# Patient Record
Sex: MALE | Race: BLACK OR AFRICAN AMERICAN | NOT HISPANIC OR LATINO | Employment: OTHER | URBAN - METROPOLITAN AREA
[De-identification: names, ages, dates, MRNs, and addresses within clinical notes are randomized per-mention and may not be internally consistent; named-entity substitution may affect disease eponyms.]

---

## 2020-09-14 ENCOUNTER — OFFICE VISIT (OUTPATIENT)
Dept: UROLOGY | Facility: CLINIC | Age: 60
End: 2020-09-14
Payer: COMMERCIAL

## 2020-09-14 ENCOUNTER — LAB VISIT (OUTPATIENT)
Dept: LAB | Facility: HOSPITAL | Age: 60
End: 2020-09-14
Attending: UROLOGY
Payer: OTHER GOVERNMENT

## 2020-09-14 VITALS
HEART RATE: 77 BPM | SYSTOLIC BLOOD PRESSURE: 150 MMHG | HEIGHT: 67 IN | RESPIRATION RATE: 18 BRPM | DIASTOLIC BLOOD PRESSURE: 94 MMHG

## 2020-09-14 DIAGNOSIS — C61 PROSTATE CANCER: ICD-10-CM

## 2020-09-14 DIAGNOSIS — C61 PROSTATE CANCER: Primary | ICD-10-CM

## 2020-09-14 DIAGNOSIS — C61 MALIGNANT NEOPLASM OF PROSTATE: ICD-10-CM

## 2020-09-14 LAB
ANION GAP SERPL CALC-SCNC: 8 MMOL/L (ref 8–16)
BUN SERPL-MCNC: 20 MG/DL (ref 8–23)
CALCIUM SERPL-MCNC: 9.3 MG/DL (ref 8.7–10.5)
CHLORIDE SERPL-SCNC: 101 MMOL/L (ref 95–110)
CO2 SERPL-SCNC: 28 MMOL/L (ref 23–29)
CREAT SERPL-MCNC: 1.3 MG/DL (ref 0.5–1.4)
EST. GFR  (AFRICAN AMERICAN): >60 ML/MIN/1.73 M^2
EST. GFR  (NON AFRICAN AMERICAN): 58.9 ML/MIN/1.73 M^2
GLUCOSE SERPL-MCNC: 96 MG/DL (ref 70–110)
POTASSIUM SERPL-SCNC: 4.4 MMOL/L (ref 3.5–5.1)
SODIUM SERPL-SCNC: 137 MMOL/L (ref 136–145)

## 2020-09-14 PROCEDURE — 80048 BASIC METABOLIC PNL TOTAL CA: CPT

## 2020-09-14 PROCEDURE — 99999 PR PBB SHADOW E&M-NEW PATIENT-LVL III: ICD-10-PCS | Mod: PBBFAC,,, | Performed by: UROLOGY

## 2020-09-14 PROCEDURE — 99204 PR OFFICE/OUTPT VISIT, NEW, LEVL IV, 45-59 MIN: ICD-10-PCS | Mod: S$GLB,,, | Performed by: UROLOGY

## 2020-09-14 PROCEDURE — 36415 COLL VENOUS BLD VENIPUNCTURE: CPT

## 2020-09-14 PROCEDURE — 99999 PR PBB SHADOW E&M-NEW PATIENT-LVL III: CPT | Mod: PBBFAC,,, | Performed by: UROLOGY

## 2020-09-14 PROCEDURE — 99204 OFFICE O/P NEW MOD 45 MIN: CPT | Mod: S$GLB,,, | Performed by: UROLOGY

## 2020-09-14 RX ORDER — TIOTROPIUM BROMIDE 18 UG/1
18 CAPSULE ORAL; RESPIRATORY (INHALATION) DAILY
COMMUNITY

## 2020-09-14 RX ORDER — CHOLECALCIFEROL (VITAMIN D3) 25 MCG
1000 TABLET ORAL DAILY
COMMUNITY

## 2020-09-14 RX ORDER — MONTELUKAST SODIUM 5 MG/1
5 TABLET, CHEWABLE ORAL NIGHTLY
COMMUNITY
End: 2024-02-16

## 2020-09-14 RX ORDER — BUDESONIDE AND FORMOTEROL FUMARATE DIHYDRATE 160; 4.5 UG/1; UG/1
2 AEROSOL RESPIRATORY (INHALATION) EVERY 12 HOURS
COMMUNITY

## 2020-09-14 RX ORDER — ALBUTEROL SULFATE 1.25 MG/3ML
1.25 SOLUTION RESPIRATORY (INHALATION) EVERY 6 HOURS PRN
COMMUNITY

## 2020-09-14 NOTE — PROGRESS NOTES
Subjective:       Patient ID: Bradley Collisn is a 60 y.o. male.    Chief Complaint:  Other (newly dx prostate cancer)      History of Present Illness  HPI  Patient is a 60 y.o. male who is new to our clinic and self-referred for evaluation of prostate cancer.  Patient underwent an MRI of the prostate due to an elevated psa.  [facility]  MRI done in 2/2020 showed a PIRADS 4 and PIRADS 3 lesion, questionable MAYRA or capsular deformity.    Underwent a fusion biopsy===patient has high volume cancer with Marsha 4+4 CaP.    Patient has good erections and minimal LUTs currently.  No prior abdominal surgeries.          Review of Systems  Review of Systems  All other systems reviewed and negative except pertinent positives noted in HPI.       Objective:     Physical Exam   Nursing note and vitals reviewed.  Constitutional: He is oriented to person, place, and time. He appears well-developed. He is cooperative.   HENT:   Head: Normocephalic.   Neck: No tracheal deviation present.   Cardiovascular: Normal rate.    Pulmonary/Chest: Effort normal. No accessory muscle usage. No tachypnea. No respiratory distress.   Abdominal: Soft. Normal appearance. He exhibits no distension, no fluid wave and no mass. There is no abdominal tenderness. There is no rebound. No hernia.       Musculoskeletal: Normal range of motion.   Neurological: He is alert and oriented to person, place, and time.   Skin: No bruising and no rash noted.     Psychiatric: His speech is normal and behavior is normal. Thought content normal.       Lab Review  No results found for: COLORU, SPECGRAV, PHUR, WBCUR, NITRITE, PROTEINUR, GLUCOSEUR, KETONESU, UROBILINOGEN, BILIRUBINUR, RBCUR      Assessment:        1. Prostate cancer    2. Malignant neoplasm of prostate            Plan:     Prostate cancer  -     Ambulatory referral/consult to Radiation Oncology; Future; Expected date: 09/21/2020  -     Basic metabolic panel; Future; Expected date:  09/14/2020    Malignant neoplasm of prostate  -     MRI Prostate W W/O Contrast; Future; Expected date: 09/14/2020    -Today's visit was spent almost entirely on counseling. 45 minutes of counseling time was spent.  We reviewed his diagnosis, stage, grade, and prognosis.  We reviewed the [facility] nomograms. We discussed the concept of low risk, moderate risk, and high risk disease. We discussed the different treatment options including active surveillance (as well as surveillance protocol), prostate brachytherapy,external bean radiation, and both open and robotic prostatectomy.We also discussed the advantages, disadvantages, risks and benefits of the different options. Regarding radiation therapy we discussed treatment planning, the different techniques, short and long term complications. These included radiation cystitis, radiation proctitis, and impotence. We discussed success, failure, and salvage therapeutic options.     We discussed surgical therapy in depth including preoperative preparation, surgical technique (including bladder neck and nerve-sparing techniques), postoperative recuperation and recovery, and short and long term complications. We discussed the risks of reoperation, incontinence and impotence. We discussed the chance of rectal injury, obturator nerve injury, and occult injury to the bowel during verress needle access.  We discussed preop and postop Kegels, post op penile rehab, and treatment options for incontinence and impotence. We discussed success, failure and salvage therapeutic options. We discussed the possible  indications for adjuvant radiation therapy.      -MRI for staging.  -patient to get [facility]  -[facility].   -patient is leaning toward XRT---will refer to radiation/oncology.  Plan to have f/u after his rad/onc consultation.

## 2020-09-30 ENCOUNTER — HOSPITAL ENCOUNTER (OUTPATIENT)
Dept: RADIOLOGY | Facility: HOSPITAL | Age: 60
Discharge: HOME | End: 2020-09-30
Attending: UROLOGY
Payer: COMMERCIAL

## 2020-09-30 DIAGNOSIS — C61 MALIGNANT NEOPLASM OF PROSTATE: ICD-10-CM

## 2020-09-30 PROCEDURE — 72197 MRI PELVIS W/O & W/DYE: CPT | Mod: 26,,, | Performed by: RADIOLOGY

## 2020-09-30 PROCEDURE — 72197 MRI PROSTATE W W/O CONTRAST: ICD-10-PCS | Mod: 26,,, | Performed by: RADIOLOGY

## 2020-09-30 PROCEDURE — 72197 MRI PELVIS W/O & W/DYE: CPT | Mod: TC

## 2020-09-30 PROCEDURE — 25500020 PHARM REV CODE 255: Performed by: UROLOGY

## 2020-09-30 PROCEDURE — A9585 GADOBUTROL INJECTION: HCPCS | Performed by: UROLOGY

## 2020-09-30 RX ORDER — GADOBUTROL 604.72 MG/ML
10 INJECTION INTRAVENOUS
Status: COMPLETED | OUTPATIENT
Start: 2020-09-30 | End: 2020-09-30

## 2020-09-30 RX ADMIN — GADOBUTROL 10 ML: 604.72 INJECTION INTRAVENOUS at 02:09

## 2020-10-05 ENCOUNTER — INITIAL CONSULT (OUTPATIENT)
Dept: RADIATION ONCOLOGY | Facility: CLINIC | Age: 60
End: 2020-10-05
Payer: COMMERCIAL

## 2020-10-05 VITALS
RESPIRATION RATE: 16 BRPM | HEIGHT: 67 IN | TEMPERATURE: 98 F | DIASTOLIC BLOOD PRESSURE: 97 MMHG | SYSTOLIC BLOOD PRESSURE: 151 MMHG | WEIGHT: 167.31 LBS | BODY MASS INDEX: 26.26 KG/M2 | HEART RATE: 80 BPM

## 2020-10-05 DIAGNOSIS — C61 PROSTATE CANCER: ICD-10-CM

## 2020-10-05 PROCEDURE — 99999 PR PBB SHADOW E&M-EST. PATIENT-LVL V: CPT | Mod: PBBFAC,,, | Performed by: RADIOLOGY

## 2020-10-05 PROCEDURE — 99999 PR PBB SHADOW E&M-EST. PATIENT-LVL V: ICD-10-PCS | Mod: PBBFAC,,, | Performed by: RADIOLOGY

## 2020-10-05 PROCEDURE — 3008F BODY MASS INDEX DOCD: CPT | Mod: CPTII,S$GLB,, | Performed by: RADIOLOGY

## 2020-10-05 PROCEDURE — 3008F PR BODY MASS INDEX (BMI) DOCUMENTED: ICD-10-PCS | Mod: CPTII,S$GLB,, | Performed by: RADIOLOGY

## 2020-10-05 PROCEDURE — 99204 PR OFFICE/OUTPT VISIT, NEW, LEVL IV, 45-59 MIN: ICD-10-PCS | Mod: S$GLB,,, | Performed by: RADIOLOGY

## 2020-10-05 PROCEDURE — 99204 OFFICE O/P NEW MOD 45 MIN: CPT | Mod: S$GLB,,, | Performed by: RADIOLOGY

## 2020-10-05 RX ORDER — CALCIUM CARBONATE 500(1250)
TABLET ORAL
COMMUNITY

## 2020-10-05 NOTE — LETTER
October 5, 2020      Darryl Greenfield MD  [address]  [facility] [address]           [facility] - Radiation Oncology 2nd Floor  [address]  [facility] [address]  Phone: [phone]  Fax: [phone]          Patient: Bradley Collins   MR Number: [id number]   YOB: 1960   Date of Visit: 10/5/2020       Dear Dr. Darryl Greenfield:    Thank you for referring Bradley Collins to me for evaluation. Attached you will find relevant portions of my assessment and plan of care.    If you have questions, please do not hesitate to call me. I look forward to following Bradley Collins along with you.    Sincerely,    Kirk Lazo Jr., MD    Enclosure  CC:  No Recipients    If you would like to receive this communication electronically, please contact [email or web address] or [phone] to request more information on [facility] Link access.    For providers and/or their staff who would like to refer a patient to [facility], please contact us through our one-stop-shop provider referral line, [facility], at [phone].    If you feel you have received this communication in error or would no longer like to receive these types of communications, please e-mail [email or web address]

## 2020-10-05 NOTE — PROGRESS NOTES
HISTORY OF PRESENT ILLNESS:   This patient presents for discussion of treatment options for his prostate cancer.    [facility]. Collins [facility] PSA of 5 ng/ml.  The patient denied family history of prostate cancer.  He underwent MRI of the prostate in February of 2020 which revealed a PI-RADS 4.  He  subsequently underwent TRUS and biopsies. Pathology reportedly revealed Marsha 8 (4+4) in 11 of 16 cores.  The patient presented to [facility] for further evaluation.  MRI at [facility] on 9/30/20 revealed a 4.8 x 4.0 x 4.7 cm prostate corresponding to a computed volume of 48.2cc.  There was a 1.1 cm T2 hypointense lesion in the Lt. apex, PI-RADS 4.  There was no extraprostatic extension.   There was a second 8 mm T2 lesion in the Lt. base, PI-RADS 3. There was no extraprostatic extension.  The neurovascular bundles and seminal vesicles were unremarkable. There was a right extra mesorectal lymph node measuring 0.7 cm    REVIEW OF SYSTEMS:   Review of Systems   Constitutional: Negative for chills, fever, malaise/fatigue and weight loss.   Respiratory: Negative for cough, sputum production and shortness of breath.    Cardiovascular: Negative for chest pain and palpitations.   Gastrointestinal: Negative for abdominal pain, constipation and diarrhea.   Genitourinary: Negative for dysuria, frequency, hematuria and urgency.        Denies ED      PAST MEDICAL HISTORY:  Past Medical History:   Diagnosis Date    Allergy     Asthma     Elevated PSA        PAST SURGICAL HISTORY:  Past Surgical History:   Procedure Laterality Date    SINUS SURGERY Bilateral 2001       ALLERGIES:   Review of patient's allergies indicates:   Allergen Reactions    Aspirin Shortness Of Breath       MEDICATIONS:  Current Outpatient Medications   Medication Sig    albuterol (ACCUNEB) 1.25 mg/3 mL Nebu Take 1.25 mg by nebulization every 6 (six) hours as needed. Rescue    budesonide-formoterol  160-4.5 mcg (SYMBICORT) 160-4.5 mcg/actuation HFAA Inhale 2 puffs into the lungs every 12 (twelve) hours. Controller    calcium carbonate (CALCIUM 500) 500 mg calcium (1,250 mg) tablet     montelukast (SINGULAIR) 5 MG chewable tablet Take 5 mg by mouth every evening.    nasal exhalation resistanc.dev (NASAL EXHALATION RESISTANCE DV NASL) by Nasal route.    rosuvastatin 10 mg CpSP     tiotropium (SPIRIVA) 18 mcg inhalation capsule Inhale 18 mcg into the lungs once daily. Controller    vitamin D (VITAMIN D3) 1000 units Tab Take 1,000 Units by mouth once daily.     No current facility-administered medications for this visit.        SOCIAL HISTORY:  Social History     Socioeconomic History    Marital status: Single     Spouse name: Not on file    Number of children: Not on file    Years of education: Not on file    Highest education level: Not on file   Occupational History    Not on file   Social Needs    Financial resource strain: Not on file    Food insecurity     Worry: Not on file     Inability: Not on file    Transportation needs     Medical: Not on file     Non-medical: Not on file   Tobacco Use    Smoking status: Never Smoker    Smokeless tobacco: Never Used   Substance and Sexual Activity    Alcohol use: Yes     Alcohol/week: 1.0 standard drinks     Types: 1 Cans of beer per week     Comment: week    Drug use: Never    Sexual activity: Not on file   Lifestyle    Physical activity     Days per week: Not on file     Minutes per session: Not on file    Stress: Not on file   Relationships    Social connections     Talks on phone: Not on file     Gets together: Not on file     Attends Alevism service: Not on file     Active member of club or organization: Not on file     Attends meetings of clubs or organizations: Not on file     Relationship status: Not on file   Other Topics Concern    Not on file   Social History Narrative    Not on file       FAMILY HISTORY:  History reviewed. No  pertinent family history.      PHYSICAL EXAMINATION:  Vitals:    10/05/20 0851   BP: (!) 151/97   Pulse: 80   Resp: 16   Temp: 97.7 °F (36.5 °C)     Physical Exam   Constitutional: He is oriented to person, place, and time and well-developed, well-nourished, and in no distress.   Pulmonary/Chest: Effort normal. No respiratory distress.   Abdominal: Soft. He exhibits no distension.   Neurological: He is alert and oriented to person, place, and time.   Psychiatric: Mood, affect and judgment normal.       ASSESSMENT/PLAN:  Marsha 8 adenocarcinoma of the prostate.     ECO    I had a long discussion with the patient.  Explained that from the available data he is believed to have high risk, high volume prostate cancer.  Discussed the implications of that classification.  Explained we would normally recommend someone in this risk group to consider some from of definitive therapy.  The patient was seen by Dr. Greenfield and is [facility].  We discussed definitive radiotherapy with external beam therapy using IMRT/ IGRT techniques +/- boost to the prostate using Palladium 103 seeds.  Discussed the indications for therapy and the procedures, risks and benefits of radiotherapy.  We had a long discussion of combining his radiotherapy with hormonal deprivation therapy.  Discussed the rational for combined modality therapy and the procedures, risks and benefits of treatment.  At this time, the patient is very reluctant to proceed with combined radiotherapy and hormonal deprivation therapy.  He feels he would like to try radiotherapy alone.  We will plan to obtain his outside records and [facility].  Will plan to start radiotherapy once we have verified his biopsy results.  Thank you for allowing us to participate in the care of this patient.      Psychosocial Distress screening score of Distress Score: 0 noted and reviewed. No intervention indicated.    I spent  approximately 50 minutes reviewing the available records and evaluating the patient, out of which over 50% of the time was spent face to face with the patient in counseling and coordinating this patient's care.

## 2020-11-04 ENCOUNTER — OFFICE VISIT (OUTPATIENT)
Dept: RADIATION ONCOLOGY | Facility: CLINIC | Age: 60
End: 2020-11-04
Attending: RADIOLOGY
Payer: OTHER GOVERNMENT

## 2020-11-04 VITALS
RESPIRATION RATE: 16 BRPM | HEIGHT: 67 IN | DIASTOLIC BLOOD PRESSURE: 78 MMHG | WEIGHT: 167.63 LBS | SYSTOLIC BLOOD PRESSURE: 142 MMHG | BODY MASS INDEX: 26.31 KG/M2 | HEART RATE: 92 BPM

## 2020-11-04 DIAGNOSIS — C61 PROSTATE CANCER: Primary | ICD-10-CM

## 2020-11-04 PROCEDURE — 99999 PR PBB SHADOW E&M-EST. PATIENT-LVL III: ICD-10-PCS | Mod: PBBFAC,,, | Performed by: RADIOLOGY

## 2020-11-04 PROCEDURE — 99213 OFFICE O/P EST LOW 20 MIN: CPT | Mod: PBBFAC | Performed by: RADIOLOGY

## 2020-11-04 PROCEDURE — 99999 PR PBB SHADOW E&M-EST. PATIENT-LVL III: CPT | Mod: PBBFAC,,, | Performed by: RADIOLOGY

## 2020-11-04 PROCEDURE — 99212 PR OFFICE/OUTPT VISIT, EST, LEVL II, 10-19 MIN: ICD-10-PCS | Mod: S$PBB,,, | Performed by: RADIOLOGY

## 2020-11-04 PROCEDURE — 99212 OFFICE O/P EST SF 10 MIN: CPT | Mod: S$PBB,,, | Performed by: RADIOLOGY

## 2020-11-04 NOTE — PROGRESS NOTES
Subjective:       Patient ID: Bradley Collins is a 60 y.o. male.    Chief Complaint: Prostate Cancer (f/u)    This patient returns for follow up visit.     Mr. Collins was recently diagnosed with Stage GURDEEP (cT1c, cN1, cM0, PSA: 5, Grade Group: 4) adenocarcinoma of the prostate.  [facility] of 5.  Biopsies from the Lt. medial apex revealed Marsha 8 (4+4) disease.  Biopsies from the Lt. lateral apex, Lt. medial mid gland and Rt. medial mid gland revealed Marsha 7 (4+3) disease.  Biopsies from the Lt. lateral base, Lt. medial base and a region of interest revealed Marsha 7 (3+4).  Biopsies from the Rt. medial base, Rt. lateral mid gland and two regions of interest revealed Marsha 6 (3+3) disease.  MRI at [facility] on 9/30/20 revealed a 4.8 x 4.0 x 4.7 cm prostate corresponding to a computed volume of 48.2cc.  There was a 1.1 cm T2 hypointense lesion in the Lt. apex, PI-RADS 4.  There was no extraprostatic extension.   There was a second 8 mm T2 lesion in the Lt. base, PI-RADS 3. There was no extraprostatic extension.  The neurovascular bundles and seminal vesicles were unremarkable. There was a right extra mesorectal lymph node measuring 0.7 cm  The patient returns today for further discussion of treatment options.  Today the patient states he feels well.  No  complaints.     Review of Systems   Constitutional: Negative for activity change, appetite change, chills and fatigue.   Respiratory: Negative for cough and shortness of breath.    Cardiovascular: Negative for chest pain and palpitations.   Gastrointestinal: Negative for abdominal pain, constipation and diarrhea.   Genitourinary: Positive for frequency. Negative for bladder incontinence, difficulty urinating and dysuria.        Notes mild ED         Objective:      Physical Exam  Constitutional:       Appearance: Normal appearance.   Pulmonary:      Effort: Pulmonary effort is normal. No respiratory  distress.   Abdominal:      General: Abdomen is flat. There is no distension.   Neurological:      Mental Status: He is alert and oriented to person, place, and time.   Psychiatric:         Mood and Affect: Mood normal.         Judgment: Judgment normal.         Assessment:         Stage GURDEEP adenocarcinoma of the prostate.   Plan:       I had a long discussion with the patient.  We reviewed his pathology report and the MRI images.  Explained he is considered to have high risk, high volume prostate cancer.  We discussed the implications of this classification.  Explained we would recommend definitive radiotherapy combined with hormonal deprivation therapy.  Discussed the procedures, risks and benefits of therapy.  Discussed the acute and long term side effects.  The patient remains very reluctant to consider hormonal deprivation therapy.  States he would like to proceed with radiotherapy alone.  He understands the overall success of the treatment is decreased with radiotherapy alone vs. combined radiotherapy and hormonal deprivation therapy.  Will plan to proceed with radiotherapy to the prostate, seminal vesicles and pelvic nodes.  Will plan boost to the prostate using Palladium 103 seeds.  Plan marker placement and simulation

## 2020-11-10 DIAGNOSIS — C61 PROSTATE CANCER: Primary | ICD-10-CM

## 2020-11-10 RX ORDER — CIPROFLOXACIN 500 MG/1
TABLET ORAL
Qty: 5 TABLET | Refills: 0 | Status: SHIPPED | OUTPATIENT
Start: 2020-11-10 | End: 2024-02-16

## 2020-11-18 ENCOUNTER — PROCEDURE VISIT (OUTPATIENT)
Dept: UROLOGY | Facility: CLINIC | Age: 60
End: 2020-11-18
Payer: OTHER GOVERNMENT

## 2020-11-18 ENCOUNTER — HOSPITAL ENCOUNTER (OUTPATIENT)
Dept: RADIATION THERAPY | Facility: HOSPITAL | Age: 60
Discharge: HOME | End: 2020-11-18
Attending: RADIOLOGY
Payer: OTHER GOVERNMENT

## 2020-11-18 VITALS
TEMPERATURE: 98 F | WEIGHT: 167.25 LBS | BODY MASS INDEX: 26.88 KG/M2 | DIASTOLIC BLOOD PRESSURE: 101 MMHG | RESPIRATION RATE: 16 BRPM | HEIGHT: 66 IN | HEART RATE: 73 BPM | SYSTOLIC BLOOD PRESSURE: 163 MMHG

## 2020-11-18 DIAGNOSIS — C61 PROSTATE CANCER: ICD-10-CM

## 2020-11-18 PROCEDURE — 77263 THER RADIOLOGY TX PLNG CPLX: CPT | Mod: ,,, | Performed by: RADIOLOGY

## 2020-11-18 PROCEDURE — 76942 ECHO GUIDE FOR BIOPSY: CPT | Mod: PBBFAC,59 | Performed by: UROLOGY

## 2020-11-18 PROCEDURE — 76872 US TRANSRECTAL: CPT | Mod: PBBFAC | Performed by: UROLOGY

## 2020-11-18 PROCEDURE — 77290 THER RAD SIMULAJ FIELD CPLX: CPT | Mod: 26,,, | Performed by: RADIOLOGY

## 2020-11-18 PROCEDURE — 77290 PR  SET RADN THERAPY FIELD COMPLEX: ICD-10-PCS | Mod: 26,,, | Performed by: RADIOLOGY

## 2020-11-18 PROCEDURE — 77334 RADIATION TREATMENT AID(S): CPT | Mod: TC | Performed by: RADIOLOGY

## 2020-11-18 PROCEDURE — 55876 PLACE RT DEVICE/MARKER PROS: CPT | Mod: S$PBB,,, | Performed by: UROLOGY

## 2020-11-18 PROCEDURE — 77334 PR  RADN TREATMENT AID(S) COMPLX: ICD-10-PCS | Mod: 26,,, | Performed by: RADIOLOGY

## 2020-11-18 PROCEDURE — 77014 HC CT GUIDANCE RADIATION THERAPY FLDS PLACEMENT: CPT | Mod: TC | Performed by: RADIOLOGY

## 2020-11-18 PROCEDURE — 77290 THER RAD SIMULAJ FIELD CPLX: CPT | Mod: TC | Performed by: RADIOLOGY

## 2020-11-18 PROCEDURE — 76942 ECHO GUIDE FOR BIOPSY: CPT | Mod: 26,S$PBB,, | Performed by: UROLOGY

## 2020-11-18 PROCEDURE — 77263 PR  RADIATION THERAPY PLAN COMPLEX: ICD-10-PCS | Mod: ,,, | Performed by: RADIOLOGY

## 2020-11-18 PROCEDURE — A4648 IMPLANTABLE TISSUE MARKER: HCPCS | Mod: PBBFAC | Performed by: UROLOGY

## 2020-11-18 PROCEDURE — 77334 RADIATION TREATMENT AID(S): CPT | Mod: 26,,, | Performed by: RADIOLOGY

## 2020-11-18 PROCEDURE — 55876 PR PLACE RADIOTHER DEVICE/MARKER, PROSTATE: ICD-10-PCS | Mod: S$PBB,,, | Performed by: UROLOGY

## 2020-11-18 PROCEDURE — 76942 PR U/S GUIDANCE FOR NEEDLE GUIDANCE: ICD-10-PCS | Mod: 26,S$PBB,, | Performed by: UROLOGY

## 2020-11-18 RX ORDER — LIDOCAINE HYDROCHLORIDE 10 MG/ML
10 INJECTION INFILTRATION; PERINEURAL
Status: COMPLETED | OUTPATIENT
Start: 2020-11-18 | End: 2020-11-18

## 2020-11-18 RX ORDER — LIDOCAINE HYDROCHLORIDE 20 MG/ML
JELLY TOPICAL
Status: COMPLETED | OUTPATIENT
Start: 2020-11-18 | End: 2020-11-18

## 2020-11-18 RX ADMIN — LIDOCAINE HYDROCHLORIDE 10 ML: 10 INJECTION INFILTRATION; PERINEURAL at 01:11

## 2020-11-18 RX ADMIN — LIDOCAINE HYDROCHLORIDE: 20 JELLY TOPICAL at 01:11

## 2020-11-18 NOTE — PATIENT INSTRUCTIONS
What to Expect After a TRUS (Transrectal Ultrasound) with Fiducial Marker Placement    Please be sure to finish your pre-procedure antibiotics as instructed.    You may have mild bleeding from the rectum or urine from about 1 week to 1 month, or in your ejaculate for several months. This bleeding is normal and expected, and it will stop. You may have mild discomfort in your rectal or urethral area for 24-48 hours.    You cannot do any strenuous lifting, straining, or exercising for 24 hours. You may return to full activity the day after the procedure.    You may continue to take all your regular medications after the procedure except for the blood thinners.    You may resume all blood-thinning medications once you no longer see any bleeding or whenever your physician prescribing the medication says it is all right to do so. You may take Tylenol if you have a fever and your temperature is less than 100° F or if you have some discomfort.    Signs and Symptoms to Report    Call your [facility] urologist at [phone] if you develop any of the following:  · Temperature greater than 101° F  · Inability to urinate  · A large amount of bleeding from the rectum or in the urine  · Persistent or severe pain    After hours or on weekends, you may reach a urology resident on call at this number: [phone].  
- - -

## 2020-11-18 NOTE — PROCEDURES
Procedures   Date: 11/18/2020  Diagnosis:Prostae cancer  Procedure: Prostate ultrasound with gold fiducial seed insertion  Surgeon: Jean Pierre   Diagnosis: Prostate Cancer  Procedure Note: Patient placed in lateral position. Prepped in usual fashion. Probe inserted and prostate visualized. Bilateral pudendal nerve block performed-10 ccs 1% lidocaine on right and 10 ccs on left. Using image guidance a 1 cm. Gold seed was placed at right base and a second seed at left apex. No evidence of bleeding or hematoma.  Complications: None  F/U: with Dr. Lazo for XRT.

## 2020-11-24 PROCEDURE — 77300 RADIATION THERAPY DOSE PLAN: CPT | Mod: TC | Performed by: RADIOLOGY

## 2020-11-24 PROCEDURE — 77300 RADIATION THERAPY DOSE PLAN: CPT | Mod: 26,,, | Performed by: RADIOLOGY

## 2020-11-24 PROCEDURE — 77300 PR RADIATION THERAPY,DOSIMETRY PLAN: ICD-10-PCS | Mod: 26,,, | Performed by: RADIOLOGY

## 2020-11-24 PROCEDURE — 77301 RADIOTHERAPY DOSE PLAN IMRT: CPT | Mod: TC | Performed by: RADIOLOGY

## 2020-11-24 PROCEDURE — 77301 RADIOTHERAPY DOSE PLAN IMRT: CPT | Mod: 26,,, | Performed by: RADIOLOGY

## 2020-11-24 PROCEDURE — 77338 PR  MLC IMRT DESIGN & CONSTRUCTION PER IMRT PLAN: ICD-10-PCS | Mod: 26,,, | Performed by: RADIOLOGY

## 2020-11-24 PROCEDURE — 77338 DESIGN MLC DEVICE FOR IMRT: CPT | Mod: TC | Performed by: RADIOLOGY

## 2020-11-24 PROCEDURE — 77301 PR  INTEN MOD RADIOTHER PLAN W/DOSE VOL HIST: ICD-10-PCS | Mod: 26,,, | Performed by: RADIOLOGY

## 2020-11-24 PROCEDURE — 77338 DESIGN MLC DEVICE FOR IMRT: CPT | Mod: 26,,, | Performed by: RADIOLOGY

## 2020-11-30 PROCEDURE — 77014 PR  CT GUIDANCE PLACEMENT RAD THERAPY FIELDS: ICD-10-PCS | Mod: 26,,, | Performed by: RADIOLOGY

## 2020-11-30 PROCEDURE — 77014 HC CT GUIDANCE RADIATION THERAPY FLDS PLACEMENT: CPT | Mod: TC | Performed by: RADIOLOGY

## 2020-11-30 PROCEDURE — 77014 PR  CT GUIDANCE PLACEMENT RAD THERAPY FIELDS: CPT | Mod: 26,,, | Performed by: RADIOLOGY

## 2020-11-30 PROCEDURE — 77385 HC IMRT, SIMPLE: CPT | Performed by: RADIOLOGY

## 2020-12-01 ENCOUNTER — APPOINTMENT (OUTPATIENT)
Dept: RADIATION THERAPY | Facility: OTHER | Age: 60
End: 2020-12-01
Attending: RADIOLOGY
Payer: OTHER GOVERNMENT

## 2020-12-01 PROCEDURE — 77014 PR  CT GUIDANCE PLACEMENT RAD THERAPY FIELDS: ICD-10-PCS | Mod: 26,,, | Performed by: RADIOLOGY

## 2020-12-01 PROCEDURE — 77014 HC CT GUIDANCE RADIATION THERAPY FLDS PLACEMENT: CPT | Mod: TC | Performed by: RADIOLOGY

## 2020-12-01 PROCEDURE — 77014 PR  CT GUIDANCE PLACEMENT RAD THERAPY FIELDS: CPT | Mod: 26,,, | Performed by: RADIOLOGY

## 2020-12-01 PROCEDURE — 77385 HC IMRT, SIMPLE: CPT | Performed by: RADIOLOGY

## 2020-12-02 ENCOUNTER — DOCUMENTATION ONLY (OUTPATIENT)
Dept: RADIATION ONCOLOGY | Facility: CLINIC | Age: 60
End: 2020-12-02

## 2020-12-02 PROCEDURE — 77014 PR  CT GUIDANCE PLACEMENT RAD THERAPY FIELDS: ICD-10-PCS | Mod: 26,,, | Performed by: RADIOLOGY

## 2020-12-02 PROCEDURE — 77014 HC CT GUIDANCE RADIATION THERAPY FLDS PLACEMENT: CPT | Mod: TC | Performed by: RADIOLOGY

## 2020-12-02 PROCEDURE — 77014 PR  CT GUIDANCE PLACEMENT RAD THERAPY FIELDS: CPT | Mod: 26,,, | Performed by: RADIOLOGY

## 2020-12-02 PROCEDURE — 77385 HC IMRT, SIMPLE: CPT | Performed by: RADIOLOGY

## 2020-12-03 PROCEDURE — 77014 PR  CT GUIDANCE PLACEMENT RAD THERAPY FIELDS: CPT | Mod: 26,,, | Performed by: RADIOLOGY

## 2020-12-03 PROCEDURE — 77014 PR  CT GUIDANCE PLACEMENT RAD THERAPY FIELDS: ICD-10-PCS | Mod: 26,,, | Performed by: RADIOLOGY

## 2020-12-03 PROCEDURE — 77014 HC CT GUIDANCE RADIATION THERAPY FLDS PLACEMENT: CPT | Mod: TC | Performed by: RADIOLOGY

## 2020-12-03 PROCEDURE — 77385 HC IMRT, SIMPLE: CPT | Performed by: RADIOLOGY

## 2020-12-04 PROCEDURE — 77385 HC IMRT, SIMPLE: CPT | Performed by: RADIOLOGY

## 2020-12-04 PROCEDURE — 77014 PR  CT GUIDANCE PLACEMENT RAD THERAPY FIELDS: CPT | Mod: 26,,, | Performed by: RADIOLOGY

## 2020-12-04 PROCEDURE — 77014 PR  CT GUIDANCE PLACEMENT RAD THERAPY FIELDS: ICD-10-PCS | Mod: 26,,, | Performed by: RADIOLOGY

## 2020-12-04 PROCEDURE — 77014 HC CT GUIDANCE RADIATION THERAPY FLDS PLACEMENT: CPT | Mod: TC | Performed by: RADIOLOGY

## 2020-12-07 PROCEDURE — 77336 RADIATION PHYSICS CONSULT: CPT | Performed by: RADIOLOGY

## 2020-12-07 PROCEDURE — G6002 PR STEREOSCOPIC XRAY GUIDE FOR RADIATION TX DELIV: ICD-10-PCS | Mod: 26,,, | Performed by: RADIOLOGY

## 2020-12-07 PROCEDURE — G6002 STEREOSCOPIC X-RAY GUIDANCE: HCPCS | Mod: 26,,, | Performed by: RADIOLOGY

## 2020-12-07 PROCEDURE — 77385 HC IMRT, SIMPLE: CPT | Performed by: RADIOLOGY

## 2020-12-07 PROCEDURE — 77417 THER RADIOLOGY PORT IMAGE(S): CPT | Performed by: RADIOLOGY

## 2020-12-08 PROCEDURE — 77014 PR  CT GUIDANCE PLACEMENT RAD THERAPY FIELDS: ICD-10-PCS | Mod: 26,,, | Performed by: RADIOLOGY

## 2020-12-08 PROCEDURE — 77385 HC IMRT, SIMPLE: CPT | Performed by: RADIOLOGY

## 2020-12-08 PROCEDURE — 77014 PR  CT GUIDANCE PLACEMENT RAD THERAPY FIELDS: CPT | Mod: 26,,, | Performed by: RADIOLOGY

## 2020-12-08 PROCEDURE — 77014 HC CT GUIDANCE RADIATION THERAPY FLDS PLACEMENT: CPT | Mod: TC | Performed by: RADIOLOGY

## 2020-12-09 ENCOUNTER — DOCUMENTATION ONLY (OUTPATIENT)
Dept: RADIATION ONCOLOGY | Facility: CLINIC | Age: 60
End: 2020-12-09

## 2020-12-09 PROCEDURE — 77014 PR  CT GUIDANCE PLACEMENT RAD THERAPY FIELDS: ICD-10-PCS | Mod: 26,,, | Performed by: RADIOLOGY

## 2020-12-09 PROCEDURE — 77385 HC IMRT, SIMPLE: CPT | Performed by: RADIOLOGY

## 2020-12-09 PROCEDURE — 77014 PR  CT GUIDANCE PLACEMENT RAD THERAPY FIELDS: CPT | Mod: 26,,, | Performed by: RADIOLOGY

## 2020-12-09 PROCEDURE — 77014 HC CT GUIDANCE RADIATION THERAPY FLDS PLACEMENT: CPT | Mod: TC | Performed by: RADIOLOGY

## 2020-12-10 PROCEDURE — G6002 STEREOSCOPIC X-RAY GUIDANCE: HCPCS | Mod: 26,,, | Performed by: RADIOLOGY

## 2020-12-10 PROCEDURE — 77385 HC IMRT, SIMPLE: CPT | Performed by: RADIOLOGY

## 2020-12-10 PROCEDURE — G6002 PR STEREOSCOPIC XRAY GUIDE FOR RADIATION TX DELIV: ICD-10-PCS | Mod: 26,,, | Performed by: RADIOLOGY

## 2020-12-11 PROCEDURE — 77385 HC IMRT, SIMPLE: CPT | Performed by: RADIOLOGY

## 2020-12-11 PROCEDURE — 77014 HC CT GUIDANCE RADIATION THERAPY FLDS PLACEMENT: CPT | Mod: TC | Performed by: RADIOLOGY

## 2020-12-11 PROCEDURE — 77014 PR  CT GUIDANCE PLACEMENT RAD THERAPY FIELDS: ICD-10-PCS | Mod: 26,,, | Performed by: RADIOLOGY

## 2020-12-11 PROCEDURE — 77014 PR  CT GUIDANCE PLACEMENT RAD THERAPY FIELDS: CPT | Mod: 26,,, | Performed by: RADIOLOGY

## 2020-12-14 PROCEDURE — 77336 RADIATION PHYSICS CONSULT: CPT | Performed by: RADIOLOGY

## 2020-12-14 PROCEDURE — 77417 THER RADIOLOGY PORT IMAGE(S): CPT | Performed by: RADIOLOGY

## 2020-12-14 PROCEDURE — G6002 STEREOSCOPIC X-RAY GUIDANCE: HCPCS | Mod: 26,,, | Performed by: RADIOLOGY

## 2020-12-14 PROCEDURE — G6002 PR STEREOSCOPIC XRAY GUIDE FOR RADIATION TX DELIV: ICD-10-PCS | Mod: 26,,, | Performed by: RADIOLOGY

## 2020-12-14 PROCEDURE — 77385 HC IMRT, SIMPLE: CPT | Performed by: RADIOLOGY

## 2020-12-15 PROCEDURE — 77014 HC CT GUIDANCE RADIATION THERAPY FLDS PLACEMENT: CPT | Mod: TC | Performed by: RADIOLOGY

## 2020-12-15 PROCEDURE — 77014 PR  CT GUIDANCE PLACEMENT RAD THERAPY FIELDS: CPT | Mod: 26,,, | Performed by: RADIOLOGY

## 2020-12-15 PROCEDURE — 77385 HC IMRT, SIMPLE: CPT | Performed by: RADIOLOGY

## 2020-12-15 PROCEDURE — 77014 PR  CT GUIDANCE PLACEMENT RAD THERAPY FIELDS: ICD-10-PCS | Mod: 26,,, | Performed by: RADIOLOGY

## 2020-12-16 PROCEDURE — 77417 THER RADIOLOGY PORT IMAGE(S): CPT | Performed by: RADIOLOGY

## 2020-12-16 PROCEDURE — 77385 HC IMRT, SIMPLE: CPT | Performed by: RADIOLOGY

## 2020-12-16 PROCEDURE — G6002 PR STEREOSCOPIC XRAY GUIDE FOR RADIATION TX DELIV: ICD-10-PCS | Mod: 26,,, | Performed by: RADIOLOGY

## 2020-12-16 PROCEDURE — G6002 STEREOSCOPIC X-RAY GUIDANCE: HCPCS | Mod: 26,,, | Performed by: RADIOLOGY

## 2020-12-17 PROCEDURE — 77014 PR  CT GUIDANCE PLACEMENT RAD THERAPY FIELDS: ICD-10-PCS | Mod: 26,,, | Performed by: RADIOLOGY

## 2020-12-17 PROCEDURE — 77014 HC CT GUIDANCE RADIATION THERAPY FLDS PLACEMENT: CPT | Mod: TC | Performed by: RADIOLOGY

## 2020-12-17 PROCEDURE — 77385 HC IMRT, SIMPLE: CPT | Performed by: RADIOLOGY

## 2020-12-17 PROCEDURE — 77014 PR  CT GUIDANCE PLACEMENT RAD THERAPY FIELDS: CPT | Mod: 26,,, | Performed by: RADIOLOGY

## 2020-12-18 ENCOUNTER — DOCUMENTATION ONLY (OUTPATIENT)
Dept: RADIATION ONCOLOGY | Facility: CLINIC | Age: 60
End: 2020-12-18

## 2020-12-18 PROCEDURE — G6002 PR STEREOSCOPIC XRAY GUIDE FOR RADIATION TX DELIV: ICD-10-PCS | Mod: 26,,, | Performed by: RADIOLOGY

## 2020-12-18 PROCEDURE — G6002 STEREOSCOPIC X-RAY GUIDANCE: HCPCS | Mod: 26,,, | Performed by: RADIOLOGY

## 2020-12-18 PROCEDURE — 77385 HC IMRT, SIMPLE: CPT | Performed by: RADIOLOGY

## 2020-12-21 PROCEDURE — 77336 RADIATION PHYSICS CONSULT: CPT | Performed by: RADIOLOGY

## 2020-12-21 PROCEDURE — 77014 HC CT GUIDANCE RADIATION THERAPY FLDS PLACEMENT: CPT | Mod: TC | Performed by: RADIOLOGY

## 2020-12-21 PROCEDURE — 77014 PR  CT GUIDANCE PLACEMENT RAD THERAPY FIELDS: ICD-10-PCS | Mod: 26,,, | Performed by: RADIOLOGY

## 2020-12-21 PROCEDURE — 77385 HC IMRT, SIMPLE: CPT | Performed by: RADIOLOGY

## 2020-12-21 PROCEDURE — 77014 PR  CT GUIDANCE PLACEMENT RAD THERAPY FIELDS: CPT | Mod: 26,,, | Performed by: RADIOLOGY

## 2020-12-22 PROCEDURE — G6002 PR STEREOSCOPIC XRAY GUIDE FOR RADIATION TX DELIV: ICD-10-PCS | Mod: 26,,, | Performed by: RADIOLOGY

## 2020-12-22 PROCEDURE — G6002 STEREOSCOPIC X-RAY GUIDANCE: HCPCS | Mod: 26,,, | Performed by: RADIOLOGY

## 2020-12-22 PROCEDURE — 77385 HC IMRT, SIMPLE: CPT | Performed by: RADIOLOGY

## 2020-12-23 ENCOUNTER — DOCUMENTATION ONLY (OUTPATIENT)
Dept: RADIATION ONCOLOGY | Facility: CLINIC | Age: 60
End: 2020-12-23

## 2020-12-23 PROCEDURE — 77385 HC IMRT, SIMPLE: CPT | Performed by: RADIOLOGY

## 2020-12-23 PROCEDURE — 77014 HC CT GUIDANCE RADIATION THERAPY FLDS PLACEMENT: CPT | Mod: TC | Performed by: RADIOLOGY

## 2020-12-23 PROCEDURE — 77014 PR  CT GUIDANCE PLACEMENT RAD THERAPY FIELDS: CPT | Mod: 26,,, | Performed by: RADIOLOGY

## 2020-12-23 PROCEDURE — 77014 PR  CT GUIDANCE PLACEMENT RAD THERAPY FIELDS: ICD-10-PCS | Mod: 26,,, | Performed by: RADIOLOGY

## 2020-12-24 PROCEDURE — 77417 THER RADIOLOGY PORT IMAGE(S): CPT | Performed by: RADIOLOGY

## 2020-12-24 PROCEDURE — 77385 HC IMRT, SIMPLE: CPT | Performed by: RADIOLOGY

## 2020-12-24 PROCEDURE — G6002 STEREOSCOPIC X-RAY GUIDANCE: HCPCS | Mod: 26,,, | Performed by: RADIOLOGY

## 2020-12-24 PROCEDURE — G6002 PR STEREOSCOPIC XRAY GUIDE FOR RADIATION TX DELIV: ICD-10-PCS | Mod: 26,,, | Performed by: RADIOLOGY

## 2020-12-28 PROCEDURE — 77014 PR  CT GUIDANCE PLACEMENT RAD THERAPY FIELDS: CPT | Mod: 26,,, | Performed by: RADIOLOGY

## 2020-12-28 PROCEDURE — 77014 PR  CT GUIDANCE PLACEMENT RAD THERAPY FIELDS: ICD-10-PCS | Mod: 26,,, | Performed by: RADIOLOGY

## 2020-12-28 PROCEDURE — 77014 HC CT GUIDANCE RADIATION THERAPY FLDS PLACEMENT: CPT | Mod: TC | Performed by: RADIOLOGY

## 2020-12-28 PROCEDURE — 77385 HC IMRT, SIMPLE: CPT | Performed by: RADIOLOGY

## 2020-12-29 PROCEDURE — G6002 STEREOSCOPIC X-RAY GUIDANCE: HCPCS | Mod: 26,,, | Performed by: RADIOLOGY

## 2020-12-29 PROCEDURE — 77385 HC IMRT, SIMPLE: CPT | Performed by: RADIOLOGY

## 2020-12-29 PROCEDURE — G6002 PR STEREOSCOPIC XRAY GUIDE FOR RADIATION TX DELIV: ICD-10-PCS | Mod: 26,,, | Performed by: RADIOLOGY

## 2020-12-30 ENCOUNTER — DOCUMENTATION ONLY (OUTPATIENT)
Dept: RADIATION ONCOLOGY | Facility: CLINIC | Age: 60
End: 2020-12-30

## 2020-12-30 PROCEDURE — 77014 PR  CT GUIDANCE PLACEMENT RAD THERAPY FIELDS: ICD-10-PCS | Mod: 26,,, | Performed by: RADIOLOGY

## 2020-12-30 PROCEDURE — 77385 HC IMRT, SIMPLE: CPT | Performed by: RADIOLOGY

## 2020-12-30 PROCEDURE — 77336 RADIATION PHYSICS CONSULT: CPT | Performed by: RADIOLOGY

## 2020-12-30 PROCEDURE — 77014 HC CT GUIDANCE RADIATION THERAPY FLDS PLACEMENT: CPT | Mod: TC | Performed by: RADIOLOGY

## 2020-12-30 PROCEDURE — 77014 PR  CT GUIDANCE PLACEMENT RAD THERAPY FIELDS: CPT | Mod: 26,,, | Performed by: RADIOLOGY

## 2020-12-31 PROCEDURE — 77385 HC IMRT, SIMPLE: CPT | Performed by: RADIOLOGY

## 2020-12-31 PROCEDURE — G6002 STEREOSCOPIC X-RAY GUIDANCE: HCPCS | Mod: 26,,, | Performed by: RADIOLOGY

## 2020-12-31 PROCEDURE — G6002 PR STEREOSCOPIC XRAY GUIDE FOR RADIATION TX DELIV: ICD-10-PCS | Mod: 26,,, | Performed by: RADIOLOGY

## 2021-01-04 ENCOUNTER — APPOINTMENT (OUTPATIENT)
Dept: RADIATION THERAPY | Facility: OTHER | Age: 61
End: 2021-01-04
Attending: RADIOLOGY
Payer: OTHER GOVERNMENT

## 2021-01-04 PROCEDURE — 77014 PR  CT GUIDANCE PLACEMENT RAD THERAPY FIELDS: ICD-10-PCS | Mod: 26,,, | Performed by: RADIOLOGY

## 2021-01-04 PROCEDURE — 77014 HC CT GUIDANCE RADIATION THERAPY FLDS PLACEMENT: CPT | Mod: TC | Performed by: RADIOLOGY

## 2021-01-04 PROCEDURE — 77385 HC IMRT, SIMPLE: CPT | Performed by: RADIOLOGY

## 2021-01-04 PROCEDURE — 77014 PR  CT GUIDANCE PLACEMENT RAD THERAPY FIELDS: CPT | Mod: 26,,, | Performed by: RADIOLOGY

## 2021-01-05 PROCEDURE — G6002 STEREOSCOPIC X-RAY GUIDANCE: HCPCS | Mod: 26,,, | Performed by: RADIOLOGY

## 2021-01-05 PROCEDURE — 77417 THER RADIOLOGY PORT IMAGE(S): CPT | Performed by: RADIOLOGY

## 2021-01-05 PROCEDURE — G6002 PR STEREOSCOPIC XRAY GUIDE FOR RADIATION TX DELIV: ICD-10-PCS | Mod: 26,,, | Performed by: RADIOLOGY

## 2021-01-05 PROCEDURE — 77385 HC IMRT, SIMPLE: CPT | Performed by: RADIOLOGY

## 2021-01-06 ENCOUNTER — DOCUMENTATION ONLY (OUTPATIENT)
Dept: RADIATION ONCOLOGY | Facility: CLINIC | Age: 61
End: 2021-01-06

## 2021-01-06 PROCEDURE — 77014 PR  CT GUIDANCE PLACEMENT RAD THERAPY FIELDS: CPT | Mod: 26,,, | Performed by: RADIOLOGY

## 2021-01-06 PROCEDURE — 77014 PR  CT GUIDANCE PLACEMENT RAD THERAPY FIELDS: ICD-10-PCS | Mod: 26,,, | Performed by: RADIOLOGY

## 2021-01-06 PROCEDURE — 77385 HC IMRT, SIMPLE: CPT | Performed by: RADIOLOGY

## 2021-01-06 PROCEDURE — 77014 HC CT GUIDANCE RADIATION THERAPY FLDS PLACEMENT: CPT | Mod: TC | Performed by: RADIOLOGY

## 2021-01-07 PROCEDURE — 77014 PR  CT GUIDANCE PLACEMENT RAD THERAPY FIELDS: ICD-10-PCS | Mod: 26,,, | Performed by: RADIOLOGY

## 2021-01-07 PROCEDURE — 77385 HC IMRT, SIMPLE: CPT | Performed by: RADIOLOGY

## 2021-01-07 PROCEDURE — 77014 HC CT GUIDANCE RADIATION THERAPY FLDS PLACEMENT: CPT | Mod: TC | Performed by: RADIOLOGY

## 2021-01-07 PROCEDURE — 77014 PR  CT GUIDANCE PLACEMENT RAD THERAPY FIELDS: CPT | Mod: 26,,, | Performed by: RADIOLOGY

## 2021-01-07 PROCEDURE — 77336 RADIATION PHYSICS CONSULT: CPT | Performed by: RADIOLOGY

## 2021-01-08 ENCOUNTER — DOCUMENTATION ONLY (OUTPATIENT)
Dept: RADIATION ONCOLOGY | Facility: CLINIC | Age: 61
End: 2021-01-08

## 2021-01-08 PROCEDURE — 77385 HC IMRT, SIMPLE: CPT | Performed by: RADIOLOGY

## 2021-01-08 PROCEDURE — G6002 PR STEREOSCOPIC XRAY GUIDE FOR RADIATION TX DELIV: ICD-10-PCS | Mod: 26,,, | Performed by: RADIOLOGY

## 2021-01-08 PROCEDURE — G6002 STEREOSCOPIC X-RAY GUIDANCE: HCPCS | Mod: 26,,, | Performed by: RADIOLOGY

## 2021-01-16 ENCOUNTER — TELEPHONE (OUTPATIENT)
Dept: RADIATION ONCOLOGY | Facility: CLINIC | Age: 61
End: 2021-01-16

## 2021-03-24 ENCOUNTER — OFFICE VISIT (OUTPATIENT)
Dept: RADIATION ONCOLOGY | Facility: CLINIC | Age: 61
End: 2021-03-24
Attending: RADIOLOGY
Payer: OTHER GOVERNMENT

## 2021-03-24 DIAGNOSIS — C61 PROSTATE CANCER: Primary | ICD-10-CM

## 2021-03-24 PROCEDURE — 99499 UNLISTED E&M SERVICE: CPT | Mod: 95,,, | Performed by: RADIOLOGY

## 2021-03-24 PROCEDURE — 99499 NO LOS: ICD-10-PCS | Mod: 95,,, | Performed by: RADIOLOGY

## 2021-04-16 ENCOUNTER — PATIENT MESSAGE (OUTPATIENT)
Dept: RESEARCH | Facility: HOSPITAL | Age: 61
End: 2021-04-16

## 2023-06-08 ENCOUNTER — TELEPHONE (OUTPATIENT)
Dept: ENDOSCOPY | Facility: HOSPITAL | Age: 63
End: 2023-06-08

## 2023-06-08 DIAGNOSIS — K86.2 PANCREATIC CYST: Primary | ICD-10-CM

## 2023-06-08 NOTE — TELEPHONE ENCOUNTER
----- Message from Talon Austin MD sent at 6/8/2023  7:49 AM CDT -----  Ok, EUS for pancreas cyst  ----- Message -----  From: Liz Ricks MA  Sent: 6/7/2023   3:10 PM CDT  To: Talon Austin MD      ----- Message -----  From: Cris Strickland  Sent: 6/7/2023   2:12 PM CDT  To: [facility] Endo Schedulers    Good Afternoon,     [facility] would like to refer the following patient to Dr. Hansen in the Gastro  department for an EUS . 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 I have scanned the patients referral and records into .     Thank you,    Cris   [facility]

## 2023-06-19 ENCOUNTER — TELEPHONE (OUTPATIENT)
Dept: ENDOSCOPY | Facility: HOSPITAL | Age: 63
End: 2023-06-19

## 2023-06-19 NOTE — TELEPHONE ENCOUNTER
----- Message from Kwaku Cline sent at 6/19/2023  3:42 PM CDT -----  Regarding: pt referral  Name of Who is Calling:AALIYAH BRUSH [[id number]]        What is the request in detail: Pt inquiring about referral that needs to be schedule soon for an appt  Please contact at your earliest convenience.           Can the clinic reply by [facility]: no         What Number [facility] Back [facility]: Telephone Information:  Mobile          [phone]

## 2023-06-20 ENCOUNTER — TELEPHONE (OUTPATIENT)
Dept: ENDOSCOPY | Facility: HOSPITAL | Age: 63
End: 2023-06-20
Payer: OTHER GOVERNMENT

## 2023-06-20 ENCOUNTER — PATIENT MESSAGE (OUTPATIENT)
Dept: ENDOSCOPY | Facility: HOSPITAL | Age: 63
End: 2023-06-20
Payer: OTHER GOVERNMENT

## 2023-06-20 NOTE — TELEPHONE ENCOUNTER
Spoke to patient to schedule procedure(s) Upper Endoscopy Ultrasound (EUS)       Physician to perform procedure(s) Dr. EMILY Hansen  Date of Procedure (s) 7/25/23  Arrival Time 12:15 PM  Time of Procedure(s) 1:15 PM   Location of Procedure(s) [address]  Type of Rx Prep sent to patient: N/A  Instructions provided to patient via [facility]    Patient was informed on the following information and verbalized understanding. Screening questionnaire reviewed with patient and complete. If procedure requires anesthesia, a responsible adult needs to be present to accompany the patient home, patient cannot drive after receiving anesthesia. Appointment details are tentative, especially check-in time. Patient will receive a prep-op call 4 days prior to confirm check-in time for procedure. If applicable the patient should contact their pharmacy to verify Rx for procedure prep is ready for pick-up. Patient was advised to call the scheduling department at [phone] if pharmacy states no Rx is available. Patient was advised to call the endoscopy scheduling department if any questions or concerns arise.      [facility] Endoscopy Scheduling Department

## 2023-07-25 ENCOUNTER — ANESTHESIA EVENT (OUTPATIENT)
Dept: ENDOSCOPY | Facility: HOSPITAL | Age: 63
End: 2023-07-25
Payer: OTHER GOVERNMENT

## 2023-07-25 ENCOUNTER — ANESTHESIA (OUTPATIENT)
Dept: ENDOSCOPY | Facility: HOSPITAL | Age: 63
End: 2023-07-25
Payer: OTHER GOVERNMENT

## 2023-07-25 ENCOUNTER — HOSPITAL ENCOUNTER (OUTPATIENT)
Facility: HOSPITAL | Age: 63
Discharge: HOME | End: 2023-07-25
Attending: INTERNAL MEDICINE | Admitting: INTERNAL MEDICINE
Payer: OTHER GOVERNMENT

## 2023-07-25 VITALS
TEMPERATURE: 98 F | HEART RATE: 60 BPM | RESPIRATION RATE: 20 BRPM | OXYGEN SATURATION: 98 % | WEIGHT: 160 LBS | BODY MASS INDEX: 25.71 KG/M2 | SYSTOLIC BLOOD PRESSURE: 126 MMHG | HEIGHT: 66 IN | DIASTOLIC BLOOD PRESSURE: 75 MMHG

## 2023-07-25 DIAGNOSIS — K86.2 PANCREAS CYST: Primary | ICD-10-CM

## 2023-07-25 PROCEDURE — D9220A PRA ANESTHESIA: ICD-10-PCS | Mod: ANES,,, | Performed by: ANESTHESIOLOGY

## 2023-07-25 PROCEDURE — D9220A PRA ANESTHESIA: ICD-10-PCS | Mod: CRNA,,, | Performed by: NURSE ANESTHETIST, CERTIFIED REGISTERED

## 2023-07-25 PROCEDURE — 63600175 PHARM REV CODE 636 W HCPCS: Performed by: NURSE ANESTHETIST, CERTIFIED REGISTERED

## 2023-07-25 PROCEDURE — 43259 PR ENDOSCOPIC ULTRASOUND EXAM: ICD-10-PCS | Mod: ,,, | Performed by: INTERNAL MEDICINE

## 2023-07-25 PROCEDURE — 43259 EGD US EXAM DUODENUM/JEJUNUM: CPT | Performed by: INTERNAL MEDICINE

## 2023-07-25 PROCEDURE — 25000003 PHARM REV CODE 250: Performed by: INTERNAL MEDICINE

## 2023-07-25 PROCEDURE — 25000003 PHARM REV CODE 250: Performed by: NURSE ANESTHETIST, CERTIFIED REGISTERED

## 2023-07-25 PROCEDURE — D9220A PRA ANESTHESIA: Mod: ANES,,, | Performed by: ANESTHESIOLOGY

## 2023-07-25 PROCEDURE — 37000009 HC ANESTHESIA EA ADD 15 MINS: Performed by: INTERNAL MEDICINE

## 2023-07-25 PROCEDURE — D9220A PRA ANESTHESIA: Mod: CRNA,,, | Performed by: NURSE ANESTHETIST, CERTIFIED REGISTERED

## 2023-07-25 PROCEDURE — 00731 ANES UPR GI NDSC PX NOS: CPT | Performed by: INTERNAL MEDICINE

## 2023-07-25 PROCEDURE — 43259 EGD US EXAM DUODENUM/JEJUNUM: CPT | Mod: ,,, | Performed by: INTERNAL MEDICINE

## 2023-07-25 PROCEDURE — 37000008 HC ANESTHESIA 1ST 15 MINUTES: Performed by: INTERNAL MEDICINE

## 2023-07-25 RX ORDER — HYDROMORPHONE HYDROCHLORIDE 1 MG/ML
0.2 INJECTION, SOLUTION INTRAMUSCULAR; INTRAVENOUS; SUBCUTANEOUS EVERY 5 MIN PRN
Status: DISCONTINUED | OUTPATIENT
Start: 2023-07-25 | End: 2023-07-25 | Stop reason: HOSPADM

## 2023-07-25 RX ORDER — DIPHENHYDRAMINE HYDROCHLORIDE 50 MG/ML
25 INJECTION INTRAMUSCULAR; INTRAVENOUS EVERY 6 HOURS PRN
Status: DISCONTINUED | OUTPATIENT
Start: 2023-07-25 | End: 2023-07-25 | Stop reason: HOSPADM

## 2023-07-25 RX ORDER — KETAMINE HCL IN 0.9 % NACL 50 MG/5 ML
SYRINGE (ML) INTRAVENOUS
Status: DISCONTINUED | OUTPATIENT
Start: 2023-07-25 | End: 2023-07-25

## 2023-07-25 RX ORDER — ONDANSETRON 2 MG/ML
4 INJECTION INTRAMUSCULAR; INTRAVENOUS ONCE AS NEEDED
Status: DISCONTINUED | OUTPATIENT
Start: 2023-07-25 | End: 2023-07-25 | Stop reason: HOSPADM

## 2023-07-25 RX ORDER — FENTANYL CITRATE 50 UG/ML
25 INJECTION, SOLUTION INTRAMUSCULAR; INTRAVENOUS EVERY 5 MIN PRN
Status: DISCONTINUED | OUTPATIENT
Start: 2023-07-25 | End: 2023-07-25 | Stop reason: HOSPADM

## 2023-07-25 RX ORDER — LIDOCAINE HYDROCHLORIDE 20 MG/ML
INJECTION INTRAVENOUS
Status: DISCONTINUED | OUTPATIENT
Start: 2023-07-25 | End: 2023-07-25

## 2023-07-25 RX ORDER — PROPOFOL 10 MG/ML
VIAL (ML) INTRAVENOUS CONTINUOUS PRN
Status: DISCONTINUED | OUTPATIENT
Start: 2023-07-25 | End: 2023-07-25

## 2023-07-25 RX ORDER — SODIUM CHLORIDE 0.9 % (FLUSH) 0.9 %
10 SYRINGE (ML) INJECTION
Status: DISCONTINUED | OUTPATIENT
Start: 2023-07-25 | End: 2023-07-25 | Stop reason: HOSPADM

## 2023-07-25 RX ORDER — MIDAZOLAM HYDROCHLORIDE 1 MG/ML
INJECTION, SOLUTION INTRAMUSCULAR; INTRAVENOUS
Status: DISCONTINUED | OUTPATIENT
Start: 2023-07-25 | End: 2023-07-25

## 2023-07-25 RX ORDER — SODIUM CHLORIDE 9 MG/ML
INJECTION, SOLUTION INTRAVENOUS CONTINUOUS
Status: DISCONTINUED | OUTPATIENT
Start: 2023-07-25 | End: 2023-07-25 | Stop reason: HOSPADM

## 2023-07-25 RX ORDER — PROPOFOL 10 MG/ML
VIAL (ML) INTRAVENOUS
Status: DISCONTINUED | OUTPATIENT
Start: 2023-07-25 | End: 2023-07-25

## 2023-07-25 RX ADMIN — Medication 200 MCG/KG/MIN: at 12:07

## 2023-07-25 RX ADMIN — PROPOFOL 50 MG: 10 INJECTION, EMULSION INTRAVENOUS at 12:07

## 2023-07-25 RX ADMIN — Medication 25 MG: at 12:07

## 2023-07-25 RX ADMIN — SODIUM CHLORIDE: 9 INJECTION, SOLUTION INTRAVENOUS at 12:07

## 2023-07-25 RX ADMIN — SODIUM CHLORIDE: 0.9 INJECTION, SOLUTION INTRAVENOUS at 12:07

## 2023-07-25 RX ADMIN — LIDOCAINE HYDROCHLORIDE 75 MG: 20 INJECTION INTRAVENOUS at 12:07

## 2023-07-25 RX ADMIN — GLYCOPYRROLATE 0.2 MG: 0.2 INJECTION, SOLUTION INTRAMUSCULAR; INTRAVENOUS at 12:07

## 2023-07-25 RX ADMIN — MIDAZOLAM HYDROCHLORIDE 2 MG: 1 INJECTION, SOLUTION INTRAMUSCULAR; INTRAVENOUS at 12:07

## 2023-07-25 NOTE — H&P
Short Stay Endoscopy History and Physical    PCP - [facility]  Referring Physician - Talon Austin MD  [address]  [address]  [place] [address]    Procedure - EUS  ASA - per anesthesia  Mallampati - per anesthesia  History of Anesthesia problems - per anesthesia  Family history Anesthesia problems -  per anesthesia   Plan of anesthesia - per anesthesia    HPI:  This is a 62 y.o. 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    No h/o pancreas disease in patient or family members.      Reflux - no  Dysphagia - no  Abdominal pain - no  Diarrhea - no    ROS:  Constitutional: No fevers, chills, No weight loss  CV: No chest pain  Pulm: No cough, No shortness of breath  Ophtho: No vision changes  GI: see HPI  Derm: No rash    Medical History:  has a past medical history of Allergy, Asthma, and Elevated PSA.    Surgical History:  has a past surgical history that includes Sinus surgery (Bilateral, 2001).    Family History: family history is not on file..    Social History:  reports that he has never smoked. He has never used smokeless tobacco. He reports current alcohol use of about 1.0 standard drink per week. He reports that he does not use drugs.    Review of patient's allergies indicates:   Allergen Reactions    Aspirin Shortness Of Breath       Medications:   Medications Prior to Admission   Medication Sig Dispense Refill Last Dose    albuterol (ACCUNEB) 1.25 mg/3 mL Nebu Take 1.25 mg by nebulization every 6 (six) hours as needed. Rescue       budesonide-formoterol 160-4.5 mcg (SYMBICORT) 160-4.5 mcg/actuation HFAA Inhale 2 puffs into the lungs every 12 (twelve) hours. Controller       calcium carbonate (CALCIUM 500) 500 mg calcium (1,250 mg) tablet        ciprofloxacin HCl (CIPRO) 500  MG tablet take 1 tablet the evening prior to procedure, 1 tablet the morning of procedure, 1 tablet the evening after procedure, 1 tablet the next morning 5 tablet 0     montelukast (SINGULAIR) 5 MG chewable tablet Take 5 mg by mouth every evening.       nasal exhalation resistanc.dev (NASAL EXHALATION RESISTANCE DV NASL) by Nasal route.       rosuvastatin 10 mg CpSP        tiotropium (SPIRIVA) 18 mcg inhalation capsule Inhale 18 mcg into the lungs once daily. Controller       vitamin D (VITAMIN D3) 1000 units Tab Take 1,000 Units by mouth once daily.          Physical Exam:    Vital Signs: There were no vitals filed for this visit.    General Appearance: Well appearing in no acute distress    Labs:  Lab Results   Component Value Date    WBC 8.2 03/07/2023    HGB 14.9 03/07/2023    HCT 43.6 03/07/2023     06/14/2005     03/07/2023    K 3.8 03/07/2023     09/14/2020    CREATININE 1.10 03/07/2023    BUN 15.0 03/07/2023    CO2 30 03/07/2023       I have explained the risks and benefits of this endoscopic procedure to the patient including but not limited to bleeding, inflammation, infection, perforation, pancreatitis, missing a lesion and death.      Yoseph Li MD

## 2023-07-25 NOTE — TRANSFER OF CARE
"Anesthesia Transfer of Care Note    Patient: Bradley Collins    Procedure(s) Performed: Procedure(s) (LRB):  ULTRASOUND, UPPER GI TRACT, ENDOSCOPIC (N/A)    Patient location: PACU    Anesthesia Type: general    Transport from OR: Transported from OR on 2-3 L/min O2 by NC with adequate spontaneous ventilation    Post pain: adequate analgesia    Post assessment: no apparent anesthetic complications    Post vital signs: stable    Level of consciousness: sedated    Nausea/Vomiting: no nausea/vomiting    Complications: none    Transfer of care protocol was followed      Last vitals:   Visit Vitals  BP (!) 142/75 (Patient Position: Lying)   Pulse 67   Temp 36.6 °C (97.9 °F) (Temporal)   Resp 16   Ht 5' 6" (1.676 m)   Wt 72.6 kg (160 lb)   SpO2 98%   BMI 25.82 kg/m²     "

## 2023-07-25 NOTE — ANESTHESIA POSTPROCEDURE EVALUATION
Anesthesia Post Evaluation    Patient: Bradley Collins    Procedure(s) Performed: Procedure(s) (LRB):  ULTRASOUND, UPPER GI TRACT, ENDOSCOPIC (N/A)    Final Anesthesia Type: general      Level of consciousness: awake and alert  Post-procedure vital signs: reviewed and stable  Pain control: Pain has been treated.  Airway patency: patent    PONV status: Absent or treated.  Anesthetic complications: no      Cardiovascular status: hemodynamically stable  Respiratory status: unassisted  Hydration status: euvolemic            Vitals Value Taken Time   /83 07/25/23 1346     07/25/23 1400   Pulse 66 07/25/23 1400   Resp 17 07/25/23 1400   SpO2 98 % 07/25/23 1400   Vitals shown include unvalidated device data.      No case tracking events are documented in the log.      Pain/Rosa M Score: Rosa M Score: 7 (7/25/2023  1:15 PM)

## 2023-07-25 NOTE — PROVATION PATIENT INSTRUCTIONS
Discharge Summary/Instructions after an Endoscopic Procedure  Patient Name: Bradley Collins  Patient MRN: [id number]  Patient YOB: 1960 Tuesday, July 25, 2023  Yoseph Li MD  Dear patient,  As a result of recent federal legislation (The Federal Cures Act), you may   receive lab or pathology results from your procedure in your [facility]   account before your physician is able to contact you. Your physician or   their representative will relay the results to you with their   recommendations at their soonest availability.  Thank you,  RESTRICTIONS:  During your procedure today, you received medications for sedation.  These   medications may affect your judgment, balance and coordination.  Therefore,   for 24 hours, you have the following restrictions:   - DO NOT drive a car, operate machinery, make legal/financial decisions,   sign important papers or drink alcohol.    ACTIVITY:  Today: no heavy lifting, straining or running due to procedural   sedation/anesthesia.  The following day: return to full activity including work.  DIET:  Eat and drink normally unless instructed otherwise.     TREATMENT FOR COMMON SIDE EFFECTS:  - Mild abdominal pain, nausea, belching, bloating or excessive gas:  rest,   eat lightly and use a heating pad.  - Sore Throat: treat with throat lozenges and/or gargle with warm salt   water.  - Because air was used during the procedure, expelling large amounts of air   from your rectum or belching is normal.  - If a bowel prep was taken, you may not have a bowel movement for 1-3 days.    This is normal.  SYMPTOMS TO WATCH FOR AND REPORT TO YOUR PHYSICIAN:  1. Abdominal pain or bloating, other than gas cramps.  2. Chest pain.  3. Back pain.  4. Signs of infection such as: chills or fever occurring within 24 hours   after the procedure.  5. Rectal bleeding, which would show as bright red, maroon, or black stools.   (A tablespoon of blood from the rectum is not serious, especially if    hemorrhoids are present.)  6. Vomiting.  7. Weakness or dizziness.  GO DIRECTLY TO THE NEAREST EMERGENCY ROOM IF YOU HAVE ANY OF THE FOLLOWING:      Difficulty breathing              Chills and/or fever over 101 F   Persistent vomiting and/or vomiting blood   Severe abdominal pain   Severe chest pain   Black, tarry stools   Bleeding- more than one tablespoon   Any other symptom or condition that you feel may need urgent attention  Your doctor recommends these additional instructions:  If any biopsies were taken, your doctors clinic will contact you in 1 to 2   weeks with any results.  - Discharge patient to home (ambulatory).   - Resume previous diet.   - Patient has a contact number available for emergencies.  The signs and   symptoms of potential delayed complications were discussed with the   patient.  Return to normal activities tomorrow.  Written discharge   instructions were provided to the patient.   - Continue present medications.   - Perform MRCP with contrast in 6 months to monitor stability of pancreas   cysts/possible IPMNs.   - Can be followed in our department for pancreas cyst surveillance annually;   versus [facility].  - Return to referring physician.  For questions, problems or results please call your physician - Yoseph Li MD at Work:  [phone].  [facility], EMERGENCY ROOM PHONE NUMBER: [phone]  IF A COMPLICATION OR EMERGENCY SITUATION ARISES AND YOU ARE UNABLE TO REACH   YOUR PHYSICIAN - GO DIRECTLY TO THE EMERGENCY ROOM.  Yoseph Li MD  7/25/2023 1:21:24 PM  This report has been verified and signed electronically.  Dear patient,  As a result of recent federal legislation (The Federal Cures Act), you may   receive lab or pathology results from your procedure in your [facility]   account before your physician is able to contact you. Your physician or   their representative will relay the results to you with their   recommendations at their  soonest availability.  Thank you,  PROVATION

## 2023-07-25 NOTE — ANESTHESIA PREPROCEDURE EVALUATION
07/25/2023  Bradley Collins is a 62 y.o., male.  Patient Active Problem List   Diagnosis    Prostate cancer           Pre-op Assessment          Review of Systems      Physical Exam    Airway:  No airway management difficulties anticipated  Dental:No active dental issues noted  Chest/Lungs:  Clear to auscultation    Heart:  Rate: Normal  Rhythm: Regular Rhythm  Sounds: Normal        Anesthesia Plan  Type of Anesthesia, risks & benefits discussed:    Anesthesia Type: Gen Natural Airway, Gen ETT  Informed Consent: Informed consent signed with the Patient and all parties understand the risks and agree with anesthesia plan.  All questions answered.   ASA Score: 3  Anesthesia Plan Notes: Chart reviewed. Patient seen and examined. Anesthesia plan discussed and questions answered. E-consent signed. Aroldo Thornton MD    Ready For Surgery From Anesthesia Perspective.     .

## 2023-12-18 ENCOUNTER — TELEPHONE (OUTPATIENT)
Dept: ENDOSCOPY | Facility: HOSPITAL | Age: 63
End: 2023-12-18
Payer: COMMERCIAL

## 2023-12-18 DIAGNOSIS — K86.2 PANCREAS CYST: Primary | ICD-10-CM

## 2023-12-18 NOTE — TELEPHONE ENCOUNTER
Called pt to schedule MRI. [facility] and has recently had [facility] done for his panc cyst. Order cancelled.

## 2024-02-16 ENCOUNTER — HOSPITAL ENCOUNTER (INPATIENT)
Facility: OTHER | Age: 64
LOS: 3 days | Discharge: HOME | DRG: 603 | End: 2024-02-20
Attending: EMERGENCY MEDICINE | Admitting: INTERNAL MEDICINE
Payer: OTHER GOVERNMENT

## 2024-02-16 DIAGNOSIS — R07.9 CHEST PAIN: ICD-10-CM

## 2024-02-16 DIAGNOSIS — M79.89 LEG SWELLING: ICD-10-CM

## 2024-02-16 DIAGNOSIS — L03.115 CELLULITIS OF RIGHT LOWER EXTREMITY: ICD-10-CM

## 2024-02-16 DIAGNOSIS — L03.90 CELLULITIS, UNSPECIFIED CELLULITIS SITE: Primary | ICD-10-CM

## 2024-02-16 PROBLEM — H10.10 ALLERGIC CONJUNCTIVITIS: Status: ACTIVE | Noted: 2024-02-16

## 2024-02-16 PROBLEM — H10.10 ALLERGIC CONJUNCTIVITIS: Status: RESOLVED | Noted: 2024-02-16 | Resolved: 2024-02-16

## 2024-02-16 PROBLEM — J45.909 ASTHMA: Status: ACTIVE | Noted: 2024-02-16

## 2024-02-16 PROBLEM — R79.89 ELEVATED SERUM CREATININE: Status: ACTIVE | Noted: 2024-02-16

## 2024-02-16 LAB
ALBUMIN SERPL BCP-MCNC: 3.4 G/DL (ref 3.5–5.2)
ALP SERPL-CCNC: 83 U/L (ref 55–135)
ALT SERPL W/O P-5'-P-CCNC: 15 U/L (ref 10–44)
ANION GAP SERPL CALC-SCNC: 10 MMOL/L (ref 8–16)
AST SERPL-CCNC: 18 U/L (ref 10–40)
BASOPHILS # BLD AUTO: 0.13 K/UL (ref 0–0.2)
BASOPHILS NFR BLD: 1.3 % (ref 0–1.9)
BILIRUB SERPL-MCNC: 0.3 MG/DL (ref 0.1–1)
BILIRUB UR QL STRIP: NEGATIVE
BUN SERPL-MCNC: 23 MG/DL (ref 8–23)
CALCIUM SERPL-MCNC: 9.3 MG/DL (ref 8.7–10.5)
CHLORIDE SERPL-SCNC: 101 MMOL/L (ref 95–110)
CLARITY UR: CLEAR
CO2 SERPL-SCNC: 25 MMOL/L (ref 23–29)
COLOR UR: COLORLESS
CREAT SERPL-MCNC: 2 MG/DL (ref 0.5–1.4)
CRP SERPL-MCNC: 33.2 MG/L (ref 0–8.2)
DIFFERENTIAL METHOD BLD: ABNORMAL
EOSINOPHIL # BLD AUTO: 3 K/UL (ref 0–0.5)
EOSINOPHIL NFR BLD: 30.6 % (ref 0–8)
ERYTHROCYTE [DISTWIDTH] IN BLOOD BY AUTOMATED COUNT: 12.2 % (ref 11.5–14.5)
ERYTHROCYTE [SEDIMENTATION RATE] IN BLOOD BY PHOTOMETRIC METHOD: 112 MM/HR (ref 0–23)
EST. GFR  (NO RACE VARIABLE): 37 ML/MIN/1.73 M^2
GLUCOSE SERPL-MCNC: 93 MG/DL (ref 70–110)
GLUCOSE UR QL STRIP: NEGATIVE
HCT VFR BLD AUTO: 36.9 % (ref 40–54)
HGB BLD-MCNC: 12.5 G/DL (ref 14–18)
HGB UR QL STRIP: NEGATIVE
IMM GRANULOCYTES # BLD AUTO: 0.03 K/UL (ref 0–0.04)
IMM GRANULOCYTES NFR BLD AUTO: 0.3 % (ref 0–0.5)
KETONES UR QL STRIP: NEGATIVE
LACTATE SERPL-SCNC: 0.7 MMOL/L (ref 0.5–2.2)
LEUKOCYTE ESTERASE UR QL STRIP: NEGATIVE
LYMPHOCYTES # BLD AUTO: 1.2 K/UL (ref 1–4.8)
LYMPHOCYTES NFR BLD: 12 % (ref 18–48)
MCH RBC QN AUTO: 32.3 PG (ref 27–31)
MCHC RBC AUTO-ENTMCNC: 33.9 G/DL (ref 32–36)
MCV RBC AUTO: 95 FL (ref 82–98)
MONOCYTES # BLD AUTO: 0.9 K/UL (ref 0.3–1)
MONOCYTES NFR BLD: 9.3 % (ref 4–15)
NEUTROPHILS # BLD AUTO: 4.6 K/UL (ref 1.8–7.7)
NEUTROPHILS NFR BLD: 46.5 % (ref 38–73)
NITRITE UR QL STRIP: NEGATIVE
NRBC BLD-RTO: 0 /100 WBC
PH UR STRIP: 5 [PH] (ref 5–8)
PLATELET # BLD AUTO: 316 K/UL (ref 150–450)
PLATELET BLD QL SMEAR: ABNORMAL
PMV BLD AUTO: 8.4 FL (ref 9.2–12.9)
POTASSIUM SERPL-SCNC: 4.6 MMOL/L (ref 3.5–5.1)
PROT SERPL-MCNC: 7.5 G/DL (ref 6–8.4)
PROT UR QL STRIP: NEGATIVE
RBC # BLD AUTO: 3.87 M/UL (ref 4.6–6.2)
SODIUM SERPL-SCNC: 136 MMOL/L (ref 136–145)
SP GR UR STRIP: 1.01 (ref 1–1.03)
URN SPEC COLLECT METH UR: ABNORMAL
UROBILINOGEN UR STRIP-ACNC: NEGATIVE EU/DL
WBC # BLD AUTO: 9.92 K/UL (ref 3.9–12.7)

## 2024-02-16 PROCEDURE — 85025 COMPLETE CBC W/AUTO DIFF WBC: CPT | Performed by: NURSE PRACTITIONER

## 2024-02-16 PROCEDURE — 80053 COMPREHEN METABOLIC PANEL: CPT | Performed by: NURSE PRACTITIONER

## 2024-02-16 PROCEDURE — 86140 C-REACTIVE PROTEIN: CPT | Performed by: NURSE PRACTITIONER

## 2024-02-16 PROCEDURE — 81003 URINALYSIS AUTO W/O SCOPE: CPT | Performed by: NURSE PRACTITIONER

## 2024-02-16 PROCEDURE — 96367 TX/PROPH/DG ADDL SEQ IV INF: CPT

## 2024-02-16 PROCEDURE — 99285 EMERGENCY DEPT VISIT HI MDM: CPT | Mod: 25

## 2024-02-16 PROCEDURE — 25000242 PHARM REV CODE 250 ALT 637 W/ HCPCS: Performed by: NURSE PRACTITIONER

## 2024-02-16 PROCEDURE — 63600175 PHARM REV CODE 636 W HCPCS: Performed by: INTERNAL MEDICINE

## 2024-02-16 PROCEDURE — 87040 BLOOD CULTURE FOR BACTERIA: CPT | Mod: 59 | Performed by: NURSE PRACTITIONER

## 2024-02-16 PROCEDURE — 25000003 PHARM REV CODE 250: Performed by: INTERNAL MEDICINE

## 2024-02-16 PROCEDURE — 85652 RBC SED RATE AUTOMATED: CPT | Performed by: NURSE PRACTITIONER

## 2024-02-16 PROCEDURE — 63600175 PHARM REV CODE 636 W HCPCS: Performed by: EMERGENCY MEDICINE

## 2024-02-16 PROCEDURE — G0378 HOSPITAL OBSERVATION PER HR: HCPCS

## 2024-02-16 PROCEDURE — 87040 BLOOD CULTURE FOR BACTERIA: CPT | Performed by: EMERGENCY MEDICINE

## 2024-02-16 PROCEDURE — 94761 N-INVAS EAR/PLS OXIMETRY MLT: CPT

## 2024-02-16 PROCEDURE — 96365 THER/PROPH/DIAG IV INF INIT: CPT

## 2024-02-16 PROCEDURE — 25000003 PHARM REV CODE 250: Performed by: EMERGENCY MEDICINE

## 2024-02-16 PROCEDURE — 96366 THER/PROPH/DIAG IV INF ADDON: CPT

## 2024-02-16 PROCEDURE — 25000003 PHARM REV CODE 250: Performed by: NURSE PRACTITIONER

## 2024-02-16 PROCEDURE — 83605 ASSAY OF LACTIC ACID: CPT | Performed by: NURSE PRACTITIONER

## 2024-02-16 RX ORDER — ACETAMINOPHEN 325 MG/1
650 TABLET ORAL EVERY 8 HOURS PRN
Status: DISCONTINUED | OUTPATIENT
Start: 2024-02-16 | End: 2024-02-20 | Stop reason: HOSPADM

## 2024-02-16 RX ORDER — NALOXONE HCL 0.4 MG/ML
0.02 VIAL (ML) INJECTION
Status: DISCONTINUED | OUTPATIENT
Start: 2024-02-16 | End: 2024-02-20 | Stop reason: HOSPADM

## 2024-02-16 RX ORDER — ALBUTEROL SULFATE 0.83 MG/ML
1.25 SOLUTION RESPIRATORY (INHALATION) EVERY 6 HOURS PRN
Status: DISCONTINUED | OUTPATIENT
Start: 2024-02-16 | End: 2024-02-20 | Stop reason: HOSPADM

## 2024-02-16 RX ORDER — FLUTICASONE FUROATE AND VILANTEROL 200; 25 UG/1; UG/1
1 POWDER RESPIRATORY (INHALATION) DAILY
Status: DISCONTINUED | OUTPATIENT
Start: 2024-02-17 | End: 2024-02-20 | Stop reason: HOSPADM

## 2024-02-16 RX ORDER — ALUMINUM HYDROXIDE, MAGNESIUM HYDROXIDE, AND SIMETHICONE 1200; 120; 1200 MG/30ML; MG/30ML; MG/30ML
30 SUSPENSION ORAL 4 TIMES DAILY PRN
Status: DISCONTINUED | OUTPATIENT
Start: 2024-02-16 | End: 2024-02-20 | Stop reason: HOSPADM

## 2024-02-16 RX ORDER — PHENOL/SODIUM PHENOLATE
20 AEROSOL, SPRAY (ML) MUCOUS MEMBRANE DAILY
COMMUNITY
Start: 2023-12-19

## 2024-02-16 RX ORDER — IBUPROFEN 200 MG
16 TABLET ORAL
Status: DISCONTINUED | OUTPATIENT
Start: 2024-02-16 | End: 2024-02-20 | Stop reason: HOSPADM

## 2024-02-16 RX ORDER — AMOXICILLIN 250 MG
1 CAPSULE ORAL 2 TIMES DAILY
Status: DISCONTINUED | OUTPATIENT
Start: 2024-02-16 | End: 2024-02-20 | Stop reason: HOSPADM

## 2024-02-16 RX ORDER — SIMETHICONE 80 MG
1 TABLET,CHEWABLE ORAL 4 TIMES DAILY PRN
Status: DISCONTINUED | OUTPATIENT
Start: 2024-02-16 | End: 2024-02-20 | Stop reason: HOSPADM

## 2024-02-16 RX ORDER — HYDROCODONE BITARTRATE AND ACETAMINOPHEN 5; 325 MG/1; MG/1
1 TABLET ORAL EVERY 6 HOURS PRN
Status: DISCONTINUED | OUTPATIENT
Start: 2024-02-16 | End: 2024-02-20 | Stop reason: HOSPADM

## 2024-02-16 RX ORDER — ONDANSETRON HYDROCHLORIDE 2 MG/ML
4 INJECTION, SOLUTION INTRAVENOUS EVERY 6 HOURS PRN
Status: DISCONTINUED | OUTPATIENT
Start: 2024-02-16 | End: 2024-02-20 | Stop reason: HOSPADM

## 2024-02-16 RX ORDER — IBUPROFEN 200 MG
24 TABLET ORAL
Status: DISCONTINUED | OUTPATIENT
Start: 2024-02-16 | End: 2024-02-20 | Stop reason: HOSPADM

## 2024-02-16 RX ORDER — TALC
6 POWDER (GRAM) TOPICAL NIGHTLY PRN
Status: DISCONTINUED | OUTPATIENT
Start: 2024-02-16 | End: 2024-02-20 | Stop reason: HOSPADM

## 2024-02-16 RX ORDER — PANTOPRAZOLE SODIUM 40 MG/1
40 TABLET, DELAYED RELEASE ORAL DAILY
Status: DISCONTINUED | OUTPATIENT
Start: 2024-02-17 | End: 2024-02-20 | Stop reason: HOSPADM

## 2024-02-16 RX ORDER — SODIUM CHLORIDE 0.9 % (FLUSH) 0.9 %
10 SYRINGE (ML) INJECTION EVERY 8 HOURS PRN
Status: DISCONTINUED | OUTPATIENT
Start: 2024-02-16 | End: 2024-02-20

## 2024-02-16 RX ORDER — SODIUM CHLORIDE 0.9 % (FLUSH) 0.9 %
10 SYRINGE (ML) INJECTION
Status: DISCONTINUED | OUTPATIENT
Start: 2024-02-16 | End: 2024-02-20

## 2024-02-16 RX ORDER — GLUCAGON 1 MG
1 KIT INJECTION
Status: DISCONTINUED | OUTPATIENT
Start: 2024-02-16 | End: 2024-02-20 | Stop reason: HOSPADM

## 2024-02-16 RX ORDER — ACETAMINOPHEN 325 MG/1
650 TABLET ORAL EVERY 4 HOURS PRN
Status: DISCONTINUED | OUTPATIENT
Start: 2024-02-16 | End: 2024-02-20 | Stop reason: HOSPADM

## 2024-02-16 RX ORDER — ENOXAPARIN SODIUM 100 MG/ML
40 INJECTION SUBCUTANEOUS EVERY 24 HOURS
Status: DISCONTINUED | OUTPATIENT
Start: 2024-02-17 | End: 2024-02-20 | Stop reason: HOSPADM

## 2024-02-16 RX ORDER — PROCHLORPERAZINE EDISYLATE 5 MG/ML
5 INJECTION INTRAMUSCULAR; INTRAVENOUS EVERY 6 HOURS PRN
Status: DISCONTINUED | OUTPATIENT
Start: 2024-02-16 | End: 2024-02-20 | Stop reason: HOSPADM

## 2024-02-16 RX ORDER — SODIUM CHLORIDE 9 MG/ML
INJECTION, SOLUTION INTRAVENOUS ONCE
Status: COMPLETED | OUTPATIENT
Start: 2024-02-16 | End: 2024-02-16

## 2024-02-16 RX ORDER — FLUTICASONE PROPIONATE 50 MCG
2 SPRAY, SUSPENSION (ML) NASAL 2 TIMES DAILY
Status: DISCONTINUED | OUTPATIENT
Start: 2024-02-16 | End: 2024-02-20 | Stop reason: HOSPADM

## 2024-02-16 RX ADMIN — FLUTICASONE PROPIONATE 100 MCG: 50 SPRAY, METERED NASAL at 09:02

## 2024-02-16 RX ADMIN — VANCOMYCIN HYDROCHLORIDE 1000 MG: 1 INJECTION, POWDER, LYOPHILIZED, FOR SOLUTION INTRAVENOUS at 08:02

## 2024-02-16 RX ADMIN — PIPERACILLIN AND TAZOBACTAM 4.5 G: 4; .5 INJECTION, POWDER, LYOPHILIZED, FOR SOLUTION INTRAVENOUS; PARENTERAL at 07:02

## 2024-02-16 RX ADMIN — SODIUM CHLORIDE: 9 INJECTION, SOLUTION INTRAVENOUS at 11:02

## 2024-02-16 RX ADMIN — VANCOMYCIN HYDROCHLORIDE 750 MG: 750 INJECTION, POWDER, LYOPHILIZED, FOR SOLUTION INTRAVENOUS at 11:02

## 2024-02-16 NOTE — Clinical Note
Diagnosis: Cellulitis, unspecified cellulitis site [[id number]]   Future Attending Provider: LUDMILA PEREA [[id number]]   Is the patient being sent to ED Observation?: No

## 2024-02-17 LAB
ALBUMIN SERPL BCP-MCNC: 3.2 G/DL (ref 3.5–5.2)
ALP SERPL-CCNC: 76 U/L (ref 55–135)
ALT SERPL W/O P-5'-P-CCNC: 16 U/L (ref 10–44)
ANION GAP SERPL CALC-SCNC: 6 MMOL/L (ref 8–16)
ANION GAP SERPL CALC-SCNC: 6 MMOL/L (ref 8–16)
ANION GAP SERPL CALC-SCNC: 7 MMOL/L (ref 8–16)
ANION GAP SERPL CALC-SCNC: 7 MMOL/L (ref 8–16)
ANION GAP SERPL CALC-SCNC: 9 MMOL/L (ref 8–16)
AST SERPL-CCNC: 19 U/L (ref 10–40)
BASOPHILS # BLD AUTO: 0.09 K/UL (ref 0–0.2)
BASOPHILS NFR BLD: 0.9 % (ref 0–1.9)
BILIRUB SERPL-MCNC: 0.4 MG/DL (ref 0.1–1)
BUN SERPL-MCNC: 18 MG/DL (ref 8–23)
BUN SERPL-MCNC: 19 MG/DL (ref 8–23)
BUN SERPL-MCNC: 19 MG/DL (ref 8–23)
BUN SERPL-MCNC: 20 MG/DL (ref 8–23)
BUN SERPL-MCNC: 20 MG/DL (ref 8–23)
BUN SERPL-MCNC: 21 MG/DL (ref 8–23)
BUN SERPL-MCNC: 21 MG/DL (ref 8–23)
CALCIUM SERPL-MCNC: 8.7 MG/DL (ref 8.7–10.5)
CALCIUM SERPL-MCNC: 8.8 MG/DL (ref 8.7–10.5)
CALCIUM SERPL-MCNC: 8.9 MG/DL (ref 8.7–10.5)
CALCIUM SERPL-MCNC: 8.9 MG/DL (ref 8.7–10.5)
CALCIUM SERPL-MCNC: 9 MG/DL (ref 8.7–10.5)
CHLORIDE SERPL-SCNC: 100 MMOL/L (ref 95–110)
CHLORIDE SERPL-SCNC: 101 MMOL/L (ref 95–110)
CHLORIDE SERPL-SCNC: 102 MMOL/L (ref 95–110)
CHLORIDE SERPL-SCNC: 103 MMOL/L (ref 95–110)
CO2 SERPL-SCNC: 24 MMOL/L (ref 23–29)
CO2 SERPL-SCNC: 24 MMOL/L (ref 23–29)
CO2 SERPL-SCNC: 25 MMOL/L (ref 23–29)
CO2 SERPL-SCNC: 26 MMOL/L (ref 23–29)
CO2 SERPL-SCNC: 27 MMOL/L (ref 23–29)
CO2 SERPL-SCNC: 29 MMOL/L (ref 23–29)
CREAT SERPL-MCNC: 1.9 MG/DL (ref 0.5–1.4)
CREAT SERPL-MCNC: 2 MG/DL (ref 0.5–1.4)
CREAT SERPL-MCNC: 2.1 MG/DL (ref 0.5–1.4)
CREAT UR-MCNC: 66.3 MG/DL (ref 23–375)
DIFFERENTIAL METHOD BLD: ABNORMAL
EOSINOPHIL # BLD AUTO: 3.2 K/UL (ref 0–0.5)
EOSINOPHIL NFR BLD: 33.6 % (ref 0–8)
ERYTHROCYTE [DISTWIDTH] IN BLOOD BY AUTOMATED COUNT: 11.9 % (ref 11.5–14.5)
EST. GFR  (NO RACE VARIABLE): 35 ML/MIN/1.73 M^2
EST. GFR  (NO RACE VARIABLE): 37 ML/MIN/1.73 M^2
EST. GFR  (NO RACE VARIABLE): 39 ML/MIN/1.73 M^2
GLUCOSE SERPL-MCNC: 104 MG/DL (ref 70–110)
GLUCOSE SERPL-MCNC: 112 MG/DL (ref 70–110)
GLUCOSE SERPL-MCNC: 121 MG/DL (ref 70–110)
GLUCOSE SERPL-MCNC: 124 MG/DL (ref 70–110)
GLUCOSE SERPL-MCNC: 126 MG/DL (ref 70–110)
GLUCOSE SERPL-MCNC: 126 MG/DL (ref 70–110)
GLUCOSE SERPL-MCNC: 134 MG/DL (ref 70–110)
GLUCOSE SERPL-MCNC: 99 MG/DL (ref 70–110)
HCT VFR BLD AUTO: 35.8 % (ref 40–54)
HGB BLD-MCNC: 11.8 G/DL (ref 14–18)
IMM GRANULOCYTES # BLD AUTO: 0.04 K/UL (ref 0–0.04)
IMM GRANULOCYTES NFR BLD AUTO: 0.4 % (ref 0–0.5)
LACTATE SERPL-SCNC: 1.2 MMOL/L (ref 0.5–2.2)
LYMPHOCYTES # BLD AUTO: 1 K/UL (ref 1–4.8)
LYMPHOCYTES NFR BLD: 10.6 % (ref 18–48)
MCH RBC QN AUTO: 31.6 PG (ref 27–31)
MCHC RBC AUTO-ENTMCNC: 33 G/DL (ref 32–36)
MCV RBC AUTO: 96 FL (ref 82–98)
MONOCYTES # BLD AUTO: 0.8 K/UL (ref 0.3–1)
MONOCYTES NFR BLD: 8 % (ref 4–15)
NEUTROPHILS # BLD AUTO: 4.5 K/UL (ref 1.8–7.7)
NEUTROPHILS NFR BLD: 46.5 % (ref 38–73)
NRBC BLD-RTO: 0 /100 WBC
PLATELET # BLD AUTO: 293 K/UL (ref 150–450)
PLATELET BLD QL SMEAR: ABNORMAL
PMV BLD AUTO: 8.5 FL (ref 9.2–12.9)
POTASSIUM SERPL-SCNC: 4 MMOL/L (ref 3.5–5.1)
POTASSIUM SERPL-SCNC: 4.1 MMOL/L (ref 3.5–5.1)
POTASSIUM SERPL-SCNC: 4.1 MMOL/L (ref 3.5–5.1)
POTASSIUM SERPL-SCNC: 4.2 MMOL/L (ref 3.5–5.1)
POTASSIUM SERPL-SCNC: 4.2 MMOL/L (ref 3.5–5.1)
POTASSIUM SERPL-SCNC: 4.3 MMOL/L (ref 3.5–5.1)
POTASSIUM SERPL-SCNC: 4.3 MMOL/L (ref 3.5–5.1)
POTASSIUM SERPL-SCNC: 4.4 MMOL/L (ref 3.5–5.1)
POTASSIUM UR-SCNC: 15 MMOL/L (ref 15–95)
PROT SERPL-MCNC: 7 G/DL (ref 6–8.4)
RBC # BLD AUTO: 3.74 M/UL (ref 4.6–6.2)
SODIUM SERPL-SCNC: 134 MMOL/L (ref 136–145)
SODIUM SERPL-SCNC: 135 MMOL/L (ref 136–145)
SODIUM SERPL-SCNC: 135 MMOL/L (ref 136–145)
SODIUM SERPL-SCNC: 136 MMOL/L (ref 136–145)
SODIUM SERPL-SCNC: 137 MMOL/L (ref 136–145)
SODIUM UR-SCNC: 30 MMOL/L (ref 20–250)
URATE UR-MCNC: 40.9 MG/DL
VANCOMYCIN SERPL-MCNC: 10.8 UG/ML
WBC # BLD AUTO: 9.61 K/UL (ref 3.9–12.7)

## 2024-02-17 PROCEDURE — 25000242 PHARM REV CODE 250 ALT 637 W/ HCPCS: Performed by: NURSE PRACTITIONER

## 2024-02-17 PROCEDURE — 25000003 PHARM REV CODE 250

## 2024-02-17 PROCEDURE — 25000003 PHARM REV CODE 250: Performed by: PHYSICIAN ASSISTANT

## 2024-02-17 PROCEDURE — 63600175 PHARM REV CODE 636 W HCPCS

## 2024-02-17 PROCEDURE — 80053 COMPREHEN METABOLIC PANEL: CPT | Performed by: NURSE PRACTITIONER

## 2024-02-17 PROCEDURE — 25000003 PHARM REV CODE 250: Performed by: NURSE PRACTITIONER

## 2024-02-17 PROCEDURE — 82570 ASSAY OF URINE CREATININE: CPT | Performed by: NURSE PRACTITIONER

## 2024-02-17 PROCEDURE — 84560 ASSAY OF URINE/URIC ACID: CPT | Performed by: NURSE PRACTITIONER

## 2024-02-17 PROCEDURE — 63600175 PHARM REV CODE 636 W HCPCS: Performed by: PHYSICIAN ASSISTANT

## 2024-02-17 PROCEDURE — 84300 ASSAY OF URINE SODIUM: CPT | Performed by: NURSE PRACTITIONER

## 2024-02-17 PROCEDURE — 80048 BASIC METABOLIC PNL TOTAL CA: CPT | Mod: 91,XB | Performed by: NURSE PRACTITIONER

## 2024-02-17 PROCEDURE — 96367 TX/PROPH/DG ADDL SEQ IV INF: CPT

## 2024-02-17 PROCEDURE — 11000001 HC ACUTE MED/SURG PRIVATE ROOM

## 2024-02-17 PROCEDURE — 85025 COMPLETE CBC W/AUTO DIFF WBC: CPT | Performed by: NURSE PRACTITIONER

## 2024-02-17 PROCEDURE — 63600175 PHARM REV CODE 636 W HCPCS: Performed by: NURSE PRACTITIONER

## 2024-02-17 PROCEDURE — 83605 ASSAY OF LACTIC ACID: CPT | Performed by: NURSE PRACTITIONER

## 2024-02-17 PROCEDURE — 96366 THER/PROPH/DIAG IV INF ADDON: CPT

## 2024-02-17 PROCEDURE — 36415 COLL VENOUS BLD VENIPUNCTURE: CPT | Mod: XB | Performed by: NURSE PRACTITIONER

## 2024-02-17 PROCEDURE — 21400001 HC TELEMETRY ROOM

## 2024-02-17 PROCEDURE — 80202 ASSAY OF VANCOMYCIN: CPT | Performed by: INTERNAL MEDICINE

## 2024-02-17 PROCEDURE — 84133 ASSAY OF URINE POTASSIUM: CPT | Performed by: NURSE PRACTITIONER

## 2024-02-17 RX ORDER — DIPHENHYDRAMINE HCL 25 MG
25 CAPSULE ORAL EVERY 6 HOURS PRN
Status: DISCONTINUED | OUTPATIENT
Start: 2024-02-17 | End: 2024-02-20 | Stop reason: HOSPADM

## 2024-02-17 RX ADMIN — PIPERACILLIN AND TAZOBACTAM 4.5 G: 4; .5 INJECTION, POWDER, LYOPHILIZED, FOR SOLUTION INTRAVENOUS; PARENTERAL at 03:02

## 2024-02-17 RX ADMIN — FLUTICASONE PROPIONATE 100 MCG: 50 SPRAY, METERED NASAL at 08:02

## 2024-02-17 RX ADMIN — CEFTRIAXONE SODIUM 1 G: 1 INJECTION, POWDER, FOR SOLUTION INTRAMUSCULAR; INTRAVENOUS at 09:02

## 2024-02-17 RX ADMIN — VANCOMYCIN HYDROCHLORIDE 1000 MG: 1 INJECTION, POWDER, LYOPHILIZED, FOR SOLUTION INTRAVENOUS at 09:02

## 2024-02-17 RX ADMIN — PANTOPRAZOLE SODIUM 40 MG: 40 TABLET, DELAYED RELEASE ORAL at 08:02

## 2024-02-17 RX ADMIN — DIPHENHYDRAMINE HYDROCHLORIDE 25 MG: 25 CAPSULE ORAL at 02:02

## 2024-02-17 NOTE — ED TRIAGE NOTES
PT arrived with c/o R leg swelling x 3 weeks.  [facility].  Pt was given IV abx at facility and was discharged with 2 different oral abx.  Pt states he finished his abx today with no improvement.  Pt denies any fever.  Pt endorses redness and warmth to R leg.  Pt answering questions appropriately, speaking in complete sentences, respirations even and unlabored.  Aao x 4.

## 2024-02-17 NOTE — H&P
[facility]  History & Physical    Patient Name: Bradley Collins  MRN: [id number]  Patient Class: OP- Observation  Admission Date: 2/16/2024  Attending Physician: LUDMILA Carrizales MD   Primary Care Provider: [facility], [facility]         Patient information was obtained from patient, past medical records, and ER records.     Subjective:     Principal Problem:Cellulitis    Chief Complaint:   Chief Complaint   Patient presents with    Leg Swelling     Leg swelling for the past three weeks. He reports that [facility] day [facility] for cellulitis. Finished po and IV antibiotics with no improvement        HPI: Bradley Collins is a 63 year old male with a past medical history of cellulitis, asthma and prostate cancer last radiation treatment was greater than 2 years ago who presents with right lower extremity swelling, discomfort and redness. Patient reports that [facility] and received IV antibiotics (unknown what) and discharged home with oral Keflex. Patient states the [facility] and [facility] performed [facility] the [facility] were negative (unable to see in chart review). He returns today with increased redness and swelling extending up his right inner thigh.  Patient denies fever, chills, nausea and vomiting.    ED workup showed lactic acid 0.7, no leukocytosis, sed rate 112 and CRP 33.2.  UA negative for UTI.  Creatinine elevated 2.0 (prior reading 1.1 one year ago).  Lower extremity ultrasound showed no DVT; slow flow noted. Patient has remained afebrile in the ED and was started on IV zosyn and vancomycin. Patient referred to hospital medicine and will be admitted for further evaluation and management.     Past Medical History:   Diagnosis Date    Allergy     Asthma     Cellulitis     Elevated PSA     Prostate cancer        Past Surgical History:   Procedure Laterality Date    ENDOSCOPIC ULTRASOUND OF UPPER GASTROINTESTINAL TRACT N/A 7/25/2023     Procedure: ULTRASOUND, UPPER GI TRACT, ENDOSCOPIC;  Surgeon: Yoseph Li MD;  Location: [facility] ([address] [place] [address]);  Service: Endoscopy;  Laterality: N/A;  instr portal-va referral    SINUS SURGERY Bilateral 2001       Review of patient's allergies indicates:   Allergen Reactions    Aspirin Shortness Of Breath       No current facility-administered medications on file prior to encounter.     Current Outpatient Medications on File Prior to Encounter   Medication Sig    fluticasone propionate 93 mcg/actuation AerB 2 sprays by Nasal route 2 (two) times a day.    mometasone-formoterol (DULERA) 200-5 mcg/actuation inhaler Inhale 2 puffs into the lungs 2 (two) times daily.    omeprazole 20 mg TbEC Take 20 mg by mouth once daily.    albuterol (ACCUNEB) 1.25 mg/3 mL Nebu Take 1.25 mg by nebulization every 6 (six) hours as needed. Rescue    budesonide-formoterol 160-4.5 mcg (SYMBICORT) 160-4.5 mcg/actuation HFAA Inhale 2 puffs into the lungs every 12 (twelve) hours. Controller    calcium carbonate (OS-CAROLYN) 500 mg calcium (1,250 mg) tablet     nasal exhalation resistanc.dev (NASAL EXHALATION RESISTANCE DV NASL) by Nasal route.    rosuvastatin 10 mg CpSP     tiotropium (SPIRIVA) 18 mcg inhalation capsule Inhale 18 mcg into the lungs once daily. Controller    vitamin D (VITAMIN D3) 1000 units Tab Take 1,000 Units by mouth once daily.    [DISCONTINUED] ciprofloxacin HCl (CIPRO) 500 MG tablet take 1 tablet the evening prior to procedure, 1 tablet the morning of procedure, 1 tablet the evening after procedure, 1 tablet the next morning    [DISCONTINUED] montelukast (SINGULAIR) 5 MG chewable tablet Take 5 mg by mouth every evening.     Family History    Family history is unknown by patient.       Tobacco Use    Smoking status: Never    Smokeless tobacco: Never   Substance and Sexual Activity    Alcohol use: Yes     Alcohol/week: 1.0 standard drink of alcohol     Types: 1 Cans of beer per week     Comment: week    Drug use: Never     Sexual activity: Not on file     Review of Systems   Constitutional:  Negative for activity change, appetite change, chills and fever.   HENT:  Negative for congestion, sore throat and trouble swallowing.    Eyes:  Negative for photophobia and visual disturbance.   Respiratory:  Negative for cough, chest tightness and shortness of breath.    Cardiovascular:  Negative for chest pain, palpitations and leg swelling (right lower extremity).   Gastrointestinal:  Negative for abdominal pain, diarrhea and nausea.   Genitourinary:  Negative for dysuria, flank pain and hematuria.   Musculoskeletal:  Negative for back pain.   Skin:  Positive for color change.   Neurological:  Negative for dizziness, weakness and headaches.   Psychiatric/Behavioral:  Negative for confusion.      Objective:     Vital Signs (Most Recent):  Temp: 98 °F (36.7 °C) (02/16/24 2125)  Pulse: 69 (02/16/24 2125)  Resp: 18 (02/16/24 2125)  BP: (!) 143/73 (02/16/24 2125)  SpO2: 98 % (02/16/24 2125) Vital Signs (24h Range):  Temp:  [98 °F (36.7 °C)-98.5 °F (36.9 °C)] 98 °F (36.7 °C)  Pulse:  [69-88] 69  Resp:  [18-22] 18  SpO2:  [94 %-99 %] 98 %  BP: (143-154)/(65-86) 143/73     Weight: 88.4 kg (194 lb 12.8 oz)  Body mass index is 30.51 kg/m².     Physical Exam  Vitals reviewed.   Constitutional:       Appearance: Normal appearance. He is normal weight.   HENT:      Head: Normocephalic.      Mouth/Throat:      Mouth: Mucous membranes are moist.      Pharynx: Oropharynx is clear.   Eyes:      Pupils: Pupils are equal, round, and reactive to light.   Cardiovascular:      Rate and Rhythm: Normal rate and regular rhythm.      Pulses: Normal pulses.           Dorsalis pedis pulses are 2+ on the right side and 2+ on the left side.      Heart sounds: Normal heart sounds.   Pulmonary:      Effort: Pulmonary effort is normal.      Breath sounds: Normal breath sounds.   Abdominal:      General: Bowel sounds are normal.      Palpations: Abdomen is soft.    Musculoskeletal:         General: Normal range of motion.      Cervical back: Normal range of motion.      Right lower leg: Swelling present.      Comments: erythema and edema to right lower extremity extends from inner thigh to distal calf. +warmth   Skin:     General: Skin is warm and dry.   Neurological:      Mental Status: He is alert and oriented to person, place, and time. Mental status is at baseline.   Psychiatric:         Mood and Affect: Mood normal.              CRANIAL NERVES     CN III, IV, VI   Pupils are equal, round, and reactive to light.       Significant Labs: All pertinent labs within the past 24 hours have been reviewed.  CBC:   Recent Labs   Lab 02/16/24  1829   WBC 9.92   HGB 12.5*   HCT 36.9*        CMP:   Recent Labs   Lab 02/16/24  1829      K 4.6      CO2 25   GLU 93   BUN 23   CREATININE 2.0*   CALCIUM 9.3   PROT 7.5   ALBUMIN 3.4*   BILITOT 0.3   ALKPHOS 83   AST 18   ALT 15   ANIONGAP 10       Significant Imaging: I have reviewed all pertinent imaging results/findings within the past 24 hours.  Imaging Results              US Lower Extremity Veins Right (Final result)  Result time 02/16/24 20:09:35      Final result by Sunni Vivas MD (02/16/24 20:09:35)                   Impression:      No evidence of right deep venous thrombosis.  Slow flow.  Subcutaneous edema.      Electronically signed by: Sunni Vivas  Date:    02/16/2024  Time:    20:09               Narrative:    EXAMINATION:  ULTRASOUND LOWER EXTREMITY VEINS RIGHT    CLINICAL HISTORY:  Other specified soft tissue disorders    TECHNIQUE:  Grayscale imaging of the right lower extremity to evaluate the deep and superficial venous system with color Doppler interrogation and Spectral Doppler wave form analysis was performed.    COMPARISON:  None.    FINDINGS:  There is normal color Doppler interrogation, compression and distal augmentation of the right common femoral, femoral, greater saphenous,  popliteal, posterior tibial, anterior tibial, and peroneal veins.  Slow flow is noted.  Subcutaneous edema is present.                                      Assessment/Plan:     * Cellulitis  Patient recently admitted and received IV antibiotics for right lower extremity cellulitis [facility].  Patient was discharged on p.o. Keflex and returned with worsening erythema and swelling to right lower extremity.  No fever or chills reported.  No leukocytosis.  Lactic acid 0.7.  Elevated CRP 33.2 and sed rate 112.  Ultrasound negative for DVT    -trend lactic acid  -monitor CBC   -continue IV Zosyn   -continue IV vancomycin    Elevated serum creatinine  Patient with elevated creatinine 2.0 (last reading 1 year ago 1.1).       Asthma  History noted.  Patient currently denying shortness a breath or wheezing    -continue scheduled fluticasone-furoate-vilanterol  -p.r.n.albuterol nebulizer      Prostate cancer  History noted     -continue to follow-up outpatient as scheduled        VTE Risk Mitigation (From admission, onward)           Ordered     enoxaparin injection 40 mg  Every 24 hours         02/16/24 2126     IP VTE HIGH RISK PATIENT  Once         02/16/24 2113     Place sequential compression device  Until discontinued         02/16/24 2113     IP VTE HIGH RISK PATIENT  Once         02/16/24 2114     Place sequential compression device  Until discontinued         02/16/24 2114                       Chanelle Wahl NP  Department of Hospital Medicine  [facility] ([place])

## 2024-02-17 NOTE — ASSESSMENT & PLAN NOTE
History noted.  Patient currently denying shortness a breath or wheezing    -continue scheduled fluticasone-furoate-vilanterol  -p.r.n.albuterol nebulizer

## 2024-02-17 NOTE — SUBJECTIVE & OBJECTIVE
Interval History:  Seen at bedside sleeping.  Right lower extremity with significant edema and erythema from his foot to upper thigh. Switch from IV Zosyn IV ceftriaxone this morning, monitor for clinical improvement.  Follow up blood cultures.  He reports having a CT scan with contrast a week ago that did not show anything.  We will attempt to obtain those    Review of Systems   Constitutional:  Negative for chills and fever.   Respiratory:  Negative for shortness of breath.    Cardiovascular:  Positive for leg swelling (right lower extremity). Negative for chest pain.   Gastrointestinal:  Negative for abdominal pain, diarrhea and nausea.   Genitourinary:  Negative for dysuria.   Musculoskeletal:  Negative for back pain.   Skin:  Positive for color change.   Neurological:  Negative for weakness.     Objective:     Vital Signs (Most Recent):  Temp: 98.1 °F (36.7 °C) (02/17/24 0921)  Pulse: 84 (02/17/24 0921)  Resp: 18 (02/17/24 0921)  BP: 138/76 (02/17/24 0921)  SpO2: 98 % (02/17/24 0924) Vital Signs (24h Range):  Temp:  [98 °F (36.7 °C)-98.8 °F (37.1 °C)] 98.1 °F (36.7 °C)  Pulse:  [69-88] 84  Resp:  [16-22] 18  SpO2:  [94 %-99 %] 98 %  BP: (121-154)/(63-86) 138/76     Weight: 81.6 kg (179 lb 12.8 oz)  Body mass index is 28.16 kg/m².    Intake/Output Summary (Last 24 hours) at 2/17/2024 1021  Last data filed at 2/16/2024 2156  Gross per 24 hour   Intake 97.04 ml   Output 290 ml   Net -192.96 ml         Physical Exam  Vitals reviewed.   Constitutional:       Appearance: Normal appearance. He is normal weight.   HENT:      Head: Normocephalic.   Eyes:      Extraocular Movements: Extraocular movements intact.   Cardiovascular:      Pulses:           Dorsalis pedis pulses are 2+ on the right side and 2+ on the left side.   Pulmonary:      Effort: Pulmonary effort is normal. No respiratory distress.   Musculoskeletal:         General: Normal range of motion.      Cervical back: Normal range of motion.      Right lower  leg: Swelling present. Edema present.      Comments: erythema and edema to right lower extremity extends from inner thigh to foot  +warmth   Skin:     General: Skin is warm.      Findings: Erythema present.   Neurological:      Mental Status: He is alert.   Psychiatric:         Mood and Affect: Mood normal.         Behavior: Behavior normal.             Significant Labs: All pertinent labs within the past 24 hours have been reviewed.    Significant Imaging: I have reviewed all pertinent imaging results/findings within the past 24 hours.

## 2024-02-17 NOTE — SUBJECTIVE & OBJECTIVE
Past Medical History:   Diagnosis Date    Allergy     Asthma     Cellulitis     Elevated PSA     Prostate cancer        Past Surgical History:   Procedure Laterality Date    ENDOSCOPIC ULTRASOUND OF UPPER GASTROINTESTINAL TRACT N/A 7/25/2023    Procedure: ULTRASOUND, UPPER GI TRACT, ENDOSCOPIC;  Surgeon: Yoseph Li MD;  Location: [facility] ([address] [place] [address]);  Service: Endoscopy;  Laterality: N/A;  instr Malo-va referral    SINUS SURGERY Bilateral 2001       Review of patient's allergies indicates:   Allergen Reactions    Aspirin Shortness Of Breath       No current facility-administered medications on file prior to encounter.     Current Outpatient Medications on File Prior to Encounter   Medication Sig    fluticasone propionate 93 mcg/actuation AerB 2 sprays by Nasal route 2 (two) times a day.    mometasone-formoterol (DULERA) 200-5 mcg/actuation inhaler Inhale 2 puffs into the lungs 2 (two) times daily.    omeprazole 20 mg TbEC Take 20 mg by mouth once daily.    albuterol (ACCUNEB) 1.25 mg/3 mL Nebu Take 1.25 mg by nebulization every 6 (six) hours as needed. Rescue    budesonide-formoterol 160-4.5 mcg (SYMBICORT) 160-4.5 mcg/actuation HFAA Inhale 2 puffs into the lungs every 12 (twelve) hours. Controller    calcium carbonate (OS-CAROLYN) 500 mg calcium (1,250 mg) tablet     nasal exhalation resistanc.dev (NASAL EXHALATION RESISTANCE DV NASL) by Nasal route.    rosuvastatin 10 mg CpSP     tiotropium (SPIRIVA) 18 mcg inhalation capsule Inhale 18 mcg into the lungs once daily. Controller    vitamin D (VITAMIN D3) 1000 units Tab Take 1,000 Units by mouth once daily.    [DISCONTINUED] ciprofloxacin HCl (CIPRO) 500 MG tablet take 1 tablet the evening prior to procedure, 1 tablet the morning of procedure, 1 tablet the evening after procedure, 1 tablet the next morning    [DISCONTINUED] montelukast (SINGULAIR) 5 MG chewable tablet Take 5 mg by mouth every evening.     Family History    Family history is unknown by patient.        Tobacco Use    Smoking status: Never    Smokeless tobacco: Never   Substance and Sexual Activity    Alcohol use: Yes     Alcohol/week: 1.0 standard drink of alcohol     Types: 1 Cans of beer per week     Comment: week    Drug use: Never    Sexual activity: Not on file     Review of Systems   Constitutional:  Negative for activity change, appetite change, chills and fever.   HENT:  Negative for congestion, sore throat and trouble swallowing.    Eyes:  Negative for photophobia and visual disturbance.   Respiratory:  Negative for cough, chest tightness and shortness of breath.    Cardiovascular:  Negative for chest pain, palpitations and leg swelling (right lower extremity).   Gastrointestinal:  Negative for abdominal pain, diarrhea and nausea.   Genitourinary:  Negative for dysuria, flank pain and hematuria.   Musculoskeletal:  Negative for back pain.   Skin:  Positive for color change.   Neurological:  Negative for dizziness, weakness and headaches.   Psychiatric/Behavioral:  Negative for confusion.      Objective:     Vital Signs (Most Recent):  Temp: 98 °F (36.7 °C) (02/16/24 2125)  Pulse: 69 (02/16/24 2125)  Resp: 18 (02/16/24 2125)  BP: (!) 143/73 (02/16/24 2125)  SpO2: 98 % (02/16/24 2125) Vital Signs (24h Range):  Temp:  [98 °F (36.7 °C)-98.5 °F (36.9 °C)] 98 °F (36.7 °C)  Pulse:  [69-88] 69  Resp:  [18-22] 18  SpO2:  [94 %-99 %] 98 %  BP: (143-154)/(65-86) 143/73     Weight: 88.4 kg (194 lb 12.8 oz)  Body mass index is 30.51 kg/m².     Physical Exam  Vitals reviewed.   Constitutional:       Appearance: Normal appearance. He is normal weight.   HENT:      Head: Normocephalic.      Mouth/Throat:      Mouth: Mucous membranes are moist.      Pharynx: Oropharynx is clear.   Eyes:      Pupils: Pupils are equal, round, and reactive to light.   Cardiovascular:      Rate and Rhythm: Normal rate and regular rhythm.      Pulses: Normal pulses.           Dorsalis pedis pulses are 2+ on the right side and 2+ on the  left side.      Heart sounds: Normal heart sounds.   Pulmonary:      Effort: Pulmonary effort is normal.      Breath sounds: Normal breath sounds.   Abdominal:      General: Bowel sounds are normal.      Palpations: Abdomen is soft.   Musculoskeletal:         General: Normal range of motion.      Cervical back: Normal range of motion.      Right lower leg: Swelling present.      Comments: erythema and edema to right lower extremity extends from inner thigh to distal calf. +warmth   Skin:     General: Skin is warm and dry.   Neurological:      Mental Status: He is alert and oriented to person, place, and time. Mental status is at baseline.   Psychiatric:         Mood and Affect: Mood normal.              CRANIAL NERVES     CN III, IV, VI   Pupils are equal, round, and reactive to light.       Significant Labs: All pertinent labs within the past 24 hours have been reviewed.  CBC:   Recent Labs   Lab 02/16/24  1829   WBC 9.92   HGB 12.5*   HCT 36.9*        CMP:   Recent Labs   Lab 02/16/24  1829      K 4.6      CO2 25   GLU 93   BUN 23   CREATININE 2.0*   CALCIUM 9.3   PROT 7.5   ALBUMIN 3.4*   BILITOT 0.3   ALKPHOS 83   AST 18   ALT 15   ANIONGAP 10       Significant Imaging: I have reviewed all pertinent imaging results/findings within the past 24 hours.  Imaging Results              US Lower Extremity Veins Right (Final result)  Result time 02/16/24 20:09:35      Final result by Sunni Vivas MD (02/16/24 20:09:35)                   Impression:      No evidence of right deep venous thrombosis.  Slow flow.  Subcutaneous edema.      Electronically signed by: Sunni Vivas  Date:    02/16/2024  Time:    20:09               Narrative:    EXAMINATION:  ULTRASOUND LOWER EXTREMITY VEINS RIGHT    CLINICAL HISTORY:  Other specified soft tissue disorders    TECHNIQUE:  Grayscale imaging of the right lower extremity to evaluate the deep and superficial venous system with color Doppler interrogation  and Spectral Doppler wave form analysis was performed.    COMPARISON:  None.    FINDINGS:  There is normal color Doppler interrogation, compression and distal augmentation of the right common femoral, femoral, greater saphenous, popliteal, posterior tibial, anterior tibial, and peroneal veins.  Slow flow is noted.  Subcutaneous edema is present.

## 2024-02-17 NOTE — PLAN OF CARE
MSW met with the patient at the bedside.     Patient is alert and oriented with no communication barriers.     Patient reported having DME at home.(nebulizer)     Patients PCP is correct on the face sheet. Patient choice pharmacy is bedside delivery.     Patients' family will transport the patient home at discharge.     CM team will continue to follow.      02/17/24 0812   Discharge Assessment   Assessment Type Discharge Planning Assessment   Confirmed/corrected address, phone number and insurance Yes   Confirmed Demographics Correct on Facesheet   Source of Information patient;health record   People in Home spouse   Do you expect to return to your current living situation? Yes   Do you have help at home or someone to help you manage your care at home? Yes   Prior to hospitilization cognitive status: Alert/Oriented   Current cognitive status: Alert/Oriented   Walking or Climbing Stairs Difficulty no   Dressing/Bathing Difficulty no   Equipment Currently Used at Home nebulizer   Readmission within 30 days? Yes  ([facility] Sunday 2/11/24)   Patient currently being followed by outpatient case management? No   Do you currently have service(s) that help you manage your care at home? No   Do you take prescription medications? Yes   Do you have prescription coverage? Yes   Do you have any problems affording any of your prescribed medications? No   Is the patient taking medications as prescribed? yes   How do you get to doctors appointments? car, drives self;family or friend will provide   Are you on dialysis? No   Do you take coumadin? No   Discharge Plan A Home with family   Discharge Plan B Home Health   DME Needed Upon Discharge  none   Discharge Plan discussed with: Patient   Transition of Care Barriers None

## 2024-02-17 NOTE — PROGRESS NOTES
"Pharmacokinetic Initial Assessment: IV Vancomycin    Assessment/Plan:    Initiate intravenous vancomycin with loading dose of 1750 mg once (1000 mg given in ED and 750 mg ordered to be given on floor) with subsequent doses when random concentrations are less than 20 mcg/mL  Desired empiric serum trough concentration is 10 to 20 mcg/mL  Draw vancomycin random level on 2/17/24 at 1900.  Pharmacy will continue to follow and monitor vancomycin.      Please contact pharmacy at extension [phone] with any questions regarding this assessment.     Thank you for the consult,   Kaley Lennon       Patient brief summary:  Bradley Collins is a 63 y.o. male initiated on antimicrobial therapy with IV Vancomycin for treatment of suspected skin & soft tissue infection    Drug Allergies:   Review of patient's allergies indicates:   Allergen Reactions    Aspirin Shortness Of Breath       Actual Body Weight:   88.4 kg    Renal Function:   Estimated Creatinine Clearance: 39.6 mL/min (A) (based on SCr of 2 mg/dL (H)).,     Dialysis Method (if applicable):  N/A    CBC (last 72 hours):  Recent Labs   Lab Result Units 02/16/24 1829   WBC K/uL 9.92   Hemoglobin g/dL 12.5*   Hematocrit % 36.9*   Platelets K/uL 316   Gran % % 46.5   Lymph % % 12.0*   Mono % % 9.3   Eosinophil % % 30.6*   Basophil % % 1.3   Differential Method  Automated       Metabolic Panel (last 72 hours):  Recent Labs   Lab Result Units 02/16/24 1829 02/16/24 2004   Sodium mmol/L 136  --    Potassium mmol/L 4.6  --    Chloride mmol/L 101  --    CO2 mmol/L 25  --    Glucose mg/dL 93  --    Glucose, UA   --  Negative   BUN mg/dL 23  --    Creatinine mg/dL 2.0*  --    Albumin g/dL 3.4*  --    Total Bilirubin mg/dL 0.3  --    Alkaline Phosphatase U/L 83  --    AST U/L 18  --    ALT U/L 15  --        Drug levels (last 3 results):  No results for input(s): "VANCOMYCINRA", "VANCORANDOM", "VANCOMYCINPE", "VANCOPEAK", "VANCOMYCINTR", "VANCOTROUGH" in the last 72 " hours.    Microbiologic Results:  Microbiology Results (last 7 days)       Procedure Component Value Units Date/Time    Blood culture #2 [[id number]] Collected: 02/16/24 1843    Order Status: Sent Specimen: Blood from Peripheral, Upper Arm, Right Updated: 02/16/24 2149    Blood Culture #1 **CANNOT BE ORDERED STAT** [[id number]] Collected: 02/16/24 1919    Order Status: Sent Specimen: Blood from Peripheral, Antecubital, Left Updated: 02/16/24 2149    Blood culture #1 [[id number]] Collected: 02/16/24 1829    Order Status: Canceled Specimen: Blood from Peripheral, Antecubital, Left

## 2024-02-17 NOTE — ED NOTES
Assumed care of pt here for redness and swelling to RLE. Pt was seen/ treated at another facility but states his leg has not gotten better. Pt denies other complaints. Pt has redness, warmth and edema to entire R leg, up into R groin. Pt states he has not had any abnormalities with elimination.     Review of patient's allergies indicates:   Allergen Reactions    Aspirin Shortness Of Breath        Patient has verified the spelling of their name and  on armband.   APPEARANCE: Patient is alert, calm, oriented x 4, and does not appear distressed.  SKIN: Skin is normal for race, warm, and dry. Normal skin turgor and mucous membranes moist.  CARDIAC: Normal rate and rhythm, no murmur heard.   RESPIRATORY:Normal rate and effort. Breath sounds clear bilaterally throughout chest. Respirations are equal and unlabored.    GASTRO: Bowel sounds normal, abdomen is soft, no tenderness, and no abdominal distention.  MUSCLE: Full ROM. No bony tenderness or soft tissue tenderness. No obvious deformity.  PERIPHERAL VASCULAR: peripheral pulses present. Normal cap refill. +edema, warmth, redness to RLE. Warm to touch.  NEURO: 5/5 strength major flexors/extensors bilaterally. Sensory intact to light touch bilaterally. Denver coma scale: eyes open spontaneously-4, oriented & converses-5, obeys commands-6. No neurological abnormalities.   MENTAL STATUS: awake, alert and aware of environment.  : Voids without complication    ED order sin progress. Pt SR up x 2. Bed in lowest position with wheels locked. Call bell within reach of pt.     Pt has concerns about taking ABX and his kidney function. RN advised pt that MD will come speak to him.

## 2024-02-17 NOTE — PROGRESS NOTES
[facility]  Progress Note    Patient Name: Bradley Collins  MRN: [id number]  Patient Class: OP- Observation   Admission Date: 2/16/2024  Length of Stay: 0 days  Attending Physician: LUDMILA Carrizales MD  Primary Care Provider: Administration, [facility]        Subjective:     Principal Problem:Cellulitis        HPI:  Bradley Collins is a 63 year old male with a past medical history of cellulitis, asthma and prostate cancer last radiation treatment was greater than 2 years ago who presents with right lower extremity swelling, discomfort and redness. [facility] (unknown what) and discharged home with oral Keflex. Patient states the [facility] were negative (unable to see in chart review). He returns today with increased redness and swelling extending up his right inner thigh.  Patient denies fever, chills, nausea and vomiting.    ED workup showed lactic acid 0.7, no leukocytosis, sed rate 112 and CRP 33.2.  UA negative for UTI.  Creatinine elevated 2.0 (prior reading 1.1 one year ago).  Lower extremity ultrasound showed no DVT; slow flow noted. Patient has remained afebrile in the ED and was started on IV zosyn and vancomycin. Patient referred to hospital medicine and will be admitted for further evaluation and management.     Overview/Hospital Course:  Bradley Collins admitted for cellulitis of his right lower extremity.  [facility], unsure which antibiotics he was on at that point, but he was discharged on cephalexin and deteriorated.  He reports having a CT scan with contrast that did not show anything.  Here he is afebrile without leukocytosis, started on IV vancomycin and IV Zosyn, changed to IV ceftriaxone and IV vancomycin.  Continue to monitor for clinical improvement, follow blood cultures    Interval History:  Seen at bedside sleeping.  Right lower extremity with significant  edema and erythema from his foot to upper thigh. Switch from IV Zosyn IV ceftriaxone this morning, monitor for clinical improvement.  Follow up blood cultures.  He reports having a CT scan with contrast a week ago that did not show anything.  We will attempt to obtain those    Review of Systems   Constitutional:  Negative for chills and fever.   Respiratory:  Negative for shortness of breath.    Cardiovascular:  Positive for leg swelling (right lower extremity). Negative for chest pain.   Gastrointestinal:  Negative for abdominal pain, diarrhea and nausea.   Genitourinary:  Negative for dysuria.   Musculoskeletal:  Negative for back pain.   Skin:  Positive for color change.   Neurological:  Negative for weakness.     Objective:     Vital Signs (Most Recent):  Temp: 98.1 °F (36.7 °C) (02/17/24 0921)  Pulse: 84 (02/17/24 0921)  Resp: 18 (02/17/24 0921)  BP: 138/76 (02/17/24 0921)  SpO2: 98 % (02/17/24 0924) Vital Signs (24h Range):  Temp:  [98 °F (36.7 °C)-98.8 °F (37.1 °C)] 98.1 °F (36.7 °C)  Pulse:  [69-88] 84  Resp:  [16-22] 18  SpO2:  [94 %-99 %] 98 %  BP: (121-154)/(63-86) 138/76     Weight: 81.6 kg (179 lb 12.8 oz)  Body mass index is 28.16 kg/m².    Intake/Output Summary (Last 24 hours) at 2/17/2024 1021  Last data filed at 2/16/2024 2156  Gross per 24 hour   Intake 97.04 ml   Output 290 ml   Net -192.96 ml         Physical Exam  Vitals reviewed.   Constitutional:       Appearance: Normal appearance. He is normal weight.   HENT:      Head: Normocephalic.   Eyes:      Extraocular Movements: Extraocular movements intact.   Cardiovascular:      Pulses:           Dorsalis pedis pulses are 2+ on the right side and 2+ on the left side.   Pulmonary:      Effort: Pulmonary effort is normal. No respiratory distress.   Musculoskeletal:         General: Normal range of motion.      Cervical back: Normal range of motion.      Right lower leg: Swelling present. Edema present.      Comments: erythema and edema to right lower  extremity extends from inner thigh to foot  +warmth   Skin:     General: Skin is warm.      Findings: Erythema present.   Neurological:      Mental Status: He is alert.   Psychiatric:         Mood and Affect: Mood normal.         Behavior: Behavior normal.             Significant Labs: All pertinent labs within the past 24 hours have been reviewed.    Significant Imaging: I have reviewed all pertinent imaging results/findings within the past 24 hours.    Assessment/Plan:      * Cellulitis  Patient recently admitted and received IV antibiotics [facility]  Patient was discharged on p.o. Keflex and returned with worsening erythema and swelling to right lower extremity.  No fever or chills reported.  No leukocytosis.  Lactic acid 0.7.  Elevated CRP 33.2 and sed rate 112.  Ultrasound negative for DVT    -monitor CBC   -continue IV Zosyn   -continue IV vancomycin  - antibiotic regimen switch to IV vancomycin and IV ceftriaxone, monitor for clinical improvement   Consider repeat CT scan to evaluate for fluid collection, right lower extremity with significant edema, no fluctuance felt but difficult given the degree of edema    Elevated serum creatinine  Patient with elevated creatinine 2.0 (last reading 1 year ago 1.1).     -IV NS   -trend BMP    Asthma  History noted.  Patient currently denying shortness a breath or wheezing    -continue scheduled fluticasone-furoate-vilanterol  -p.r.n.albuterol nebulizer      Prostate cancer  History noted     -continue to follow-up outpatient as scheduled        VTE Risk Mitigation (From admission, onward)           Ordered     enoxaparin injection 40 mg  Every 24 hours         02/16/24 2126     IP VTE HIGH RISK PATIENT  Once         02/16/24 2113     Place sequential compression device  Until discontinued         02/16/24 2113     IP VTE HIGH RISK PATIENT  Once         02/16/24 2114     Place sequential compression device  Until discontinued          02/16/24 2114                    Discharge Planning   CONSTANTINO:      Code Status: Full Code   Is the patient medically ready for discharge?:     Reason for patient still in hospital (select all that apply): Patient trending condition  Discharge Plan A: Home with family                  Nayla Morales PA-C  Department of Hospital Medicine   [facility] ([place])

## 2024-02-17 NOTE — FIRST PROVIDER EVALUATION
Emergency Department TeleTriage Encounter Note      CHIEF COMPLAINT    Chief Complaint   Patient presents with    Leg Swelling     Leg swelling for the past three weeks. He reports that [facility] for [facility]ulitis. Finished po and IV antibiotics with no improvement       VITAL SIGNS   Initial Vitals [02/16/24 1806]   BP Pulse Resp Temp SpO2   (!) 153/69 88 18 98 °F (36.7 °C) 98 %      MAP       --            ALLERGIES    Review of patient's allergies indicates:   Allergen Reactions    Aspirin Shortness Of Breath       PROVIDER TRIAGE NOTE  Verbal consent for the teletriage evaluation was given by the patient at the start of the evaluation.  All efforts will be made to maintain patient's privacy during the evaluation.      This is a teletriage evaluation of a 63 y.o. male presenting to the ED with c/o RLE swelling for 3 weeks. [facility] and diagnosed with [facility]itis.  Completed ABX with no improvement.  Also had an admission this past Sunday and on ABX Keflex with improvement.  Limited physical exam via telehealth: The patient is awake, alert, answering questions appropriately and is not in respiratory distress.  As the Teletriage provider, I performed an initial assessment and ordered appropriate labs and imaging studies, if any, to facilitate the patient's care once placed in the ED. Once a room is available, care and a full evaluation will be completed by an alternate ED provider.  Any additional orders and the final disposition will be determined by that provider.  All imaging and labs will not be followed-up by the Teletriage Team, including myself.          ORDERS  Labs Reviewed - No data to display    ED Orders (720h ago, onward)      Start Ordered     Status Ordering Provider    02/16/24 1819 02/16/24 1818  C-reactive protein  STAT         Ordered ORESTES WAYNE    02/16/24 1819 02/16/24 1818  Sedimentation rate  STAT         Ordered ORESTES WAYNE  REDD    02/16/24 1819 02/16/24 1818  US Lower Extremity Veins Right  1 time imaging         Ordered ORESTES WAYNE    02/16/24 1818 02/16/24 1818  Saline lock IV  Once         Ordered ORESTES WAYNE    02/16/24 1818 02/16/24 1818  CBC auto differential  STAT         Ordered ORESTES WAYNE    02/16/24 1818 02/16/24 1818  Comprehensive metabolic panel  STAT         Ordered ORESTES WAYNE    02/16/24 1818 02/16/24 1818  Urinalysis, Reflex to Urine Culture Urine, Clean Catch  STAT         Ordered ORESTES WAYNE    02/16/24 1818 02/16/24 1818  Lactic acid, plasma  STAT         Ordered ORESTES WAYNE    02/16/24 1818 02/16/24 1818  Blood culture #1  STAT        Comments: Blood Culture #1      Ordered ORESTES WAYNE    02/16/24 1818 02/16/24 1818  Blood culture #2  STAT        Comments: Blood Culture #2      Ordered ORESTES WAYNE              Virtual Visit Note: The provider triage portion of this emergency department evaluation and documentation was performed via How do you roll?, a HIPAA-compliant telemedicine application, in concert with a tele-presenter in the room. A face to face patient evaluation with one of my colleagues will occur once the patient is placed in an emergency department room.      DISCLAIMER: This note was prepared with [employer] voice recognition transcription software. Garbled syntax, mangled pronouns, and other bizarre constructions may be attributed to that software system.

## 2024-02-17 NOTE — HOSPITAL COURSE
Bradley Collins admitted for cellulitis of his right lower extremity. He is not aware of any trauma or injury to right leg but did inject his dupixant in the right hip earlier this month. [facility] unsure which antibiotics he was on at that point, but he was discharged on cephalexin and deteriorated.  Mr Huerta reported that he was diagnosed with DALIA at the [facility] during that [facility] also. He reports having a CT scan with contrast that did not show anything.  Here he is afebrile without leukocytosis, started on IV vancomycin and IV Zosyn, changed to IV ceftriaxone and IV vancomycin. Improvement in erythema, but not edema. Induration noted to thigh. Repeat CT of RLE to r/o fluid collection:diffuse cutaneous thickening and edema, prominent right inguinal lymph nodes, no definite discrete fluid collection. Switched to PO augmentin/doxy 2/19.  Blood cultures NGTD.  Mr Huerta reports improvement in his erythema and edema though he still has induration to right leg.  We will continue oral antibiotics at home and patient has strict return criteria. Outpatient lab monitoring ordered as well. Follow appointment with [facility] has been requested.

## 2024-02-17 NOTE — ASSESSMENT & PLAN NOTE
Patient recently admitted and received IV antibiotics for right lower extremity cellulitis [facility].  Patient was discharged on p.o. Keflex and returned with worsening erythema and swelling to right lower extremity.  No fever or chills reported.  No leukocytosis.  Lactic acid 0.7.  Elevated CRP 33.2 and sed rate 112.  Ultrasound negative for DVT    -monitor CBC   -continue IV Zosyn   -continue IV vancomycin  - antibiotic regimen switch to IV vancomycin and IV ceftriaxone, monitor for clinical improvement   Consider repeat CT scan to evaluate for fluid collection, right lower extremity with significant edema, no fluctuance felt but difficult given the degree of edema

## 2024-02-17 NOTE — ASSESSMENT & PLAN NOTE
Patient recently admitted and received IV antibiotics for right lower extremity cellulitis [facility].  Patient was discharged on p.o. Keflex and returned with worsening erythema and swelling to right lower extremity.  No fever or chills reported.  No leukocytosis.  Lactic acid 0.7.  Elevated CRP 33.2 and sed rate 112.  Ultrasound negative for DVT    -trend lactic acid  -monitor CBC   -continue IV Zosyn   -continue IV vancomycin

## 2024-02-17 NOTE — ED PROVIDER NOTES
Encounter Date: 2/16/2024       History     Chief Complaint   Patient presents with    Leg Swelling     Leg swelling for the past three weeks. [facility] for [facility]. Finished po and IV antibiotics with no improvement     63-year-old male with a past medical history of cellulitis, asthma and prostate cancer last radiation treatment was greater than 2 years ago who presents with right lower extremity swelling, discomfort and redness.  Patient reports that he was admitted to the hospital several days ago for the same, and remained an inpatient for approximately 4 days.  Patient was discharged home on p.o. in to swelling and redness had decreased, but both the redness and swelling have returned and are no up to his right inner thigh.  Patient denies any fevers or chills    The history is provided by the patient.     Review of patient's allergies indicates:   Allergen Reactions    Aspirin Shortness Of Breath     Past Medical History:   Diagnosis Date    Allergy     Asthma     Cellulitis     Elevated PSA     Prostate cancer      Past Surgical History:   Procedure Laterality Date    ENDOSCOPIC ULTRASOUND OF UPPER GASTROINTESTINAL TRACT N/A 7/25/2023    Procedure: ULTRASOUND, UPPER GI TRACT, ENDOSCOPIC;  Surgeon: Yoseph Li MD;  Location: [facility] ([address] [place] [address]);  Service: Endoscopy;  Laterality: N/A;  instr portal-va referral    SINUS SURGERY Bilateral 2001     Family History   Family history unknown: Yes     Social History     Tobacco Use    Smoking status: Never    Smokeless tobacco: Never   Substance Use Topics    Alcohol use: Yes     Alcohol/week: 1.0 standard drink of alcohol     Types: 1 Cans of beer per week     Comment: week    Drug use: Never     Review of Systems   All other systems reviewed and are negative.      Physical Exam     Initial Vitals [02/16/24 1806]   BP Pulse Resp Temp SpO2   (!) 153/69 88 18 98 °F (36.7 °C) 98 %      MAP        --         Physical Exam    Nursing note and vitals reviewed.  Constitutional: He appears well-developed and well-nourished. He is not diaphoretic. No distress.   HENT:   Head: Normocephalic and atraumatic.   Right Ear: External ear normal.   Left Ear: External ear normal.   Eyes: Conjunctivae and EOM are normal. Pupils are equal, round, and reactive to light.   Neck: Neck supple. No JVD present.   Normal range of motion.  Cardiovascular:  Normal rate, regular rhythm and intact distal pulses.           Pulmonary/Chest: No respiratory distress.   Abdominal: He exhibits no distension.   Musculoskeletal:      Cervical back: Normal range of motion and neck supple.      Comments: RLE: Erythema and edema of extends from inner thigh to distal calf, no crepitus     Neurological: He is alert and oriented to person, place, and time. He has normal strength.   Skin: Skin is warm. Capillary refill takes less than 2 seconds.   Psychiatric: He has a normal mood and affect. Thought content normal.         ED Course   Procedures  Labs Reviewed   CBC W/ AUTO DIFFERENTIAL - Abnormal; Notable for the following components:       Result Value    RBC 3.87 (*)     Hemoglobin 12.5 (*)     Hematocrit 36.9 (*)     MCH 32.3 (*)     MPV 8.4 (*)     Eos # 3.0 (*)     Lymph % 12.0 (*)     Eosinophil % 30.6 (*)     All other components within normal limits   COMPREHENSIVE METABOLIC PANEL - Abnormal; Notable for the following components:    Creatinine 2.0 (*)     Albumin 3.4 (*)     eGFR 37 (*)     All other components within normal limits   URINALYSIS, REFLEX TO URINE CULTURE - Abnormal; Notable for the following components:    Color, UA Colorless (*)     All other components within normal limits    Narrative:     Specimen Source->Urine   C-REACTIVE PROTEIN - Abnormal; Notable for the following components:    CRP 33.2 (*)     All other components within normal limits   SEDIMENTATION RATE - Abnormal; Notable for the following components:    Sed  Rate 112 (*)     All other components within normal limits   LACTIC ACID, PLASMA          Imaging Results              US Lower Extremity Veins Right (Final result)  Result time 02/16/24 20:09:35      Final result by Sunni Vivas MD (02/16/24 20:09:35)                   Impression:      No evidence of right deep venous thrombosis.  Slow flow.  Subcutaneous edema.      Electronically signed by: Sunni Vivas  Date:    02/16/2024  Time:    20:09               Narrative:    EXAMINATION:  ULTRASOUND LOWER EXTREMITY VEINS RIGHT    CLINICAL HISTORY:  Other specified soft tissue disorders    TECHNIQUE:  Grayscale imaging of the right lower extremity to evaluate the deep and superficial venous system with color Doppler interrogation and Spectral Doppler wave form analysis was performed.    COMPARISON:  None.    FINDINGS:  There is normal color Doppler interrogation, compression and distal augmentation of the right common femoral, femoral, greater saphenous, popliteal, posterior tibial, anterior tibial, and peroneal veins.  Slow flow is noted.  Subcutaneous edema is present.                                       Medications   sodium chloride 0.9% flush 10 mL (has no administration in time range)   melatonin tablet 6 mg (has no administration in time range)   albuterol nebulizer solution 1.25 mg (has no administration in time range)   fluticasone furoate-vilanteroL 200-25 mcg/dose diskus inhaler 1 puff (1 puff Inhalation Not Given 2/17/24 0900)   pantoprazole EC tablet 40 mg (40 mg Oral Given 2/17/24 0855)   fluticasone propionate 50 mcg/actuation nasal spray 100 mcg (100 mcg Each Nostril Given 2/17/24 0856)   sodium chloride 0.9% flush 10 mL (has no administration in time range)   ondansetron injection 4 mg (has no administration in time range)   prochlorperazine injection Soln 5 mg (has no administration in time range)   senna-docusate 8.6-50 mg per tablet 1 tablet (1 tablet Oral Not Given 2/17/24 0900)    acetaminophen tablet 650 mg (has no administration in time range)   simethicone chewable tablet 80 mg (has no administration in time range)   aluminum-magnesium hydroxide-simethicone 200-200-20 mg/5 mL suspension 30 mL (has no administration in time range)   naloxone 0.4 mg/mL injection 0.02 mg (has no administration in time range)   glucose chewable tablet 16 g (has no administration in time range)   glucose chewable tablet 24 g (has no administration in time range)   glucagon (human recombinant) injection 1 mg (has no administration in time range)   acetaminophen tablet 650 mg (has no administration in time range)   HYDROcodone-acetaminophen 5-325 mg per tablet 1 tablet (has no administration in time range)   vancomycin - pharmacy to dose (has no administration in time range)   dextrose 10% bolus 125 mL 125 mL (has no administration in time range)   dextrose 10% bolus 250 mL 250 mL (has no administration in time range)   enoxaparin injection 40 mg (has no administration in time range)   cefTRIAXone (Rocephin) 1 g in dextrose 5 % in water (D5W) 100 mL IVPB (MB+) (0 g Intravenous Stopped 2/17/24 0954)   piperacillin-tazobactam (ZOSYN) 4.5 g in dextrose 5 % in water (D5W) 100 mL IVPB (MB+) (0 g Intravenous Stopped 2/16/24 2013)   vancomycin (VANCOCIN) 1,000 mg in dextrose 5 % (D5W) 250 mL IVPB (Vial-Mate) (0 mg Intravenous Stopped 2/16/24 2145)   vancomycin 750 mg in dextrose 5 % (D5W) 250 mL IVPB (Vial-Mate) (0 mg Intravenous Stopped 2/17/24 0041)   0.9%  NaCl infusion ( Intravenous New Bag 2/16/24 2311)     Medical Decision Making  RLE erythema and redness having failed outpatient antibiotics.  Differential diagnosis includes DVT, necrotizing fasciitis, cellulitis, vascular insufficiency, sepsis    Amount and/or Complexity of Data Reviewed  Labs: ordered.    Risk  OTC drugs.  Prescription drug management.  Decision regarding hospitalization.  Risk Details: Patient with right lower extremity cellulitis that has  failed 2 PO outpatient antibiotics presents with worsening redness. Will need to admit and escalate antibiotics at the outset. No evidence of sepsis currently. Case discussed with Chanelle Wilson NP, hospital medicine.  Will admit to Dr. Carrizales under opt                                      Clinical Impression:  Final diagnoses:  [M79.89] Leg swelling  [L03.90] Cellulitis, unspecified cellulitis site (Primary)          ED Disposition Condition    Observation Stable                Yahir Mendoza MD  02/17/24 8095

## 2024-02-17 NOTE — HPI
Bradley Collins is a 63 year old male with a past medical history of cellulitis, asthma and prostate cancer last radiation treatment was greater than 2 years ago who presents with right lower extremity swelling, discomfort and redness. [facility] and received [facility] (unknown what) and discharged home with oral Keflex. Patient states the [facility] and [facility] were [facility] (unable to see in chart review). He returns today with increased redness and swelling extending up his right inner thigh.  Patient denies fever, chills, nausea and vomiting.    ED workup showed lactic acid 0.7, no leukocytosis, sed rate 112 and CRP 33.2.  UA negative for UTI.  Creatinine elevated 2.0 (prior reading 1.1 one year ago).  Lower extremity ultrasound showed no DVT; slow flow noted. Patient has remained afebrile in the ED and was started on IV zosyn and vancomycin. Patient referred to hospital medicine and will be admitted for further evaluation and management.

## 2024-02-18 LAB
ALBUMIN SERPL BCP-MCNC: 3.1 G/DL (ref 3.5–5.2)
ALP SERPL-CCNC: 79 U/L (ref 55–135)
ALT SERPL W/O P-5'-P-CCNC: 15 U/L (ref 10–44)
ANION GAP SERPL CALC-SCNC: 6 MMOL/L (ref 8–16)
ANION GAP SERPL CALC-SCNC: 7 MMOL/L (ref 8–16)
ANION GAP SERPL CALC-SCNC: 8 MMOL/L (ref 8–16)
AST SERPL-CCNC: 17 U/L (ref 10–40)
BASOPHILS NFR BLD: 0 % (ref 0–1.9)
BILIRUB SERPL-MCNC: 0.3 MG/DL (ref 0.1–1)
BUN SERPL-MCNC: 14 MG/DL (ref 8–23)
BUN SERPL-MCNC: 15 MG/DL (ref 8–23)
BUN SERPL-MCNC: 16 MG/DL (ref 8–23)
BUN SERPL-MCNC: 17 MG/DL (ref 8–23)
BUN SERPL-MCNC: 17 MG/DL (ref 8–23)
CALCIUM SERPL-MCNC: 8.4 MG/DL (ref 8.7–10.5)
CALCIUM SERPL-MCNC: 8.7 MG/DL (ref 8.7–10.5)
CALCIUM SERPL-MCNC: 8.8 MG/DL (ref 8.7–10.5)
CALCIUM SERPL-MCNC: 8.8 MG/DL (ref 8.7–10.5)
CALCIUM SERPL-MCNC: 8.9 MG/DL (ref 8.7–10.5)
CALCIUM SERPL-MCNC: 8.9 MG/DL (ref 8.7–10.5)
CALCIUM SERPL-MCNC: 9 MG/DL (ref 8.7–10.5)
CHLORIDE SERPL-SCNC: 101 MMOL/L (ref 95–110)
CHLORIDE SERPL-SCNC: 101 MMOL/L (ref 95–110)
CHLORIDE SERPL-SCNC: 102 MMOL/L (ref 95–110)
CHLORIDE SERPL-SCNC: 102 MMOL/L (ref 95–110)
CHLORIDE SERPL-SCNC: 104 MMOL/L (ref 95–110)
CO2 SERPL-SCNC: 25 MMOL/L (ref 23–29)
CO2 SERPL-SCNC: 28 MMOL/L (ref 23–29)
CO2 SERPL-SCNC: 29 MMOL/L (ref 23–29)
CO2 SERPL-SCNC: 29 MMOL/L (ref 23–29)
CREAT SERPL-MCNC: 1.8 MG/DL (ref 0.5–1.4)
CREAT SERPL-MCNC: 1.9 MG/DL (ref 0.5–1.4)
CREAT SERPL-MCNC: 2 MG/DL (ref 0.5–1.4)
DIFFERENTIAL METHOD BLD: ABNORMAL
EOSINOPHIL NFR BLD: 24 % (ref 0–8)
ERYTHROCYTE [DISTWIDTH] IN BLOOD BY AUTOMATED COUNT: 11.9 % (ref 11.5–14.5)
EST. GFR  (NO RACE VARIABLE): 37 ML/MIN/1.73 M^2
EST. GFR  (NO RACE VARIABLE): 39 ML/MIN/1.73 M^2
EST. GFR  (NO RACE VARIABLE): 42 ML/MIN/1.73 M^2
GLUCOSE SERPL-MCNC: 100 MG/DL (ref 70–110)
GLUCOSE SERPL-MCNC: 100 MG/DL (ref 70–110)
GLUCOSE SERPL-MCNC: 108 MG/DL (ref 70–110)
GLUCOSE SERPL-MCNC: 115 MG/DL (ref 70–110)
GLUCOSE SERPL-MCNC: 124 MG/DL (ref 70–110)
GLUCOSE SERPL-MCNC: 93 MG/DL (ref 70–110)
GLUCOSE SERPL-MCNC: 93 MG/DL (ref 70–110)
HCT VFR BLD AUTO: 32.6 % (ref 40–54)
HGB BLD-MCNC: 11 G/DL (ref 14–18)
IMM GRANULOCYTES # BLD AUTO: ABNORMAL K/UL (ref 0–0.04)
IMM GRANULOCYTES NFR BLD AUTO: ABNORMAL % (ref 0–0.5)
LYMPHOCYTES NFR BLD: 9 % (ref 18–48)
MCH RBC QN AUTO: 32.1 PG (ref 27–31)
MCHC RBC AUTO-ENTMCNC: 33.7 G/DL (ref 32–36)
MCV RBC AUTO: 95 FL (ref 82–98)
MONOCYTES NFR BLD: 8 % (ref 4–15)
NEUTROPHILS NFR BLD: 59 % (ref 38–73)
NRBC BLD-RTO: 0 /100 WBC
PLATELET # BLD AUTO: 295 K/UL (ref 150–450)
PLATELET BLD QL SMEAR: ABNORMAL
PMV BLD AUTO: 8.2 FL (ref 9.2–12.9)
POTASSIUM SERPL-SCNC: 4 MMOL/L (ref 3.5–5.1)
POTASSIUM SERPL-SCNC: 4.3 MMOL/L (ref 3.5–5.1)
POTASSIUM SERPL-SCNC: 4.4 MMOL/L (ref 3.5–5.1)
POTASSIUM SERPL-SCNC: 4.6 MMOL/L (ref 3.5–5.1)
POTASSIUM SERPL-SCNC: 4.6 MMOL/L (ref 3.5–5.1)
PROT SERPL-MCNC: 6.9 G/DL (ref 6–8.4)
RBC # BLD AUTO: 3.43 M/UL (ref 4.6–6.2)
SODIUM SERPL-SCNC: 134 MMOL/L (ref 136–145)
SODIUM SERPL-SCNC: 136 MMOL/L (ref 136–145)
SODIUM SERPL-SCNC: 137 MMOL/L (ref 136–145)
VANCOMYCIN SERPL-MCNC: 10.2 UG/ML
WBC # BLD AUTO: 10.35 K/UL (ref 3.9–12.7)

## 2024-02-18 PROCEDURE — 63600175 PHARM REV CODE 636 W HCPCS

## 2024-02-18 PROCEDURE — 80053 COMPREHEN METABOLIC PANEL: CPT | Performed by: NURSE PRACTITIONER

## 2024-02-18 PROCEDURE — 25000003 PHARM REV CODE 250: Performed by: NURSE PRACTITIONER

## 2024-02-18 PROCEDURE — 94761 N-INVAS EAR/PLS OXIMETRY MLT: CPT

## 2024-02-18 PROCEDURE — 25000003 PHARM REV CODE 250: Performed by: PHYSICIAN ASSISTANT

## 2024-02-18 PROCEDURE — 80048 BASIC METABOLIC PNL TOTAL CA: CPT | Mod: 91 | Performed by: NURSE PRACTITIONER

## 2024-02-18 PROCEDURE — 25000003 PHARM REV CODE 250

## 2024-02-18 PROCEDURE — 80202 ASSAY OF VANCOMYCIN: CPT | Performed by: INTERNAL MEDICINE

## 2024-02-18 PROCEDURE — 25500020 PHARM REV CODE 255: Performed by: INTERNAL MEDICINE

## 2024-02-18 PROCEDURE — 63600175 PHARM REV CODE 636 W HCPCS: Performed by: PHYSICIAN ASSISTANT

## 2024-02-18 PROCEDURE — 85007 BL SMEAR W/DIFF WBC COUNT: CPT | Performed by: NURSE PRACTITIONER

## 2024-02-18 PROCEDURE — 21400001 HC TELEMETRY ROOM

## 2024-02-18 PROCEDURE — 36415 COLL VENOUS BLD VENIPUNCTURE: CPT | Mod: XB | Performed by: NURSE PRACTITIONER

## 2024-02-18 PROCEDURE — 85027 COMPLETE CBC AUTOMATED: CPT | Performed by: NURSE PRACTITIONER

## 2024-02-18 RX ADMIN — DIPHENHYDRAMINE HYDROCHLORIDE 25 MG: 25 CAPSULE ORAL at 09:02

## 2024-02-18 RX ADMIN — IOHEXOL 75 ML: 350 INJECTION, SOLUTION INTRAVENOUS at 01:02

## 2024-02-18 RX ADMIN — VANCOMYCIN HYDROCHLORIDE 1000 MG: 1 INJECTION, POWDER, LYOPHILIZED, FOR SOLUTION INTRAVENOUS at 11:02

## 2024-02-18 RX ADMIN — PANTOPRAZOLE SODIUM 40 MG: 40 TABLET, DELAYED RELEASE ORAL at 09:02

## 2024-02-18 RX ADMIN — CEFTRIAXONE SODIUM 1 G: 1 INJECTION, POWDER, FOR SOLUTION INTRAMUSCULAR; INTRAVENOUS at 10:02

## 2024-02-18 RX ADMIN — SENNOSIDES AND DOCUSATE SODIUM 1 TABLET: 8.6; 5 TABLET ORAL at 08:02

## 2024-02-18 NOTE — PROGRESS NOTES
[facility] Medicine  Progress Note    Patient Name: Bradley Collins  MRN: [id number]  Patient Class: IP- Inpatient   Admission Date: 2/16/2024  Length of Stay: 1 days  Attending Physician: LUDMILA Carrizales MD  Primary Care Provider: Administration, [facility]        Subjective:     Principal Problem:Cellulitis        HPI:  Bradley Collins is a 63 year old male with a past medical history of cellulitis, asthma and prostate cancer last radiation treatment was greater than 2 years ago who presents with right lower extremity swelling, discomfort and redness. Patient reports that [facility] and received IV antibiotics (unknown what) and discharged home with oral Keflex. Patient states the [facility] were [facility] (unable to see in chart review). He returns today with increased redness and swelling extending up his right inner thigh.  Patient denies fever, chills, nausea and vomiting.    ED workup showed lactic acid 0.7, no leukocytosis, sed rate 112 and CRP 33.2.  UA negative for UTI.  Creatinine elevated 2.0 (prior reading 1.1 one year ago).  Lower extremity ultrasound showed no DVT; slow flow noted. Patient has remained afebrile in the ED and was started on IV zosyn and vancomycin. Patient referred to hospital medicine and will be admitted for further evaluation and management.     Overview/Hospital Course:  Bradley Collins admitted for cellulitis of his right lower extremity.  [facility], unsure which antibiotics he was on at that point, but he was discharged on cephalexin and deteriorated.  He reports having a CT scan with contrast that did not show anything.  Here he is afebrile without leukocytosis, started on IV vancomycin and IV Zosyn, changed to IV ceftriaxone and IV vancomycin. Improvement in erythema, but not edema. Induration noted to thigh. Will repeat CT of RLE to r/o fluid collection.  Continue to monitor for clinical improvement,  follow blood cultures    Interval History: erytehma significantly improved,. But edema persistent. Area of induration in inner thigh. Will repeat imaging to r/o fluid collection    Review of Systems   Constitutional:  Negative for chills and fever.   Respiratory:  Negative for shortness of breath.    Cardiovascular:  Positive for leg swelling (right lower extremity). Negative for chest pain.   Gastrointestinal:  Negative for abdominal pain, diarrhea and nausea.   Genitourinary:  Negative for dysuria.   Musculoskeletal:  Negative for back pain.   Skin:  Positive for color change.   Neurological:  Negative for weakness.     Objective:     Vital Signs (Most Recent):  Temp: 98.1 °F (36.7 °C) (02/18/24 1150)  Pulse: 82 (02/18/24 1150)  Resp: 18 (02/18/24 1150)  BP: 139/85 (02/18/24 1150)  SpO2: 97 % (02/18/24 1150) Vital Signs (24h Range):  Temp:  [97.6 °F (36.4 °C)-98.5 °F (36.9 °C)] 98.1 °F (36.7 °C)  Pulse:  [72-88] 82  Resp:  [16-20] 18  SpO2:  [93 %-97 %] 97 %  BP: (127-153)/(72-85) 139/85     Weight: 81.6 kg (179 lb 12.8 oz)  Body mass index is 28.16 kg/m².    Intake/Output Summary (Last 24 hours) at 2/18/2024 1219  Last data filed at 2/18/2024 0800  Gross per 24 hour   Intake 739.34 ml   Output --   Net 739.34 ml         Physical Exam  Vitals reviewed.   Constitutional:       Appearance: Normal appearance.   HENT:      Head: Normocephalic.   Eyes:      Extraocular Movements: Extraocular movements intact.   Pulmonary:      Effort: Pulmonary effort is normal. No respiratory distress.   Musculoskeletal:         General: Normal range of motion.      Right lower leg: Swelling present. Edema present.      Comments: erythema and edema to right lower extremity extends from inner thigh to foot  +warmth   Skin:     General: Skin is warm.      Findings: Erythema present.   Neurological:      Mental Status: He is alert.   Psychiatric:         Mood and Affect: Mood normal.         Behavior: Behavior normal.              Significant Labs: All pertinent labs within the past 24 hours have been reviewed.    Significant Imaging: I have reviewed all pertinent imaging results/findings within the past 24 hours.    Assessment/Plan:      * Cellulitis  Patient recently admitted and received IV antibiotics for right lower extremity cellulitis [facility].  Patient was discharged on p.o. Keflex and returned with worsening erythema and swelling to right lower extremity.  No fever or chills reported.  No leukocytosis.  Lactic acid 0.7.  Elevated CRP 33.2 and sed rate 112.  Ultrasound negative for DVT    -monitor CBC   -continue IV Zosyn   -continue IV vancomycin  - antibiotic regimen switch to IV vancomycin and IV ceftriaxone, monitor for clinical improvement   - erythema improving but significant edema persists, will repeat CT to r/o fluid collection    Elevated serum creatinine  Patient with elevated creatinine 2.0 (last reading 1 year ago 1.1).     -IV NS   -trend BMP    Asthma  History noted.  Patient currently denying shortness a breath or wheezing    -continue scheduled fluticasone-furoate-vilanterol  -p.r.n.albuterol nebulizer      Prostate cancer  History noted     -continue to follow-up outpatient as scheduled        VTE Risk Mitigation (From admission, onward)           Ordered     enoxaparin injection 40 mg  Every 24 hours         02/16/24 2126     IP VTE HIGH RISK PATIENT  Once         02/16/24 2113     Place sequential compression device  Until discontinued         02/16/24 2113     IP VTE HIGH RISK PATIENT  Once         02/16/24 2114     Place sequential compression device  Until discontinued         02/16/24 2114                    Discharge Planning   CONSTANTINO:      Code Status: Full Code   Is the patient medically ready for discharge?:     Reason for patient still in hospital (select all that apply): Patient trending condition  Discharge Plan A: Home with family                  Nayla Morales PA-C  Department of Hospital  [facility] ([place])

## 2024-02-18 NOTE — PLAN OF CARE
VSS on RA and afebrile this shift.  .  Repositions self independently in bed and ambulating around room .   Free from injury or skin breakdown; Fall precautions maintained and call light in reach.  POC updated questions answered and comments acknowledged.    Problem: Adult Inpatient Plan of Care  Goal: Plan of Care Review  Outcome: Ongoing, Progressing  Goal: Patient-Specific Goal (Individualized)  Outcome: Ongoing, Progressing  Goal: Absence of Hospital-Acquired Illness or Injury  Outcome: Ongoing, Progressing  Goal: Optimal Comfort and Wellbeing  Outcome: Ongoing, Progressing  Goal: Readiness for Transition of Care  Outcome: Ongoing, Progressing     Problem: Infection  Goal: Absence of Infection Signs and Symptoms  Outcome: Ongoing, Progressing     Problem: Fall Injury Risk  Goal: Absence of Fall and Fall-Related Injury  Outcome: Ongoing, Progressing

## 2024-02-18 NOTE — SUBJECTIVE & OBJECTIVE
Interval History: erytehma significantly improved,. But edema persistent. Area of induration in inner thigh. Will repeat imaging to r/o fluid collection    Review of Systems   Constitutional:  Negative for chills and fever.   Respiratory:  Negative for shortness of breath.    Cardiovascular:  Positive for leg swelling (right lower extremity). Negative for chest pain.   Gastrointestinal:  Negative for abdominal pain, diarrhea and nausea.   Genitourinary:  Negative for dysuria.   Musculoskeletal:  Negative for back pain.   Skin:  Positive for color change.   Neurological:  Negative for weakness.     Objective:     Vital Signs (Most Recent):  Temp: 98.1 °F (36.7 °C) (02/18/24 1150)  Pulse: 82 (02/18/24 1150)  Resp: 18 (02/18/24 1150)  BP: 139/85 (02/18/24 1150)  SpO2: 97 % (02/18/24 1150) Vital Signs (24h Range):  Temp:  [97.6 °F (36.4 °C)-98.5 °F (36.9 °C)] 98.1 °F (36.7 °C)  Pulse:  [72-88] 82  Resp:  [16-20] 18  SpO2:  [93 %-97 %] 97 %  BP: (127-153)/(72-85) 139/85     Weight: 81.6 kg (179 lb 12.8 oz)  Body mass index is 28.16 kg/m².    Intake/Output Summary (Last 24 hours) at 2/18/2024 1219  Last data filed at 2/18/2024 0800  Gross per 24 hour   Intake 739.34 ml   Output --   Net 739.34 ml         Physical Exam  Vitals reviewed.   Constitutional:       Appearance: Normal appearance.   HENT:      Head: Normocephalic.   Eyes:      Extraocular Movements: Extraocular movements intact.   Pulmonary:      Effort: Pulmonary effort is normal. No respiratory distress.   Musculoskeletal:         General: Normal range of motion.      Right lower leg: Swelling present. Edema present.      Comments: erythema and edema to right lower extremity extends from inner thigh to foot  +warmth   Skin:     General: Skin is warm.      Findings: Erythema present.   Neurological:      Mental Status: He is alert.   Psychiatric:         Mood and Affect: Mood normal.         Behavior: Behavior normal.             Significant Labs: All pertinent  labs within the past 24 hours have been reviewed.    Significant Imaging: I have reviewed all pertinent imaging results/findings within the past 24 hours.

## 2024-02-18 NOTE — PROGRESS NOTES
Pharmacokinetic Assessment Follow Up: IV Vancomycin    Vancomycin serum concentration assessment(s):    The random level was drawn correctly and can be used to guide therapy at this time. The measurement is within the desired definitive target range of 10 to 20 mcg/mL.    Vancomycin Regimen Plan:    Re-dose when the random level is less than 20 mcg/mL, next level to be drawn at 1900 on 2/18/24    Drug levels (last 3 results):  Recent Labs   Lab Result Units 02/17/24 2002   Vancomycin, Random ug/mL 10.8       Pharmacy will continue to follow and monitor vancomycin.    Please contact pharmacy at extension [phone] for questions regarding this assessment.    Thank you for the consult,   Tom Jett       Patient brief summary:  Bradley Collins is a 63 y.o. male initiated on antimicrobial therapy with IV Vancomycin for treatment of skin & soft tissue infection    The patient's current regimen is pulse dosing    Drug Allergies:   Review of patient's allergies indicates:   Allergen Reactions    Aspirin Shortness Of Breath       Actual Body Weight:   81.6 kg    Renal Function:   Estimated Creatinine Clearance: 36.3 mL/min (A) (based on SCr of 2.1 mg/dL (H)).,     Dialysis Method (if applicable):  N/A    CBC (last 72 hours):  Recent Labs   Lab Result Units 02/16/24 1829 02/17/24  0525   WBC K/uL 9.92 9.61   Hemoglobin g/dL 12.5* 11.8*   Hematocrit % 36.9* 35.8*   Platelets K/uL 316 293   Gran % % 46.5 46.5   Lymph % % 12.0* 10.6*   Mono % % 9.3 8.0   Eosinophil % % 30.6* 33.6*   Basophil % % 1.3 0.9   Differential Method  Automated Automated       Metabolic Panel (last 72 hours):  Recent Labs   Lab Result Units 02/16/24  1829 02/16/24  2004 02/17/24  0001 02/17/24  0129 02/17/24  0315 02/17/24  0525 02/17/24  0835 02/17/24  1210 02/17/24  1546 02/17/24 2002   Sodium mmol/L 136  --  134* 136  --  135*  136  136 136 137  137 135* 137   Sodium, Urine mmol/L  --   --   --   --  30  --   --   --   --   --    Potassium  mmol/L 4.6  --  4.0 4.1  --  4.3  4.4  4.4 4.1 4.2  4.2 4.4 4.3   Potassium, Urine mmol/L  --   --   --   --  15  --   --   --   --   --    Chloride mmol/L 101  --  101 102  --  103  103  103 100 102  102 102 102   CO2 mmol/L 25  --  26 25  --  26  24  24 29 26  26 27 26   Glucose mg/dL 93  --  134* 121*  --  124*  126*  126* 112* 104  104 104 99   Glucose, UA   --  Negative  --   --   --   --   --   --   --   --    BUN mg/dL 23  --  21 21  --  18  20  20 19 18  18 19 18   Creatinine mg/dL 2.0*  --  1.9* 2.0*  --  1.9*  1.9*  1.9* 2.1* 1.9*  1.9* 2.1* 2.1*   Creatinine, Urine mg/dL  --   --   --   --  66.3  --   --   --   --   --    Albumin g/dL 3.4*  --   --   --   --  3.2*  --   --   --   --    Total Bilirubin mg/dL 0.3  --   --   --   --  0.4  --   --   --   --    Alkaline Phosphatase U/L 83  --   --   --   --  76  --   --   --   --    AST U/L 18  --   --   --   --  19  --   --   --   --    ALT U/L 15  --   --   --   --  16  --   --   --   --        Vancomycin Administrations:  vancomycin given in the last 96 hours                     vancomycin 750 mg in dextrose 5 % (D5W) 250 mL IVPB (Vial-Mate) (mg) 750 mg New Bag 02/16/24 8631    vancomycin (VANCOCIN) 1,000 mg in dextrose 5 % (D5W) 250 mL IVPB (Vial-Mate) (mg) 1,000 mg New Bag 02/16/24 2015                    Microbiologic Results:  Microbiology Results (last 7 days)       Procedure Component Value Units Date/Time    Blood culture #2 [[id number]] Collected: 02/16/24 1320    Order Status: Completed Specimen: Blood from Peripheral, Upper Arm, Right Updated: 02/17/24 0515     Blood Culture, Routine No Growth to date    Narrative:      Blood Culture #2    Blood Culture #1 **CANNOT BE ORDERED STAT** [[id number]] Collected: 02/16/24 1919    Order Status: Completed Specimen: Blood from Peripheral, Antecubital, Left Updated: 02/17/24 0515     Blood Culture, Routine No Growth to date    Blood culture #1 [[id number]] Collected: 02/16/24 1829    Order  Status: Canceled Specimen: Blood from Peripheral, Antecubital, Left

## 2024-02-18 NOTE — PLAN OF CARE
VSS on RA and afebrile this shift.   Repositions self independently in bed and ambulating around room .   Free from injury or skin breakdown; Fall precautions maintained and call light in reach.  POC updated questions answered and comments acknowledged.    Problem: Adult Inpatient Plan of Care  Goal: Plan of Care Review  Outcome: Ongoing, Progressing  Goal: Patient-Specific Goal (Individualized)  Outcome: Ongoing, Progressing  Goal: Absence of Hospital-Acquired Illness or Injury  Outcome: Ongoing, Progressing  Goal: Optimal Comfort and Wellbeing  Outcome: Ongoing, Progressing  Goal: Readiness for Transition of Care  Outcome: Ongoing, Progressing     Problem: Infection  Goal: Absence of Infection Signs and Symptoms  Outcome: Ongoing, Progressing     Problem: Fall Injury Risk  Goal: Absence of Fall and Fall-Related Injury  Outcome: Ongoing, Progressing

## 2024-02-19 LAB
ALBUMIN SERPL BCP-MCNC: 3.1 G/DL (ref 3.5–5.2)
ALP SERPL-CCNC: 78 U/L (ref 55–135)
ALT SERPL W/O P-5'-P-CCNC: 14 U/L (ref 10–44)
ANION GAP SERPL CALC-SCNC: 5 MMOL/L (ref 8–16)
ANION GAP SERPL CALC-SCNC: 6 MMOL/L (ref 8–16)
ANION GAP SERPL CALC-SCNC: 6 MMOL/L (ref 8–16)
ANION GAP SERPL CALC-SCNC: 8 MMOL/L (ref 8–16)
ANION GAP SERPL CALC-SCNC: 9 MMOL/L (ref 8–16)
AST SERPL-CCNC: 15 U/L (ref 10–40)
BASOPHILS # BLD AUTO: ABNORMAL K/UL (ref 0–0.2)
BASOPHILS NFR BLD: 0 % (ref 0–1.9)
BILIRUB SERPL-MCNC: 0.2 MG/DL (ref 0.1–1)
BUN SERPL-MCNC: 15 MG/DL (ref 8–23)
BUN SERPL-MCNC: 16 MG/DL (ref 8–23)
BUN SERPL-MCNC: 17 MG/DL (ref 8–23)
BUN SERPL-MCNC: 18 MG/DL (ref 8–23)
BUN SERPL-MCNC: 18 MG/DL (ref 8–23)
CALCIUM SERPL-MCNC: 8.4 MG/DL (ref 8.7–10.5)
CALCIUM SERPL-MCNC: 8.9 MG/DL (ref 8.7–10.5)
CALCIUM SERPL-MCNC: 9 MG/DL (ref 8.7–10.5)
CHLORIDE SERPL-SCNC: 101 MMOL/L (ref 95–110)
CHLORIDE SERPL-SCNC: 102 MMOL/L (ref 95–110)
CHLORIDE SERPL-SCNC: 103 MMOL/L (ref 95–110)
CHLORIDE SERPL-SCNC: 104 MMOL/L (ref 95–110)
CHLORIDE SERPL-SCNC: 104 MMOL/L (ref 95–110)
CO2 SERPL-SCNC: 25 MMOL/L (ref 23–29)
CO2 SERPL-SCNC: 26 MMOL/L (ref 23–29)
CO2 SERPL-SCNC: 28 MMOL/L (ref 23–29)
CO2 SERPL-SCNC: 29 MMOL/L (ref 23–29)
CO2 SERPL-SCNC: 29 MMOL/L (ref 23–29)
CREAT SERPL-MCNC: 1.8 MG/DL (ref 0.5–1.4)
CREAT SERPL-MCNC: 1.9 MG/DL (ref 0.5–1.4)
DIFFERENTIAL METHOD BLD: ABNORMAL
EOSINOPHIL # BLD AUTO: ABNORMAL K/UL (ref 0–0.5)
EOSINOPHIL NFR BLD: 25 % (ref 0–8)
ERYTHROCYTE [DISTWIDTH] IN BLOOD BY AUTOMATED COUNT: 11.9 % (ref 11.5–14.5)
EST. GFR  (NO RACE VARIABLE): 39 ML/MIN/1.73 M^2
EST. GFR  (NO RACE VARIABLE): 42 ML/MIN/1.73 M^2
GLUCOSE SERPL-MCNC: 101 MG/DL (ref 70–110)
GLUCOSE SERPL-MCNC: 101 MG/DL (ref 70–110)
GLUCOSE SERPL-MCNC: 107 MG/DL (ref 70–110)
GLUCOSE SERPL-MCNC: 109 MG/DL (ref 70–110)
GLUCOSE SERPL-MCNC: 114 MG/DL (ref 70–110)
GLUCOSE SERPL-MCNC: 140 MG/DL (ref 70–110)
GLUCOSE SERPL-MCNC: 95 MG/DL (ref 70–110)
HCT VFR BLD AUTO: 33 % (ref 40–54)
HGB BLD-MCNC: 11 G/DL (ref 14–18)
IMM GRANULOCYTES # BLD AUTO: ABNORMAL K/UL (ref 0–0.04)
IMM GRANULOCYTES NFR BLD AUTO: ABNORMAL % (ref 0–0.5)
LYMPHOCYTES # BLD AUTO: ABNORMAL K/UL (ref 1–4.8)
LYMPHOCYTES NFR BLD: 11 % (ref 18–48)
MCH RBC QN AUTO: 31.7 PG (ref 27–31)
MCHC RBC AUTO-ENTMCNC: 33.3 G/DL (ref 32–36)
MCV RBC AUTO: 95 FL (ref 82–98)
MONOCYTES # BLD AUTO: ABNORMAL K/UL (ref 0.3–1)
MONOCYTES NFR BLD: 9 % (ref 4–15)
NEUTROPHILS NFR BLD: 54 % (ref 38–73)
NEUTS BAND NFR BLD MANUAL: 1 %
NRBC BLD-RTO: 0 /100 WBC
PLATELET # BLD AUTO: 286 K/UL (ref 150–450)
PLATELET BLD QL SMEAR: ABNORMAL
PMV BLD AUTO: 8.2 FL (ref 9.2–12.9)
POTASSIUM SERPL-SCNC: 4.2 MMOL/L (ref 3.5–5.1)
POTASSIUM SERPL-SCNC: 4.3 MMOL/L (ref 3.5–5.1)
POTASSIUM SERPL-SCNC: 4.4 MMOL/L (ref 3.5–5.1)
POTASSIUM SERPL-SCNC: 4.6 MMOL/L (ref 3.5–5.1)
PROT SERPL-MCNC: 6.9 G/DL (ref 6–8.4)
RBC # BLD AUTO: 3.47 M/UL (ref 4.6–6.2)
SODIUM SERPL-SCNC: 135 MMOL/L (ref 136–145)
SODIUM SERPL-SCNC: 136 MMOL/L (ref 136–145)
SODIUM SERPL-SCNC: 136 MMOL/L (ref 136–145)
SODIUM SERPL-SCNC: 137 MMOL/L (ref 136–145)
SODIUM SERPL-SCNC: 137 MMOL/L (ref 136–145)
WBC # BLD AUTO: 9.85 K/UL (ref 3.9–12.7)

## 2024-02-19 PROCEDURE — 21400001 HC TELEMETRY ROOM

## 2024-02-19 PROCEDURE — 63600175 PHARM REV CODE 636 W HCPCS: Performed by: PHYSICIAN ASSISTANT

## 2024-02-19 PROCEDURE — 85007 BL SMEAR W/DIFF WBC COUNT: CPT | Performed by: NURSE PRACTITIONER

## 2024-02-19 PROCEDURE — 85027 COMPLETE CBC AUTOMATED: CPT | Performed by: NURSE PRACTITIONER

## 2024-02-19 PROCEDURE — 94761 N-INVAS EAR/PLS OXIMETRY MLT: CPT

## 2024-02-19 PROCEDURE — 25000242 PHARM REV CODE 250 ALT 637 W/ HCPCS: Performed by: NURSE PRACTITIONER

## 2024-02-19 PROCEDURE — 25000003 PHARM REV CODE 250: Performed by: NURSE PRACTITIONER

## 2024-02-19 PROCEDURE — 36415 COLL VENOUS BLD VENIPUNCTURE: CPT | Mod: XB | Performed by: NURSE PRACTITIONER

## 2024-02-19 PROCEDURE — 80048 BASIC METABOLIC PNL TOTAL CA: CPT | Mod: 91,XB | Performed by: NURSE PRACTITIONER

## 2024-02-19 PROCEDURE — 25000003 PHARM REV CODE 250: Performed by: PHYSICIAN ASSISTANT

## 2024-02-19 PROCEDURE — 80053 COMPREHEN METABOLIC PANEL: CPT | Performed by: NURSE PRACTITIONER

## 2024-02-19 PROCEDURE — 25000003 PHARM REV CODE 250: Performed by: EMERGENCY MEDICINE

## 2024-02-19 RX ORDER — AMOXICILLIN AND CLAVULANATE POTASSIUM 875; 125 MG/1; MG/1
1 TABLET, FILM COATED ORAL EVERY 12 HOURS
Status: DISCONTINUED | OUTPATIENT
Start: 2024-02-19 | End: 2024-02-20 | Stop reason: HOSPADM

## 2024-02-19 RX ORDER — DOXYCYCLINE HYCLATE 100 MG
100 TABLET ORAL EVERY 12 HOURS
Status: DISCONTINUED | OUTPATIENT
Start: 2024-02-19 | End: 2024-02-20 | Stop reason: HOSPADM

## 2024-02-19 RX ADMIN — PANTOPRAZOLE SODIUM 40 MG: 40 TABLET, DELAYED RELEASE ORAL at 09:02

## 2024-02-19 RX ADMIN — DOXYCYCLINE HYCLATE 100 MG: 100 TABLET, COATED ORAL at 11:02

## 2024-02-19 RX ADMIN — SENNOSIDES AND DOCUSATE SODIUM 1 TABLET: 8.6; 5 TABLET ORAL at 08:02

## 2024-02-19 RX ADMIN — AMOXICILLIN AND CLAVULANATE POTASSIUM 1 TABLET: 875; 125 TABLET, FILM COATED ORAL at 08:02

## 2024-02-19 RX ADMIN — DIPHENHYDRAMINE HYDROCHLORIDE 25 MG: 25 CAPSULE ORAL at 08:02

## 2024-02-19 RX ADMIN — FLUTICASONE PROPIONATE 100 MCG: 50 SPRAY, METERED NASAL at 08:02

## 2024-02-19 RX ADMIN — Medication 6 MG: at 08:02

## 2024-02-19 RX ADMIN — CEFTRIAXONE SODIUM 1 G: 1 INJECTION, POWDER, FOR SOLUTION INTRAMUSCULAR; INTRAVENOUS at 09:02

## 2024-02-19 RX ADMIN — DOXYCYCLINE HYCLATE 100 MG: 100 TABLET, COATED ORAL at 08:02

## 2024-02-19 RX ADMIN — AMOXICILLIN AND CLAVULANATE POTASSIUM 1 TABLET: 875; 125 TABLET, FILM COATED ORAL at 11:02

## 2024-02-19 NOTE — PLAN OF CARE
Problem: Adult Inpatient Plan of Care  Goal: Plan of Care Review  Outcome: Ongoing, Progressing  Goal: Patient-Specific Goal (Individualized)  Outcome: Ongoing, Progressing  Goal: Absence of Hospital-Acquired Illness or Injury  Outcome: Ongoing, Progressing  Goal: Optimal Comfort and Wellbeing  Outcome: Ongoing, Progressing  Goal: Readiness for Transition of Care  Outcome: Ongoing, Progressing     Problem: Infection  Goal: Absence of Infection Signs and Symptoms  Outcome: Ongoing, Progressing     Problem: Fall Injury Risk  Goal: Absence of Fall and Fall-Related Injury  Outcome: Ongoing, Progressing     Problem: Skin Injury Risk Increased  Goal: Skin Health and Integrity  Outcome: Ongoing, Progressing   Pt AOX4, up at [facility].  VS WNL.  Pt denies pain and itching.  He was transitioned to PO antibiotics. No concerns verbalized.  Continue to monitor.

## 2024-02-19 NOTE — PROGRESS NOTES
[facility] Medicine  Progress Note    Patient Name: Bradley Collins  MRN: [id number]  Patient Class: IP- Inpatient   Admission Date: 2/16/2024  Length of Stay: 2 days  Attending Physician: LUDMILA Carrizales MD  Primary Care Provider: Administration, [facility]        Subjective:     Principal Problem:Cellulitis        HPI:  Bradley Collins is a 63 year old male with a past medical history of cellulitis, asthma and prostate cancer last radiation treatment was greater than 2 years ago who presents with right lower extremity swelling, discomfort and redness. [facility] and [facility] (unknown what) and discharged home with oral Keflex. Patient states the [facility] and [facility] performed [facility] were negative (unable to see in chart review). He returns today with increased redness and swelling extending up his right inner thigh.  Patient denies fever, chills, nausea and vomiting.    ED workup showed lactic acid 0.7, no leukocytosis, sed rate 112 and CRP 33.2.  UA negative for UTI.  Creatinine elevated 2.0 (prior reading 1.1 one year ago).  Lower extremity ultrasound showed no DVT; slow flow noted. Patient has remained afebrile in the ED and was started on IV zosyn and vancomycin. Patient referred to hospital medicine and will be admitted for further evaluation and management.     Overview/Hospital Course:  Bradley Collins admitted for cellulitis of his right lower extremity.  [facility], unsure which antibiotics he was on at that point, but he was discharged on cephalexin and deteriorated.  He reports having a CT scan with contrast that did not show anything.  Here he is afebrile without leukocytosis, started on IV vancomycin and IV Zosyn, changed to IV ceftriaxone and IV vancomycin. Improvement in erythema, but not edema. Induration noted to thigh. Will repeat CT of RLE to r/o fluid collection:diffuse cutaneous thickening and edema, prominent  right inguinal lymph nodes, no definite discrete fluid collection. Will switch to PO augmentin/doxy 2/20 .  Continue to monitor for clinical improvement, follow blood cultures: NGTD    Interval History: Erythema continues to improve, slight improvement in edema in his ankle/calf btu still impressive.Discussed CT findings, will switch to oral antibiotics and monitor. Trend ESR/CRP in AM    Review of Systems   Constitutional:  Negative for chills and fever.   Respiratory:  Negative for shortness of breath.    Cardiovascular:  Positive for leg swelling (right lower extremity). Negative for chest pain.   Gastrointestinal:  Negative for abdominal pain and diarrhea.   Skin:  Positive for color change. Negative for wound.     Objective:     Vital Signs (Most Recent):  Temp: 98.1 °F (36.7 °C) (02/19/24 0830)  Pulse: 81 (02/19/24 0830)  Resp: 18 (02/19/24 0830)  BP: (!) 146/79 (02/19/24 0830)  SpO2: 95 % (02/19/24 0830) Vital Signs (24h Range):  Temp:  [8.2 °F (-13.2 °C)-99 °F (37.2 °C)] 98.1 °F (36.7 °C)  Pulse:  [66-87] 81  Resp:  [16-18] 18  SpO2:  [93 %-97 %] 95 %  BP: (121-166)/(71-85) 146/79     Weight: 74.3 kg (163 lb 12.8 oz)  Body mass index is 25.66 kg/m².    Intake/Output Summary (Last 24 hours) at 2/19/2024 1044  Last data filed at 2/18/2024 1558  Gross per 24 hour   Intake 241.37 ml   Output --   Net 241.37 ml         Physical Exam  Vitals reviewed.   Constitutional:       Appearance: Normal appearance.   HENT:      Head: Normocephalic.   Pulmonary:      Effort: Pulmonary effort is normal.   Musculoskeletal:         General: Normal range of motion.      Right lower leg: Swelling present. Edema present.      Comments: erythema and edema to right lower extremity extends from inner thigh to foot  +warmth   Skin:     General: Skin is warm.      Findings: Erythema present.   Neurological:      Mental Status: He is alert.   Psychiatric:         Mood and Affect: Mood normal.         Behavior: Behavior normal.              Significant Labs: All pertinent labs within the past 24 hours have been reviewed.    Significant Imaging: I have reviewed all pertinent imaging results/findings within the past 24 hours.    Assessment/Plan:      * Cellulitis  Patient recently admitted and received IV antibiotics for right lower extremity cellulitis .  Patient was discharged on p.o. Keflex and returned with worsening erythema and swelling to right lower extremity.  No fever or chills reported.  No leukocytosis.  Lactic acid 0.7.  Elevated CRP 33.2 and sed rate 112.  Ultrasound negative for DVT    -monitor CBC   -continue IV Zosyn   -continue IV vancomycin  - antibiotic regimen switch to IV vancomycin and IV ceftriaxone, monitor for clinical improvement   - erythema improving but significant edema persists, will repeat CT to r/o fluid collection:   Diffuse cutaneous thickening and subcutaneous edema and inflammatory change about the entire visualized right lower extremity extending from the upper thigh to the visualized ankle, with scattered prominent right inguinal lymph nodes.  No definite discrete fluid collection.    - Switch to PO Augmentin and Doxy 2/19, monitor for fever/decompensation     Elevated serum creatinine  Patient with elevated creatinine 2.0 (last reading 1 year ago 1.1).     -IV NS   -trend BMP    Asthma  History noted.  Patient currently denying shortness a breath or wheezing    -continue scheduled fluticasone-furoate-vilanterol  -p.r.n.albuterol nebulizer      Prostate cancer  History noted     -continue to follow-up outpatient as scheduled        VTE Risk Mitigation (From admission, onward)           Ordered     enoxaparin injection 40 mg  Every 24 hours         02/16/24 2126     IP VTE HIGH RISK PATIENT  Once         02/16/24 2113     Place sequential compression device  Until discontinued         02/16/24 2113     IP VTE HIGH RISK PATIENT  Once         02/16/24 2114     Place sequential compression device  Until  discontinued         02/16/24 2114                    Discharge Planning   CONSTANTINO:      Code Status: Full Code   Is the patient medically ready for discharge?:     Reason for patient still in hospital (select all that apply): Patient trending condition  Discharge Plan A: Home with family                  Nayla Morales PA-C  Department of Hospital Medicine   [facility] ([place])

## 2024-02-19 NOTE — PROGRESS NOTES
Pharmacokinetic Assessment Follow Up: IV Vancomycin    Vancomycin serum concentration assessment(s):    The random level was drawn correctly and can be used to guide therapy at this time. The measurement is within the desired definitive target range of 10 to 20 mcg/mL.    Vancomycin Regimen Plan:    Re-dose when the random level is less than 20 mcg/mL, next level to be drawn at 2100 on 2/19/24    Drug levels (last 3 results):  Recent Labs   Lab Result Units 02/17/24 2002 02/18/24  1955   Vancomycin, Random ug/mL 10.8 10.2       Pharmacy will continue to follow and monitor vancomycin.    Please contact pharmacy at extension [phone] for questions regarding this assessment.    Thank you for the consult,   Tom Jett       Patient brief summary:  Bradley Collins is a 63 y.o. male initiated on antimicrobial therapy with IV Vancomycin for treatment of skin & soft tissue infection    The patient's current regimen is pulse dosing    Drug Allergies:   Review of patient's allergies indicates:   Allergen Reactions    Aspirin Shortness Of Breath       Actual Body Weight:   81.6 kg    Renal Function:   Estimated Creatinine Clearance: 40.2 mL/min (A) (based on SCr of 1.9 mg/dL (H)).,     Dialysis Method (if applicable):  N/A    CBC (last 72 hours):  Recent Labs   Lab Result Units 02/16/24 1829 02/17/24  0525 02/18/24  0557   WBC K/uL 9.92 9.61 10.35   Hemoglobin g/dL 12.5* 11.8* 11.0*   Hematocrit % 36.9* 35.8* 32.6*   Platelets K/uL 316 293 295   Gran % % 46.5 46.5 59.0   Lymph % % 12.0* 10.6* 9.0*   Mono % % 9.3 8.0 8.0   Eosinophil % % 30.6* 33.6* 24.0*   Basophil % % 1.3 0.9 0.0   Differential Method  Automated Automated Manual       Metabolic Panel (last 72 hours):  Recent Labs   Lab Result Units 02/16/24 1829 02/16/24 2004 02/17/24  0001 02/17/24  0129 02/17/24  0315 02/17/24  0525 02/17/24  0835 02/17/24  1210 02/17/24  1546 02/17/24 2002 02/17/24  2317 02/18/24  0557 02/18/24  0809 02/18/24  1218 02/18/24  1554  02/18/24 1955   Sodium mmol/L 136  --  134* 136  --  135*  136  136 136 137  137 135* 137 134* 136  136 137 136 136 136   Sodium, Urine mmol/L  --   --   --   --  30  --   --   --   --   --   --   --   --   --   --   --    Potassium mmol/L 4.6  --  4.0 4.1  --  4.3  4.4  4.4 4.1 4.2  4.2 4.4 4.3 4.0 4.3  4.3 4.4 4.3 4.6 4.6   Potassium, Urine mmol/L  --   --   --   --  15  --   --   --   --   --   --   --   --   --   --   --    Chloride mmol/L 101  --  101 102  --  103  103  103 100 102  102 102 102 102 104  104 104 101 102 101   CO2 mmol/L 25  --  26 25  --  26  24  24 29 26  26 27 26 25 25  25 25 29 28 29   Glucose mg/dL 93  --  134* 121*  --  124*  126*  126* 112* 104  104 104 99 124* 100  100 93 93 108 115*   Glucose, UA   --  Negative  --   --   --   --   --   --   --   --   --   --   --   --   --   --    BUN mg/dL 23  --  21 21  --  18  20  20 19 18  18 19 18 17 14  14 14 15 17 16   Creatinine mg/dL 2.0*  --  1.9* 2.0*  --  1.9*  1.9*  1.9* 2.1* 1.9*  1.9* 2.1* 2.1* 2.0* 1.8*  1.8* 1.8* 1.9* 1.9* 1.9*   Creatinine, Urine mg/dL  --   --   --   --  66.3  --   --   --   --   --   --   --   --   --   --   --    Albumin g/dL 3.4*  --   --   --   --  3.2*  --   --   --   --   --  3.1*  --   --   --   --    Total Bilirubin mg/dL 0.3  --   --   --   --  0.4  --   --   --   --   --  0.3  --   --   --   --    Alkaline Phosphatase U/L 83  --   --   --   --  76  --   --   --   --   --  79  --   --   --   --    AST U/L 18  --   --   --   --  19  --   --   --   --   --  17  --   --   --   --    ALT U/L 15  --   --   --   --  16  --   --   --   --   --  15  --   --   --   --        Vancomycin Administrations:  vancomycin given in the last 96 hours                     vancomycin (VANCOCIN) 1,000 mg in dextrose 5 % (D5W) 250 mL IVPB (Vial-Mate) (mg) 1,000 mg New Bag 02/17/24 2126    vancomycin 750 mg in dextrose 5 % (D5W) 250 mL IVPB (Vial-Mate) (mg) 750 mg New Bag 02/16/24 2311    vancomycin  (VANCOCIN) 1,000 mg in dextrose 5 % (D5W) 250 mL IVPB (Vial-Mate) (mg) 1,000 mg New Bag 02/16/24 2015                    Microbiologic Results:  Microbiology Results (last 7 days)       Procedure Component Value Units Date/Time    Blood culture #2 [[id number]] Collected: 02/16/24 1843    Order Status: Completed Specimen: Blood from Peripheral, Upper Arm, Right Updated: 02/18/24 2212     Blood Culture, Routine No Growth to date      No Growth to date      No Growth to date    Narrative:      Blood Culture #2    Blood Culture #1 **CANNOT BE ORDERED STAT** [[id number]] Collected: 02/16/24 1919    Order Status: Completed Specimen: Blood from Peripheral, Antecubital, Left Updated: 02/18/24 2212     Blood Culture, Routine No Growth to date      No Growth to date      No Growth to date    Blood culture #1 [[id number]] Collected: 02/16/24 1829    Order Status: Canceled Specimen: Blood from Peripheral, Antecubital, Left

## 2024-02-19 NOTE — SUBJECTIVE & OBJECTIVE
Interval History: Erythema continues to improve, slight improvement in edema in his ankle/calf btu still impressive.Discussed CT findings, will switch to oral antibiotics and monitor. Trend ESR/CRP in AM    Review of Systems   Constitutional:  Negative for chills and fever.   Respiratory:  Negative for shortness of breath.    Cardiovascular:  Positive for leg swelling (right lower extremity). Negative for chest pain.   Gastrointestinal:  Negative for abdominal pain and diarrhea.   Skin:  Positive for color change. Negative for wound.     Objective:     Vital Signs (Most Recent):  Temp: 98.1 °F (36.7 °C) (02/19/24 0830)  Pulse: 81 (02/19/24 0830)  Resp: 18 (02/19/24 0830)  BP: (!) 146/79 (02/19/24 0830)  SpO2: 95 % (02/19/24 0830) Vital Signs (24h Range):  Temp:  [8.2 °F (-13.2 °C)-99 °F (37.2 °C)] 98.1 °F (36.7 °C)  Pulse:  [66-87] 81  Resp:  [16-18] 18  SpO2:  [93 %-97 %] 95 %  BP: (121-166)/(71-85) 146/79     Weight: 74.3 kg (163 lb 12.8 oz)  Body mass index is 25.66 kg/m².    Intake/Output Summary (Last 24 hours) at 2/19/2024 1044  Last data filed at 2/18/2024 1558  Gross per 24 hour   Intake 241.37 ml   Output --   Net 241.37 ml         Physical Exam  Vitals reviewed.   Constitutional:       Appearance: Normal appearance.   HENT:      Head: Normocephalic.   Pulmonary:      Effort: Pulmonary effort is normal.   Musculoskeletal:         General: Normal range of motion.      Right lower leg: Swelling present. Edema present.      Comments: erythema and edema to right lower extremity extends from inner thigh to foot  +warmth   Skin:     General: Skin is warm.      Findings: Erythema present.   Neurological:      Mental Status: He is alert.   Psychiatric:         Mood and Affect: Mood normal.         Behavior: Behavior normal.             Significant Labs: All pertinent labs within the past 24 hours have been reviewed.    Significant Imaging: I have reviewed all pertinent imaging results/findings within the past 24  hours.

## 2024-02-19 NOTE — ASSESSMENT & PLAN NOTE
Patient recently admitted and received IV antibiotics for right lower extremity cellulitis [facility].  Patient was discharged on p.o. Keflex and returned with worsening erythema and swelling to right lower extremity.  No fever or chills reported.  No leukocytosis.  Lactic acid 0.7.  Elevated CRP 33.2 and sed rate 112.  Ultrasound negative for DVT    -monitor CBC   -continue IV Zosyn   -continue IV vancomycin  - antibiotic regimen switch to IV vancomycin and IV ceftriaxone, monitor for clinical improvement   - erythema improving but significant edema persists, will repeat CT to r/o fluid collection:   Diffuse cutaneous thickening and subcutaneous edema and inflammatory change about the entire visualized right lower extremity extending from the upper thigh to the visualized ankle, with scattered prominent right inguinal lymph nodes.  No definite discrete fluid collection.    - Switch to PO Augmentin and Doxy 2/19, monitor for fever/decompensation

## 2024-02-19 NOTE — PLAN OF CARE
Problem: Adult Inpatient Plan of Care  Goal: Plan of Care Review  2/18/2024 2219 by Mesha Ellis RN  Outcome: Ongoing, Progressing  2/18/2024 2219 by Mesha Ellis RN  Outcome: Ongoing, Progressing  Goal: Patient-Specific Goal (Individualized)  2/18/2024 2219 by Mesha Ellis RN  Outcome: Ongoing, Progressing  2/18/2024 2219 by Mesha Ellis RN  Outcome: Ongoing, Progressing  Goal: Absence of Hospital-Acquired Illness or Injury  2/18/2024 2219 by Mesha Ellis RN  Outcome: Ongoing, Progressing  2/18/2024 2219 by Mesah Ellis RN  Outcome: Ongoing, Progressing  Goal: Optimal Comfort and Wellbeing  2/18/2024 2219 by Mesha Ellis RN  Outcome: Ongoing, Progressing  2/18/2024 2219 by Mesha Ellis RN  Outcome: Ongoing, Progressing  Goal: Readiness for Transition of Care  2/18/2024 2219 by Mesha Ellis RN  Outcome: Ongoing, Progressing  2/18/2024 2219 by Mesha Ellis RN  Outcome: Ongoing, Progressing     Problem: Infection  Goal: Absence of Infection Signs and Symptoms  Outcome: Ongoing, Progressing     Problem: Fall Injury Risk  Goal: Absence of Fall and Fall-Related Injury  2/18/2024 2219 by Mesha Ellis RN  Outcome: Ongoing, Progressing  2/18/2024 2219 by Mesha Ellis RN  Outcome: Ongoing, Progressing     Problem: Skin Injury Risk Increased  Goal: Skin Health and Integrity  2/18/2024 2219 by Mesha Ellis RN  Outcome: Ongoing, Progressing  2/18/2024 2219 by Mesha Ellis RN  Outcome: Ongoing, Progressing

## 2024-02-19 NOTE — CARE UPDATE
02/19/24 0800   PRE-TX-O2   Device (Oxygen Therapy) room air   SpO2 (!) 94 %   Pulse Oximetry Type Intermittent   $ Pulse Oximetry - Multiple Charge Pulse Oximetry - Multiple

## 2024-02-20 VITALS
WEIGHT: 163.81 LBS | RESPIRATION RATE: 16 BRPM | DIASTOLIC BLOOD PRESSURE: 82 MMHG | SYSTOLIC BLOOD PRESSURE: 151 MMHG | OXYGEN SATURATION: 98 % | HEART RATE: 71 BPM | TEMPERATURE: 99 F | BODY MASS INDEX: 25.71 KG/M2 | HEIGHT: 67 IN

## 2024-02-20 LAB
BASOPHILS NFR BLD: 2 % (ref 0–1.9)
CRP SERPL-MCNC: 39.9 MG/L (ref 0–8.2)
DIFFERENTIAL METHOD BLD: ABNORMAL
EOSINOPHIL NFR BLD: 26 % (ref 0–8)
ERYTHROCYTE [DISTWIDTH] IN BLOOD BY AUTOMATED COUNT: 11.9 % (ref 11.5–14.5)
ERYTHROCYTE [SEDIMENTATION RATE] IN BLOOD BY PHOTOMETRIC METHOD: >120 MM/HR (ref 0–23)
HCT VFR BLD AUTO: 35.6 % (ref 40–54)
HGB BLD-MCNC: 12 G/DL (ref 14–18)
IMM GRANULOCYTES # BLD AUTO: ABNORMAL K/UL (ref 0–0.04)
IMM GRANULOCYTES NFR BLD AUTO: ABNORMAL % (ref 0–0.5)
LYMPHOCYTES NFR BLD: 9 % (ref 18–48)
MCH RBC QN AUTO: 32.1 PG (ref 27–31)
MCHC RBC AUTO-ENTMCNC: 33.7 G/DL (ref 32–36)
MCV RBC AUTO: 95 FL (ref 82–98)
MONOCYTES NFR BLD: 10 % (ref 4–15)
NEUTROPHILS NFR BLD: 53 % (ref 38–73)
NRBC BLD-RTO: 0 /100 WBC
PLATELET # BLD AUTO: 315 K/UL (ref 150–450)
PLATELET BLD QL SMEAR: ABNORMAL
PMV BLD AUTO: 8.2 FL (ref 9.2–12.9)
RBC # BLD AUTO: 3.74 M/UL (ref 4.6–6.2)
WBC # BLD AUTO: 10.76 K/UL (ref 3.9–12.7)

## 2024-02-20 PROCEDURE — 86140 C-REACTIVE PROTEIN: CPT | Performed by: PHYSICIAN ASSISTANT

## 2024-02-20 PROCEDURE — 25000003 PHARM REV CODE 250: Performed by: NURSE PRACTITIONER

## 2024-02-20 PROCEDURE — 85652 RBC SED RATE AUTOMATED: CPT | Performed by: PHYSICIAN ASSISTANT

## 2024-02-20 PROCEDURE — 94640 AIRWAY INHALATION TREATMENT: CPT

## 2024-02-20 PROCEDURE — 25000242 PHARM REV CODE 250 ALT 637 W/ HCPCS: Performed by: NURSE PRACTITIONER

## 2024-02-20 PROCEDURE — 85007 BL SMEAR W/DIFF WBC COUNT: CPT | Performed by: NURSE PRACTITIONER

## 2024-02-20 PROCEDURE — 36415 COLL VENOUS BLD VENIPUNCTURE: CPT | Performed by: PHYSICIAN ASSISTANT

## 2024-02-20 PROCEDURE — 25000003 PHARM REV CODE 250: Performed by: PHYSICIAN ASSISTANT

## 2024-02-20 PROCEDURE — 36415 COLL VENOUS BLD VENIPUNCTURE: CPT | Mod: XB | Performed by: NURSE PRACTITIONER

## 2024-02-20 PROCEDURE — 85027 COMPLETE CBC AUTOMATED: CPT | Performed by: NURSE PRACTITIONER

## 2024-02-20 PROCEDURE — 94761 N-INVAS EAR/PLS OXIMETRY MLT: CPT

## 2024-02-20 RX ORDER — AMOXICILLIN AND CLAVULANATE POTASSIUM 875; 125 MG/1; MG/1
1 TABLET, FILM COATED ORAL EVERY 12 HOURS
Qty: 20 TABLET | Refills: 0 | Status: ON HOLD | OUTPATIENT
Start: 2024-02-20 | End: 2024-03-09 | Stop reason: HOSPADM

## 2024-02-20 RX ORDER — DOXYCYCLINE 100 MG/1
100 CAPSULE ORAL EVERY 12 HOURS
Qty: 20 CAPSULE | Refills: 0 | Status: ON HOLD | OUTPATIENT
Start: 2024-02-20 | End: 2024-03-09 | Stop reason: HOSPADM

## 2024-02-20 RX ADMIN — FLUTICASONE FUROATE AND VILANTEROL TRIFENATATE 1 PUFF: 200; 25 POWDER RESPIRATORY (INHALATION) at 07:02

## 2024-02-20 RX ADMIN — AMOXICILLIN AND CLAVULANATE POTASSIUM 1 TABLET: 875; 125 TABLET, FILM COATED ORAL at 08:02

## 2024-02-20 RX ADMIN — DOXYCYCLINE HYCLATE 100 MG: 100 TABLET, COATED ORAL at 08:02

## 2024-02-20 RX ADMIN — PANTOPRAZOLE SODIUM 40 MG: 40 TABLET, DELAYED RELEASE ORAL at 08:02

## 2024-02-20 NOTE — DISCHARGE INSTRUCTIONS
-Return to ED for increased swelling or redness to right leg, pain, decreased urine output, and/or temperature 100.4 or more.     -Get repeat CBC and BMP this week.    -[facility]or [facility] of [facility].

## 2024-02-20 NOTE — ASSESSMENT & PLAN NOTE
Patient with elevated creatinine 2.0 (last reading 1 year ago 1.1). [facility] due [facility]. Discussed importance of hydration and increased fluid intake with antibiotic use.     -IV NS   -trend BMP

## 2024-02-20 NOTE — DISCHARGE SUMMARY
[facility] Surg ([facility] Medicine  Discharge Summary      Patient Name: Bradley Collins  MRN: [id number]  ANTHONY: [id number]  Patient Class: IP- Inpatient  Admission Date: 2/16/2024  Hospital Length of Stay: 3 days  Discharge Date and Time:  02/20/2024 1:52 PM  Attending Physician: Ella Obrien MD   Discharging Provider: Essence Austin DNP  Primary Care Provider: Administration, [facility]    Primary Care Team: Networked reference to record PCT     HPI:   Bradley Collins is a 63 year old male with a past medical history of cellulitis, asthma and prostate cancer last radiation treatment was greater than 2 years ago who presents with right lower extremity swelling, discomfort and redness. [facility] and [facility] (unknown what) and discharged home with oral Keflex. Patient states the [facility] were [facility] (unable to see in chart review). He returns today with increased redness and swelling extending up his right inner thigh.  Patient denies fever, chills, nausea and vomiting.    ED workup showed lactic acid 0.7, no leukocytosis, sed rate 112 and CRP 33.2.  UA negative for UTI.  Creatinine elevated 2.0 (prior reading 1.1 one year ago).  Lower extremity ultrasound showed no DVT; slow flow noted. Patient has remained afebrile in the ED and was started on IV zosyn and vancomycin. Patient referred to hospital medicine and will be admitted for further evaluation and management.     * No surgery found *      Hospital Course:   Bradley Collins admitted for cellulitis of his right lower extremity. He is not aware of any trauma or injury to right leg but did inject his dupixant in the right hip earlier this month. [facility], unsure which antibiotics he was on at that point, but he was discharged on cephalexin and deteriorated.  Mr Huerta reported that he was diagnosed with DLAIA [facility] during that [facility] also. He reports  having a CT scan with contrast that did not show anything.  Here he is afebrile without leukocytosis, started on IV vancomycin and IV Zosyn, changed to IV ceftriaxone and IV vancomycin. Improvement in erythema, but not edema. Induration noted to thigh. Repeat CT of RLE to r/o fluid collection:diffuse cutaneous thickening and edema, prominent right inguinal lymph nodes, no definite discrete fluid collection. Switched to PO augmentin/doxy 2/19.  Blood cultures NGTD.  Mr Huerta reports improvement in his erythema and edema though he still has induration to right leg.  We will continue oral antibiotics at home and patient has strict return criteria. Outpatient lab monitoring ordered as well. Follow appointment with [facility] has been requested.      Goals of Care Treatment Preferences:  Code Status: Full Code      Consults:     Renal/  Elevated serum creatinine  Patient with elevated creatinine 2.0 (last reading 1 year ago 1.1). [facility] due [facility]. Discussed importance of hydration and increased fluid intake with antibiotic use.     -IV NS   -trend BMP    ID  * Cellulitis  Patient recently admitted and received IV antibiotics for right lower [facility].  Patient was discharged on p.o. Keflex and returned with worsening erythema and swelling to right lower extremity.  No fever or chills reported.  No leukocytosis.  Lactic acid 0.7.  Elevated CRP 33.2 and sed rate 112.  Ultrasound negative for DVT    -monitor CBC   -continue IV Zosyn   -continue IV vancomycin  - antibiotic regimen switch to IV vancomycin and IV ceftriaxone, monitor for clinical improvement   - erythema improving but significant edema persists, will repeat CT to r/o fluid collection:   Diffuse cutaneous thickening and subcutaneous edema and inflammatory change about the entire visualized right lower extremity extending from the upper thigh to the visualized ankle, with scattered prominent  right inguinal lymph nodes.  No definite discrete fluid collection.    - Switch to PO Augmentin and Doxy 2/19, monitor for fever/decompensation       Final Active Diagnoses:    Diagnosis Date Noted POA    PRINCIPAL PROBLEM:  Cellulitis [L03.90] 02/16/2024 Yes    Asthma [J45.909] 02/16/2024 Yes    Elevated serum creatinine [R79.89] 02/16/2024 Yes    Prostate cancer [C61] 10/05/2020 Yes      Problems Resolved During this Admission:       Discharged Condition: fair    Disposition: Home or Self Care    Follow Up:    Patient Instructions:      Basic metabolic panel   Standing Status: Future Standing Exp. Date: 04/20/25     CBC auto differential   Standing Status: Future Standing Exp. Date: 04/20/25     Diet Adult Regular     Notify your health care provider if you experience any of the following:  temperature >100.4     Notify your health care provider if you experience any of the following:  redness, tenderness, or signs of infection (pain, swelling, redness, odor or green/yellow discharge around incision site)     Notify your health care provider if you experience any of the following:  persistent dizziness, light-headedness, or visual disturbances     Activity as tolerated   Order Comments: Keep right leg elevated when resting       Significant Diagnostic Studies: N/A    Pending Diagnostic Studies:       None           Medications:  Reconciled Home Medications:      Medication List        START taking these medications      amoxicillin-clavulanate 875-125mg 875-125 mg per tablet  Commonly known as: AUGMENTIN  Take 1 tablet by mouth every 12 (twelve) hours. for 10 days     doxycycline 100 MG capsule  Commonly known as: MONODOX  Take 1 capsule (100 mg total) by mouth every 12 (twelve) hours. for 10 days            CONTINUE taking these medications      albuterol 1.25 mg/3 mL Nebu  Commonly known as: ACCUNEB  Take 1.25 mg by nebulization every 6 (six) hours as needed. Rescue     budesonide-formoterol 160-4.5 mcg 160-4.5  mcg/actuation Hfaa  Commonly known as: SYMBICORT  Inhale 2 puffs into the lungs every 12 (twelve) hours. Controller     calcium carbonate 500 mg calcium (1,250 mg) tablet  Commonly known as: OS-CAROLYN     fluticasone propionate 93 mcg/actuation Aerb  2 sprays by Nasal route 2 (two) times a day.     mometasone-formoterol 200-5 mcg/actuation inhaler  Commonly known as: DULERA  Inhale 2 puffs into the lungs 2 (two) times daily.     NASAL EXHALATION RESISTANCE DV NASL  by Nasal route.     omeprazole 20 mg Tbec  Take 20 mg by mouth once daily.     rosuvastatin 10 mg Cpsp     tiotropium 18 mcg inhalation capsule  Commonly known as: SPIRIVA  Inhale 18 mcg into the lungs once daily. Controller     vitamin D 1000 units Tab  Commonly known as: VITAMIN D3  Take 1,000 Units by mouth once daily.              Indwelling Lines/Drains at time of discharge:   Lines/Drains/Airways       None                   Time spent on the discharge of patient: 50 minutes         Essence Austin DNP  Department of Hospital Medicine  [facility])

## 2024-02-20 NOTE — ASSESSMENT & PLAN NOTE
Patient recently admitted and received IV antibiotics for right lower extremity cellulitis [facility].  Patient was discharged on p.o. Keflex and returned with worsening erythema and swelling to right lower extremity.  No fever or chills reported.  No leukocytosis.  Lactic acid 0.7.  Elevated CRP 33.2 and sed rate 112.  Ultrasound negative for DVT    -monitor CBC   -continue IV Zosyn   -continue IV vancomycin  - antibiotic regimen switch to IV vancomycin and IV ceftriaxone, monitor for clinical improvement   - erythema improving but significant edema persists, will repeat CT to r/o fluid collection:   Diffuse cutaneous thickening and subcutaneous edema and inflammatory change about the entire visualized right lower extremity extending from the upper thigh to the visualized ankle, with scattered prominent right inguinal lymph nodes.  No definite discrete fluid collection.    - Switch to PO Augmentin and Doxy 2/19, monitor for fever/decompensation

## 2024-02-20 NOTE — PLAN OF CARE
LMSW met with patient via bedside. Patient asked to see a PCP in [facility].  Patient has a hospital follow up appointment scheduled with the [facility]. Patient's preferred pharmacy is [facility]. Patient's family will provide transportation home up discharge.     [facility] ([place])  Discharge Final Note    Primary Care Provider: Reed Mcmullen    Expected Discharge Date: 2/20/2024    Final Discharge Note (most recent)       Final Note - 02/20/24 1357          Final Note    Assessment Type Final Discharge Note (P)      Anticipated Discharge Disposition Home or Self Care (P)      Hospital Resources/Appts/Education Provided Provided patient/caregiver with written discharge plan information;Appointments scheduled and added to AVS (P)         Post-Acute Status    Post-Acute Authorization Other (P)      Other Status No Post-Acute Service Needs (P)      Discharge Delays None known at this time (P)

## 2024-02-21 LAB
BACTERIA BLD CULT: NORMAL
BACTERIA BLD CULT: NORMAL

## 2024-02-27 ENCOUNTER — OFFICE VISIT (OUTPATIENT)
Dept: INTERNAL MEDICINE | Facility: CLINIC | Age: 64
End: 2024-02-27
Payer: COMMERCIAL

## 2024-02-27 ENCOUNTER — HOSPITAL ENCOUNTER (INPATIENT)
Facility: HOSPITAL | Age: 64
LOS: 11 days | Discharge: HOME | DRG: 252 | End: 2024-03-09
Attending: EMERGENCY MEDICINE | Admitting: HOSPITALIST
Payer: COMMERCIAL

## 2024-02-27 VITALS
TEMPERATURE: 99 F | SYSTOLIC BLOOD PRESSURE: 118 MMHG | WEIGHT: 184.19 LBS | BODY MASS INDEX: 28.91 KG/M2 | OXYGEN SATURATION: 96 % | HEIGHT: 67 IN | HEART RATE: 94 BPM | DIASTOLIC BLOOD PRESSURE: 74 MMHG

## 2024-02-27 DIAGNOSIS — M79.89 SWELLING OF RIGHT LOWER EXTREMITY: ICD-10-CM

## 2024-02-27 DIAGNOSIS — L03.115 CELLULITIS OF RIGHT LOWER EXTREMITY: ICD-10-CM

## 2024-02-27 DIAGNOSIS — M79.89 LEG SWELLING: Primary | ICD-10-CM

## 2024-02-27 DIAGNOSIS — R19.09 GROIN SWELLING: ICD-10-CM

## 2024-02-27 DIAGNOSIS — R79.89 ELEVATED SERUM CREATININE: ICD-10-CM

## 2024-02-27 DIAGNOSIS — L03.90 CELLULITIS, UNSPECIFIED CELLULITIS SITE: ICD-10-CM

## 2024-02-27 DIAGNOSIS — E66.3 OVERWEIGHT (BMI 25.0-29.9): ICD-10-CM

## 2024-02-27 DIAGNOSIS — L03.115 CELLULITIS OF RIGHT LEG: ICD-10-CM

## 2024-02-27 DIAGNOSIS — I89.0 LYMPHEDEMA: ICD-10-CM

## 2024-02-27 DIAGNOSIS — I15.9 SECONDARY HYPERTENSION: ICD-10-CM

## 2024-02-27 DIAGNOSIS — Z09 HOSPITAL DISCHARGE FOLLOW-UP: Primary | ICD-10-CM

## 2024-02-27 DIAGNOSIS — I82.429: ICD-10-CM

## 2024-02-27 PROBLEM — K58.0 IRRITABLE BOWEL SYNDROME WITH DIARRHEA: Status: ACTIVE | Noted: 2024-02-27

## 2024-02-27 PROBLEM — H04.123 DRY EYE SYNDROME OF BILATERAL LACRIMAL GLANDS: Status: ACTIVE | Noted: 2024-02-27

## 2024-02-27 PROBLEM — K86.2 CYST OF PANCREAS: Status: ACTIVE | Noted: 2024-02-27

## 2024-02-27 PROBLEM — Z86.010 HISTORY OF COLON POLYPS: Status: ACTIVE | Noted: 2023-01-27

## 2024-02-27 PROBLEM — E78.5 HYPERLIPIDEMIA: Status: ACTIVE | Noted: 2024-02-27

## 2024-02-27 PROBLEM — J45.909 ASPIRIN-EXACERBATED RESPIRATORY DISEASE (AERD): Status: ACTIVE | Noted: 2024-02-27

## 2024-02-27 PROBLEM — Z88.6 ASPIRIN-EXACERBATED RESPIRATORY DISEASE (AERD): Status: ACTIVE | Noted: 2024-02-27

## 2024-02-27 PROBLEM — Z71.81 SPIRITUAL OR RELIGIOUS COUNSELING: Status: ACTIVE | Noted: 2024-02-27

## 2024-02-27 PROBLEM — Z86.0100 HISTORY OF COLON POLYPS: Status: ACTIVE | Noted: 2023-01-27

## 2024-02-27 PROBLEM — R49.0 DYSPHONIA: Status: ACTIVE | Noted: 2024-02-27

## 2024-02-27 PROBLEM — J45.40 MODERATE PERSISTENT ASTHMA, UNCOMPLICATED: Status: ACTIVE | Noted: 2024-02-27

## 2024-02-27 PROBLEM — J45.40 MODERATE PERSISTENT ASTHMA, UNCOMPLICATED: Chronic | Status: ACTIVE | Noted: 2024-02-27

## 2024-02-27 PROBLEM — J31.0 CHRONIC RHINITIS: Status: ACTIVE | Noted: 2024-02-27

## 2024-02-27 PROBLEM — J32.0 CHRONIC MAXILLARY SINUSITIS: Status: ACTIVE | Noted: 2024-02-27

## 2024-02-27 PROBLEM — R63.4 ABNORMAL WEIGHT LOSS: Status: ACTIVE | Noted: 2022-07-11

## 2024-02-27 PROBLEM — R10.13 DYSPEPSIA: Status: ACTIVE | Noted: 2022-07-11

## 2024-02-27 PROBLEM — K58.0 IRRITABLE BOWEL SYNDROME WITH DIARRHEA: Chronic | Status: ACTIVE | Noted: 2024-02-27

## 2024-02-27 PROBLEM — R03.0 ELEVATED BLOOD-PRESSURE READING WITHOUT DIAGNOSIS OF HYPERTENSION: Status: ACTIVE | Noted: 2024-02-27

## 2024-02-27 PROBLEM — J31.0 CHRONIC RHINITIS: Chronic | Status: ACTIVE | Noted: 2024-02-27

## 2024-02-27 PROBLEM — E78.5 HYPERLIPIDEMIA: Chronic | Status: ACTIVE | Noted: 2024-02-27

## 2024-02-27 PROBLEM — J30.9 ALLERGIC RHINITIS: Status: ACTIVE | Noted: 2024-02-27

## 2024-02-27 PROBLEM — J33.9 ASPIRIN-EXACERBATED RESPIRATORY DISEASE (AERD): Status: ACTIVE | Noted: 2024-02-27

## 2024-02-27 LAB
ALBUMIN SERPL BCP-MCNC: 3.4 G/DL (ref 3.5–5.2)
ALP SERPL-CCNC: 86 U/L (ref 55–135)
ALT SERPL W/O P-5'-P-CCNC: 16 U/L (ref 10–44)
ANION GAP SERPL CALC-SCNC: 13 MMOL/L (ref 8–16)
AST SERPL-CCNC: 26 U/L (ref 10–40)
BASOPHILS # BLD AUTO: 0.2 K/UL (ref 0–0.2)
BASOPHILS NFR BLD: 1.9 % (ref 0–1.9)
BILIRUB SERPL-MCNC: 0.5 MG/DL (ref 0.1–1)
BILIRUB UR QL STRIP: NEGATIVE
BUN SERPL-MCNC: 16 MG/DL (ref 8–23)
CALCIUM SERPL-MCNC: 9.7 MG/DL (ref 8.7–10.5)
CHLORIDE SERPL-SCNC: 97 MMOL/L (ref 95–110)
CLARITY UR REFRACT.AUTO: CLEAR
CO2 SERPL-SCNC: 23 MMOL/L (ref 23–29)
COLOR UR AUTO: YELLOW
CREAT SERPL-MCNC: 1.7 MG/DL (ref 0.5–1.4)
DIFFERENTIAL METHOD BLD: ABNORMAL
DOHLE BOD BLD QL SMEAR: PRESENT
EOSINOPHIL # BLD AUTO: 2.6 K/UL (ref 0–0.5)
EOSINOPHIL NFR BLD: 25 % (ref 0–8)
ERYTHROCYTE [DISTWIDTH] IN BLOOD BY AUTOMATED COUNT: 12 % (ref 11.5–14.5)
EST. GFR  (NO RACE VARIABLE): 44.5 ML/MIN/1.73 M^2
GIANT PLATELETS BLD QL SMEAR: PRESENT
GLUCOSE SERPL-MCNC: 76 MG/DL (ref 70–110)
GLUCOSE UR QL STRIP: NEGATIVE
HCT VFR BLD AUTO: 31.7 % (ref 40–54)
HCV AB SERPL QL IA: NORMAL
HGB BLD-MCNC: 10.7 G/DL (ref 14–18)
HGB UR QL STRIP: NEGATIVE
HIV 1+2 AB+HIV1 P24 AG SERPL QL IA: NORMAL
IMM GRANULOCYTES # BLD AUTO: 0.04 K/UL (ref 0–0.04)
IMM GRANULOCYTES NFR BLD AUTO: 0.4 % (ref 0–0.5)
KETONES UR QL STRIP: NEGATIVE
LACTATE SERPL-SCNC: 0.7 MMOL/L (ref 0.5–2.2)
LEUKOCYTE ESTERASE UR QL STRIP: NEGATIVE
LYMPHOCYTES # BLD AUTO: 1.1 K/UL (ref 1–4.8)
LYMPHOCYTES NFR BLD: 10.7 % (ref 18–48)
MCH RBC QN AUTO: 31.5 PG (ref 27–31)
MCHC RBC AUTO-ENTMCNC: 33.8 G/DL (ref 32–36)
MCV RBC AUTO: 93 FL (ref 82–98)
MONOCYTES # BLD AUTO: 1.1 K/UL (ref 0.3–1)
MONOCYTES NFR BLD: 10.1 % (ref 4–15)
NEUTROPHILS # BLD AUTO: 5.4 K/UL (ref 1.8–7.7)
NEUTROPHILS NFR BLD: 51.9 % (ref 38–73)
NITRITE UR QL STRIP: NEGATIVE
NRBC BLD-RTO: 0 /100 WBC
PH UR STRIP: 5 [PH] (ref 5–8)
PLATELET # BLD AUTO: 405 K/UL (ref 150–450)
PLATELET BLD QL SMEAR: ABNORMAL
PMV BLD AUTO: 8.9 FL (ref 9.2–12.9)
POTASSIUM SERPL-SCNC: 4.5 MMOL/L (ref 3.5–5.1)
PROT SERPL-MCNC: 7.8 G/DL (ref 6–8.4)
PROT UR QL STRIP: NEGATIVE
RBC # BLD AUTO: 3.4 M/UL (ref 4.6–6.2)
SCHISTOCYTES BLD QL SMEAR: ABNORMAL
SODIUM SERPL-SCNC: 133 MMOL/L (ref 136–145)
SP GR UR STRIP: 1.01 (ref 1–1.03)
TOXIC GRANULES BLD QL SMEAR: PRESENT
URN SPEC COLLECT METH UR: NORMAL
WBC # BLD AUTO: 10.47 K/UL (ref 3.9–12.7)
WBC TOXIC VACUOLES BLD QL SMEAR: PRESENT

## 2024-02-27 PROCEDURE — 86803 HEPATITIS C AB TEST: CPT | Performed by: PHYSICIAN ASSISTANT

## 2024-02-27 PROCEDURE — 85025 COMPLETE CBC W/AUTO DIFF WBC: CPT

## 2024-02-27 PROCEDURE — 25000003 PHARM REV CODE 250: Performed by: HOSPITALIST

## 2024-02-27 PROCEDURE — 81003 URINALYSIS AUTO W/O SCOPE: CPT

## 2024-02-27 PROCEDURE — 99213 OFFICE O/P EST LOW 20 MIN: CPT | Mod: S$GLB,,, | Performed by: NURSE PRACTITIONER

## 2024-02-27 PROCEDURE — 87389 HIV-1 AG W/HIV-1&-2 AB AG IA: CPT | Performed by: PHYSICIAN ASSISTANT

## 2024-02-27 PROCEDURE — 63600175 PHARM REV CODE 636 W HCPCS: Performed by: HOSPITALIST

## 2024-02-27 PROCEDURE — 99285 EMERGENCY DEPT VISIT HI MDM: CPT | Mod: 25

## 2024-02-27 PROCEDURE — 1111F DSCHRG MED/CURRENT MED MERGE: CPT | Mod: CPTII,S$GLB,, | Performed by: NURSE PRACTITIONER

## 2024-02-27 PROCEDURE — 96365 THER/PROPH/DIAG IV INF INIT: CPT

## 2024-02-27 PROCEDURE — 99999 PR PBB SHADOW E&M-EST. PATIENT-LVL V: CPT | Mod: PBBFAC,,, | Performed by: NURSE PRACTITIONER

## 2024-02-27 PROCEDURE — 83605 ASSAY OF LACTIC ACID: CPT

## 2024-02-27 PROCEDURE — 3008F BODY MASS INDEX DOCD: CPT | Mod: CPTII,S$GLB,, | Performed by: NURSE PRACTITIONER

## 2024-02-27 PROCEDURE — 12000002 HC ACUTE/MED SURGE SEMI-PRIVATE ROOM

## 2024-02-27 PROCEDURE — 96366 THER/PROPH/DIAG IV INF ADDON: CPT

## 2024-02-27 PROCEDURE — 80053 COMPREHEN METABOLIC PANEL: CPT

## 2024-02-27 PROCEDURE — 25000003 PHARM REV CODE 250

## 2024-02-27 PROCEDURE — 3074F SYST BP LT 130 MM HG: CPT | Mod: CPTII,S$GLB,, | Performed by: NURSE PRACTITIONER

## 2024-02-27 PROCEDURE — 63600175 PHARM REV CODE 636 W HCPCS

## 2024-02-27 PROCEDURE — 3078F DIAST BP <80 MM HG: CPT | Mod: CPTII,S$GLB,, | Performed by: NURSE PRACTITIONER

## 2024-02-27 PROCEDURE — 1159F MED LIST DOCD IN RCRD: CPT | Mod: CPTII,S$GLB,, | Performed by: NURSE PRACTITIONER

## 2024-02-27 RX ORDER — FLUTICASONE FUROATE AND VILANTEROL 100; 25 UG/1; UG/1
1 POWDER RESPIRATORY (INHALATION) DAILY
Status: DISCONTINUED | OUTPATIENT
Start: 2024-02-28 | End: 2024-03-09 | Stop reason: HOSPADM

## 2024-02-27 RX ORDER — HYDROCODONE BITARTRATE AND ACETAMINOPHEN 5; 325 MG/1; MG/1
1 TABLET ORAL EVERY 6 HOURS PRN
Status: DISCONTINUED | OUTPATIENT
Start: 2024-02-27 | End: 2024-03-09 | Stop reason: HOSPADM

## 2024-02-27 RX ORDER — CLINDAMYCIN HYDROCHLORIDE 300 MG/1
300 CAPSULE ORAL
COMMUNITY
Start: 2024-01-30 | End: 2024-02-27 | Stop reason: ALTCHOICE

## 2024-02-27 RX ORDER — ACETAMINOPHEN 325 MG/1
650 TABLET ORAL EVERY 6 HOURS PRN
Status: DISCONTINUED | OUTPATIENT
Start: 2024-02-27 | End: 2024-03-09 | Stop reason: HOSPADM

## 2024-02-27 RX ORDER — ENOXAPARIN SODIUM 100 MG/ML
40 INJECTION SUBCUTANEOUS EVERY 24 HOURS
Status: DISCONTINUED | OUTPATIENT
Start: 2024-02-27 | End: 2024-03-07

## 2024-02-27 RX ORDER — ONDANSETRON HYDROCHLORIDE 2 MG/ML
4 INJECTION, SOLUTION INTRAVENOUS EVERY 6 HOURS PRN
Status: DISCONTINUED | OUTPATIENT
Start: 2024-02-27 | End: 2024-03-09 | Stop reason: HOSPADM

## 2024-02-27 RX ORDER — TADALAFIL 20 MG/1
20 TABLET ORAL
COMMUNITY
Start: 2023-07-11

## 2024-02-27 RX ORDER — MONTELUKAST SODIUM 5 MG/1
5 TABLET, CHEWABLE ORAL
COMMUNITY

## 2024-02-27 RX ORDER — PANTOPRAZOLE SODIUM 40 MG/1
40 TABLET, DELAYED RELEASE ORAL DAILY
Status: DISCONTINUED | OUTPATIENT
Start: 2024-02-28 | End: 2024-03-09 | Stop reason: HOSPADM

## 2024-02-27 RX ORDER — BISACODYL 5 MG
5 TABLET, DELAYED RELEASE (ENTERIC COATED) ORAL DAILY PRN
Status: DISCONTINUED | OUTPATIENT
Start: 2024-02-27 | End: 2024-03-09 | Stop reason: HOSPADM

## 2024-02-27 RX ORDER — ALBUTEROL SULFATE 90 UG/1
2 AEROSOL, METERED RESPIRATORY (INHALATION) EVERY 4 HOURS PRN
Status: DISCONTINUED | OUTPATIENT
Start: 2024-02-27 | End: 2024-03-09 | Stop reason: HOSPADM

## 2024-02-27 RX ORDER — CALCIUM CARBONATE 200(500)MG
500 TABLET,CHEWABLE ORAL 3 TIMES DAILY PRN
Status: DISCONTINUED | OUTPATIENT
Start: 2024-02-27 | End: 2024-03-09 | Stop reason: HOSPADM

## 2024-02-27 RX ORDER — PANTOPRAZOLE SODIUM 40 MG/1
40 TABLET, DELAYED RELEASE ORAL
COMMUNITY

## 2024-02-27 RX ORDER — CEPHALEXIN 500 MG/1
500 CAPSULE ORAL
COMMUNITY
Start: 2024-02-11 | End: 2024-02-27 | Stop reason: ALTCHOICE

## 2024-02-27 RX ADMIN — VANCOMYCIN HYDROCHLORIDE 1750 MG: 500 INJECTION, POWDER, LYOPHILIZED, FOR SOLUTION INTRAVENOUS at 06:02

## 2024-02-27 RX ADMIN — ACETAMINOPHEN 650 MG: 325 TABLET ORAL at 06:02

## 2024-02-27 RX ADMIN — PIPERACILLIN SODIUM AND TAZOBACTAM SODIUM 4.5 G: 4; .5 INJECTION, POWDER, FOR SOLUTION INTRAVENOUS at 01:02

## 2024-02-27 RX ADMIN — PIPERACILLIN SODIUM AND TAZOBACTAM SODIUM 4.5 G: 4; .5 INJECTION, POWDER, FOR SOLUTION INTRAVENOUS at 09:02

## 2024-02-27 NOTE — PROGRESS NOTES
Subjective     Patient ID: Bradley Collins is a 63 y.o. male.    Chief Complaint: Hospital Follow Up    Pt new to me, here for hospital f/u    Present with wife. Was hospitalized for right leg cellulitis 2/16/24-2-20/24, received IV antibiotics, discharged on doxy and Augmentin, has 4 days left. States leg swelling has worsened and spread up to his abdomen. Very painful and warm to touch.    [facility]    I have reviewed the hospital discharge summary below:        Discharge Summary by Essence Austin DNP at 2/20/2024  1:52 PM  Version 1 of 1  Author: Essence Austin DNP Service: Hospital Medicine Author Type: Nurse Practitioner   Filed: 2/20/2024  1:52 PM Creation Time: 2/20/2024  1:52 PM Status: Signed   : Essence Austin DNP (Nurse Practitioner) Cosigner: Ella Obrien MD at 2/22/2024 11:35 AM   [facility] Surg [facility] [address] [facility] Medicine  Discharge Summary        Patient Name: Bradley Collins  MRN: [id number]  ANTHONY: [id number]  Patient Class: IP- Inpatient  Admission Date: 2/16/2024  Hospital Length of Stay: 3 days  Discharge Date and Time:  02/20/2024 1:52 PM  Attending Physician: Ella Obrien MD   Discharging Provider: Essence Austin DNP  Primary Care Provider: Administration, [facility]     Primary Care Team: Networked reference to record PCT      HPI:   Bradley Collins is a 63 year old male with a past medical history of cellulitis, asthma and prostate cancer last radiation treatment was greater than 2 years ago who presents with right lower extremity swelling, discomfort and redness. Patient reports that [facility] and received IV antibiotics (unknown what) and discharged home with oral Keflex. Patient states the [facility] were [facility] (unable to see in chart review). He returns today with increased redness and swelling extending up his right inner thigh.  Patient denies fever, chills, nausea and vomiting.     ED workup showed lactic  acid 0.7, no leukocytosis, sed rate 112 and CRP 33.2.  UA negative for UTI.  Creatinine elevated 2.0 (prior reading 1.1 one year ago).  Lower extremity ultrasound showed no DVT; slow flow noted. Patient has remained afebrile in the ED and was started on IV zosyn and vancomycin. Patient referred to hospital medicine and will be admitted for further evaluation and management.      * No surgery found *       Hospital Course:   Bradley Collins admitted for cellulitis of his right lower extremity. He is not aware of any trauma or injury to right leg but did inject his dupixant in the right hip earlier this month. [facility], unsure which antibiotics he was on at that point, but he was discharged on cephalexin and deteriorated.  Mr Huerta reported that he was diagnosed with DALIA [facility] during that [facility] also. He reports having a CT scan with contrast that did not show anything.  Here he is afebrile without leukocytosis, started on IV vancomycin and IV Zosyn, changed to IV ceftriaxone and IV vancomycin. Improvement in erythema, but not edema. Induration noted to thigh. Repeat CT of RLE to r/o fluid collection:diffuse cutaneous thickening and edema, prominent right inguinal lymph nodes, no definite discrete fluid collection. Switched to PO augmentin/doxy 2/19.  Blood cultures NGTD.  Mr Huerta reports improvement in his erythema and edema though he still has induration to right leg.  We will continue oral antibiotics at home and patient has strict return criteria. Outpatient lab monitoring ordered as well. Follow appointment with [facility] has been requested.       Goals of Care Treatment Preferences:  Code Status: Full Code        Consults:      Renal/  Elevated serum creatinine  Patient with elevated creatinine 2.0 (last reading 1 year ago 1.1). [facility]. Discussed importance of hydration and increased fluid intake with antibiotic use.      -IV NS    -trend BMP     ID  * Cellulitis  Patient recently admitted and received IV antibiotics for right lower extremity cellulitis [facility].  Patient was discharged on p.o. Keflex and returned with worsening erythema and swelling to right lower extremity.  No fever or chills reported.  No leukocytosis.  Lactic acid 0.7.  Elevated CRP 33.2 and sed rate 112.  Ultrasound negative for DVT     -monitor CBC   -continue IV Zosyn   -continue IV vancomycin  - antibiotic regimen switch to IV vancomycin and IV ceftriaxone, monitor for clinical improvement   - erythema improving but significant edema persists, will repeat CT to r/o fluid collection:   Diffuse cutaneous thickening and subcutaneous edema and inflammatory change about the entire visualized right lower extremity extending from the upper thigh to the visualized ankle, with scattered prominent right inguinal lymph nodes.  No definite discrete fluid collection.    - Switch to PO Augmentin and Doxy 2/19, monitor for fever/decompensation               Final Active Diagnoses:     Diagnosis Date Noted POA    PRINCIPAL PROBLEM:  Cellulitis [L03.90] 02/16/2024 Yes    Asthma [J45.909] 02/16/2024 Yes    Elevated serum creatinine [R79.89] 02/16/2024 Yes    Prostate cancer [C61] 10/05/2020 Yes       Problems Resolved During this Admission:         Discharged Condition: fair     Disposition: Home or Self Care     Follow Up:     Patient Instructions:             Basic metabolic panel   Standing Status: Future Standing Exp. Date: 04/20/25            CBC auto differential   Standing Status: Future Standing Exp. Date: 04/20/25      Diet Adult Regular      Notify your health care provider if you experience any of the following:  temperature >100.4      Notify your health care provider if you experience any of the following:  redness, tenderness, or signs of infection (pain, swelling, redness, odor or green/yellow discharge around incision site)      Notify your health care provider if  you experience any of the following:  persistent dizziness, light-headedness, or visual disturbances          Activity as tolerated   Order Comments: Keep right leg elevated when resting         Significant Diagnostic Studies: N/A     Pending Diagnostic Studies:         None             Medications:  Reconciled Home Medications:       Medication List          START taking these medications       amoxicillin-clavulanate 875-125mg 875-125 mg per tablet  Commonly known as: AUGMENTIN  Take 1 tablet by mouth every 12 (twelve) hours. for 10 days      doxycycline 100 MG capsule  Commonly known as: MONODOX  Take 1 capsule (100 mg total) by mouth every 12 (twelve) hours. for 10 days                CONTINUE taking these medications       albuterol 1.25 mg/3 mL Nebu  Commonly known as: ACCUNEB  Take 1.25 mg by nebulization every 6 (six) hours as needed. Rescue      budesonide-formoterol 160-4.5 mcg 160-4.5 mcg/actuation Hfaa  Commonly known as: SYMBICORT  Inhale 2 puffs into the lungs every 12 (twelve) hours. Controller      calcium carbonate 500 mg calcium (1,250 mg) tablet  Commonly known as: OS-CAROLYN      fluticasone propionate 93 mcg/actuation Aerb  2 sprays by Nasal route 2 (two) times a day.      mometasone-formoterol 200-5 mcg/actuation inhaler  Commonly known as: DULERA  Inhale 2 puffs into the lungs 2 (two) times daily.      NASAL EXHALATION RESISTANCE DV NASL  by Nasal route.      omeprazole 20 mg Tbec  Take 20 mg by mouth once daily.      rosuvastatin 10 mg Cpsp      tiotropium 18 mcg inhalation capsule  Commonly known as: SPIRIVA  Inhale 18 mcg into the lungs once daily. Controller      vitamin D 1000 units Tab  Commonly known as: VITAMIN D3  Take 1,000 Units by mouth once daily.                   Indwelling Lines/Drains at time of discharge:   Lines/Drains/Airways         None                         Time spent on the discharge of patient: 50 minutes           Essence Austin DNP  Department of Hospital  Medicine  [facility] ([place])        Review of Systems   Constitutional:  Negative for activity change, appetite change, chills, diaphoresis, fatigue, fever and unexpected weight change.   Respiratory:  Negative for chest tightness and shortness of breath.    Cardiovascular:  Positive for leg swelling and claudication. Negative for chest pain and palpitations.   Gastrointestinal:  Positive for abdominal pain. Negative for constipation, diarrhea, nausea and vomiting.   Musculoskeletal:  Positive for arthralgias, leg pain and myalgias.   Integumentary:  Positive for color change.        As documented in HPI     Neurological:  Positive for numbness. Negative for dizziness, weakness, light-headedness and headaches.   Hematological:  Negative for adenopathy. Does not bruise/bleed easily.            Review of patient's allergies indicates:   Allergen Reactions    Aspirin Shortness Of Breath         Current Outpatient Medications:     albuterol (ACCUNEB) 1.25 mg/3 mL Nebu, Take 1.25 mg by nebulization every 6 (six) hours as needed. Rescue, Disp: , Rfl:     amoxicillin-clavulanate 875-125mg (AUGMENTIN) 875-125 mg per tablet, Take 1 tablet by mouth every 12 (twelve) hours. for 10 days, Disp: 20 tablet, Rfl: 0    budesonide-formoterol 160-4.5 mcg (SYMBICORT) 160-4.5 mcg/actuation HFAA, Inhale 2 puffs into the lungs every 12 (twelve) hours. Controller, Disp: , Rfl:     calcium carbonate (OS-CAROLYN) 500 mg calcium (1,250 mg) tablet, , Disp: , Rfl:     cephALEXin (KEFLEX) 500 MG capsule, 500 mg., Disp: , Rfl:     clindamycin (CLEOCIN) 300 MG capsule, 300 mg., Disp: , Rfl:     doxycycline (MONODOX) 100 MG capsule, Take 1 capsule (100 mg total) by mouth every 12 (twelve) hours. for 10 days, Disp: 20 capsule, Rfl: 0    dupilumab 300 mg/2 mL PnIj, INJECT 300MG SUBCUTANEOUSLY EVERY TWO WEEKS, Disp: , Rfl:     fluticasone propionate 93 mcg/actuation AerB, 2 sprays by Nasal route 2 (two) times a day., Disp: , Rfl:      mometasone-formoterol (DULERA) 200-5 mcg/actuation inhaler, Inhale 2 puffs into the lungs 2 (two) times daily., Disp: , Rfl:     montelukast (SINGULAIR) 5 MG chewable tablet, Take 5 mg by mouth., Disp: , Rfl:     nasal exhalation resistanc.dev (NASAL EXHALATION RESISTANCE DV NASL), by Nasal route., Disp: , Rfl:     omeprazole 20 mg TbEC, Take 20 mg by mouth once daily., Disp: , Rfl:     pantoprazole (PROTONIX) 40 MG tablet, Take 40 mg by mouth., Disp: , Rfl:     rosuvastatin 10 mg CpSP, , Disp: , Rfl:     tadalafiL (CIALIS) 20 MG Tab, 20 mg., Disp: , Rfl:     tiotropium (SPIRIVA) 18 mcg inhalation capsule, Inhale 18 mcg into the lungs once daily. Controller, Disp: , Rfl:     vitamin D (VITAMIN D3) 1000 units Tab, Take 1,000 Units by mouth once daily., Disp: , Rfl:     Patient Active Problem List   Diagnosis    Prostate cancer    Asthma    Cellulitis    Elevated serum creatinine    Abnormal weight loss    Allergic rhinitis    Aspirin-exacerbated respiratory disease (AERD)    Chronic maxillary sinusitis    Chronic rhinitis    Cellulitis of right lower limb    Spiritual or Adventism counseling    Cyst of pancreas    Dry eye syndrome of bilateral lacrimal glands    Dyspepsia    Dysphonia    Hyperlipidemia    Irritable bowel syndrome with diarrhea    Elevated blood-pressure reading without diagnosis of hypertension    History of colon polyps    Moderate persistent asthma, uncomplicated       Past Medical History:   Diagnosis Date    Allergy     Asthma     Cellulitis     Elevated PSA     Prostate cancer        Past Surgical History:   Procedure Laterality Date    ENDOSCOPIC ULTRASOUND OF UPPER GASTROINTESTINAL TRACT N/A 7/25/2023    Procedure: ULTRASOUND, UPPER GI TRACT, ENDOSCOPIC;  Surgeon: Yoseph Li MD;  Location: [address]);  Service: Endoscopy;  Laterality: N/A;  instr [place]-[facility] referral    SINUS SURGERY Bilateral 2001       Social History     Socioeconomic History    Marital status: Single   Tobacco  "Use    Smoking status: Never    Smokeless tobacco: Never   Substance and Sexual Activity    Alcohol use: Yes     Alcohol/week: 1.0 standard drink of alcohol     Types: 1 Cans of beer per week     Comment: week    Drug use: Never       Family History   Family history unknown: Yes       Objective     Vitals:    24 0957   BP: 118/74   Pulse: 94   Temp: 98.9 °F (37.2 °C)   SpO2: 96%   Weight: 83.6 kg (184 lb 3.1 oz)   Height: 5' 7" (1.702 m)   PainSc:   8     Body mass index is 28.85 kg/m².    Physical Exam  Vitals and nursing note reviewed.   HENT:      Head: Normocephalic.      Nose: Nose normal.      Mouth/Throat:      Mouth: Mucous membranes are moist.   Eyes:      Conjunctiva/sclera: Conjunctivae normal.   Cardiovascular:      Rate and Rhythm: Normal rate.      Comments: Entire right leg upper and lower red, warm, tender, tight going into to right groin and abdomen    Pulmonary:      Effort: Pulmonary effort is normal.   Musculoskeletal:         General: Swelling and tenderness present.      Right lower le+ Pitting Edema present.   Skin:     General: Skin is warm.      Capillary Refill: Capillary refill takes more than 3 seconds.      Findings: Erythema present.             Comments: Entire right leg upper and lower red, warm, tender, tight going into to right groin and abdomen   Neurological:      General: No focal deficit present.      Mental Status: He is alert and oriented to person, place, and time.   Psychiatric:         Mood and Affect: Mood normal.         Behavior: Behavior normal.         Thought Content: Thought content normal.         Judgment: Judgment normal.            Assessment and Plan     1. Hospital discharge follow-up  2. Cellulitis of right leg  3. Elevated serum creatinine  -     Refer to Emergency Dept.    4. BMI 28.0-28.9,adult  BMI reviewed    5. Overweight (BMI 25.0-29.9)  BMI reviewed.    Diet and exercise to lose weight.    Cellulitis has worsened and this will require ER " eval    Report called across the street to Jolie Charge nurse         Follow up if symptoms worsen or fail to improve.

## 2024-02-27 NOTE — ED PROVIDER NOTES
Encounter Date: 2/27/2024       History     Chief Complaint   Patient presents with    Leg Swelling     On antibiotics since last wed for cellulitis to L leg, now more pain and swelling, sent from IM clinic, had iv antibiotics during admission and dc'd alexandre corderokenisha on oral     HPI    Bradley Collins is a 63 y.o. male w/a PMHx significant for prostate cancer treated w/radiation, asthma, and recent cellulitis (discharged 7 days ago w/a 10 day course of Keflex).  Patient presents today for worsening worsening erythema/edema/pain in his right lower extremity extending from his foot into his groin w/ scrotal and penile involvement.  Patient states that since discharge, he has had a slow progression of these symptoms involving a larger surface area.  Patient states that he has been taking his prescribed Keflex daily w/o missing doses.  Patient has also been having some difficulty w/urinating, and feels that he has not able to fully empty his bladder due to penile swelling.  Patient has no open wounds or discharge from affected areas.  Patient denies f/c, HA, CP, cough, congestion, SOB, abdominal pain, N/V/D, or involvement of other extremities.      Review of patient's allergies indicates:   Allergen Reactions    Aspirin Shortness Of Breath     Past Medical History:   Diagnosis Date    Allergy     Asthma     Cellulitis     Elevated PSA     Prostate cancer      Past Surgical History:   Procedure Laterality Date    ENDOSCOPIC ULTRASOUND OF UPPER GASTROINTESTINAL TRACT N/A 7/25/2023    Procedure: ULTRASOUND, UPPER GI TRACT, ENDOSCOPIC;  Surgeon: Yoseph Li MD;  Location: [address];  Service: Endoscopy;  Laterality: N/A;  [facility] referral    SINUS SURGERY Bilateral 2001     Family History   Family history unknown: Yes     Social History     Tobacco Use    Smoking status: Never    Smokeless tobacco: Never   Substance Use Topics    Alcohol use: Yes     Alcohol/week: 1.0 standard drink of alcohol     Types: 1 Cans  of beer per week     Comment: week    Drug use: Never     Review of Systems    Physical Exam     Initial Vitals [02/27/24 1046]   BP Pulse Resp Temp SpO2   (!) 153/76 92 18 98.1 °F (36.7 °C) 96 %      MAP       --         Physical Exam    Nursing note and vitals reviewed.  Constitutional: He appears well-developed and well-nourished. No distress.   HENT:   Head: Normocephalic and atraumatic.   Nose: Nose normal.   Eyes: Conjunctivae and EOM are normal.   Neck:   Normal range of motion.  Cardiovascular:  Normal rate, regular rhythm, normal heart sounds and intact distal pulses.           Pulmonary/Chest: Breath sounds normal. No respiratory distress.   Abdominal: Abdomen is soft. Bowel sounds are normal. There is no abdominal tenderness.   Genitourinary: Right testis shows swelling and tenderness. Left testis shows swelling and tenderness. Penile tenderness present. No discharge found.   Musculoskeletal:         General: Tenderness (Right lower extremity from ankle to hip including penis and scrotum.) and edema (Right lower extremity from ankle to hip including penis and scrotum.) present.      Cervical back: Normal range of motion.     Neurological: He is alert and oriented to person, place, and time. He has normal strength. No sensory deficit.   Skin: Skin is warm and dry. Capillary refill takes less than 2 seconds. No rash and no abscess noted. There is erythema (Right lower extremity from ankle to hip including penis and scrotum.). No pallor.   Psychiatric: He has a normal mood and affect. His behavior is normal. Judgment and thought content normal.         ED Course   Procedures  Labs Reviewed   CBC W/ AUTO DIFFERENTIAL - Abnormal; Notable for the following components:       Result Value    RBC 3.40 (*)     Hemoglobin 10.7 (*)     Hematocrit 31.7 (*)     MCH 31.5 (*)     MPV 8.9 (*)     Mono # 1.1 (*)     Eos # 2.6 (*)     Lymph % 10.7 (*)     Eosinophil % 25.0 (*)     All other components within normal limits    COMPREHENSIVE METABOLIC PANEL - Abnormal; Notable for the following components:    Sodium 133 (*)     Creatinine 1.7 (*)     Albumin 3.4 (*)     eGFR 44.5 (*)     All other components within normal limits   BASIC METABOLIC PANEL - Abnormal; Notable for the following components:    Sodium 132 (*)     Creatinine 1.8 (*)     Anion Gap 7 (*)     eGFR 41.5 (*)     All other components within normal limits   MRSA SCREEN BY PCR   HIV 1 / 2 ANTIBODY    Narrative:     Release to patient->Immediate   HEPATITIS C ANTIBODY    Narrative:     Release to patient->Immediate   LACTIC ACID, PLASMA   URINALYSIS, REFLEX TO URINE CULTURE    Narrative:     Specimen Source->Urine   C-REACTIVE PROTEIN          Imaging Results              CT Thigh Without Contrast Right (Final result)  Result time 02/27/24 16:13:47      Final result by Javier Rutherford MD (02/27/24 16:13:47)                   Impression:      Extensive soft tissue induration throughout the right thigh and leg as well as anterior pelvis.  Deep fascial edema noted at the level of the thigh.  No evidence for soft tissue gas.  Diagnostic considerations include infectious process such as cellulitis, myositis, and fasciitis.  Alternatively, appearance may be seen with inflammatory process, venous/lymphatic obstruction, or drug/allergic reaction.      Electronically signed by: Javier Rutherford MD  Date:    02/27/2024  Time:    16:13               Narrative:    EXAMINATION:  CT PELVIS WITHOUT CONTRAST; CT THIGH WITHOUT CONTRAST RIGHT; CT LEG (TIBIA-FIBULA) WITHOUT CONTRAST RIGHT    CLINICAL HISTORY:  Soft tissue infection suspected;    TECHNIQUE:  CT of pelvis, right thigh, and right leg performed without contrast.  Coronal and sagittal reformats provided.    COMPARISON:  CT dated 02/18/2024    FINDINGS:  There is severe soft tissue induration throughout the right thigh and leg as well as the anterior pelvis.  There is skin thickening with subcutaneous, superficial fascial, and  deep fascial edema.  There is skin thickening and subcutaneous edema of the leg.  There is no soft tissue gas.  There is no evidence for confluent fluid collection, noting somewhat limited noncontrast assessment.    Note made of scrotal edema.    No pelvic ascites.  Mild retroperitoneal/extraperitoneal edema noted along the sidewalls.  Metallic bodies within the prostate, presumably fiducial markers.    No fracture.  No lytic or blastic lesion.  Degenerative changes are seen in the lower lumbar spine.  There is a small knee effusion.                                       CT Leg (Tibia-Fibula) Without Contrast Right (Final result)  Result time 02/27/24 16:13:47      Final result by Javier Rutherford MD (02/27/24 16:13:47)                   Impression:      Extensive soft tissue induration throughout the right thigh and leg as well as anterior pelvis.  Deep fascial edema noted at the level of the thigh.  No evidence for soft tissue gas.  Diagnostic considerations include infectious process such as cellulitis, myositis, and fasciitis.  Alternatively, appearance may be seen with inflammatory process, venous/lymphatic obstruction, or drug/allergic reaction.      Electronically signed by: Javier Rutherford MD  Date:    02/27/2024  Time:    16:13               Narrative:    EXAMINATION:  CT PELVIS WITHOUT CONTRAST; CT THIGH WITHOUT CONTRAST RIGHT; CT LEG (TIBIA-FIBULA) WITHOUT CONTRAST RIGHT    CLINICAL HISTORY:  Soft tissue infection suspected;    TECHNIQUE:  CT of pelvis, right thigh, and right leg performed without contrast.  Coronal and sagittal reformats provided.    COMPARISON:  CT dated 02/18/2024    FINDINGS:  There is severe soft tissue induration throughout the right thigh and leg as well as the anterior pelvis.  There is skin thickening with subcutaneous, superficial fascial, and deep fascial edema.  There is skin thickening and subcutaneous edema of the leg.  There is no soft tissue gas.  There is no evidence for  confluent fluid collection, noting somewhat limited noncontrast assessment.    Note made of scrotal edema.    No pelvic ascites.  Mild retroperitoneal/extraperitoneal edema noted along the sidewalls.  Metallic bodies within the prostate, presumably fiducial markers.    No fracture.  No lytic or blastic lesion.  Degenerative changes are seen in the lower lumbar spine.  There is a small knee effusion.                                       CT Pelvis Without Contrast (Final result)  Result time 02/27/24 16:13:47      Final result by Javier Rutherford MD (02/27/24 16:13:47)                   Impression:      Extensive soft tissue induration throughout the right thigh and leg as well as anterior pelvis.  Deep fascial edema noted at the level of the thigh.  No evidence for soft tissue gas.  Diagnostic considerations include infectious process such as cellulitis, myositis, and fasciitis.  Alternatively, appearance may be seen with inflammatory process, venous/lymphatic obstruction, or drug/allergic reaction.      Electronically signed by: Javier Rutherford MD  Date:    02/27/2024  Time:    16:13               Narrative:    EXAMINATION:  CT PELVIS WITHOUT CONTRAST; CT THIGH WITHOUT CONTRAST RIGHT; CT LEG (TIBIA-FIBULA) WITHOUT CONTRAST RIGHT    CLINICAL HISTORY:  Soft tissue infection suspected;    TECHNIQUE:  CT of pelvis, right thigh, and right leg performed without contrast.  Coronal and sagittal reformats provided.    COMPARISON:  CT dated 02/18/2024    FINDINGS:  There is severe soft tissue induration throughout the right thigh and leg as well as the anterior pelvis.  There is skin thickening with subcutaneous, superficial fascial, and deep fascial edema.  There is skin thickening and subcutaneous edema of the leg.  There is no soft tissue gas.  There is no evidence for confluent fluid collection, noting somewhat limited noncontrast assessment.    Note made of scrotal edema.    No pelvic ascites.  Mild  retroperitoneal/extraperitoneal edema noted along the sidewalls.  Metallic bodies within the prostate, presumably fiducial markers.    No fracture.  No lytic or blastic lesion.  Degenerative changes are seen in the lower lumbar spine.  There is a small knee effusion.                                       Medications   acetaminophen tablet 650 mg (650 mg Oral Given 2/27/24 1813)   ondansetron injection 4 mg (has no administration in time range)   calcium carbonate 200 mg calcium (500 mg) chewable tablet 500 mg (has no administration in time range)   enoxaparin injection 40 mg (40 mg Subcutaneous Not Given 2/27/24 1715)   bisacodyL EC tablet 5 mg (has no administration in time range)   albuterol inhaler 2 puff (has no administration in time range)   fluticasone furoate-vilanteroL 100-25 mcg/dose diskus inhaler 1 puff (1 puff Inhalation Given 2/28/24 0947)   pantoprazole EC tablet 40 mg (40 mg Oral Given 2/28/24 0850)   tiotropium bromide 2.5 mcg/actuation inhaler 2 puff (2 puffs Inhalation Given 2/28/24 1047)   vancomycin - pharmacy to dose (has no administration in time range)   HYDROcodone-acetaminophen 5-325 mg per tablet 1 tablet (1 tablet Oral Given 2/28/24 0720)   vancomycin 1,250 mg in dextrose 5 % (D5W) 250 mL IVPB (Vial-Mate) (1,250 mg Intravenous Trough Due As Scheduled Before Dose 2/29/24 1730)   piperacillin-tazobactam (ZOSYN) 4.5 g in dextrose 5 % in water (D5W) 100 mL IVPB (MB+) (0 g Intravenous Stopped 2/28/24 1024)   vancomycin 1.75 g in 5 % dextrose 500 mL IVPB (0 mg Intravenous Stopped 2/27/24 2020)     Medical Decision Making  Patient is a 63-year-old male who was recently discharged from the hospital for right lower extremity cellulitis who presents for worsening symptoms of cellulitis involving his right lower extremity and groin. On initial evaluation, patient is in NAD and VSS.  Patient has significant erythema/edema of right lower extremity as well as scrotum and penis.  These areas are tense  and warm to the touch.  Patient states that he is slightly tender to palpation in these areas.  No open wounds or drainage noted in any of the affected areas.  Compared to previous images of cellulitis, it was apparent that the affected area has significantly increased over the past week.  On reexamination, patient has no change in exam or clinical status.  There is no crepitus to palpation.     ED interventions include   - vancomycin and Zosyn for antimicrobial prophylaxis for suspected cellulitis.     Labs include:  - CBC, CMP, lactate, urinalysis pending at time of handoff    Imaging include:  - CT right lower extremity w/out contrast, CT pelvis w/o contrast pending at time of handoff     Ddx including, but not limited to:  Cellulitis v. lymphedema v. DVT v. necrotizing fasciitis     Most likely Dx:  At this time, I have high suspicion for cellulitis given patient's recent admission for similar symptoms.  Patient also likely has some component of lymphedema given his history of radiation treatment for prostate cancer.  Low suspicion for DVT at this time.  Low suspicion for necrotizing fasciitis as patient does not have significant tenderness to palpation no crepitus is felt on exam.  However, given incomplete workup it is difficult to fully determine definitive diagnosis at this time.     Disposition:   Pending at time of handoff.  However I anticipate that patient will be admitted for treatment of expanding cellulitis requiring IV antibiotics. Discussed plan with patient and/or caregiver who expressed understanding and agreement.    Please see HPI, physical exam, ED course for additional details.    Amount and/or Complexity of Data Reviewed  Labs: ordered.  Radiology: ordered.    Risk  Prescription drug management.  Decision regarding hospitalization.              Attending Attestation:   Physician Attestation Statement for Resident:  As the supervising MD   Physician Attestation Statement: I have personally  seen and examined this patient.   I agree with the above history.  -:   As the supervising MD I agree with the above PE.     As the supervising MD I agree with the above treatment, course, plan, and disposition.                    ED Course as of 02/28/24 1141   Tue Feb 27, 2024 1713 This patient was received in sign-out from Dr. Hunter, pending CT scans of the right lower extremity.  Recent history significant for cellulitis of the right lower extremity with recent discharge on p.o. antibiotics, area of cellulitis and swelling and pain has worsened as he is failed outpatient management of this.  Patient is stable in the emergency department.  Normal mental status.  CT scans further elucidate a large area of RLE cellulitis without gas.  No acute evidence of other myositis or necrotizing fasciitis.  Case discussed with and accepted by the on-call hospitalist.  Patient in agreement with plan of care. [ST]      ED Course User Index  [ST] Lake Ross MD                           Clinical Impression:  Final diagnoses:  [M79.89] Leg swelling (Primary)  [L03.90] Cellulitis, unspecified cellulitis site  [R19.09] Groin swelling  [L03.115] Cellulitis of right lower extremity          ED Disposition Condition    Admit                 Dao Goodman MD  Resident  02/27/24 1451       Leny Hunter MD  02/28/24 1141

## 2024-02-27 NOTE — H&P
[facility] - Emergency Dept  Hospital Medicine  History & Physical    Patient Name: Bradley Collins  MRN: [id number]  Patient Class: IP- Inpatient  Admission Date: 2/27/2024  Attending Physician: Sagar Cox MD   Primary Care Provider: Administration, [facility]         Patient information was obtained from patient, past medical records, and ER records.     Subjective:     Principal Problem:Cellulitis of right lower limb    Chief Complaint:   Chief Complaint   Patient presents with    Leg Swelling     On antibiotics since last wed for cellulitis to L leg, now more pain and swelling, sent from IM clinic, had iv antibiotics during admission and dc'd las tues on oral        HPI: Bradley Collins is a 63 year old Black man with asthma, allergic rhinitis, hyperlipidemia, irritable bowel syndrome with diarrhea, history of prostate cancer treated with radiation, history of sinus surgery in 2001. He lives in [facility]. He works for [employer]. His primary care clinic is the [facility] clinic.    He was hospitalized at the [facility] on 2/12/2024 for right lower extremity cellulitis. He had a CT with contrast done and was told that it did not show anything. He was prescribed cephalexin.   He was hospitalized at [facility] from 2/16/2024 to 2/20/2024 for worsening cellulitis. Creatinine was elevated at 2.0 mg/dL (from 1.1 a year ago). He was initially put on piperacillin-tazobactam and vancomycin. Piperacillin-tazobactam was changed to ceftriaxone. Erythema improved but edema persisted. He had induration of the thigh. CT showed diffuse cutaneous thickening and edema with prominent right inguinal lymph nodes. Antibiotics were switched to oral amoxicillin-clavulanate and doxycycline on 2/19/2024. Edema improved. Induration persisted. He was discharged home.   He followed up at [facility] on 2/27/2024. He had 4 days  left of the antibiotics. Swelling had worsened and spread up to his abdomen. He had edema and erythema of the entire right lower extremity, right groin, and right lower abdomen. It was tender and warm. He was advised to return to the emergency department to resume intravenous antibiotic treatment. Labs showed creatinine to still be elevated (1.7 mg/dL, from 1.8 on 2/19/2024). Non-contrast CT showed soft tissue induration throughout the right anterior pelvis, thigh,and leg, skin thickening and edema of the subcutaneous tissue, superficial fascia, and deep fascia, scrotal edema, and retroperitoneal/extraperitoneal edema along the sidewalls. He was given piperacillin-tazobactam and vancomycin. He was admitted to Hospital Medicine Team W.     Past Medical History:   Diagnosis Date    Allergy     Asthma     Cellulitis     Elevated PSA     Prostate cancer        Past Surgical History:   Procedure Laterality Date    ENDOSCOPIC ULTRASOUND OF UPPER GASTROINTESTINAL TRACT N/A 7/25/2023    Procedure: ULTRASOUND, UPPER GI TRACT, ENDOSCOPIC;  Surgeon: Yoseph Li MD;  Location: [address]);  Service: Endoscopy;  Laterality: N/A;  instr [place]-va referral    SINUS SURGERY Bilateral 2001       Review of patient's allergies indicates:   Allergen Reactions    Aspirin Shortness Of Breath       No current facility-administered medications on file prior to encounter.     Current Outpatient Medications on File Prior to Encounter   Medication Sig    albuterol (ACCUNEB) 1.25 mg/3 mL Nebu Take 1.25 mg by nebulization every 6 (six) hours as needed. Rescue    amoxicillin-clavulanate 875-125mg (AUGMENTIN) 875-125 mg per tablet Take 1 tablet by mouth every 12 (twelve) hours. for 10 days    budesonide-formoterol 160-4.5 mcg (SYMBICORT) 160-4.5 mcg/actuation HFAA Inhale 2 puffs into the lungs every 12 (twelve) hours. Controller    calcium carbonate (OS-CAROLYN) 500 mg calcium (1,250 mg) tablet     doxycycline (MONODOX) 100 MG capsule Take  1 capsule (100 mg total) by mouth every 12 (twelve) hours. for 10 days    dupilumab 300 mg/2 mL PnIj INJECT 300MG SUBCUTANEOUSLY EVERY TWO WEEKS    fluticasone propionate 93 mcg/actuation AerB 2 sprays by Nasal route 2 (two) times a day.    mometasone-formoterol (DULERA) 200-5 mcg/actuation inhaler Inhale 2 puffs into the lungs 2 (two) times daily.    montelukast (SINGULAIR) 5 MG chewable tablet Take 5 mg by mouth.    nasal exhalation resistanc.dev (NASAL EXHALATION RESISTANCE DV NASL) by Nasal route.    omeprazole 20 mg TbEC Take 20 mg by mouth once daily.    pantoprazole (PROTONIX) 40 MG tablet Take 40 mg by mouth.    rosuvastatin 10 mg CpSP     tadalafiL (CIALIS) 20 MG Tab 20 mg.    tiotropium (SPIRIVA) 18 mcg inhalation capsule Inhale 18 mcg into the lungs once daily. Controller    vitamin D (VITAMIN D3) 1000 units Tab Take 1,000 Units by mouth once daily.    [DISCONTINUED] cephALEXin (KEFLEX) 500 MG capsule 500 mg.    [DISCONTINUED] clindamycin (CLEOCIN) 300 MG capsule 300 mg.     Family History    Family history is unknown by patient.       Tobacco Use    Smoking status: Never    Smokeless tobacco: Never   Substance and Sexual Activity    Alcohol use: Yes     Alcohol/week: 1.0 standard drink of alcohol     Types: 1 Cans of beer per week     Comment: week    Drug use: Never    Sexual activity: Not on file     Review of Systems   Constitutional:  Negative for chills and fever.   HENT:  Negative for trouble swallowing and voice change.    Eyes:  Negative for pain and redness.   Respiratory:  Negative for cough and shortness of breath.    Cardiovascular:  Positive for leg swelling. Negative for chest pain.   Gastrointestinal:  Negative for nausea and vomiting.   Genitourinary:  Positive for scrotal swelling. Negative for dysuria.   Musculoskeletal:  Negative for neck pain and neck stiffness.   Skin:  Positive for color change. Negative for pallor.   Neurological:  Negative for seizures and syncope.     Objective:      Vital Signs (Most Recent):  Temp: 98.1 °F (36.7 °C) (02/27/24 1046)  Pulse: 92 (02/27/24 1046)  Resp: 18 (02/27/24 1046)  BP: (!) 153/76 (02/27/24 1046)  SpO2: 96 % (02/27/24 1046) Vital Signs (24h Range):  Temp:  [98.1 °F (36.7 °C)-98.9 °F (37.2 °C)] 98.1 °F (36.7 °C)  Pulse:  [92-94] 92  Resp:  [18] 18  SpO2:  [96 %] 96 %  BP: (118-153)/(74-76) 153/76     Weight: 83.5 kg (184 lb)  Body mass index is 28.82 kg/m².     Physical Exam  Vitals and nursing note reviewed.   Constitutional:       General: He is not in acute distress.     Appearance: He is well-developed. He is not toxic-appearing or diaphoretic.   HENT:      Head: Normocephalic and atraumatic.   Eyes:      General: No scleral icterus.     Conjunctiva/sclera: Conjunctivae normal.   Cardiovascular:      Rate and Rhythm: Normal rate and regular rhythm.      Heart sounds: Normal heart sounds. No murmur heard.  Pulmonary:      Effort: Pulmonary effort is normal. No respiratory distress.      Breath sounds: Normal breath sounds. No wheezing.   Abdominal:      General: There is no distension.      Palpations: Abdomen is soft.      Tenderness: There is abdominal tenderness. There is no guarding.   Musculoskeletal:         General: Tenderness present. No signs of injury.      Right lower leg: Edema present.      Left lower leg: No edema.   Skin:     General: Skin is warm and dry.      Coloration: Skin is not jaundiced.      Findings: Erythema present.   Neurological:      Mental Status: He is alert and oriented to person, place, and time. Mental status is at baseline.      Motor: No seizure activity.   Psychiatric:         Attention and Perception: Attention normal.         Mood and Affect: Affect normal.         Behavior: Behavior is not agitated. Behavior is cooperative.                Significant Labs: All pertinent labs within the past 24 hours have been reviewed.    Significant Imaging: I have reviewed all pertinent imaging results/findings within the past  24 hours.  CT Pelvis Without Contrast CT Thigh Without Contrast Right CT Leg (Tibia-Fibula) Without Contrast Right 2/27/24: FINDINGS:   There is severe soft tissue induration throughout the right thigh and leg as well as the anterior pelvis.  There is skin thickening with subcutaneous, superficial fascial, and deep fascial edema.  There is skin thickening and subcutaneous edema of the leg.  There is no soft tissue gas.  There is no evidence for confluent fluid collection, noting somewhat limited noncontrast assessment.   Note made of scrotal edema.   No pelvic ascites.  Mild retroperitoneal/extraperitoneal edema noted along the sidewalls.  Metallic bodies within the prostate, presumably fiducial markers.   No fracture.  No lytic or blastic lesion.  Degenerative changes are seen in the lower lumbar spine.  There is a small knee effusion.   Impression:  Extensive soft tissue induration throughout the right thigh and leg as well as anterior pelvis.  Deep fascial edema noted at the level of the thigh.  No evidence for soft tissue gas.  Diagnostic considerations include infectious process such as cellulitis, myositis, and fasciitis.  Alternatively, appearance may be seen with inflammatory process, venous/lymphatic obstruction, or drug/allergic reaction.   Assessment/Plan:     * Cellulitis of right lower limb  Persistent. 3rd hospital admission. Improved on vancomycin and ceftriaxone during last hospitalization. Prescribed amoxicillin-clavulanate and doxycycline and it worsened. Give vancomycin. Consult Infectious Disease for recommendations.    Moderate persistent asthma, uncomplicated  Give fluticasone-vilanterol in place of home mometasone-formoterol. Albuterol prn.       Elevated serum creatinine  Persistent since recent hospitalization. Monitor.       VTE Risk Mitigation (From admission, onward)           Ordered     enoxaparin injection 40 mg  Daily         02/27/24 3940                                    Sagar RAINEY  MD Kenny  Department of Hospital Medicine  [facility] - Emergency Dept

## 2024-02-27 NOTE — SUBJECTIVE & OBJECTIVE
Past Medical History:   Diagnosis Date    Allergy     Asthma     Cellulitis     Elevated PSA     Prostate cancer        Past Surgical History:   Procedure Laterality Date    ENDOSCOPIC ULTRASOUND OF UPPER GASTROINTESTINAL TRACT N/A 7/25/2023    Procedure: ULTRASOUND, UPPER GI TRACT, ENDOSCOPIC;  Surgeon: Yoseph Li MD;  Location: [facility] ([address] [place] [address]);  Service: Endoscopy;  Laterality: N/A;  instr [place]-va referral    SINUS SURGERY Bilateral 2001       Review of patient's allergies indicates:   Allergen Reactions    Aspirin Shortness Of Breath       No current facility-administered medications on file prior to encounter.     Current Outpatient Medications on File Prior to Encounter   Medication Sig    albuterol (ACCUNEB) 1.25 mg/3 mL Nebu Take 1.25 mg by nebulization every 6 (six) hours as needed. Rescue    amoxicillin-clavulanate 875-125mg (AUGMENTIN) 875-125 mg per tablet Take 1 tablet by mouth every 12 (twelve) hours. for 10 days    budesonide-formoterol 160-4.5 mcg (SYMBICORT) 160-4.5 mcg/actuation HFAA Inhale 2 puffs into the lungs every 12 (twelve) hours. Controller    calcium carbonate (OS-CAROLYN) 500 mg calcium (1,250 mg) tablet     doxycycline (MONODOX) 100 MG capsule Take 1 capsule (100 mg total) by mouth every 12 (twelve) hours. for 10 days    dupilumab 300 mg/2 mL PnIj INJECT 300MG SUBCUTANEOUSLY EVERY TWO WEEKS    fluticasone propionate 93 mcg/actuation AerB 2 sprays by Nasal route 2 (two) times a day.    mometasone-formoterol (DULERA) 200-5 mcg/actuation inhaler Inhale 2 puffs into the lungs 2 (two) times daily.    montelukast (SINGULAIR) 5 MG chewable tablet Take 5 mg by mouth.    nasal exhalation resistanc.dev (NASAL EXHALATION RESISTANCE DV NASL) by Nasal route.    omeprazole 20 mg TbEC Take 20 mg by mouth once daily.    pantoprazole (PROTONIX) 40 MG tablet Take 40 mg by mouth.    rosuvastatin 10 mg CpSP     tadalafiL (CIALIS) 20 MG Tab 20 mg.    tiotropium (SPIRIVA) 18 mcg inhalation capsule Inhale  18 mcg into the lungs once daily. Controller    vitamin D (VITAMIN D3) 1000 units Tab Take 1,000 Units by mouth once daily.    [DISCONTINUED] cephALEXin (KEFLEX) 500 MG capsule 500 mg.    [DISCONTINUED] clindamycin (CLEOCIN) 300 MG capsule 300 mg.     Family History    Family history is unknown by patient.       Tobacco Use    Smoking status: Never    Smokeless tobacco: Never   Substance and Sexual Activity    Alcohol use: Yes     Alcohol/week: 1.0 standard drink of alcohol     Types: 1 Cans of beer per week     Comment: week    Drug use: Never    Sexual activity: Not on file     Review of Systems   Constitutional:  Negative for chills and fever.   HENT:  Negative for trouble swallowing and voice change.    Eyes:  Negative for pain and redness.   Respiratory:  Negative for cough and shortness of breath.    Cardiovascular:  Positive for leg swelling. Negative for chest pain.   Gastrointestinal:  Negative for nausea and vomiting.   Genitourinary:  Positive for scrotal swelling. Negative for dysuria.   Musculoskeletal:  Negative for neck pain and neck stiffness.   Skin:  Positive for color change. Negative for pallor.   Neurological:  Negative for seizures and syncope.     Objective:     Vital Signs (Most Recent):  Temp: 98.1 °F (36.7 °C) (02/27/24 1046)  Pulse: 92 (02/27/24 1046)  Resp: 18 (02/27/24 1046)  BP: (!) 153/76 (02/27/24 1046)  SpO2: 96 % (02/27/24 1046) Vital Signs (24h Range):  Temp:  [98.1 °F (36.7 °C)-98.9 °F (37.2 °C)] 98.1 °F (36.7 °C)  Pulse:  [92-94] 92  Resp:  [18] 18  SpO2:  [96 %] 96 %  BP: (118-153)/(74-76) 153/76     Weight: 83.5 kg (184 lb)  Body mass index is 28.82 kg/m².     Physical Exam  Vitals and nursing note reviewed.   Constitutional:       General: He is not in acute distress.     Appearance: He is well-developed. He is not toxic-appearing or diaphoretic.   HENT:      Head: Normocephalic and atraumatic.   Eyes:      General: No scleral icterus.     Conjunctiva/sclera: Conjunctivae  normal.   Cardiovascular:      Rate and Rhythm: Normal rate and regular rhythm.      Heart sounds: Normal heart sounds. No murmur heard.  Pulmonary:      Effort: Pulmonary effort is normal. No respiratory distress.      Breath sounds: Normal breath sounds. No wheezing.   Abdominal:      General: There is no distension.      Palpations: Abdomen is soft.      Tenderness: There is abdominal tenderness. There is no guarding.   Musculoskeletal:         General: Tenderness present. No signs of injury.      Right lower leg: Edema present.      Left lower leg: No edema.   Skin:     General: Skin is warm and dry.      Coloration: Skin is not jaundiced.      Findings: Erythema present.   Neurological:      Mental Status: He is alert and oriented to person, place, and time. Mental status is at baseline.      Motor: No seizure activity.   Psychiatric:         Attention and Perception: Attention normal.         Mood and Affect: Affect normal.         Behavior: Behavior is not agitated. Behavior is cooperative.                Significant Labs: All pertinent labs within the past 24 hours have been reviewed.    Significant Imaging: I have reviewed all pertinent imaging results/findings within the past 24 hours.  CT Pelvis Without Contrast CT Thigh Without Contrast Right CT Leg (Tibia-Fibula) Without Contrast Right 2/27/24: FINDINGS:   There is severe soft tissue induration throughout the right thigh and leg as well as the anterior pelvis.  There is skin thickening with subcutaneous, superficial fascial, and deep fascial edema.  There is skin thickening and subcutaneous edema of the leg.  There is no soft tissue gas.  There is no evidence for confluent fluid collection, noting somewhat limited noncontrast assessment.   Note made of scrotal edema.   No pelvic ascites.  Mild retroperitoneal/extraperitoneal edema noted along the sidewalls.  Metallic bodies within the prostate, presumably fiducial markers.   No fracture.  No lytic or  blastic lesion.  Degenerative changes are seen in the lower lumbar spine.  There is a small knee effusion.   Impression:  Extensive soft tissue induration throughout the right thigh and leg as well as anterior pelvis.  Deep fascial edema noted at the level of the thigh.  No evidence for soft tissue gas.  Diagnostic considerations include infectious process such as cellulitis, myositis, and fasciitis.  Alternatively, appearance may be seen with inflammatory process, venous/lymphatic obstruction, or drug/allergic reaction.

## 2024-02-27 NOTE — ASSESSMENT & PLAN NOTE
Persistent. 3rd hospital admission. Improved on vancomycin and ceftriaxone during last hospitalization. Prescribed amoxicillin-clavulanate and doxycycline and it worsened. Give vancomycin. Consult Infectious Disease for recommendations.

## 2024-02-27 NOTE — ED NOTES
Patient comes into the emergency department by POV with complaints of right leg pain and swelling. Patient states that he got diagnosed with cellulitis in the RLE a couple weeks ago and it has spread up to the thigh, groin, and lower portion of his right abdomen. Pt has been on oral antibiotics since last Wednesday.    LOC: The patient is awake, alert, and oriented to self, place, time, and situation. Pt is calm and cooperative. Affect is appropriate.  Speech is appropriate and clear.     APPEARANCE: Patient resting comfortably in no acute distress.  Patient is clean and well groomed.    SKIN: The skin is warm and dry; color consistent with ethnicity.  Patient has normal skin turgor and moist mucus membranes.  Skin on the right leg is red, swollen, warm to touch.    MUSCULOSKELETAL: Patient moving upper and lower extremities without difficulty; denies pain in the extremities or back.  Denies weakness.     RESPIRATORY: Airway is open and patent. Respirations spontaneous, even, easy, and non-labored.  Patient has a normal effort and rate.  No accessory muscle use noted. Denies cough.     CARDIAC:  Normal rate noted.  No peripheral edema noted. No complaints of chest pain.     ABDOMEN: Soft and non tender to palpation.  No distention noted. Pt denies nausea, vomiting, diarrhea, or constipation. Pt has pain in the right abdomen due to spreading cellulitis.    NEUROLOGIC: Eyes open spontaneously.  Behavior appropriate to situation.  Follows commands; facial expression symmetrical.  Purposeful motor response noted; normal sensation in all extremities. Pt denies headache; denies lightheadedness or dizziness; denies visual disturbances; denies loss of balance; denies unilateral weakness.

## 2024-02-27 NOTE — HPI
Bradley Collins is a 63 year old Black man with asthma, allergic rhinitis, hyperlipidemia, irritable bowel syndrome with diarrhea, history of prostate cancer treated with radiation, history of sinus surgery in 2001. He lives in [facility]. He works for [employer]. [facility]    He was hospitalized at the [facility] on 2/12/2024 for right lower extremity cellulitis. He had a CT with contrast done and was told that it did not show anything. He was prescribed cephalexin.   He was hospitalized at [facility] from 2/16/2024 to 2/20/2024 for worsening cellulitis. Creatinine was elevated at 2.0 mg/dL (from 1.1 a year ago). He was initially put on piperacillin-tazobactam and vancomycin. Piperacillin-tazobactam was changed to ceftriaxone. Erythema improved but edema persisted. He had induration of the thigh. CT showed diffuse cutaneous thickening and edema with prominent right inguinal lymph nodes. Antibiotics were switched to oral amoxicillin-clavulanate and doxycycline on 2/19/2024. Edema improved. Induration persisted. He was discharged home.   He followed up at [facility] clinic on 2/27/2024. He had 4 days left of the antibiotics. Swelling had worsened and spread up to his abdomen. He had edema and erythema of the entire right lower extremity, right groin, and right lower abdomen. It was tender and warm. He was advised to return to the emergency department to resume intravenous antibiotic treatment. Labs showed creatinine to still be elevated (1.7 mg/dL, from 1.8 on 2/19/2024). Non-contrast CT showed soft tissue induration throughout the right anterior pelvis, thigh,and leg, skin thickening and edema of the subcutaneous tissue, superficial fascia, and deep fascia, scrotal edema, and retroperitoneal/extraperitoneal edema along the sidewalls. He was given piperacillin-tazobactam  and vancomycin. He was admitted to Hospital Medicine Team [facility]

## 2024-02-27 NOTE — PATIENT INSTRUCTIONS
Cellulitis has worsened and this will require ER eval    Report called across the street to Jolie Charge nurse

## 2024-02-28 LAB
ANION GAP SERPL CALC-SCNC: 7 MMOL/L (ref 8–16)
BUN SERPL-MCNC: 15 MG/DL (ref 8–23)
CALCIUM SERPL-MCNC: 9.3 MG/DL (ref 8.7–10.5)
CHLORIDE SERPL-SCNC: 100 MMOL/L (ref 95–110)
CO2 SERPL-SCNC: 25 MMOL/L (ref 23–29)
CREAT SERPL-MCNC: 1.8 MG/DL (ref 0.5–1.4)
CRP SERPL-MCNC: 61 MG/L (ref 0–8.2)
EST. GFR  (NO RACE VARIABLE): 41.5 ML/MIN/1.73 M^2
GLUCOSE SERPL-MCNC: 94 MG/DL (ref 70–110)
POTASSIUM SERPL-SCNC: 4.3 MMOL/L (ref 3.5–5.1)
SODIUM SERPL-SCNC: 132 MMOL/L (ref 136–145)

## 2024-02-28 PROCEDURE — 25000003 PHARM REV CODE 250

## 2024-02-28 PROCEDURE — 63600175 PHARM REV CODE 636 W HCPCS: Performed by: STUDENT IN AN ORGANIZED HEALTH CARE EDUCATION/TRAINING PROGRAM

## 2024-02-28 PROCEDURE — 11000001 HC ACUTE MED/SURG PRIVATE ROOM

## 2024-02-28 PROCEDURE — 86140 C-REACTIVE PROTEIN: CPT | Performed by: STUDENT IN AN ORGANIZED HEALTH CARE EDUCATION/TRAINING PROGRAM

## 2024-02-28 PROCEDURE — 25000003 PHARM REV CODE 250: Performed by: STUDENT IN AN ORGANIZED HEALTH CARE EDUCATION/TRAINING PROGRAM

## 2024-02-28 PROCEDURE — 63600175 PHARM REV CODE 636 W HCPCS

## 2024-02-28 PROCEDURE — 25000003 PHARM REV CODE 250: Performed by: HOSPITALIST

## 2024-02-28 PROCEDURE — 25000242 PHARM REV CODE 250 ALT 637 W/ HCPCS: Performed by: HOSPITALIST

## 2024-02-28 PROCEDURE — 87081 CULTURE SCREEN ONLY: CPT | Performed by: HOSPITALIST

## 2024-02-28 PROCEDURE — 99223 1ST HOSP IP/OBS HIGH 75: CPT | Mod: ,,, | Performed by: STUDENT IN AN ORGANIZED HEALTH CARE EDUCATION/TRAINING PROGRAM

## 2024-02-28 PROCEDURE — 94640 AIRWAY INHALATION TREATMENT: CPT

## 2024-02-28 PROCEDURE — 80048 BASIC METABOLIC PNL TOTAL CA: CPT | Performed by: HOSPITALIST

## 2024-02-28 PROCEDURE — 99222 1ST HOSP IP/OBS MODERATE 55: CPT | Mod: ,,, | Performed by: UROLOGY

## 2024-02-28 RX ORDER — LINEZOLID 600 MG/1
600 TABLET, FILM COATED ORAL EVERY 12 HOURS
Status: DISCONTINUED | OUTPATIENT
Start: 2024-02-28 | End: 2024-03-03

## 2024-02-28 RX ADMIN — DAPTOMYCIN 670 MG: 350 INJECTION, POWDER, LYOPHILIZED, FOR SOLUTION INTRAVENOUS at 06:02

## 2024-02-28 RX ADMIN — PIPERACILLIN SODIUM AND TAZOBACTAM SODIUM 4.5 G: 4; .5 INJECTION, POWDER, FOR SOLUTION INTRAVENOUS at 06:02

## 2024-02-28 RX ADMIN — TIOTROPIUM BROMIDE INHALATION SPRAY 2 PUFF: 3.12 SPRAY, METERED RESPIRATORY (INHALATION) at 10:02

## 2024-02-28 RX ADMIN — HYDROCODONE BITARTRATE AND ACETAMINOPHEN 1 TABLET: 5; 325 TABLET ORAL at 12:02

## 2024-02-28 RX ADMIN — PANTOPRAZOLE SODIUM 40 MG: 40 TABLET, DELAYED RELEASE ORAL at 08:02

## 2024-02-28 RX ADMIN — FLUTICASONE FUROATE AND VILANTEROL TRIFENATATE 1 PUFF: 100; 25 POWDER RESPIRATORY (INHALATION) at 09:02

## 2024-02-28 RX ADMIN — CEFTRIAXONE 2 G: 2 INJECTION, POWDER, FOR SOLUTION INTRAMUSCULAR; INTRAVENOUS at 05:02

## 2024-02-28 RX ADMIN — LINEZOLID 600 MG: 600 TABLET, FILM COATED ORAL at 09:02

## 2024-02-28 RX ADMIN — HYDROCODONE BITARTRATE AND ACETAMINOPHEN 1 TABLET: 5; 325 TABLET ORAL at 07:02

## 2024-02-28 NOTE — PROGRESS NOTES
"Pharmacokinetic Initial Assessment: IV Vancomycin    Assessment/Plan:    Initiate intravenous vancomycin with loading dose of 1750 mg once, done in ED, followed by a maintenance dose of vancomycin 1250 mg IV every 24 hours.  Desired empiric serum trough concentration is 10 to 20 mcg/mL.  Draw vancomycin trough level 60 min prior to third dose on 02/29/2024 at 1730.  Pharmacy will continue to follow and monitor vancomycin.      Please contact pharmacy at extension [phone] with any questions regarding this assessment.     Thank you for the consult,   Tori Osuna       Patient brief summary:  Bradley Collins is a 63 y.o. male initiated on antimicrobial therapy with IV Vancomycin for treatment of suspected skin & soft tissue infection.    Drug Allergies:   Review of patient's allergies indicates:   Allergen Reactions    Aspirin Shortness Of Breath       Actual Body Weight:   83.5 kg    Renal Function:   Estimated Creatinine Clearance: 45.4 mL/min (A) (based on SCr of 1.7 mg/dL (H)).    CBC (last 72 hours):  Recent Labs   Lab Result Units 02/27/24  1359   WBC K/uL 10.47   Hemoglobin g/dL 10.7*   Hematocrit % 31.7*   Platelets K/uL 405   Gran % % 51.9   Lymph % % 10.7*   Mono % % 10.1   Eosinophil % % 25.0*   Basophil % % 1.9   Differential Method  Automated       Metabolic Panel (last 72 hours):  Recent Labs   Lab Result Units 02/27/24  1359   Sodium mmol/L 133*   Potassium mmol/L 4.5   Chloride mmol/L 97   CO2 mmol/L 23   Glucose mg/dL 76   BUN mg/dL 16   Creatinine mg/dL 1.7*   Albumin g/dL 3.4*   Total Bilirubin mg/dL 0.5   Alkaline Phosphatase U/L 86   AST U/L 26   ALT U/L 16       Drug levels (last 3 results):  No results for input(s): "VANCOMYCINRA", "VANCORANDOM", "VANCOMYCINPE", "VANCOPEAK", "VANCOMYCINTR", "VANCOTROUGH" in the last 72 hours.    Microbiologic Results:  Microbiology Results (last 7 days)       ** No results found for the last 168 hours. **            "

## 2024-02-28 NOTE — ED NOTES
Assumed care of this patient.     Pt AAOx4, resting comfortably in bed in the hallway, NAD, respirations E/UL, updated on POC, wheels locked and in low position, Comfort positioning and restroom needs were addressed. Necessary items were placed with in reach and was advised when a reassessment would take place.

## 2024-02-28 NOTE — ASSESSMENT & PLAN NOTE
I have reviewed hospital notes from  HM service and other specialty providers. I have also reviewed CBC, CMP/BMP,  cultures and imaging with my interpretation as documented.      63M with asthma, allergic rhinitis (on Dupixent, bi-weekly injection), IBS, h/o prostate cancer (prior tx w/ radiation 2020), and recent h/o persistent RLE cellulitis for the past month; which seems to have begun 1-2d following routine dupixent inj, administered to L thigh. Pt has had multiple recent hospital admissions, but RLE cellulitis failing to resolve with abx tx outpt.     [place] [facility] [place] RLE cellulitis with increased redness, swelling, induration of entire L foot/leg/ thigh, with spread to groin/perineum and lower pelvis/ abdomen. Arrived 02/27, AF, VSS, HDS, wbc nml, lactic acid 0.7, Cr 1.8 (ongoing DALIA? Vs. New CKD). Non-contrast Ct-Right lower leg / /thigh and pelvis: showed severe soft tissue induration throughout R leg / pelvis; with edema / skin thickening to deep fascia, with scrotal edema, and retroperitoneal/extraperitoneal edema along the sidewalls. No abscess or tissue gas seen. ID consulted for abx recs of progressive RLE cellulitis on oral abx outpt.     Recommendations / Plan:  Discontinue empiric Iv-vanc and zosyn. To start Daptomycin (-cidal), linezolid (-static; but added for inhibition of bacterial toxin production), and ceftriaxone.   Recommend urology consult to eval penile / scrotal swelling with associated obstructive urinary sxs.   Overall, pattern / nature of pt illness, c/f possible concurrent presence of venous/lymphatic obstruction; preventing from draining of diffuse leg edema, possibly contributing to spread or non-resolving nature of illness. Consider gen surg or other surgical specialty to further eval for recs regarding diffuse RLE swelling. If fails to improve or worsens on abx, consider derm consult for further opinion.     -- Discussed with ID staff and primary team   -- ID  will continue to follow w/ further recs.

## 2024-02-28 NOTE — ED NOTES
Pt requesting to speak with a physician about POC, messaged team and POC is to consult infectious Dx to treat pt's cellulitis.  Pt made aware of physician rounding times. Care ongoing.

## 2024-02-28 NOTE — HOSPITAL COURSE
He was put on vancomycin. Infectious Disease was consulted for recommendations changed vancomycin to ceftriaxone, daptomycin, and linezolid. General Surgery was consulted to evaluate and recommended no surgical intervention to help reduce swelling. Urology was consulted to evaluate his scrotal swelling and found no issues they had to manage. His right lower extremity, right pelvis, penile, and scrotal swelling decreased after a dose of torsemide on 3/1/2024. Given his history and physical examination, the diagnosis of lymphedema was made. CT showed right iliac vein filling defect likely due to thrombus. Nephrology was consulted to evaluate his persistent but stable acute kidney injury. Amlodipine was started for elevated blood pressures. He never had hypertension before and it was likely secondary to his recent persistent acute kidney injury. Rheumatology was also consulted. Urology recommended inguinal lymph node biopsy by Interventional Radiology. Interventional Radiology suspected his inguinal lymphadenopathy to be reactive and recommended treating the cellulitis and getting a biopsy in a month if the lymph nodes are still enlarged, but said that he the right iliac vein occlusion could be contributing to the swelling. Hematology-Oncology recommended consulting General Surgery to consider inguinal lymph node biopsy, but General Surgery also felt that his lymphadenopathy was reactive and recommended no biopsy at this time. Interventional Radiology performed pelvic venography on 3/8/2024, finding severe compression of both external and to lesser degree common iliac veins. Recanalization of iliac veins was done with placement of kissing Venovo stents in the external iliac to inferior vena cava. Interventional Radiology recommended clopidrogel daily for 2 months and apixaban twice daily. They recommended he follow up in their clinic in 2 to 4 weeks and get CT scans in 6 months and 12 months. He noticed improvement of  swelling after the procedure. He was prescribed clopidrogel for 2 months, apixaban, and amlodipine. He will follow up with his primary care physician to assess continued need for hypertension medication and follow up in Interventional Radiology clinic. His renal function remained stable during this hospitalization and can be followed up outpatient.

## 2024-02-28 NOTE — ASSESSMENT & PLAN NOTE
Persistent. 3rd hospital admission. Improved on vancomycin and ceftriaxone during last hospitalization. Prescribed amoxicillin-clavulanate and doxycycline and it worsened. Giving vancomycin. Appreciate Infectious Disease for recommendations. Concern about lymphadenopathy contributing to persistent swelling.

## 2024-02-28 NOTE — SUBJECTIVE & OBJECTIVE
"Chief Complaint   Patient presents with     RECHECK     FOLLOW UP FOR sle       Initial /67  Pulse 68  Ht 1.676 m (5' 6\")  Wt 69.4 kg (153 lb 1.6 oz)  SpO2 98%  BMI 24.71 kg/m2 Estimated body mass index is 24.71 kg/(m^2) as calculated from the following:    Height as of this encounter: 1.676 m (5' 6\").    Weight as of this encounter: 69.4 kg (153 lb 1.6 oz).  Medication Reconciliation: complete   Shalonda Ramos cma    " Past Medical History:   Diagnosis Date    Allergy     Asthma     Cellulitis     Elevated PSA     Prostate cancer        Past Surgical History:   Procedure Laterality Date    ENDOSCOPIC ULTRASOUND OF UPPER GASTROINTESTINAL TRACT N/A 7/25/2023    Procedure: ULTRASOUND, UPPER GI TRACT, ENDOSCOPIC;  Surgeon: Yoseph Li MD;  [facility] ([address] [place] [address]);  Service: Endoscopy;  Laterality: N/A;  instr [facility] referral    SINUS SURGERY Bilateral 2001       Review of patient's allergies indicates:   Allergen Reactions    Aspirin Shortness Of Breath       Family History    Family history is unknown by patient.         Tobacco Use    Smoking status: Never    Smokeless tobacco: Never   Substance and Sexual Activity    Alcohol use: Yes     Alcohol/week: 1.0 standard drink of alcohol     Types: 1 Cans of beer per week     Comment: week    Drug use: Never    Sexual activity: Not on file       Review of Systems   Constitutional:  Positive for chills. Negative for fever.   Genitourinary:  Positive for penile swelling and scrotal swelling. Negative for difficulty urinating, dysuria, flank pain and hematuria.       Objective:     Temp:  [97.9 °F (36.6 °C)-98.3 °F (36.8 °C)] 98.2 °F (36.8 °C)  Pulse:  [75-99] 85  Resp:  [16-18] 18  SpO2:  [95 %-99 %] 97 %  BP: (130-158)/(59-88) 137/85  Weight: 83.5 kg (184 lb)  Body mass index is 28.82 kg/m².           Drains       None                    Physical Exam  Constitutional:       Appearance: Normal appearance.   Cardiovascular:      Rate and Rhythm: Normal rate.   Pulmonary:      Effort: Pulmonary effort is normal.   Genitourinary:     Comments: Severe penile and scrotal swelling, unable to visualize meatus.  Uncircumcised.  Unable to palpable testicles within scrotum 2/2 swelling.  Warmth and induration of suprapubic region but no crepitus or overlying skin changes.  No necrosis.     Musculoskeletal:      Comments: Warmth induration and swelling extending from R gluteal region to  ankle.    Neurological:      Mental Status: He is alert.          Significant Labs:    BMP:  Recent Labs   Lab 02/27/24  1359 02/28/24  0409   * 132*   K 4.5 4.3   CL 97 100   CO2 23 25   BUN 16 15   CREATININE 1.7* 1.8*   CALCIUM 9.7 9.3       CBC:  Recent Labs   Lab 02/27/24  1359   WBC 10.47   HGB 10.7*   HCT 31.7*          All pertinent labs results from the past 24 hours have been reviewed.    Significant Imaging:  All pertinent imaging results/findings from the past 24 hours have been reviewed.

## 2024-02-28 NOTE — SUBJECTIVE & OBJECTIVE
Past Medical History:   Diagnosis Date    Allergy     Asthma     Cellulitis     Elevated PSA     Prostate cancer        Past Surgical History:   Procedure Laterality Date    ENDOSCOPIC ULTRASOUND OF UPPER GASTROINTESTINAL TRACT N/A 7/25/2023    Procedure: ULTRASOUND, UPPER GI TRACT, ENDOSCOPIC;  Surgeon: Yoseph Li MD;  Location: [facility] ([address] [place] [address]);  Service: Endoscopy;  Laterality: N/A;  [facility] referral    SINUS SURGERY Bilateral 2001       Review of patient's allergies indicates:   Allergen Reactions    Aspirin Shortness Of Breath       Medications:  (Not in a hospital admission)    Antibiotics (From admission, onward)      Start     Stop Route Frequency Ordered    02/28/24 2100  linezolid tablet 600 mg         -- Oral Every 12 hours 02/28/24 1446    02/28/24 1600  cefTRIAXone (ROCEPHIN) 2 g in dextrose 5 % in water (D5W) 100 mL IVPB (MB+)         -- IV Every 24 hours (non-standard times) 02/28/24 1446    02/28/24 1545  DAPTOmycin (CUBICIN) 670 mg in sodium chloride 0.9% SolP 50 mL IVPB         -- IV Every 24 hours (non-standard times) 02/28/24 1446          Antifungals (From admission, onward)      None          Antivirals (From admission, onward)      None               There is no immunization history on file for this patient.    Family History    Family history is unknown by patient.       Social History     Socioeconomic History    Marital status: Single   Tobacco Use    Smoking status: Never    Smokeless tobacco: Never   Substance and Sexual Activity    Alcohol use: Yes     Alcohol/week: 1.0 standard drink of alcohol     Types: 1 Cans of beer per week     Comment: week    Drug use: Never     Review of Systems   Constitutional:  Positive for chills and fever. Negative for fatigue.   HENT:  Negative for facial swelling and sore throat.    Respiratory:  Negative for cough and shortness of breath.    Cardiovascular:  Positive for leg swelling.   Gastrointestinal:  Positive for abdominal pain.  Negative for nausea and vomiting.   Genitourinary:  Positive for difficulty urinating (weak stream 2/2 obstruction from diffuse swelling), penile swelling and scrotal swelling.   Musculoskeletal:  Positive for myalgias.   Skin:  Positive for rash. Negative for wound.   Neurological:  Negative for seizures and syncope.   Psychiatric/Behavioral:  Negative for behavioral problems, confusion and self-injury.      Objective:     Vital Signs (Most Recent):  Temp: 98.2 °F (36.8 °C) (02/28/24 1224)  Pulse: 85 (02/28/24 1224)  Resp: 18 (02/28/24 1224)  BP: 137/85 (02/28/24 1224)  SpO2: 97 % (02/28/24 1224) Vital Signs (24h Range):  Temp:  [97.9 °F (36.6 °C)-98.3 °F (36.8 °C)] 98.2 °F (36.8 °C)  Pulse:  [75-99] 85  Resp:  [16-18] 18  SpO2:  [95 %-99 %] 97 %  BP: (130-158)/(59-88) 137/85     Weight: 83.5 kg (184 lb)  Body mass index is 28.82 kg/m².    Estimated Creatinine Clearance: 42.9 mL/min (A) (based on SCr of 1.8 mg/dL (H)).     Physical Exam  Vitals and nursing note reviewed.   Constitutional:       General: He is not in acute distress.     Appearance: Normal appearance. He is not ill-appearing, toxic-appearing or diaphoretic.   HENT:      Head: Normocephalic and atraumatic.      Mouth/Throat:      Mouth: Mucous membranes are moist.      Pharynx: Oropharynx is clear.   Eyes:      General: No scleral icterus.     Conjunctiva/sclera: Conjunctivae normal.   Cardiovascular:      Rate and Rhythm: Normal rate.      Heart sounds: Normal heart sounds.   Pulmonary:      Effort: Pulmonary effort is normal.      Breath sounds: Normal breath sounds.   Abdominal:      General: Bowel sounds are normal. There is distension.      Palpations: Abdomen is soft.      Tenderness: There is abdominal tenderness. There is no right CVA tenderness or left CVA tenderness.   Genitourinary:     Comments: Pelvic swelling globally, penile / scrotal swelling diffuse.   Musculoskeletal:         General: Swelling and tenderness present. Normal range of  motion.      Cervical back: Normal range of motion. No rigidity or tenderness.      Right lower leg: Edema present.      Left lower leg: No edema.   Lymphadenopathy:      Cervical: No cervical adenopathy.   Skin:     General: Skin is warm and dry.      Findings: Erythema and rash present.   Neurological:      Mental Status: He is alert and oriented to person, place, and time. Mental status is at baseline.   Psychiatric:         Behavior: Behavior normal.         Thought Content: Thought content normal.                  Significant Labs: Blood Culture:   Recent Labs   Lab 02/16/24  1843 02/16/24  1919   LABBLOO No growth after 5 days. No growth after 5 days.     CBC:   Recent Labs   Lab 02/27/24  1359   WBC 10.47   HGB 10.7*   HCT 31.7*        CMP:   Recent Labs   Lab 02/27/24  1359 02/28/24  0409   * 132*   K 4.5 4.3   CL 97 100   CO2 23 25   GLU 76 94   BUN 16 15   CREATININE 1.7* 1.8*   CALCIUM 9.7 9.3   PROT 7.8  --    ALBUMIN 3.4*  --    BILITOT 0.5  --    ALKPHOS 86  --    AST 26  --    ALT 16  --    ANIONGAP 13 7*     Microbiology Results (last 7 days)       Procedure Component Value Units Date/Time    MRSA Screen by PCR [[id number]] Collected: 02/28/24 0850    Order Status: Sent Specimen: Nasopharyngeal Swab from Nasal Updated: 02/28/24 0900          Pathology Results  (Last 10 years)      None          All pertinent labs within the past 24 hours have been reviewed.    Significant Imaging: I have reviewed all pertinent imaging results/findings within the past 24 hours.    I have personally reviewed records / hospital notes from   service and other specialty providers. I have also reviewed CBC, CMP/BMP,  cultures and imaging with my interpretation as documented in my assessment / plan.    Patient is high risk for infectious complications given pt's age, multiple co-morbidities, and case complexity.      Time: 75 minutes   50% of time spent on face-to-face counseling and coordination of care.  Counseling included review of test results, diagnosis, and treatment plan with patient and/or family.

## 2024-02-28 NOTE — PROGRESS NOTES
[facility] - Emergency Dept  [facility] Medicine  Progress Note    Patient Name: Bradley Collins  MRN: [id number]  Patient Class: IP- Inpatient   Admission Date: 2/27/2024  Length of Stay: 1 days  Attending Physician: Sagar Cox MD  Primary Care Provider: AdministrationReed        Subjective:     Principal Problem:Cellulitis of right lower limb        HPI:  Bradley Collins is a 63 year old Black man with asthma, allergic rhinitis, hyperlipidemia, irritable bowel syndrome with diarrhea, history of prostate cancer treated with radiation, history of sinus surgery in 2001. He lives in [place]. He works for [employer] His primary care clinic is the [facility] clinic.    He was hospitalized at the [facility] on 2/12/2024 for right lower extremity cellulitis. He had a CT with contrast done and was told that it did not show anything. He was prescribed cephalexin.   He was hospitalized at [facility] from 2/16/2024 to 2/20/2024 for worsening cellulitis. Creatinine was elevated at 2.0 mg/dL (from 1.1 a year ago). He was initially put on piperacillin-tazobactam and vancomycin. Piperacillin-tazobactam was changed to ceftriaxone. Erythema improved but edema persisted. He had induration of the thigh. CT showed diffuse cutaneous thickening and edema with prominent right inguinal lymph nodes. Antibiotics were switched to oral amoxicillin-clavulanate and doxycycline on 2/19/2024. Edema improved. Induration persisted. He was discharged home.   He followed up at [facility] clinic on 2/27/2024. He had 4 days left of the antibiotics. Swelling had worsened and spread up to his abdomen. He had edema and erythema of the entire right lower extremity, right groin, and right lower abdomen. It was tender and warm. He was advised to return to the emergency department to resume intravenous antibiotic treatment. Labs showed creatinine  to still be elevated (1.7 mg/dL, from 1.8 on 2/19/2024). Non-contrast CT showed soft tissue induration throughout the right anterior pelvis, thigh,and leg, skin thickening and edema of the subcutaneous tissue, superficial fascia, and deep fascia, scrotal edema, and retroperitoneal/extraperitoneal edema along the sidewalls. He was given piperacillin-tazobactam and vancomycin. He was admitted to Hospital Medicine Team W.     Overview/Hospital Course:  He was put on vancomycin. Infectious Disease was consulted for recommendations.    Interval History: He reports that ID saw him and mentioned consulting Urology. I will hear from ID about it directly.    Review of Systems   Constitutional:  Negative for chills and fever.   Cardiovascular:  Positive for leg swelling.   Skin:  Positive for color change. Negative for wound.   Neurological:  Negative for seizures and syncope.     Objective:     Vital Signs (Most Recent):  Temp: 98.2 °F (36.8 °C) (02/28/24 1224)  Pulse: 85 (02/28/24 1224)  Resp: 18 (02/28/24 1224)  BP: 137/85 (02/28/24 1224)  SpO2: 97 % (02/28/24 1224) Vital Signs (24h Range):  Temp:  [97.9 °F (36.6 °C)-98.3 °F (36.8 °C)] 98.2 °F (36.8 °C)  Pulse:  [75-99] 85  Resp:  [16-18] 18  SpO2:  [95 %-99 %] 97 %  BP: (130-158)/(59-88) 137/85     Weight: 83.5 kg (184 lb)  Body mass index is 28.82 kg/m².    Intake/Output Summary (Last 24 hours) at 2/28/2024 1428  Last data filed at 2/27/2024 2020  Gross per 24 hour   Intake 537.02 ml   Output --   Net 537.02 ml         Physical Exam  Vitals and nursing note reviewed.   Constitutional:       General: He is not in acute distress.     Appearance: He is well-developed. He is not toxic-appearing or diaphoretic.   Pulmonary:      Effort: Pulmonary effort is normal. No respiratory distress.   Genitourinary:     Penis: Swelling present. No discharge.       Testes:         Right: Swelling present.         Left: Swelling present.   Musculoskeletal:         General: No deformity or  signs of injury.      Right lower leg: Edema present.      Left lower leg: No edema.   Skin:     Findings: Erythema present.   Neurological:      Mental Status: He is alert and oriented to person, place, and time. Mental status is at baseline.      Motor: No seizure activity.   Psychiatric:         Attention and Perception: Attention normal.         Mood and Affect: Mood and affect normal.         Behavior: Behavior is cooperative.             Significant Labs: All pertinent labs within the past 24 hours have been reviewed.    Significant Imaging: I have reviewed all pertinent imaging results/findings within the past 24 hours.    Assessment/Plan:      * Cellulitis of right lower limb  Persistent. 3rd hospital admission. Improved on vancomycin and ceftriaxone during last hospitalization. Prescribed amoxicillin-clavulanate and doxycycline and it worsened. Giving vancomycin. Appreciate Infectious Disease for recommendations. Concern about lymphadenopathy contributing to persistent swelling.    Moderate persistent asthma, uncomplicated  Give fluticasone-vilanterol in place of home mometasone-formoterol. Albuterol prn.       Elevated serum creatinine  Persistent since recent hospitalization. Monitoring.      VTE Risk Mitigation (From admission, onward)           Ordered     enoxaparin injection 40 mg  Daily         02/27/24 6522                    Discharge Planning   CONSTANTINO:      Code Status: Full Code   Is the patient medically ready for discharge?: No    Reason for patient still in hospital (select all that apply): Patient trending condition, Treatment, and Consult recommendations                     Sagar Cox MD  Department of Hospital Medicine   [facility] - Emergency Dept

## 2024-02-28 NOTE — HPI
63M with asthma, allergic rhinitis (on Dupixent, bi-weekly injection), IBS, h/o prostate cancer (prior tx w/ radiation 2020), and recent h/o persistent RLE cellulitis requiring multiple recent hospital admissions, failing to resolve with abx tx.     [facility]; which apparently began 1-2 days following routine bi-weekly injection of Dupixent to Left thigh. Ct-leg unremarkable for deeper infection then. Given cephalexin, but went to [facility] (02/16) for worsening RLE cellulitis, noted to have significant DALIA (Cr 2.0; baseline 1.1?). Given zosyn, de-escalated to CTX inpatient. Erythema improved but edema persisted. He had induration of the thigh. CT showed diffuse cutaneous thickening and edema with prominent right inguinal lymph nodes. IV-CTX switched to oral augmentin and doxycycline on 2/19/2024. Redness and Edema improved; but induration persisted. He was discharged home on doxy / augmentin for 14d, HERNANDO 03/01.     Pt seen for f/u at [facility] 02/27; worsening redness/ swelling with spread up to his abdomen. He had edema and erythema of the entire right lower extremity, right groin, and right lower abdomen. It was tender and warm.  Thus, pt re-admitted to [facility] for worsening RLE cellulitis and ongoing DALIA (Cr 1.8).     Pt arrived AF, VSS, HDS, wbc nml, started on empiric Iv-zosyn / vanc.    Non-contrast CT showed soft tissue induration throughout the right anterior pelvis, thigh,and leg, skin thickening and edema of the subcutaneous tissue, superficial fascia, and deep fascia, scrotal edema, and retroperitoneal/extraperitoneal edema along the sidewalls.  ID consulted for abx recs of progressive RLE cellulitis on oral abx.

## 2024-02-28 NOTE — ASSESSMENT & PLAN NOTE
63M with worsening cellulitis refractory to multiple courses of oral antibiotics.      - no indication for urologic surgical intervention   - ID consulted, see recs   - agree with broad spectrum antibiotics, currently on dapto/linezolid/rocephin   - No evidence of urinary obstruction, PVR bladder scan 20 mL   - No indication for indwelling lee catheter   - please reach out to urology if worsening of  exam or if any concerns for development of Fanny's gangrene   - rest of care per primary

## 2024-02-28 NOTE — ED NOTES
LOC: The patient is awake, alert and aware of environment with an appropriate affect, the patient is oriented x 3 and speaking appropriately. The patient is ambulatory.  APPEARANCE: Patient appears uncomfortable but is in no acute distress, patient is clean and well groomed.  SKIN: The skin is warm and dry, the skin to the right lower extremity appears red, shiny and taut. Redness noted. Swelling noted.  MUSKULOSKELETAL: Patient moving all extremities well.  RESPIRATORY: Airway is open and patent, respirations are spontaneous, patient has a normal effort and rate, no accessory muscle use noted.   CARDIAC: Patient has a normal rate   NEUROLOGIC: Eyes open spontaneously, behavior appropriate to situation, follows commands

## 2024-02-28 NOTE — SUBJECTIVE & OBJECTIVE
Interval History: He reports that ID saw him and mentioned consulting Urology. I will hear from ID about it directly.    Review of Systems   Constitutional:  Negative for chills and fever.   Cardiovascular:  Positive for leg swelling.   Skin:  Positive for color change. Negative for wound.   Neurological:  Negative for seizures and syncope.     Objective:     Vital Signs (Most Recent):  Temp: 98.2 °F (36.8 °C) (02/28/24 1224)  Pulse: 85 (02/28/24 1224)  Resp: 18 (02/28/24 1224)  BP: 137/85 (02/28/24 1224)  SpO2: 97 % (02/28/24 1224) Vital Signs (24h Range):  Temp:  [97.9 °F (36.6 °C)-98.3 °F (36.8 °C)] 98.2 °F (36.8 °C)  Pulse:  [75-99] 85  Resp:  [16-18] 18  SpO2:  [95 %-99 %] 97 %  BP: (130-158)/(59-88) 137/85     Weight: 83.5 kg (184 lb)  Body mass index is 28.82 kg/m².    Intake/Output Summary (Last 24 hours) at 2/28/2024 1428  Last data filed at 2/27/2024 2020  Gross per 24 hour   Intake 537.02 ml   Output --   Net 537.02 ml         Physical Exam  Vitals and nursing note reviewed.   Constitutional:       General: He is not in acute distress.     Appearance: He is well-developed. He is not toxic-appearing or diaphoretic.   Pulmonary:      Effort: Pulmonary effort is normal. No respiratory distress.   Genitourinary:     Penis: Swelling present. No discharge.       Testes:         Right: Swelling present.         Left: Swelling present.   Musculoskeletal:         General: No deformity or signs of injury.      Right lower leg: Edema present.      Left lower leg: No edema.   Skin:     Findings: Erythema present.   Neurological:      Mental Status: He is alert and oriented to person, place, and time. Mental status is at baseline.      Motor: No seizure activity.   Psychiatric:         Attention and Perception: Attention normal.         Mood and Affect: Mood and affect normal.         Behavior: Behavior is cooperative.             Significant Labs: All pertinent labs within the past 24 hours have been  reviewed.    Significant Imaging: I have reviewed all pertinent imaging results/findings within the past 24 hours.

## 2024-02-28 NOTE — HPI
63M with PMH of asthma, [place] 4+4 prostate cancer treated with XRT and ADT in 2020, presents with progressively worsening soft tissue swelling.  Urology re consulted for hydronephrosis, previously seen for hydronephrosis.    Patient reports edema and swelling that began in his R leg one month ago and has slowly progressed to the right gluteal region and now involves the penis/scrotum.  [facility] and was discharged on oral antibiotics but swelling has continued to progress.  He denies fevers but does report subjective chills.  He denies difficulty urinating and has voided multiple times today.  He denies hematuria.  Bladder scan PVR last week was 20 ml.     On assessment, he is AFVSS. Cr 1.8 from 1.5, which has been stable for two months. Previously normal baseline.  UA 2/27 negative for tested components.    CT 3/5/24: bilateral hydronephrosis to level of partially distended bladder, thickened bladder wall. Difficult to assess distal right ureter. Bilateral inguinal and retroperitoneal nodes. Severe edema of scrotum and penis consistent with physical exam.    Previous CT pelvis and thigh shows extensive soft tissue edema and induration through the R leg and groin.  No air and no organized fluid collection.

## 2024-02-28 NOTE — CONSULTS
[facility] - Emergency Dept  Infectious Disease  Consult Note    Patient Name: Bradley Collins  MRN: [id number]  Admission Date: 2/27/2024  Hospital Length of Stay: 1 days  Attending Physician: Sagar Cox MD  Primary Care Provider: Reed Mcmullen     Isolation Status: No active isolations    Patient information was obtained from patient and ER records.      Inpatient consult to Infectious Diseases  Consult performed by: Darryl Sauer PA-C  Consult ordered by: Sagar Cox MD        Assessment/Plan:     ID  * Cellulitis of right lower limb  I have reviewed hospital notes from  HM service and other specialty providers. I have also reviewed CBC, CMP/BMP,  cultures and imaging with my interpretation as documented.      63M with asthma, allergic rhinitis (on Dupixent, bi-weekly injection), IBS, h/o prostate cancer (prior tx w/ radiation 2020), and recent h/o persistent RLE cellulitis for the past month; which seems to have begun 1-2d following routine dupixent inj, administered to L thigh. Pt has had multiple recent hospital admissions, but RLE cellulitis failing to resolve with abx tx outpt.     Pt now presents to C for worsening RLE cellulitis with increased redness, swelling, induration of entire L foot/leg/ thigh, with spread to groin/perineum and lower pelvis/ abdomen. Arrived 02/27, AF, VSS, HDS, wbc nml, lactic acid 0.7, Cr 1.8 (ongoing DALIA? Vs. New CKD). Non-contrast Ct-Right lower leg / /thigh and pelvis: showed severe soft tissue induration throughout R leg / pelvis; with edema / skin thickening to deep fascia, with scrotal edema, and retroperitoneal/extraperitoneal edema along the sidewalls. No abscess or tissue gas seen. ID consulted for abx recs of progressive RLE cellulitis on oral abx outpt.     Recommendations / Plan:  Discontinue empiric Iv-vanc and zosyn. To start Daptomycin (-cidal), linezolid (-static; but added for inhibition of bacterial toxin production), and ceftriaxone.    Recommend urology consult to eval penile / scrotal swelling with associated obstructive urinary sxs.   Overall, pattern / nature of pt illness, c/f possible concurrent presence of venous/lymphatic obstruction; preventing from draining of diffuse leg edema, possibly contributing to spread or non-resolving nature of illness. Consider gen surg or other surgical specialty to further eval for recs regarding diffuse RLE swelling. If fails to improve or worsens on abx, consider derm consult for further opinion.     -- Discussed with ID staff and primary team   -- ID will continue to follow w/ further recs.            Thank you for your consult. I will follow-up with patient. Please contact us if you have any additional questions.    Darryl Sauer PA-C  Infectious Disease  [facility] - Emergency Dept    Subjective:     Principal Problem: Cellulitis of right lower limb    HPI: 63M with asthma, allergic rhinitis (on Dupixent, bi-weekly injection), IBS, h/o prostate cancer (prior tx w/ radiation 2020), and recent h/o persistent RLE cellulitis requiring multiple recent hospital admissions, failing to resolve with abx tx.     [facility]; which apparently began 1-2 days following routine bi-weekly injection of Dupixent to Left thigh. Ct-leg unremarkable for deeper infection then. Given cephalexin, but went to [facility] (02/16) for worsening RLE cellulitis, noted to have significant DALIA (Cr 2.0; baseline 1.1?). Given zosyn, de-escalated to CTX inpatient. Erythema improved but edema persisted. He had induration of the thigh. CT showed diffuse cutaneous thickening and edema with prominent right inguinal lymph nodes. IV-CTX switched to oral augmentin and doxycycline on 2/19/2024. Redness and Edema improved; but induration persisted. He was discharged home on doxy / augmentin for 14d, HERNANDO 03/01.     Pt seen for f/u at [facility] 02/27; worsening redness/ swelling with  spread up to his abdomen. He had edema and erythema of the entire right lower extremity, right groin, and right lower abdomen. It was tender and warm.  Thus, pt re-admitted to [facility]) for worsening RLE cellulitis and ongoing DALIA (Cr 1.8).     Pt arrived AF, VSS, HDS, wbc nml, started on empiric Iv-zosyn / vanc.    Non-contrast CT showed soft tissue induration throughout the right anterior pelvis, thigh,and leg, skin thickening and edema of the subcutaneous tissue, superficial fascia, and deep fascia, scrotal edema, and retroperitoneal/extraperitoneal edema along the sidewalls.  ID consulted for abx recs of progressive RLE cellulitis on oral abx.             Past Medical History:   Diagnosis Date    Allergy     Asthma     Cellulitis     Elevated PSA     Prostate cancer        Past Surgical History:   Procedure Laterality Date    ENDOSCOPIC ULTRASOUND OF UPPER GASTROINTESTINAL TRACT N/A 7/25/2023    Procedure: ULTRASOUND, UPPER GI TRACT, ENDOSCOPIC;  Surgeon: Yoseph Li MD;  Location: [address]);  Service: Endoscopy;  Laterality: N/A;  instr [place]-va referral    SINUS SURGERY Bilateral 2001       Review of patient's allergies indicates:   Allergen Reactions    Aspirin Shortness Of Breath       Medications:  (Not in a hospital admission)    Antibiotics (From admission, onward)      Start     Stop Route Frequency Ordered    02/28/24 2100  linezolid tablet 600 mg         -- Oral Every 12 hours 02/28/24 1446    02/28/24 1600  cefTRIAXone (ROCEPHIN) 2 g in dextrose 5 % in water (D5W) 100 mL IVPB (MB+)         -- IV Every 24 hours (non-standard times) 02/28/24 1446    02/28/24 1545  DAPTOmycin (CUBICIN) 670 mg in sodium chloride 0.9% SolP 50 mL IVPB         -- IV Every 24 hours (non-standard times) 02/28/24 1446          Antifungals (From admission, onward)      None          Antivirals (From admission, onward)      None               There is no immunization history on file for this patient.    Family  History    Family history is unknown by patient.       Social History     Socioeconomic History    Marital status: Single   Tobacco Use    Smoking status: Never    Smokeless tobacco: Never   Substance and Sexual Activity    Alcohol use: Yes     Alcohol/week: 1.0 standard drink of alcohol     Types: 1 Cans of beer per week     Comment: week    Drug use: Never     Review of Systems   Constitutional:  Positive for chills and fever. Negative for fatigue.   HENT:  Negative for facial swelling and sore throat.    Respiratory:  Negative for cough and shortness of breath.    Cardiovascular:  Positive for leg swelling.   Gastrointestinal:  Positive for abdominal pain. Negative for nausea and vomiting.   Genitourinary:  Positive for difficulty urinating (weak stream 2/2 obstruction from diffuse swelling), penile swelling and scrotal swelling.   Musculoskeletal:  Positive for myalgias.   Skin:  Positive for rash. Negative for wound.   Neurological:  Negative for seizures and syncope.   Psychiatric/Behavioral:  Negative for behavioral problems, confusion and self-injury.      Objective:     Vital Signs (Most Recent):  Temp: 98.2 °F (36.8 °C) (02/28/24 1224)  Pulse: 85 (02/28/24 1224)  Resp: 18 (02/28/24 1224)  BP: 137/85 (02/28/24 1224)  SpO2: 97 % (02/28/24 1224) Vital Signs (24h Range):  Temp:  [97.9 °F (36.6 °C)-98.3 °F (36.8 °C)] 98.2 °F (36.8 °C)  Pulse:  [75-99] 85  Resp:  [16-18] 18  SpO2:  [95 %-99 %] 97 %  BP: (130-158)/(59-88) 137/85     Weight: 83.5 kg (184 lb)  Body mass index is 28.82 kg/m².    Estimated Creatinine Clearance: 42.9 mL/min (A) (based on SCr of 1.8 mg/dL (H)).     Physical Exam  Vitals and nursing note reviewed.   Constitutional:       General: He is not in acute distress.     Appearance: Normal appearance. He is not ill-appearing, toxic-appearing or diaphoretic.   HENT:      Head: Normocephalic and atraumatic.      Mouth/Throat:      Mouth: Mucous membranes are moist.      Pharynx: Oropharynx is  clear.   Eyes:      General: No scleral icterus.     Conjunctiva/sclera: Conjunctivae normal.   Cardiovascular:      Rate and Rhythm: Normal rate.      Heart sounds: Normal heart sounds.   Pulmonary:      Effort: Pulmonary effort is normal.      Breath sounds: Normal breath sounds.   Abdominal:      General: Bowel sounds are normal. There is distension.      Palpations: Abdomen is soft.      Tenderness: There is abdominal tenderness. There is no right CVA tenderness or left CVA tenderness.   Genitourinary:     Comments: Pelvic swelling globally, penile / scrotal swelling diffuse.   Musculoskeletal:         General: Swelling and tenderness present. Normal range of motion.      Cervical back: Normal range of motion. No rigidity or tenderness.      Right lower leg: Edema present.      Left lower leg: No edema.   Lymphadenopathy:      Cervical: No cervical adenopathy.   Skin:     General: Skin is warm and dry.      Findings: Erythema and rash present.   Neurological:      Mental Status: He is alert and oriented to person, place, and time. Mental status is at baseline.   Psychiatric:         Behavior: Behavior normal.         Thought Content: Thought content normal.                  Significant Labs: Blood Culture:   Recent Labs   Lab 02/16/24  1843 02/16/24  1919   LABBLOO No growth after 5 days. No growth after 5 days.     CBC:   Recent Labs   Lab 02/27/24  1359   WBC 10.47   HGB 10.7*   HCT 31.7*        CMP:   Recent Labs   Lab 02/27/24  1359 02/28/24  0409   * 132*   K 4.5 4.3   CL 97 100   CO2 23 25   GLU 76 94   BUN 16 15   CREATININE 1.7* 1.8*   CALCIUM 9.7 9.3   PROT 7.8  --    ALBUMIN 3.4*  --    BILITOT 0.5  --    ALKPHOS 86  --    AST 26  --    ALT 16  --    ANIONGAP 13 7*     Microbiology Results (last 7 days)       Procedure Component Value Units Date/Time    MRSA Screen by PCR [[id number]] Collected: 02/28/24 0850    Order Status: Sent Specimen: Nasopharyngeal Swab from Nasal Updated: 02/28/24  0900          Pathology Results  (Last 10 years)      None          All pertinent labs within the past 24 hours have been reviewed.    Significant Imaging: I have reviewed all pertinent imaging results/findings within the past 24 hours.    I have personally reviewed records / hospital notes from   service and other specialty providers. I have also reviewed CBC, CMP/BMP,  cultures and imaging with my interpretation as documented in my assessment / plan.    Patient is high risk for infectious complications given pt's age, multiple co-morbidities, and case complexity.      Time: 75 minutes   50% of time spent on face-to-face counseling and coordination of care. Counseling included review of test results, diagnosis, and treatment plan with patient and/or family.

## 2024-02-28 NOTE — CONSULTS
[facility] - Emergency Dept  Urology  Consult Note    Patient Name: Bradley Collins  MRN: [id number]  Admission Date: 2/27/2024  Hospital Length of Stay: 1   Code Status: Full Code   Attending Provider: Sagar Cox MD   Consulting Provider: Pretty Blackmon MD  Primary Care Physician: Reed Mcmullen  Principal Problem:Cellulitis of right lower limb    Inpatient consult to Urology  Consult performed by: Pretty Blackmon MD  Consult ordered by: Sagar Cox MD  Reason for consult: penile/scrotal swelling and concern for urinary retention        Subjective:     HPI:  63M with PMH of asthma, [place] 4+4 prostate cancer treated with XRT in 2020, presents with progressively worsening soft tissue swelling.  Urology consulted due to penile/scrotal swelling and concern for urinary retention.     Patient reports edema and swelling that began in his R leg one month ago and has slowly progressed to the right gluteal region and now involves the penis/scrotum.  [facility] and was [facility] on oral antibiotics but swelling has continued to progress.  He denies fevers but does report subjective chills.  He denies difficulty urinating and has voided multiple times today.  He denies hematuria.      On assessment, he is AFVSS.  Lactate WNL.  No leukocytosis. CRP 61 uptrending.  Cr 1.8 (recent baseline 1.7- 2.1 but prior to that was around 1.3).  UA non concerning for infection.  PVR bladder scan in the ED was 20 mL.   CT pelvis and thigh shows extensive soft tissue edema and induration through the R leg and groin.  No air and no organized fluid collection.      Past Medical History:   Diagnosis Date    Allergy     Asthma     Cellulitis     Elevated PSA     Prostate cancer        Past Surgical History:   Procedure Laterality Date    ENDOSCOPIC ULTRASOUND OF UPPER GASTROINTESTINAL TRACT N/A 7/25/2023    Procedure: ULTRASOUND, UPPER GI TRACT, ENDOSCOPIC;  Surgeon: Yoseph Li  MD;  [facility] [place] [address]);  Service: Endoscopy;  Laterality: N/A;  instr portal-va referral    SINUS SURGERY Bilateral 2001       Review of patient's allergies indicates:   Allergen Reactions    Aspirin Shortness Of Breath       Family History    Family history is unknown by patient.         Tobacco Use    Smoking status: Never    Smokeless tobacco: Never   Substance and Sexual Activity    Alcohol use: Yes     Alcohol/week: 1.0 standard drink of alcohol     Types: 1 Cans of beer per week     Comment: week    Drug use: Never    Sexual activity: Not on file       Review of Systems   Constitutional:  Positive for chills. Negative for fever.   Genitourinary:  Positive for penile swelling and scrotal swelling. Negative for difficulty urinating, dysuria, flank pain and hematuria.       Objective:     Temp:  [97.9 °F (36.6 °C)-98.3 °F (36.8 °C)] 98.2 °F (36.8 °C)  Pulse:  [75-99] 85  Resp:  [16-18] 18  SpO2:  [95 %-99 %] 97 %  BP: (130-158)/(59-88) 137/85  Weight: 83.5 kg (184 lb)  Body mass index is 28.82 kg/m².           Drains       None                    Physical Exam  Constitutional:       Appearance: Normal appearance.   Cardiovascular:      Rate and Rhythm: Normal rate.   Pulmonary:      Effort: Pulmonary effort is normal.   Genitourinary:     Comments: Severe penile and scrotal swelling, unable to visualize meatus.  Uncircumcised.  Unable to palpable testicles within scrotum 2/2 swelling.  Warmth and induration of suprapubic region but no crepitus or overlying skin changes.  No necrosis.     Musculoskeletal:      Comments: Warmth induration and swelling extending from R gluteal region to ankle.    Neurological:      Mental Status: He is alert.          Significant Labs:    BMP:  Recent Labs   Lab 02/27/24  1359 02/28/24  0409   * 132*   K 4.5 4.3   CL 97 100   CO2 23 25   BUN 16 15   CREATININE 1.7* 1.8*   CALCIUM 9.7 9.3       CBC:  Recent Labs   Lab 02/27/24  1359   WBC 10.47   HGB 10.7*    HCT 31.7*          All pertinent labs results from the past 24 hours have been reviewed.    Significant Imaging:  All pertinent imaging results/findings from the past 24 hours have been reviewed.                    Assessment and Plan:     * Cellulitis of right lower limb  63M with worsening cellulitis refractory to multiple courses of oral antibiotics.      - no indication for urologic surgical intervention   - ID consulted, see recs   - agree with broad spectrum antibiotics, currently on dapto/linezolid/rocephin   - No evidence of urinary obstruction, PVR bladder scan 20 mL   - No indication for indwelling lee catheter   - please reach out to urology if worsening of  exam or if any concerns for development of Fanny's gangrene   - rest of care per primary         VTE Risk Mitigation (From admission, onward)           Ordered     enoxaparin injection 40 mg  Daily         02/27/24 0736                    Thank you for your consult.     Pretty Blackmon MD  Urology  [facility] - Emergency Dept

## 2024-02-29 LAB
ANION GAP SERPL CALC-SCNC: 11 MMOL/L (ref 8–16)
BUN SERPL-MCNC: 15 MG/DL (ref 8–23)
CALCIUM SERPL-MCNC: 9.2 MG/DL (ref 8.7–10.5)
CHLORIDE SERPL-SCNC: 99 MMOL/L (ref 95–110)
CK SERPL-CCNC: 160 U/L (ref 20–200)
CO2 SERPL-SCNC: 23 MMOL/L (ref 23–29)
CREAT SERPL-MCNC: 1.8 MG/DL (ref 0.5–1.4)
EST. GFR  (NO RACE VARIABLE): 41.5 ML/MIN/1.73 M^2
GLUCOSE SERPL-MCNC: 95 MG/DL (ref 70–110)
POTASSIUM SERPL-SCNC: 4.1 MMOL/L (ref 3.5–5.1)
SODIUM SERPL-SCNC: 133 MMOL/L (ref 136–145)

## 2024-02-29 PROCEDURE — 94761 N-INVAS EAR/PLS OXIMETRY MLT: CPT

## 2024-02-29 PROCEDURE — 11000001 HC ACUTE MED/SURG PRIVATE ROOM

## 2024-02-29 PROCEDURE — 36415 COLL VENOUS BLD VENIPUNCTURE: CPT | Performed by: HOSPITALIST

## 2024-02-29 PROCEDURE — 25000003 PHARM REV CODE 250: Performed by: STUDENT IN AN ORGANIZED HEALTH CARE EDUCATION/TRAINING PROGRAM

## 2024-02-29 PROCEDURE — 94640 AIRWAY INHALATION TREATMENT: CPT

## 2024-02-29 PROCEDURE — 80048 BASIC METABOLIC PNL TOTAL CA: CPT | Performed by: HOSPITALIST

## 2024-02-29 PROCEDURE — 25000003 PHARM REV CODE 250: Performed by: HOSPITALIST

## 2024-02-29 PROCEDURE — 82550 ASSAY OF CK (CPK): CPT | Performed by: STUDENT IN AN ORGANIZED HEALTH CARE EDUCATION/TRAINING PROGRAM

## 2024-02-29 PROCEDURE — 63600175 PHARM REV CODE 636 W HCPCS: Performed by: STUDENT IN AN ORGANIZED HEALTH CARE EDUCATION/TRAINING PROGRAM

## 2024-02-29 PROCEDURE — 36415 COLL VENOUS BLD VENIPUNCTURE: CPT | Mod: XB | Performed by: STUDENT IN AN ORGANIZED HEALTH CARE EDUCATION/TRAINING PROGRAM

## 2024-02-29 RX ADMIN — Medication 1 CAPSULE: at 10:02

## 2024-02-29 RX ADMIN — Medication 1 CAPSULE: at 08:02

## 2024-02-29 RX ADMIN — PANTOPRAZOLE SODIUM 40 MG: 40 TABLET, DELAYED RELEASE ORAL at 09:02

## 2024-02-29 RX ADMIN — LINEZOLID 600 MG: 600 TABLET, FILM COATED ORAL at 09:02

## 2024-02-29 RX ADMIN — FLUTICASONE FUROATE AND VILANTEROL TRIFENATATE 1 PUFF: 100; 25 POWDER RESPIRATORY (INHALATION) at 08:02

## 2024-02-29 RX ADMIN — TIOTROPIUM BROMIDE INHALATION SPRAY 2 PUFF: 3.12 SPRAY, METERED RESPIRATORY (INHALATION) at 08:02

## 2024-02-29 RX ADMIN — LINEZOLID 600 MG: 600 TABLET, FILM COATED ORAL at 08:02

## 2024-02-29 RX ADMIN — CEFTRIAXONE 2 G: 2 INJECTION, POWDER, FOR SOLUTION INTRAMUSCULAR; INTRAVENOUS at 05:02

## 2024-02-29 RX ADMIN — DAPTOMYCIN 670 MG: 350 INJECTION, POWDER, LYOPHILIZED, FOR SOLUTION INTRAVENOUS at 06:02

## 2024-02-29 RX ADMIN — HYDROCODONE BITARTRATE AND ACETAMINOPHEN 1 TABLET: 5; 325 TABLET ORAL at 09:02

## 2024-02-29 NOTE — HPI
Bradley Collins is a 63M with PMH of asthma, [place] 4+4 prostate cancer treated with XRT in 2020 who presents with progressively worsening RLE edema/erythema for the last month. He is presumed to have cellulitis, though the source is uncertain. He has no history of diabetes, no wounds. He does state that he uses allergy injections in his right gluteus. He has had no fevers, but subjective chills, has no leukocytosis (going back 13 days). US 2/16 showed no DVT and he has had several CT scans demonstrating soft tissue edema/inflammatory change without any evidence of gas or fluid collection. Urology is following for penile/scrotal edema and scrotal US today shows no fluid collection or gas. It is not particularly tender or painful, he has no sensory or motor deficits.

## 2024-02-29 NOTE — SUBJECTIVE & OBJECTIVE
Interval History: Seen by General Surgery and Urology. Able to urinate but skin in genital region is hardened.    Review of Systems   Constitutional:  Negative for chills and fever.   Cardiovascular:  Positive for leg swelling.   Skin:  Positive for color change. Negative for wound.   Neurological:  Negative for seizures and syncope.     Objective:     Vital Signs (Most Recent):  Temp: 98.4 °F (36.9 °C) (02/29/24 1058)  Pulse: 82 (02/29/24 1058)  Resp: 16 (02/29/24 1058)  BP: 133/77 (02/29/24 1058)  SpO2: 97 % (02/29/24 1058) Vital Signs (24h Range):  Temp:  [98 °F (36.7 °C)-98.8 °F (37.1 °C)] 98.4 °F (36.9 °C)  Pulse:  [82-93] 82  Resp:  [16-18] 16  SpO2:  [92 %-97 %] 97 %  BP: (126-147)/(70-82) 133/77     Weight: 83.5 kg (184 lb 1.4 oz)  Body mass index is 28.83 kg/m².  No intake or output data in the 24 hours ending 02/29/24 1553        Physical Exam  Vitals and nursing note reviewed.   Constitutional:       General: He is not in acute distress.     Appearance: He is well-developed. He is not toxic-appearing or diaphoretic.   Pulmonary:      Effort: Pulmonary effort is normal. No respiratory distress.   Genitourinary:     Penis: Swelling present. No discharge.       Testes:         Right: Swelling present.         Left: Swelling present.   Musculoskeletal:         General: No deformity or signs of injury.      Right lower leg: Edema present.      Left lower leg: No edema.   Skin:     Findings: Erythema present.   Neurological:      Mental Status: He is alert and oriented to person, place, and time. Mental status is at baseline.      Motor: No seizure activity.   Psychiatric:         Attention and Perception: Attention normal.         Mood and Affect: Mood and affect normal.         Behavior: Behavior is cooperative.             Significant Labs: All pertinent labs within the past 24 hours have been reviewed.    Significant Imaging: I have reviewed all pertinent imaging results/findings within the past 24 hours.

## 2024-02-29 NOTE — ASSESSMENT & PLAN NOTE
Persistent. 3rd hospital admission. Improved on vancomycin and ceftriaxone during last hospitalization. Prescribed amoxicillin-clavulanate and doxycycline and it worsened. Appreciate Infectious Disease. Giving ceftriaxone, daptomycin, linezolid.

## 2024-02-29 NOTE — PROGRESS NOTES
Therapy with Vancomycin therapy is complete and consult was discontinued by provider.    Pharmacy will sign off, please re-consult as needed.

## 2024-02-29 NOTE — PLAN OF CARE
[facility] - Med Surg  Initial Discharge Assessment       Primary Care Provider: Administration, [facility]    Admission Diagnosis: Groin swelling [R19.09]  Leg swelling [M79.89]  Cellulitis of right lower extremity [L03.115]  Cellulitis, unspecified cellulitis site [L03.90]    Admission Date: 2/27/2024  Expected Discharge Date: 3/1/2024    Transition of Care Barriers: (P) None    Payor: BLUE CROSS BLUE SHIELD / Plan: [facility] FEDERAL BASIC / Product Type: PPO /     Extended Emergency Contact Information  Primary Emergency Contact: MurphyNilo  Address: [address] [place] [address] [place]  Mobile Phone: [phone]  Relation: Significant other    Discharge Plan A: (P) Home with family  Discharge Plan B: (P) Home      [id number] [address] [address] [facility]  [address] [address] [facility] [address]  Phone: [phone] Fax: [phone]    [facility]  [address]  [place] [address]  Phone: [phone] Fax: [phone]      CM met with patient to discuss discharge planning. Patient lives home with Nilo Arrington (significant other) [phone] in a single story home with no steps to enter. Prior to hospital admission, patient was independent with ADLs and did not require medical equipment. Will continue to follow for post acute needs. Discharge Plan A and Plan B have been determined by review of patient's clinical status, future medical and therapeutic needs, and coverage/benefits for post-acute care in coordination with multidisciplinary team members.  Initial Assessment (most recent)       Adult Discharge Assessment - 02/29/24 1317          Discharge Assessment    Assessment Type Discharge Planning Assessment (P)      Confirmed/corrected address, phone number and insurance Yes (P)      Confirmed Demographics Correct on Facesheet (P)      Source of Information patient (P)       Communicated CONSTANTINO with patient/caregiver Date not available/Unable to determine (P)      Reason For Admission Cellulitis of right lower limb (P)      People in Home significant other (P)      Facility Arrived From: home (P)      Do you expect to return to your current living situation? Yes (P)      Do you have help at home or someone to help you manage your care at home? Yes (P)      Who are your caregiver(s) and their phone number(s)? Nilo Arrington (significant other) [phone] (P)      Prior to hospitilization cognitive status: Alert/Oriented (P)      Current cognitive status: Alert/Oriented (P)      Walking or Climbing Stairs Difficulty no (P)      Dressing/Bathing Difficulty no (P)      Home Layout Able to live on 1st floor (P)      Equipment Currently Used at Home none (P)      Readmission within 30 days? Yes (P)      Patient currently being followed by outpatient case management? No (P)      Do you currently have service(s) that help you manage your care at home? No (P)      Do you take prescription medications? Yes (P)      Do you have prescription coverage? Yes (P)      Coverage BCBS (P)      Do you have any problems affording any of your prescribed medications? No (P)      Is the patient taking medications as prescribed? yes (P)      Who is going to help you get home at discharge? Nilo Murphy (significant other) [phone] (P)      How do you get to doctors appointments? car, drives self (P)      Are you on dialysis? No (P)      Do you take coumadin? No (P)      Discharge Plan A Home with family (P)      Discharge Plan B Home (P)      DME Needed Upon Discharge  none (P)      Discharge Plan discussed with: Spouse/sig other (P)      Name(s) and Number(s) Christbrittney Arrington (significant other) [phone] (P)      Transition of Care Barriers None (P)         Physical Activity    On average, how many days per week do you engage in moderate to strenuous exercise (like a brisk walk)? 3 days (P)       On average, how many minutes do you engage in exercise at this level? 40 min (P)         Financial Resource Strain    How hard is it for you to pay for the very basics like food, housing, medical care, and heating? Not hard at all (P)         Housing Stability    In the last 12 months, was there a time when you were not able to pay the mortgage or rent on time? No (P)      In the last 12 months, how many places have you lived? 1 (P)      In the last 12 months, was there a time when you did not have a steady place to sleep or slept in a shelter (including now)? No (P)         Transportation Needs    In the past 12 months, has lack of transportation kept you from medical appointments or from getting medications? No (P)      In the past 12 months, has lack of transportation kept you from meetings, work, or from getting things needed for daily living? No (P)         Food Insecurity    Within the past 12 months, you worried that your food would run out before you got the money to buy more. Never true (P)      Within the past 12 months, the food you bought just didn't last and you didn't have money to get more. Never true (P)         Stress    Do you feel stress - tense, restless, nervous, or anxious, or unable to sleep at night because your mind is troubled all the time - these days? Not at all (P)         Social Connections    In a typical week, how many times do you talk on the phone with family, friends, or neighbors? More than three times a week (P)      How often do you get together with friends or relatives? More than three times a week (P)      How often do you attend Caodaism or Islam services? More than 4 times per year (P)      Do you belong to any clubs or organizations such as Caodaism groups, unions, fraternal or athletic groups, or school groups? No (P)      How often do you attend meetings of the clubs or organizations you belong to? Never (P)      Are you , , , , never  , or living with a partner? Living with partner (P)         Alcohol Use    Q1: How often do you have a drink containing alcohol? 2-3 times a week (P)      Q2: How many drinks containing alcohol do you have on a typical day when you are drinking? 1 or 2 (P)      Q3: How often do you have six or more drinks on one occasion? Never (P)         OTHER    Name(s) of People in Home Nilo Arrington (significant other) [phone] (P)                      Readmission Assessment (most recent)       Readmission Assessment - 02/29/24 1315          Readmission    Why were you hospitalized in the last 30 days? Cellulitis of Leg     Why were you readmitted? Related to previous admission     When you left the hospital how did you feel? Okay     When you left the hospital where did you go? Home with Family     Did patient/caregiver refused recommended DC plan? No     Tell me about what happened between when you left the hospital and the day you returned. Began having redness, swelling and pain in leg     Did you try to manage your symptoms your self? No     Did you call anyone? No     Why? Camed to ED     Did you try to see or did see a doctor or nurse before you came? No     Did you have  a follow-up appointment on discharge? Yes     Did you go? Yes     Was this a planned readmission? No                        CHARMAINE Bowman  Case Management  [phone]

## 2024-02-29 NOTE — SUBJECTIVE & OBJECTIVE
No current facility-administered medications on file prior to encounter.     Current Outpatient Medications on File Prior to Encounter   Medication Sig    albuterol (ACCUNEB) 1.25 mg/3 mL Nebu Take 1.25 mg by nebulization every 6 (six) hours as needed. Rescue    amoxicillin-clavulanate 875-125mg (AUGMENTIN) 875-125 mg per tablet Take 1 tablet by mouth every 12 (twelve) hours. for 10 days    budesonide-formoterol 160-4.5 mcg (SYMBICORT) 160-4.5 mcg/actuation HFAA Inhale 2 puffs into the lungs every 12 (twelve) hours. Controller    calcium carbonate (OS-CAROLYN) 500 mg calcium (1,250 mg) tablet     doxycycline (MONODOX) 100 MG capsule Take 1 capsule (100 mg total) by mouth every 12 (twelve) hours. for 10 days    dupilumab 300 mg/2 mL PnIj INJECT 300MG SUBCUTANEOUSLY EVERY TWO WEEKS    fluticasone propionate 93 mcg/actuation AerB 2 sprays by Nasal route 2 (two) times a day.    mometasone-formoterol (DULERA) 200-5 mcg/actuation inhaler Inhale 2 puffs into the lungs 2 (two) times daily.    montelukast (SINGULAIR) 5 MG chewable tablet Take 5 mg by mouth.    nasal exhalation resistanc.dev (NASAL EXHALATION RESISTANCE DV NASL) by Nasal route.    omeprazole 20 mg TbEC Take 20 mg by mouth once daily.    pantoprazole (PROTONIX) 40 MG tablet Take 40 mg by mouth.    rosuvastatin 10 mg CpSP     tadalafiL (CIALIS) 20 MG Tab 20 mg.    tiotropium (SPIRIVA) 18 mcg inhalation capsule Inhale 18 mcg into the lungs once daily. Controller    vitamin D (VITAMIN D3) 1000 units Tab Take 1,000 Units by mouth once daily.       Review of patient's allergies indicates:   Allergen Reactions    Aspirin Shortness Of Breath       Past Medical History:   Diagnosis Date    Allergy     Asthma     Cellulitis     Elevated PSA     Prostate cancer      Past Surgical History:   Procedure Laterality Date    ENDOSCOPIC ULTRASOUND OF UPPER GASTROINTESTINAL TRACT N/A 7/25/2023    Procedure: ULTRASOUND, UPPER GI TRACT, ENDOSCOPIC;  Surgeon: Yoseph Li MD;   Location: [facility] ([address] [place] [address]);  Service: Endoscopy;  Laterality: N/A;  instr portal-va referral    SINUS SURGERY Bilateral 2001     Family History    Family history is unknown by patient.       Tobacco Use    Smoking status: Never    Smokeless tobacco: Never   Substance and Sexual Activity    Alcohol use: Yes     Alcohol/week: 1.0 standard drink of alcohol     Types: 1 Cans of beer per week     Comment: week    Drug use: Never    Sexual activity: Not on file     Review of Systems   Constitutional:  Positive for chills. Negative for activity change, appetite change, fatigue and fever.   Respiratory:  Negative for cough, chest tightness and shortness of breath.    Cardiovascular:  Positive for leg swelling. Negative for chest pain and palpitations.   Gastrointestinal:  Negative for abdominal distention and abdominal pain.   Genitourinary:  Negative for difficulty urinating, dysuria, flank pain and penile discharge.   Musculoskeletal:  Negative for arthralgias.   Skin:  Positive for color change.   Neurological:  Negative for dizziness and light-headedness.     Objective:     Vital Signs (Most Recent):  Temp: 98 °F (36.7 °C) (02/29/24 0844)  Pulse: 86 (02/29/24 0858)  Resp: 18 (02/29/24 0905)  BP: 126/71 (02/29/24 0844)  SpO2: 97 % (02/29/24 0858) Vital Signs (24h Range):  Temp:  [98 °F (36.7 °C)-98.8 °F (37.1 °C)] 98 °F (36.7 °C)  Pulse:  [75-93] 86  Resp:  [16-18] 18  SpO2:  [92 %-97 %] 97 %  BP: (126-147)/(70-85) 126/71     Weight: 83.5 kg (184 lb 1.4 oz)  Body mass index is 28.83 kg/m².     Physical Exam  Constitutional:       General: He is not in acute distress.     Appearance: Normal appearance. He is not ill-appearing.   HENT:      Head: Normocephalic and atraumatic.   Eyes:      Extraocular Movements: Extraocular movements intact.      Pupils: Pupils are equal, round, and reactive to light.   Pulmonary:      Effort: Pulmonary effort is normal. No respiratory distress.   Abdominal:      General: Abdomen is  flat.      Palpations: Abdomen is soft.   Musculoskeletal:         General: Swelling present. Normal range of motion.      Right lower leg: Edema present.      Comments: RLE with significant edema extending to his gluteus. Some increased warmth and erythema, not tender. No areas of fluctuance or crepitus. He has palpable pedal pulses. Normal ROM and strength without sensory changes.    Skin:     General: Skin is warm and dry.   Neurological:      General: No focal deficit present.      Mental Status: He is alert.            I have reviewed all pertinent lab results within the past 24 hours.  CBC:   Recent Labs   Lab 02/27/24  1359   WBC 10.47   RBC 3.40*   HGB 10.7*   HCT 31.7*      MCV 93   MCH 31.5*   MCHC 33.8     CMP:   Recent Labs   Lab 02/27/24  1359 02/28/24  0409 02/29/24  0437   GLU 76   < > 95   CALCIUM 9.7   < > 9.2   ALBUMIN 3.4*  --   --    PROT 7.8  --   --    *   < > 133*   K 4.5   < > 4.1   CO2 23   < > 23   CL 97   < > 99   BUN 16   < > 15   CREATININE 1.7*   < > 1.8*   ALKPHOS 86  --   --    ALT 16  --   --    AST 26  --   --    BILITOT 0.5  --   --     < > = values in this interval not displayed.       Significant Diagnostics:  I have reviewed all pertinent imaging results/findings within the past 24 hours.

## 2024-02-29 NOTE — PROGRESS NOTES
[facility] S[facility]  Urology  Progress Note    Patient Name: Bradley Collins  MRN: [id number]  Admission Date: 2/27/2024  Hospital Length of Stay: 2 days  Code Status: Full Code   Attending Provider: Sagar Cox MD   Primary Care Physician: [facility]    Subjective:     HPI:  63M with PMH of asthma, [place] 4+4 prostate cancer treated with XRT in 2020, presents with progressively worsening soft tissue swelling.  Urology consulted due to penile/scrotal swelling and concern for urinary retention.     Patient reports edema and swelling that began in his R leg one month ago and has slowly progressed to the right gluteal region and now involves the penis/scrotum.  [facility] and [facility] on oral antibiotics but swelling has continued to progress.  He denies fevers but does report subjective chills.  He denies difficulty urinating and has voided multiple times today.  He denies hematuria.      On assessment, he is AFVSS.  Lactate WNL.  No leukocytosis. CRP 61 uptrending.  Cr 1.8 (recent baseline 1.7- 2.1 but prior to that was around 1.3).  UA non concerning for infection.  PVR bladder scan in the ED was 20 mL.   CT pelvis and thigh shows extensive soft tissue edema and induration through the R leg and groin.  No air and no organized fluid collection.      Interval History: NAOE Vitals stable. Denies difficulty urinating.   Cr 1.8.   Ordering scrotal u/s.     Objective:     Temp:  [97.9 °F (36.6 °C)-98.8 °F (37.1 °C)] 98.3 °F (36.8 °C)  Pulse:  [75-99] 90  Resp:  [17-18] 18  SpO2:  [92 %-97 %] 92 %  BP: (135-155)/(70-85) 135/70     Body mass index is 28.83 kg/m².           Drains       None                    Physical Exam  Constitutional:       Appearance: Normal appearance.   Cardiovascular:      Rate and Rhythm: Normal rate.   Pulmonary:      Effort: Pulmonary effort is normal.   Genitourinary:     Comments: Severe penile and scrotal swelling,  unable to visualize meatus.  Uncircumcised.  Unable to palpable testicles within scrotum 2/2 swelling.  Warmth and induration of suprapubic region but no crepitus or overlying skin changes.  No necrosis.     Musculoskeletal:      Comments: Warmth induration and swelling extending from R gluteal region to ankle.    Neurological:      Mental Status: He is alert.           Significant Labs:    BMP:  Recent Labs   Lab 02/27/24  1359 02/28/24  0409 02/29/24  0437   * 132* 133*   K 4.5 4.3 4.1   CL 97 100 99   CO2 23 25 23   BUN 16 15 15   CREATININE 1.7* 1.8* 1.8*   CALCIUM 9.7 9.3 9.2       CBC:   Recent Labs   Lab 02/27/24  1359   WBC 10.47   HGB 10.7*   HCT 31.7*          All pertinent labs results from the past 24 hours have been reviewed.    Significant Imaging:  All pertinent imaging results/findings from the past 24 hours have been reviewed.                  Assessment/Plan:     * Cellulitis of right lower limb  63M with worsening cellulitis refractory to multiple courses of oral antibiotics.      - f/u scrotal u/s  - ID consulted, see recs   - agree with broad spectrum antibiotics, currently on dapto/linezolid/rocephin   - No evidence of urinary obstruction, PVR bladder scan 20 mL   - No indication for indwelling lee catheter   - please reach out to urology if worsening of  exam or if any concerns for development of Fanny's gangrene   - rest of care per primary         VTE Risk Mitigation (From admission, onward)           Ordered     enoxaparin injection 40 mg  Daily         02/27/24 0431                    ADI HEATH MD  Urology  [facility] - Med Surg

## 2024-02-29 NOTE — PROGRESS NOTES
[facility]  Progress Note    Patient Name: Bradley Collins  MRN: [id number]  Patient Class: IP- Inpatient   Admission Date: 2/27/2024  Length of Stay: 2 days  Attending Physician: Sagar Cox MD  Primary Care Provider: AdministrationReed        Subjective:     Principal Problem:Cellulitis of right lower limb        HPI:  Bradley Collins is a 63 year old Black man with asthma, allergic rhinitis, hyperlipidemia, irritable bowel syndrome with diarrhea, history of prostate cancer treated with radiation, history of sinus surgery in 2001. He lives in [facility]. He works for [employer]. His primary care clinic is the [facility] clinic.    He was hospitalized at the [facility] on 2/12/2024 for right lower extremity cellulitis. He had a CT with contrast done and was told that it did not show anything. He was prescribed cephalexin.   He was hospitalized at [facility] from 2/16/2024 to 2/20/2024 for worsening cellulitis. Creatinine was elevated at 2.0 mg/dL (from 1.1 a year ago). He was initially put on piperacillin-tazobactam and vancomycin. Piperacillin-tazobactam was changed to ceftriaxone. Erythema improved but edema persisted. He had induration of the thigh. CT showed diffuse cutaneous thickening and edema with prominent right inguinal lymph nodes. Antibiotics were switched to oral amoxicillin-clavulanate and doxycycline on 2/19/2024. Edema improved. Induration persisted. He was discharged home.   He followed up at [facility] on 2/27/2024. He had 4 days left of the antibiotics. Swelling had worsened and spread up to his abdomen. He had edema and erythema of the entire right lower extremity, right groin, and right lower abdomen. It was tender and warm. He was advised to return to the emergency department to resume intravenous antibiotic treatment. Labs showed creatinine to  still be elevated (1.7 mg/dL, from 1.8 on 2/19/2024). Non-contrast CT showed soft tissue induration throughout the right anterior pelvis, thigh,and leg, skin thickening and edema of the subcutaneous tissue, superficial fascia, and deep fascia, scrotal edema, and retroperitoneal/extraperitoneal edema along the sidewalls. He was given piperacillin-tazobactam and vancomycin. He was admitted to Hospital Medicine Team W.     Overview/Hospital Course:  He was put on vancomycin. Infectious Disease was consulted for recommendations changed vancomycin to ceftriaxone, daptomycin, and linezolid. General Surgery was consulted to evaluate and recommended no surgical intervention to help reduce swelling. Urology was consulted to evaluate his scrotal swelling and found no issues they had to manage.     Interval History: Seen by General Surgery and Urology. Able to urinate but skin in genital region is hardened.    Review of Systems   Constitutional:  Negative for chills and fever.   Cardiovascular:  Positive for leg swelling.   Skin:  Positive for color change. Negative for wound.   Neurological:  Negative for seizures and syncope.     Objective:     Vital Signs (Most Recent):  Temp: 98.4 °F (36.9 °C) (02/29/24 1058)  Pulse: 82 (02/29/24 1058)  Resp: 16 (02/29/24 1058)  BP: 133/77 (02/29/24 1058)  SpO2: 97 % (02/29/24 1058) Vital Signs (24h Range):  Temp:  [98 °F (36.7 °C)-98.8 °F (37.1 °C)] 98.4 °F (36.9 °C)  Pulse:  [82-93] 82  Resp:  [16-18] 16  SpO2:  [92 %-97 %] 97 %  BP: (126-147)/(70-82) 133/77     Weight: 83.5 kg (184 lb 1.4 oz)  Body mass index is 28.83 kg/m².  No intake or output data in the 24 hours ending 02/29/24 1553        Physical Exam  Vitals and nursing note reviewed.   Constitutional:       General: He is not in acute distress.     Appearance: He is well-developed. He is not toxic-appearing or diaphoretic.   Pulmonary:      Effort: Pulmonary effort is normal. No respiratory distress.   Genitourinary:     Penis:  Swelling present. No discharge.       Testes:         Right: Swelling present.         Left: Swelling present.   Musculoskeletal:         General: No deformity or signs of injury.      Right lower leg: Edema present.      Left lower leg: No edema.   Skin:     Findings: Erythema present.   Neurological:      Mental Status: He is alert and oriented to person, place, and time. Mental status is at baseline.      Motor: No seizure activity.   Psychiatric:         Attention and Perception: Attention normal.         Mood and Affect: Mood and affect normal.         Behavior: Behavior is cooperative.             Significant Labs: All pertinent labs within the past 24 hours have been reviewed.    Significant Imaging: I have reviewed all pertinent imaging results/findings within the past 24 hours.    Assessment/Plan:      * Cellulitis of right lower limb  Persistent. 3rd hospital admission. Improved on vancomycin and ceftriaxone during last hospitalization. Prescribed amoxicillin-clavulanate and doxycycline and it worsened. Appreciate Infectious Disease. Giving ceftriaxone, daptomycin, linezolid.     Moderate persistent asthma, uncomplicated  Give fluticasone-vilanterol in place of home mometasone-formoterol. Albuterol prn.       Elevated serum creatinine  Persistent since recent hospitalization. Monitoring.      VTE Risk Mitigation (From admission, onward)           Ordered     enoxaparin injection 40 mg  Daily         02/27/24 1703                    Discharge Planning   CONSTANTINO: 3/1/2024     Code Status: Full Code   Is the patient medically ready for discharge?: No    Reason for patient still in hospital (select all that apply): Treatment  Discharge Plan A: Home with family                  Sagar Cox MD  Department of Hospital Medicine   [facility] Surg

## 2024-02-29 NOTE — ASSESSMENT & PLAN NOTE
63M with worsening cellulitis refractory to multiple courses of oral antibiotics.      - f/u scrotal u/s  - ID consulted, see recs   - agree with broad spectrum antibiotics, currently on dapto/linezolid/rocephin   - No evidence of urinary obstruction, PVR bladder scan 20 mL   - No indication for indwelling lee catheter   - please reach out to urology if worsening of  exam or if any concerns for development of Fanny's gangrene   - rest of care per primary

## 2024-02-29 NOTE — CONSULTS
[facility] Surg  General Surgery  Consult Note    Patient Name: Bradley Collins  MRN: [id number]  Code Status: Full Code  Admission Date: 2/27/2024  Hospital Length of Stay: 2 days  Attending Physician: Sagar Cox MD  Primary Care Provider: Administration, [facility]    Patient information was obtained from patient, ER records, and primary team.     Inpatient consult to General Surgery  Consult performed by: Taye Christine MD  Consult ordered by: Sagar Cox MD        Subjective:     Principal Problem: Cellulitis of right lower limb    History of Present Illness: Bradley Collins is a 63M with PMH of asthma, Marsha 4+4 prostate cancer treated with XRT in 2020 who presents with progressively worsening RLE edema/erythema for the last month. He is presumed to have cellulitis, though the source is uncertain. He has no history of diabetes, no wounds. He does state that he uses allergy injections in his right gluteus. He has had no fevers, but subjective chills, has no leukocytosis (going back 13 days). US 2/16 showed no DVT and he has had several CT scans demonstrating soft tissue edema/inflammatory change without any evidence of gas or fluid collection. Urology is following for penile/scrotal edema and scrotal US today shows no fluid collection or gas. It is not particularly tender or painful, he has no sensory or motor deficits.     No current facility-administered medications on file prior to encounter.     Current Outpatient Medications on File Prior to Encounter   Medication Sig    albuterol (ACCUNEB) 1.25 mg/3 mL Nebu Take 1.25 mg by nebulization every 6 (six) hours as needed. Rescue    amoxicillin-clavulanate 875-125mg (AUGMENTIN) 875-125 mg per tablet Take 1 tablet by mouth every 12 (twelve) hours. for 10 days    budesonide-formoterol 160-4.5 mcg (SYMBICORT) 160-4.5 mcg/actuation HFAA Inhale 2 puffs into the lungs every 12 (twelve) hours. Controller    calcium carbonate (OS-CAROLYN) 500 mg calcium (1,250 mg)  tablet     doxycycline (MONODOX) 100 MG capsule Take 1 capsule (100 mg total) by mouth every 12 (twelve) hours. for 10 days    dupilumab 300 mg/2 mL PnIj INJECT 300MG SUBCUTANEOUSLY EVERY TWO WEEKS    fluticasone propionate 93 mcg/actuation AerB 2 sprays by Nasal route 2 (two) times a day.    mometasone-formoterol (DULERA) 200-5 mcg/actuation inhaler Inhale 2 puffs into the lungs 2 (two) times daily.    montelukast (SINGULAIR) 5 MG chewable tablet Take 5 mg by mouth.    nasal exhalation resistanc.dev (NASAL EXHALATION RESISTANCE DV NASL) by Nasal route.    omeprazole 20 mg TbEC Take 20 mg by mouth once daily.    pantoprazole (PROTONIX) 40 MG tablet Take 40 mg by mouth.    rosuvastatin 10 mg CpSP     tadalafiL (CIALIS) 20 MG Tab 20 mg.    tiotropium (SPIRIVA) 18 mcg inhalation capsule Inhale 18 mcg into the lungs once daily. Controller    vitamin D (VITAMIN D3) 1000 units Tab Take 1,000 Units by mouth once daily.       Review of patient's allergies indicates:   Allergen Reactions    Aspirin Shortness Of Breath       Past Medical History:   Diagnosis Date    Allergy     Asthma     Cellulitis     Elevated PSA     Prostate cancer      Past Surgical History:   Procedure Laterality Date    ENDOSCOPIC ULTRASOUND OF UPPER GASTROINTESTINAL TRACT N/A 7/25/2023    Procedure: ULTRASOUND, UPPER GI TRACT, ENDOSCOPIC;  Surgeon: Yoseph Li MD;  Location: [address];  Service: Endoscopy;  Laterality: N/A;  instr portal-va referral    SINUS SURGERY Bilateral 2001     Family History    Family history is unknown by patient.       Tobacco Use    Smoking status: Never    Smokeless tobacco: Never   Substance and Sexual Activity    Alcohol use: Yes     Alcohol/week: 1.0 standard drink of alcohol     Types: 1 Cans of beer per week     Comment: week    Drug use: Never    Sexual activity: Not on file     Review of Systems   Constitutional:  Positive for chills. Negative for activity change, appetite change, fatigue and fever.    Respiratory:  Negative for cough, chest tightness and shortness of breath.    Cardiovascular:  Positive for leg swelling. Negative for chest pain and palpitations.   Gastrointestinal:  Negative for abdominal distention and abdominal pain.   Genitourinary:  Negative for difficulty urinating, dysuria, flank pain and penile discharge.   Musculoskeletal:  Negative for arthralgias.   Skin:  Positive for color change.   Neurological:  Negative for dizziness and light-headedness.     Objective:     Vital Signs (Most Recent):  Temp: 98 °F (36.7 °C) (02/29/24 0844)  Pulse: 86 (02/29/24 0858)  Resp: 18 (02/29/24 0905)  BP: 126/71 (02/29/24 0844)  SpO2: 97 % (02/29/24 0858) Vital Signs (24h Range):  Temp:  [98 °F (36.7 °C)-98.8 °F (37.1 °C)] 98 °F (36.7 °C)  Pulse:  [75-93] 86  Resp:  [16-18] 18  SpO2:  [92 %-97 %] 97 %  BP: (126-147)/(70-85) 126/71     Weight: 83.5 kg (184 lb 1.4 oz)  Body mass index is 28.83 kg/m².     Physical Exam  Constitutional:       General: He is not in acute distress.     Appearance: Normal appearance. He is not ill-appearing.   HENT:      Head: Normocephalic and atraumatic.   Eyes:      Extraocular Movements: Extraocular movements intact.      Pupils: Pupils are equal, round, and reactive to light.   Pulmonary:      Effort: Pulmonary effort is normal. No respiratory distress.   Abdominal:      General: Abdomen is flat.      Palpations: Abdomen is soft.   Musculoskeletal:         General: Swelling present. Normal range of motion.      Right lower leg: Edema present.      Comments: RLE with significant edema extending to his gluteus. Some increased warmth and erythema, not tender. No areas of fluctuance or crepitus. He has palpable pedal pulses. Normal ROM and strength without sensory changes.    Skin:     General: Skin is warm and dry.   Neurological:      General: No focal deficit present.      Mental Status: He is alert.            I have reviewed all pertinent lab results within the past 24  hours.  CBC:   Recent Labs   Lab 02/27/24  1359   WBC 10.47   RBC 3.40*   HGB 10.7*   HCT 31.7*      MCV 93   MCH 31.5*   MCHC 33.8     CMP:   Recent Labs   Lab 02/27/24  1359 02/28/24  0409 02/29/24  0437   GLU 76   < > 95   CALCIUM 9.7   < > 9.2   ALBUMIN 3.4*  --   --    PROT 7.8  --   --    *   < > 133*   K 4.5   < > 4.1   CO2 23   < > 23   CL 97   < > 99   BUN 16   < > 15   CREATININE 1.7*   < > 1.8*   ALKPHOS 86  --   --    ALT 16  --   --    AST 26  --   --    BILITOT 0.5  --   --     < > = values in this interval not displayed.       Significant Diagnostics:  I have reviewed all pertinent imaging results/findings within the past 24 hours.    Assessment/Plan:     * Cellulitis of right lower limb  64 yo M with significant RLE cellulitis. Given his stability, lack of crepitus and no gas on imaging, this is not necrotizing fasciitis. Additionally, he has no fluid collections or wounds requiring drainage or debridement.     - no surgical intervention indicated   - abx per primary team  - urology following for penile and scrotal swelling  - general surgery will sign off, please reach out with further questions or concerns regarding the care of this patient      VTE Risk Mitigation (From admission, onward)           Ordered     enoxaparin injection 40 mg  Daily         02/27/24 8788                    Thank you for your consult.     Taye Christine MD  General Surgery  [facility] - Med Surg

## 2024-02-29 NOTE — CONSULTS
Wound care noted intact skin. Urology and ID following at this time. Pt denied any needs at this time. Wound care to sign-off at this time.

## 2024-02-29 NOTE — ASSESSMENT & PLAN NOTE
62 yo M with significant RLE cellulitis. Given his stability, lack of crepitus and no gas on imaging, this is not necrotizing fasciitis. Additionally, he has no fluid collections or wounds requiring drainage or debridement.     - no surgical intervention indicated   - abx per primary team  - urology following for penile and scrotal swelling  - general surgery will sign off, please reach out with further questions or concerns regarding the care of this patient

## 2024-02-29 NOTE — SUBJECTIVE & OBJECTIVE
Interval History: NAOE Vitals stable. Denies difficulty urinating.   Cr 1.8.   Ordering scrotal u/s.     Objective:     Temp:  [97.9 °F (36.6 °C)-98.8 °F (37.1 °C)] 98.3 °F (36.8 °C)  Pulse:  [75-99] 90  Resp:  [17-18] 18  SpO2:  [92 %-97 %] 92 %  BP: (135-155)/(70-85) 135/70     Body mass index is 28.83 kg/m².           Drains       None                    Physical Exam  Constitutional:       Appearance: Normal appearance.   Cardiovascular:      Rate and Rhythm: Normal rate.   Pulmonary:      Effort: Pulmonary effort is normal.   Genitourinary:     Comments: Severe penile and scrotal swelling, unable to visualize meatus.  Uncircumcised.  Unable to palpable testicles within scrotum 2/2 swelling.  Warmth and induration of suprapubic region but no crepitus or overlying skin changes.  No necrosis.     Musculoskeletal:      Comments: Warmth induration and swelling extending from R gluteal region to ankle.    Neurological:      Mental Status: He is alert.           Significant Labs:    BMP:  Recent Labs   Lab 02/27/24  1359 02/28/24  0409 02/29/24  0437   * 132* 133*   K 4.5 4.3 4.1   CL 97 100 99   CO2 23 25 23   BUN 16 15 15   CREATININE 1.7* 1.8* 1.8*   CALCIUM 9.7 9.3 9.2       CBC:   Recent Labs   Lab 02/27/24  1359   WBC 10.47   HGB 10.7*   HCT 31.7*          All pertinent labs results from the past 24 hours have been reviewed.    Significant Imaging:  All pertinent imaging results/findings from the past 24 hours have been reviewed.

## 2024-02-29 NOTE — NURSING
Nurses Note -- 4 Eyes      2/29/2024   1:21 AM      Skin assessed during: Admit      [] No Altered Skin Integrity Present    []Prevention Measures Documented      [x] Yes- Altered Skin Integrity Present or Discovered   [] LDA Added if Not in Epic (Describe Wound)   [] New Altered Skin Integrity was Present on Admit and Documented in LDA   [x] Wound Image Taken    Wound Care Consulted? Yes    Attending Nurse:  Shayna Milner RN/Staff Member:  Shayna/Ken

## 2024-03-01 LAB
ANION GAP SERPL CALC-SCNC: 9 MMOL/L (ref 8–16)
BUN SERPL-MCNC: 12 MG/DL (ref 8–23)
CALCIUM SERPL-MCNC: 8.9 MG/DL (ref 8.7–10.5)
CHLORIDE SERPL-SCNC: 100 MMOL/L (ref 95–110)
CO2 SERPL-SCNC: 24 MMOL/L (ref 23–29)
CREAT SERPL-MCNC: 1.8 MG/DL (ref 0.5–1.4)
EST. GFR  (NO RACE VARIABLE): 41.5 ML/MIN/1.73 M^2
GLUCOSE SERPL-MCNC: 92 MG/DL (ref 70–110)
MRSA SPEC QL CULT: NORMAL
POTASSIUM SERPL-SCNC: 4 MMOL/L (ref 3.5–5.1)
SODIUM SERPL-SCNC: 133 MMOL/L (ref 136–145)

## 2024-03-01 PROCEDURE — 25000003 PHARM REV CODE 250: Performed by: HOSPITALIST

## 2024-03-01 PROCEDURE — 80048 BASIC METABOLIC PNL TOTAL CA: CPT | Performed by: HOSPITALIST

## 2024-03-01 PROCEDURE — 94761 N-INVAS EAR/PLS OXIMETRY MLT: CPT

## 2024-03-01 PROCEDURE — 11000001 HC ACUTE MED/SURG PRIVATE ROOM

## 2024-03-01 PROCEDURE — 36415 COLL VENOUS BLD VENIPUNCTURE: CPT | Performed by: HOSPITALIST

## 2024-03-01 PROCEDURE — 63600175 PHARM REV CODE 636 W HCPCS: Performed by: STUDENT IN AN ORGANIZED HEALTH CARE EDUCATION/TRAINING PROGRAM

## 2024-03-01 PROCEDURE — 25000003 PHARM REV CODE 250: Performed by: STUDENT IN AN ORGANIZED HEALTH CARE EDUCATION/TRAINING PROGRAM

## 2024-03-01 PROCEDURE — 94640 AIRWAY INHALATION TREATMENT: CPT

## 2024-03-01 RX ORDER — TORSEMIDE 20 MG/1
20 TABLET ORAL ONCE
Status: COMPLETED | OUTPATIENT
Start: 2024-03-01 | End: 2024-03-01

## 2024-03-01 RX ADMIN — FLUTICASONE FUROATE AND VILANTEROL TRIFENATATE 1 PUFF: 100; 25 POWDER RESPIRATORY (INHALATION) at 08:03

## 2024-03-01 RX ADMIN — LINEZOLID 600 MG: 600 TABLET, FILM COATED ORAL at 09:03

## 2024-03-01 RX ADMIN — Medication 1 CAPSULE: at 08:03

## 2024-03-01 RX ADMIN — TORSEMIDE 20 MG: 20 TABLET ORAL at 02:03

## 2024-03-01 RX ADMIN — TIOTROPIUM BROMIDE INHALATION SPRAY 2 PUFF: 3.12 SPRAY, METERED RESPIRATORY (INHALATION) at 08:03

## 2024-03-01 RX ADMIN — PANTOPRAZOLE SODIUM 40 MG: 40 TABLET, DELAYED RELEASE ORAL at 09:03

## 2024-03-01 RX ADMIN — Medication 1 CAPSULE: at 09:03

## 2024-03-01 RX ADMIN — DAPTOMYCIN 670 MG: 350 INJECTION, POWDER, LYOPHILIZED, FOR SOLUTION INTRAVENOUS at 06:03

## 2024-03-01 RX ADMIN — CEFTRIAXONE 2 G: 2 INJECTION, POWDER, FOR SOLUTION INTRAMUSCULAR; INTRAVENOUS at 05:03

## 2024-03-01 RX ADMIN — LINEZOLID 600 MG: 600 TABLET, FILM COATED ORAL at 08:03

## 2024-03-01 NOTE — PLAN OF CARE
Patient is AAOx4. Vital signs stable. No falls throughout shift. Patient ambulates independently. Pain managed with PRN pain medication. IV antibiotics administered.  Problem: Adult Inpatient Plan of Care  Goal: Plan of Care Review  Outcome: Ongoing, Progressing  Goal: Patient-Specific Goal (Individualized)  Outcome: Ongoing, Progressing  Goal: Absence of Hospital-Acquired Illness or Injury  Outcome: Ongoing, Progressing  Goal: Optimal Comfort and Wellbeing  Outcome: Ongoing, Progressing  Goal: Readiness for Transition of Care  Outcome: Ongoing, Progressing     Problem: Impaired Wound Healing  Goal: Optimal Wound Healing  Outcome: Ongoing, Progressing

## 2024-03-01 NOTE — PLAN OF CARE
Went over plan of care- all questions answered. No falls or injuries. No complaints . Will continue to monitor

## 2024-03-01 NOTE — PROGRESS NOTES
[facility] Medicine  Progress Note    Patient Name: Bradley Collins  MRN: [id number]  Patient Class: IP- Inpatient   Admission Date: 2/27/2024  Length of Stay: 3 days  Attending Physician: Sagar Cox MD  Primary Care Provider: AdministrationReed        Subjective:     Principal Problem:Cellulitis of right lower limb        HPI:  Bradley Collins is a 63 year old Black man with asthma, allergic rhinitis, hyperlipidemia, irritable bowel syndrome with diarrhea, history of prostate cancer treated with radiation, history of sinus surgery in 2001. He lives in [facility]. He works for [employer]. His primary care clinic is the [facility] clinic.    He was hospitalized at the [facility] on 2/12/2024 for right lower extremity cellulitis. He had a CT with contrast done and was told that it did not show anything. He was prescribed cephalexin.   He was hospitalized at [facility] from 2/16/2024 to 2/20/2024 for worsening cellulitis. Creatinine was elevated at 2.0 mg/dL (from 1.1 a year ago). He was initially put on piperacillin-tazobactam and vancomycin. Piperacillin-tazobactam was changed to ceftriaxone. Erythema improved but edema persisted. He had induration of the thigh. CT showed diffuse cutaneous thickening and edema with prominent right inguinal lymph nodes. Antibiotics were switched to oral amoxicillin-clavulanate and doxycycline on 2/19/2024. Edema improved. Induration persisted. He was discharged home.   He followed up at [facility] on 2/27/2024. He had 4 days left of the antibiotics. Swelling had worsened and spread up to his abdomen. He had edema and erythema of the entire right lower extremity, right groin, and right lower abdomen. It was tender and warm. He was advised to return to the emergency department to resume intravenous antibiotic treatment. Labs showed creatinine to  still be elevated (1.7 mg/dL, from 1.8 on 2/19/2024). Non-contrast CT showed soft tissue induration throughout the right anterior pelvis, thigh,and leg, skin thickening and edema of the subcutaneous tissue, superficial fascia, and deep fascia, scrotal edema, and retroperitoneal/extraperitoneal edema along the sidewalls. He was given piperacillin-tazobactam and vancomycin. He was admitted to Hospital Medicine Team W.     Overview/Hospital Course:  He was put on vancomycin. Infectious Disease was consulted for recommendations changed vancomycin to ceftriaxone, daptomycin, and linezolid. General Surgery was consulted to evaluate and recommended no surgical intervention to help reduce swelling. Urology was consulted to evaluate his scrotal swelling and found no issues they had to manage. His right lower extremity, right pelvis, penile, and scrotal swelling persisted. He felt that it was increasing.     Interval History: Still having persistent severe swelling. He is worried that the swelling will increase to the point that he will not be able to urinate. Currently, he has no problems getting urine out.     Review of Systems   Constitutional:  Negative for chills and fever.   Cardiovascular:  Positive for leg swelling.   Skin:  Positive for color change. Negative for wound.   Neurological:  Negative for seizures and syncope.     Objective:     Vital Signs (Most Recent):  Temp: 98 °F (36.7 °C) (03/01/24 1105)  Pulse: 86 (03/01/24 1105)  Resp: 18 (03/01/24 1105)  BP: (!) 142/77 (03/01/24 1105)  SpO2: 96 % (03/01/24 1105) Vital Signs (24h Range):  Temp:  [98 °F (36.7 °C)-99 °F (37.2 °C)] 98 °F (36.7 °C)  Pulse:  [77-86] 86  Resp:  [18] 18  SpO2:  [96 %-98 %] 96 %  BP: (122-162)/(67-84) 142/77     Weight: 83.5 kg (184 lb 1.4 oz)  Body mass index is 28.83 kg/m².    Intake/Output Summary (Last 24 hours) at 3/1/2024 1252  Last data filed at 2/29/2024 1814  Gross per 24 hour   Intake 165.11 ml   Output --   Net 165.11 ml            Physical Exam  Vitals and nursing note reviewed.   Constitutional:       General: He is not in acute distress.     Appearance: He is well-developed. He is not toxic-appearing or diaphoretic.   Pulmonary:      Effort: Pulmonary effort is normal. No respiratory distress.   Genitourinary:     Penis: Swelling present. No discharge.       Testes:         Right: Swelling present.         Left: Swelling present.   Musculoskeletal:         General: No deformity or signs of injury.      Right lower leg: Edema present.      Left lower leg: No edema.   Skin:     Findings: Erythema present.   Neurological:      Mental Status: He is alert and oriented to person, place, and time. Mental status is at baseline.      Motor: No seizure activity.   Psychiatric:         Attention and Perception: Attention normal.         Mood and Affect: Mood and affect normal.         Behavior: Behavior is cooperative.             Significant Labs: All pertinent labs within the past 24 hours have been reviewed.    Significant Imaging: I have reviewed all pertinent imaging results/findings within the past 24 hours.    Assessment/Plan:      * Cellulitis of right lower limb  Persistent. 3rd hospital admission. Improved on vancomycin and ceftriaxone during last hospitalization. Prescribed amoxicillin-clavulanate and doxycycline and it worsened. Appreciate Infectious Disease. Giving ceftriaxone, daptomycin, linezolid. Try a dose of torsemide for the swelling. Monitor renal function.    Moderate persistent asthma, uncomplicated  Give fluticasone-vilanterol in place of home mometasone-formoterol. Albuterol prn.       Elevated serum creatinine  Persistent since recent hospitalization. Monitoring.      VTE Risk Mitigation (From admission, onward)           Ordered     enoxaparin injection 40 mg  Daily         02/27/24 1703                    Discharge Planning   CONSTANTINO: 3/4/2024     Code Status: Full Code   Is the patient medically ready for discharge?: No     Reason for patient still in hospital (select all that apply): Patient trending condition and Treatment  Discharge Plan A: Home with family                  Sagar Cox MD  Department of Hospital Medicine   [facility] Surg

## 2024-03-01 NOTE — ASSESSMENT & PLAN NOTE
Persistent. 3rd hospital admission. Improved on vancomycin and ceftriaxone during last hospitalization. Prescribed amoxicillin-clavulanate and doxycycline and it worsened. Appreciate Infectious Disease. Giving ceftriaxone, daptomycin, linezolid. Try a dose of torsemide for the swelling. Monitor renal function.

## 2024-03-02 PROBLEM — I89.0 LYMPHEDEMA: Status: ACTIVE | Noted: 2024-03-02

## 2024-03-02 PROBLEM — N17.9 AKI (ACUTE KIDNEY INJURY): Status: ACTIVE | Noted: 2024-03-02

## 2024-03-02 LAB
ANION GAP SERPL CALC-SCNC: 8 MMOL/L (ref 8–16)
BUN SERPL-MCNC: 14 MG/DL (ref 8–23)
CALCIUM SERPL-MCNC: 8.8 MG/DL (ref 8.7–10.5)
CHLORIDE SERPL-SCNC: 100 MMOL/L (ref 95–110)
CO2 SERPL-SCNC: 25 MMOL/L (ref 23–29)
CREAT SERPL-MCNC: 1.7 MG/DL (ref 0.5–1.4)
EST. GFR  (NO RACE VARIABLE): 44.5 ML/MIN/1.73 M^2
GLUCOSE SERPL-MCNC: 110 MG/DL (ref 70–110)
POTASSIUM SERPL-SCNC: 3.9 MMOL/L (ref 3.5–5.1)
SODIUM SERPL-SCNC: 133 MMOL/L (ref 136–145)

## 2024-03-02 PROCEDURE — 80048 BASIC METABOLIC PNL TOTAL CA: CPT | Performed by: HOSPITALIST

## 2024-03-02 PROCEDURE — 25000003 PHARM REV CODE 250: Performed by: HOSPITALIST

## 2024-03-02 PROCEDURE — 99233 SBSQ HOSP IP/OBS HIGH 50: CPT | Mod: ,,, | Performed by: REGISTERED NURSE

## 2024-03-02 PROCEDURE — 11000001 HC ACUTE MED/SURG PRIVATE ROOM

## 2024-03-02 PROCEDURE — 94761 N-INVAS EAR/PLS OXIMETRY MLT: CPT

## 2024-03-02 PROCEDURE — 63600175 PHARM REV CODE 636 W HCPCS: Performed by: STUDENT IN AN ORGANIZED HEALTH CARE EDUCATION/TRAINING PROGRAM

## 2024-03-02 PROCEDURE — 25000003 PHARM REV CODE 250: Performed by: STUDENT IN AN ORGANIZED HEALTH CARE EDUCATION/TRAINING PROGRAM

## 2024-03-02 PROCEDURE — 36415 COLL VENOUS BLD VENIPUNCTURE: CPT | Performed by: HOSPITALIST

## 2024-03-02 PROCEDURE — 94640 AIRWAY INHALATION TREATMENT: CPT

## 2024-03-02 RX ORDER — BUMETANIDE 0.5 MG/1
0.5 TABLET ORAL DAILY
Status: COMPLETED | OUTPATIENT
Start: 2024-03-02 | End: 2024-03-02

## 2024-03-02 RX ORDER — TALC
6 POWDER (GRAM) TOPICAL NIGHTLY PRN
Status: DISCONTINUED | OUTPATIENT
Start: 2024-03-02 | End: 2024-03-09 | Stop reason: HOSPADM

## 2024-03-02 RX ADMIN — DAPTOMYCIN 670 MG: 350 INJECTION, POWDER, LYOPHILIZED, FOR SOLUTION INTRAVENOUS at 06:03

## 2024-03-02 RX ADMIN — TIOTROPIUM BROMIDE INHALATION SPRAY 2 PUFF: 3.12 SPRAY, METERED RESPIRATORY (INHALATION) at 09:03

## 2024-03-02 RX ADMIN — PANTOPRAZOLE SODIUM 40 MG: 40 TABLET, DELAYED RELEASE ORAL at 09:03

## 2024-03-02 RX ADMIN — Medication 6 MG: at 09:03

## 2024-03-02 RX ADMIN — Medication 1 CAPSULE: at 09:03

## 2024-03-02 RX ADMIN — LINEZOLID 600 MG: 600 TABLET, FILM COATED ORAL at 09:03

## 2024-03-02 RX ADMIN — BUMETANIDE 0.5 MG: 0.5 TABLET ORAL at 05:03

## 2024-03-02 RX ADMIN — Medication 6 MG: at 03:03

## 2024-03-02 RX ADMIN — CEFTRIAXONE 2 G: 2 INJECTION, POWDER, FOR SOLUTION INTRAMUSCULAR; INTRAVENOUS at 05:03

## 2024-03-02 RX ADMIN — FLUTICASONE FUROATE AND VILANTEROL TRIFENATATE 1 PUFF: 100; 25 POWDER RESPIRATORY (INHALATION) at 09:03

## 2024-03-02 NOTE — ASSESSMENT & PLAN NOTE
Patient with acute kidney injury/acute renal failure likely due to acute tubular necrosis. DALIA is currently stable. Baseline creatinine 1.2 - Labs reviewed- Renal function/electrolytes with Estimated Creatinine Clearance: 45.4 mL/min (A) (based on SCr of 1.7 mg/dL (H)). according to latest data. Monitor urine output and serial BMP and adjust therapy as needed. Avoid nephrotoxins and renally dose meds for GFR listed above.  We will consider consulting Nephrology if persistent DALIA

## 2024-03-02 NOTE — ASSESSMENT & PLAN NOTE
- Stable  - Continue fluticasone-vilanterol in place of home mometasone-formoterol. Albuterol prn.

## 2024-03-02 NOTE — SUBJECTIVE & OBJECTIVE
Interval History:  Improvement in right lower extremity, scrotal edema after the dose of torsemide yesterday.    Review of Systems   Constitutional:  Negative for chills, diaphoresis, fatigue and fever.   HENT:  Negative for congestion, ear pain, sore throat, tinnitus and trouble swallowing.    Eyes:  Negative for photophobia, discharge and visual disturbance.   Respiratory:  Negative for apnea, cough, choking, chest tightness and shortness of breath.    Cardiovascular:  Negative for chest pain, palpitations and leg swelling.   Gastrointestinal:  Negative for abdominal distention, abdominal pain, constipation, diarrhea, nausea and vomiting.   Endocrine: Negative for polydipsia, polyphagia and polyuria.   Genitourinary:  Positive for penile swelling and scrotal swelling. Negative for difficulty urinating and dysuria.   Musculoskeletal:  Positive for joint swelling. Negative for arthralgias, back pain and gait problem.        Right leg swelling   Skin:  Negative for color change and rash.   Neurological:  Negative for dizziness, syncope, speech difficulty, weakness, light-headedness and headaches.   Psychiatric/Behavioral:  Negative for agitation, confusion, dysphoric mood, hallucinations and sleep disturbance. The patient is not nervous/anxious.      Objective:     Vital Signs (Most Recent):  Temp: 97.6 °F (36.4 °C) (03/02/24 1543)  Pulse: 78 (03/02/24 1543)  Resp: 18 (03/02/24 1543)  BP: (!) 153/84 (03/02/24 1543)  SpO2: 96 % (03/02/24 1543) Vital Signs (24h Range):  Temp:  [97.6 °F (36.4 °C)-98.3 °F (36.8 °C)] 97.6 °F (36.4 °C)  Pulse:  [73-87] 78  Resp:  [16-19] 18  SpO2:  [92 %-97 %] 96 %  BP: (125-153)/(69-84) 153/84     Weight: 83.5 kg (184 lb 1.4 oz)  Body mass index is 28.83 kg/m².  No intake or output data in the 24 hours ending 03/02/24 1648      Physical Exam  Constitutional:       General: He is not in acute distress.     Appearance: Normal appearance. He is normal weight.   HENT:      Head: Normocephalic  and atraumatic.      Nose: No congestion.      Mouth/Throat:      Mouth: Mucous membranes are moist.   Eyes:      Extraocular Movements: Extraocular movements intact.      Conjunctiva/sclera: Conjunctivae normal.      Pupils: Pupils are equal, round, and reactive to light.   Neck:      Vascular: No carotid bruit.   Cardiovascular:      Rate and Rhythm: Normal rate and regular rhythm.      Pulses: Normal pulses.      Heart sounds: Normal heart sounds. No murmur heard.     No gallop.   Pulmonary:      Effort: Pulmonary effort is normal. No respiratory distress.      Breath sounds: Normal breath sounds. No wheezing, rhonchi or rales.   Chest:      Chest wall: No tenderness.   Abdominal:      General: Abdomen is flat. Bowel sounds are normal. There is no distension.      Tenderness: There is no abdominal tenderness.   Genitourinary:     Comments: Scrotal swelling +  Penile swelling +  Musculoskeletal:         General: Swelling and tenderness present.      Cervical back: No rigidity.      Right lower leg: Edema present.      Left lower leg: No edema.      Comments: Right lower extremity edema, with thickening of skin changes consistent with lymphedema   Ankle cuffing +  Stemmer sign +   Skin:     Capillary Refill: Capillary refill takes less than 2 seconds.   Neurological:      General: No focal deficit present.      Mental Status: He is alert and oriented to person, place, and time. Mental status is at baseline.      Cranial Nerves: No cranial nerve deficit.      Motor: No weakness.   Psychiatric:         Mood and Affect: Mood normal.         Behavior: Behavior normal.             Significant Labs: All pertinent labs within the past 24 hours have been reviewed.    Significant Imaging: I have reviewed all pertinent imaging results/findings within the past 24 hours.

## 2024-03-02 NOTE — PROGRESS NOTES
[facility] Medicine  Progress Note    Patient Name: Bradley Collins  MRN: [id number]  Patient Class: IP- Inpatient   Admission Date: 2/27/2024  Length of Stay: 4 days  Attending Physician: Gilmer Noel MD  Primary Care Provider: Administration, Reed        Subjective:     Principal Problem:Cellulitis of right lower limb        HPI:  Bradley Collins is a 63 year old Black man with asthma, allergic rhinitis, hyperlipidemia, irritable bowel syndrome with diarrhea, history of prostate cancer treated with radiation, history of sinus surgery in 2001. He lives in [facility]. He works for [employer]. His primary care clinic is the [facility] clinic.    He was hospitalized at the [facility] on 2/12/2024 for right lower extremity cellulitis. He had a CT with contrast done and was told that it did not show anything. He was prescribed cephalexin.   He was hospitalized at [facility] from 2/16/2024 to 2/20/2024 for worsening cellulitis. Creatinine was elevated at 2.0 mg/dL (from 1.1 a year ago). He was initially put on piperacillin-tazobactam and vancomycin. Piperacillin-tazobactam was changed to ceftriaxone. Erythema improved but edema persisted. He had induration of the thigh. CT showed diffuse cutaneous thickening and edema with prominent right inguinal lymph nodes. Antibiotics were switched to oral amoxicillin-clavulanate and doxycycline on 2/19/2024. Edema improved. Induration persisted. He was discharged home.   He followed up at [facility] on 2/27/2024. He had 4 days left of the antibiotics. Swelling had worsened and spread up to his abdomen. He had edema and erythema of the entire right lower extremity, right groin, and right lower abdomen. It was tender and warm. He was advised to return to the emergency department to resume intravenous antibiotic treatment. Labs showed creatinine to still be  elevated (1.7 mg/dL, from 1.8 on 2/19/2024). Non-contrast CT showed soft tissue induration throughout the right anterior pelvis, thigh,and leg, skin thickening and edema of the subcutaneous tissue, superficial fascia, and deep fascia, scrotal edema, and retroperitoneal/extraperitoneal edema along the sidewalls. He was given piperacillin-tazobactam and vancomycin. He was admitted to Hospital Medicine Team W.     Overview/Hospital Course:  He was put on vancomycin. Infectious Disease was consulted for recommendations changed vancomycin to ceftriaxone, daptomycin, and linezolid. General Surgery was consulted to evaluate and recommended no surgical intervention to help reduce swelling. Urology was consulted to evaluate his scrotal swelling and found no issues they had to manage. His right lower extremity, right pelvis, penile, and scrotal swelling decreased after a dose of torsemide on 3/1/24.  Giving his history physical examination diagnosis of lymphedema has been made.  His creatine is downtrending 1 dose of Bumex 0.5 mg on 03/02/2024    Interval History:  Improvement in right lower extremity, scrotal edema after the dose of torsemide yesterday.    Review of Systems   Constitutional:  Negative for chills, diaphoresis, fatigue and fever.   HENT:  Negative for congestion, ear pain, sore throat, tinnitus and trouble swallowing.    Eyes:  Negative for photophobia, discharge and visual disturbance.   Respiratory:  Negative for apnea, cough, choking, chest tightness and shortness of breath.    Cardiovascular:  Negative for chest pain, palpitations and leg swelling.   Gastrointestinal:  Negative for abdominal distention, abdominal pain, constipation, diarrhea, nausea and vomiting.   Endocrine: Negative for polydipsia, polyphagia and polyuria.   Genitourinary:  Positive for penile swelling and scrotal swelling. Negative for difficulty urinating and dysuria.   Musculoskeletal:  Positive for joint swelling. Negative for  arthralgias, back pain and gait problem.        Right leg swelling   Skin:  Negative for color change and rash.   Neurological:  Negative for dizziness, syncope, speech difficulty, weakness, light-headedness and headaches.   Psychiatric/Behavioral:  Negative for agitation, confusion, dysphoric mood, hallucinations and sleep disturbance. The patient is not nervous/anxious.      Objective:     Vital Signs (Most Recent):  Temp: 97.6 °F (36.4 °C) (03/02/24 1543)  Pulse: 78 (03/02/24 1543)  Resp: 18 (03/02/24 1543)  BP: (!) 153/84 (03/02/24 1543)  SpO2: 96 % (03/02/24 1543) Vital Signs (24h Range):  Temp:  [97.6 °F (36.4 °C)-98.3 °F (36.8 °C)] 97.6 °F (36.4 °C)  Pulse:  [73-87] 78  Resp:  [16-19] 18  SpO2:  [92 %-97 %] 96 %  BP: (125-153)/(69-84) 153/84     Weight: 83.5 kg (184 lb 1.4 oz)  Body mass index is 28.83 kg/m².  No intake or output data in the 24 hours ending 03/02/24 1648      Physical Exam  Constitutional:       General: He is not in acute distress.     Appearance: Normal appearance. He is normal weight.   HENT:      Head: Normocephalic and atraumatic.      Nose: No congestion.      Mouth/Throat:      Mouth: Mucous membranes are moist.   Eyes:      Extraocular Movements: Extraocular movements intact.      Conjunctiva/sclera: Conjunctivae normal.      Pupils: Pupils are equal, round, and reactive to light.   Neck:      Vascular: No carotid bruit.   Cardiovascular:      Rate and Rhythm: Normal rate and regular rhythm.      Pulses: Normal pulses.      Heart sounds: Normal heart sounds. No murmur heard.     No gallop.   Pulmonary:      Effort: Pulmonary effort is normal. No respiratory distress.      Breath sounds: Normal breath sounds. No wheezing, rhonchi or rales.   Chest:      Chest wall: No tenderness.   Abdominal:      General: Abdomen is flat. Bowel sounds are normal. There is no distension.      Tenderness: There is no abdominal tenderness.   Genitourinary:     Comments: Scrotal swelling +  Penile  swelling +  Musculoskeletal:         General: Swelling and tenderness present.      Cervical back: No rigidity.      Right lower leg: Edema present.      Left lower leg: No edema.      Comments: Right lower extremity edema, with thickening of skin changes consistent with lymphedema   Ankle cuffing +  Stemmer sign +   Skin:     Capillary Refill: Capillary refill takes less than 2 seconds.   Neurological:      General: No focal deficit present.      Mental Status: He is alert and oriented to person, place, and time. Mental status is at baseline.      Cranial Nerves: No cranial nerve deficit.      Motor: No weakness.   Psychiatric:         Mood and Affect: Mood normal.         Behavior: Behavior normal.             Significant Labs: All pertinent labs within the past 24 hours have been reviewed.    Significant Imaging: I have reviewed all pertinent imaging results/findings within the past 24 hours.    Assessment/Plan:      * Cellulitis of right lower limb  Persistent. 3rd hospital admission. Improved on vancomycin and ceftriaxone during last hospitalization. Prescribed amoxicillin-clavulanate and doxycycline and it worsened. Appreciate Infectious Disease. Giving ceftriaxone, daptomycin, linezolid. Received torsemide on 3/1/24 for the swelling. Give Bumex 0.5 mg 1 dose today. Monitor renal function.    Lymphedema  - History and physical examination including ankle cup sign, Stammer sign, thickening of skin consistent with right lower extremity lymphedema   - give 1 dose of Bumex 0.5 mg today   - monitor renal function closely  - Limit sodium intake to 2000 mg daily.  Limit volume intake to 1.5 L daily.  Elevate legs when resting.   - vascular Medicine consult as outpatient for further lymphedema therapy      Elevated serum creatinine  - Persistent since recent hospitalization. Monitoring.  - downtrending  - we will closely monitor since we are giving diuretics for lymphedema, right lower extremity swelling    Moderate  persistent asthma, uncomplicated  - Stable  - Continue fluticasone-vilanterol in place of home mometasone-formoterol. Albuterol prn.       DALIA (acute kidney injury)  Patient with acute kidney injury/acute renal failure likely due to acute tubular necrosis. DALIA is currently stable. Baseline creatinine 1.2 - Labs reviewed- Renal function/electrolytes with Estimated Creatinine Clearance: 45.4 mL/min (A) (based on SCr of 1.7 mg/dL (H)). according to latest data. Monitor urine output and serial BMP and adjust therapy as needed. Avoid nephrotoxins and renally dose meds for GFR listed above.  We will consider consulting Nephrology if persistent DALIA      VTE Risk Mitigation (From admission, onward)           Ordered     enoxaparin injection 40 mg  Daily         02/27/24 1703                    Discharge Planning   CONSTANTINO: 3/4/2024     Code Status: Full Code   Is the patient medically ready for discharge?: No    Reason for patient still in hospital (select all that apply): Patient trending condition and Treatment  Discharge Plan A: Home with family                  Gilmer Noel MD  Department of Hospital Medicine   [facility]

## 2024-03-02 NOTE — ASSESSMENT & PLAN NOTE
- Persistent since recent hospitalization. Monitoring.  - downtrending  - we will closely monitor since we are giving diuretics for lymphedema, right lower extremity swelling

## 2024-03-02 NOTE — ASSESSMENT & PLAN NOTE
"63M with asthma, allergic rhinitis (on Dupixent, bi-weekly injection), IBS, h/o prostate cancer (prior tx w/ radiation 2020), and recent h/o persistent RLE cellulitis for the past month; which seems to have begun 1-2d following routine dupixent injections to L thigh. Pt has had multiple recent hospital admissions, but RLE cellulitis failing to resolve with abx tx outpt.     Pt now presents to [facility] for worsening RLE cellulitis with increased redness, swelling, induration of entire L foot/leg/thigh, with spread to groin/perineum and lower pelvis/abdomen. Also with concerns for scrotal swelling, involvement.     CT of Right lower leg/thigh/pelvis: showed severe soft tissue induration throughout right leg, pelvis. With edema to deep fascia, with scrotal edema, and retroperitoneal/extraperitoneal edema along the sidewalls. No abscess or tissue gas seen. Scrotal US with cellulitis, edema. Without fluid collection. US of lower right extremity without evidence of DVT, though noting a "slow flow". US of lower right arteries noting no significant stenosis. Normal ABIs.     ID consulted for abx recs of progressive RLE cellulitis on oral abx outpt.     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 Pt was transitioned to daptomycin, linezolid (toxin production inhibitor), and ceftriaxone. Urology and general surgery following. Urology without concern for tiffany's, though recommending scrotal US. General surgery noting no evidence of fourniers. No surgical/urological recommendations regarding persistent swelling.     Recommendations / Plan:  Continue daptomycin and ceftriaxone for cellulitis while inpatient. Okay to discontinue linezolid.   Continue scrotal elevation, leg elevation.   Consider vascular surgery for eval regarding persistent swelling, concerns for underlying venous insufficiency vs infectious process.   Plan reviewed with ID staff. ID will follow with you.             "

## 2024-03-02 NOTE — PROGRESS NOTES
"[facility] Surg  Infectious Disease  Progress Note    Patient Name: Bradley Collins  MRN: [id number]  Admission Date: 2/27/2024  Length of Stay: 4 days  Attending Physician: Gilmer Noel MD  Primary Care Provider: Administration, [facility]    Isolation Status: No active isolations  Assessment/Plan:      ID  * Cellulitis of right lower limb  63M with asthma, allergic rhinitis (on Dupixent, bi-weekly injection), IBS, h/o prostate cancer (prior tx w/ radiation 2020), and recent h/o persistent RLE cellulitis for the past month; which seems to have begun 1-2d following routine dupixent injections to L thigh. Pt has had multiple recent hospital admissions, but RLE cellulitis failing to resolve with abx tx outpt.     Pt now presents to [facility] for worsening RLE cellulitis with increased redness, swelling, induration of entire L foot/leg/thigh, with spread to groin/perineum and lower pelvis/abdomen. Also with concerns for scrotal swelling, involvement.     CT of Right lower leg/thigh/pelvis: showed severe soft tissue induration throughout right leg, pelvis. With edema to deep fascia, with scrotal edema, and retroperitoneal/extraperitoneal edema along the sidewalls. No abscess or tissue gas seen. Scrotal US with cellulitis, edema. Without fluid collection. US of lower right extremity without evidence of DVT, though noting a "slow flow". US of lower right arteries noting no significant stenosis. Normal ABIs.     ID consulted for abx recs of progressive RLE cellulitis on oral abx outpt.     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 Pt was transitioned to daptomycin, linezolid (toxin production inhibitor), and ceftriaxone. Urology and general surgery following. Urology without concern for tiffany's, though recommending scrotal US. General surgery noting no evidence of fourniers. No surgical/urological recommendations regarding persistent swelling.     Recommendations / Plan:  Continue daptomycin and ceftriaxone for " cellulitis while inpatient. Okay to discontinue linezolid.   Continue scrotal elevation, leg elevation.   Consider vascular surgery for eval regarding persistent swelling, concerns for underlying venous insufficiency vs infectious process.   Plan reviewed with ID staff. ID will follow with you.                       Thank you for your consult. I will follow-up with patient. Please contact us if you have any additional questions.    Yolanda Stone NP  Infectious Disease  [facility] S[facility]    Subjective:     Principal Problem:Cellulitis of right lower limb    HPI: 63M with asthma, allergic rhinitis (on Dupixent, bi-weekly injection), IBS, h/o prostate cancer (prior tx w/ radiation 2020), and recent h/o persistent RLE cellulitis requiring multiple recent hospital admissions, failing to resolve with abx tx.     [facility]; which apparently began 1-2 days following routine bi-weekly injection of Dupixent to Left thigh. Ct-leg unremarkable for deeper infection then. Given cephalexin, but [facility] for worsening RLE cellulitis, noted to have significant DALIA (Cr 2.0; baseline 1.1?). Given zosyn, de-escalated to CTX inpatient. Erythema improved but edema persisted. He had induration of the thigh. CT showed diffuse cutaneous thickening and edema with prominent right inguinal lymph nodes. IV-CTX switched to oral augmentin and doxycycline on 2/19/2024. Redness and Edema improved; but induration persisted. He was discharged home on doxy / augmentin for 14d, HERNANDO 03/01.     Pt [facility]; worsening redness/ swelling with spread up to his abdomen. He had edema and erythema of the entire right lower extremity, right groin, and right lower abdomen. It was tender and warm.  Thus, pt re-ad[facility] for worsening RLE cellulitis and ongoing DALIA (Cr 1.8).     Pt arrived AF, VSS, HDS, wbc nml, started on empiric Iv-zosyn /  vanc.    Non-contrast CT showed soft tissue induration throughout the right anterior pelvis, thigh,and leg, skin thickening and edema of the subcutaneous tissue, superficial fascia, and deep fascia, scrotal edema, and retroperitoneal/extraperitoneal edema along the sidewalls.  ID consulted for abx recs of progressive RLE cellulitis on oral abx.           Interval hx:     Afebrile. HDS. Remains with right leg, testicle, penile, lower abdomen swelling. Urology and general surgery not concerned for nec fas. Without obstruction. Has had antibiotics ~ 1 month without significant improvement in swelling.     Past Medical History:   Diagnosis Date    Allergy     Asthma     Cellulitis     Elevated PSA     Prostate cancer        Past Surgical History:   Procedure Laterality Date    ENDOSCOPIC ULTRASOUND OF UPPER GASTROINTESTINAL TRACT N/A 2023    Procedure: ULTRASOUND, UPPER GI TRACT, ENDOSCOPIC;  Surgeon: Yoseph Li MD;  Location: [facility] ([address] [place] [address]);  Service: Endoscopy;  Laterality: N/A;  instr [place]-[facility] referral    SINUS SURGERY Bilateral        Review of patient's allergies indicates:   Allergen Reactions    Aspirin Shortness Of Breath       Medications:  Medications Prior to Admission   Medication Sig    albuterol (ACCUNEB) 1.25 mg/3 mL Nebu Take 1.25 mg by nebulization every 6 (six) hours as needed. Rescue    [] amoxicillin-clavulanate 875-125mg (AUGMENTIN) 875-125 mg per tablet Take 1 tablet by mouth every 12 (twelve) hours. for 10 days    budesonide-formoterol 160-4.5 mcg (SYMBICORT) 160-4.5 mcg/actuation HFAA Inhale 2 puffs into the lungs every 12 (twelve) hours. Controller    calcium carbonate (OS-CAROLYN) 500 mg calcium (1,250 mg) tablet     [] doxycycline (MONODOX) 100 MG capsule Take 1 capsule (100 mg total) by mouth every 12 (twelve) hours. for 10 days    dupilumab 300 mg/2 mL PnIj INJECT 300MG SUBCUTANEOUSLY EVERY TWO WEEKS    fluticasone propionate 93 mcg/actuation AerB 2 sprays by  Nasal route 2 (two) times a day.    mometasone-formoterol (DULERA) 200-5 mcg/actuation inhaler Inhale 2 puffs into the lungs 2 (two) times daily.    montelukast (SINGULAIR) 5 MG chewable tablet Take 5 mg by mouth.    nasal exhalation resistanc.dev (NASAL EXHALATION RESISTANCE DV NASL) by Nasal route.    omeprazole 20 mg TbEC Take 20 mg by mouth once daily.    pantoprazole (PROTONIX) 40 MG tablet Take 40 mg by mouth.    rosuvastatin 10 mg CpSP     tadalafiL (CIALIS) 20 MG Tab 20 mg.    tiotropium (SPIRIVA) 18 mcg inhalation capsule Inhale 18 mcg into the lungs once daily. Controller    vitamin D (VITAMIN D3) 1000 units Tab Take 1,000 Units by mouth once daily.       Antibiotics (From admission, onward)      Start     Stop Route Frequency Ordered    02/28/24 2100  linezolid tablet 600 mg         -- Oral Every 12 hours 02/28/24 1446    02/28/24 1730  DAPTOmycin (CUBICIN) 670 mg in sodium chloride 0.9% SolP 50 mL IVPB         -- IV Every 24 hours (non-standard times) 02/28/24 1446    02/28/24 1630  cefTRIAXone (ROCEPHIN) 2 g in dextrose 5 % in water (D5W) 100 mL IVPB (MB+)         -- IV Every 24 hours (non-standard times) 02/28/24 1446          Antifungals (From admission, onward)      None          Antivirals (From admission, onward)      None               There is no immunization history on file for this patient.    Family History    Family history is unknown by patient.       Social History     Socioeconomic History    Marital status: Single   Tobacco Use    Smoking status: Never    Smokeless tobacco: Never   Substance and Sexual Activity    Alcohol use: Yes     Alcohol/week: 1.0 standard drink of alcohol     Types: 1 Cans of beer per week     Comment: week    Drug use: Never     Social Determinants of Health     Financial Resource Strain: Low Risk  (2/29/2024)    Overall Financial Resource Strain (CARDIA)     Difficulty of Paying Living Expenses: Not hard at all   Food Insecurity: No Food Insecurity (2/29/2024)     Hunger Vital Sign     Worried About Running Out of Food in the Last Year: Never true     Ran Out of Food in the Last Year: Never true   Transportation Needs: No Transportation Needs (2/29/2024)    PRAPARE - Transportation     Lack of Transportation (Medical): No     Lack of Transportation (Non-Medical): No   Physical Activity: Insufficiently Active (2/29/2024)    Exercise Vital Sign     Days of Exercise per Week: 3 days     Minutes of Exercise per Session: 40 min   Stress: No Stress Concern Present (2/29/2024)    [employer] [place] [employer] - Occupational Stress Questionnaire     Feeling of Stress : Not at all   Social Connections: Moderately Integrated (2/29/2024)    Social Connection and Isolation Panel [NHANES]     Frequency of Communication with Friends and Family: More than three times a week     Frequency of Social Gatherings with Friends and Family: More than three times a week     Attends [employer] Services: More than 4 times per year     Active Member of Clubs or Organizations: No     Attends Club or Organization Meetings: Never     Marital Status: Living with partner   Housing Stability: Low Risk  (2/29/2024)    Housing Stability Vital Sign     Unable to Pay for Housing in the Last Year: No     Number of Places Lived in the Last Year: 1     Unstable Housing in the Last Year: No     Review of Systems   Constitutional:  Positive for chills. Negative for fatigue and fever.   Respiratory:  Negative for cough and shortness of breath.    Cardiovascular:  Positive for leg swelling.   Gastrointestinal:  Negative for abdominal pain, diarrhea, nausea and vomiting.   Genitourinary:  Positive for penile swelling and scrotal swelling. Negative for difficulty urinating, dysuria and hematuria.   Musculoskeletal:  Positive for myalgias.   Skin:  Positive for rash. Negative for wound.   Neurological:  Negative for seizures and syncope.   Psychiatric/Behavioral:  Negative for behavioral problems, confusion and  self-injury.      Objective:     Vital Signs (Most Recent):  Temp: 98.3 °F (36.8 °C) (03/02/24 1147)  Pulse: 81 (03/02/24 1147)  Resp: 19 (03/02/24 1147)  BP: 136/73 (03/02/24 1147)  SpO2: 95 % (03/02/24 1147) Vital Signs (24h Range):  Temp:  [98 °F (36.7 °C)-98.3 °F (36.8 °C)] 98.3 °F (36.8 °C)  Pulse:  [73-87] 81  Resp:  [16-19] 19  SpO2:  [92 %-99 %] 95 %  BP: (125-155)/(69-81) 136/73     Weight: 83.5 kg (184 lb 1.4 oz)  Body mass index is 28.83 kg/m².    Estimated Creatinine Clearance: 45.4 mL/min (A) (based on SCr of 1.7 mg/dL (H)).     Physical Exam  Vitals and nursing note reviewed. Exam conducted with a chaperone present.   Constitutional:       General: He is not in acute distress.     Appearance: Normal appearance. He is not ill-appearing, toxic-appearing or diaphoretic.   HENT:      Head: Normocephalic and atraumatic.      Mouth/Throat:      Mouth: Mucous membranes are moist.      Pharynx: Oropharynx is clear.   Eyes:      General: No scleral icterus.     Conjunctiva/sclera: Conjunctivae normal.   Cardiovascular:      Rate and Rhythm: Normal rate.      Heart sounds: Normal heart sounds.   Pulmonary:      Effort: Pulmonary effort is normal.      Breath sounds: Normal breath sounds.   Abdominal:      General: Bowel sounds are normal. There is no distension.      Palpations: Abdomen is soft.      Tenderness: There is abdominal tenderness. There is no right CVA tenderness or left CVA tenderness.   Genitourinary:     Comments: Pelvic swelling globally, penile / scrotal swelling diffuse. Mild redness noted.     Musculoskeletal:         General: Swelling and tenderness present. Normal range of motion.      Cervical back: Normal range of motion. No rigidity or tenderness.      Right lower leg: Edema present.      Left lower leg: No edema.   Lymphadenopathy:      Cervical: No cervical adenopathy.   Skin:     General: Skin is warm and dry.      Findings: Erythema and rash present.   Neurological:      General: No  "focal deficit present.      Mental Status: He is alert and oriented to person, place, and time. Mental status is at baseline.   Psychiatric:         Mood and Affect: Mood normal.         Behavior: Behavior normal.         Thought Content: Thought content normal.                  Significant Labs: Blood Culture:   Recent Labs   Lab 02/16/24  1843 02/16/24  1919   LABBLOO No growth after 5 days. No growth after 5 days.       CBC:   No results for input(s): "WBC", "HGB", "HCT", "PLT" in the last 48 hours.    CMP:   Recent Labs   Lab 03/01/24  0523 03/02/24  0442   * 133*   K 4.0 3.9    100   CO2 24 25   GLU 92 110   BUN 12 14   CREATININE 1.8* 1.7*   CALCIUM 8.9 8.8   ANIONGAP 9 8       Microbiology Results (last 7 days)       Procedure Component Value Units Date/Time    Culture, MRSA [[id number]] Collected: 02/28/24 0850    Order Status: Completed Specimen: Nares Updated: 03/01/24 1314     MRSA Surveillance Screen No MRSA isolated    MRSA Screen by PCR [[id number]] Collected: 02/28/24 0850    Order Status: Canceled Specimen: Nasopharyngeal Swab from Nasal           Pathology Results  (Last 10 years)      None          All pertinent labs within the past 24 hours have been reviewed.    Significant Imaging: I have reviewed all pertinent imaging results/findings within the past 24 hours.        "

## 2024-03-02 NOTE — ASSESSMENT & PLAN NOTE
- History and physical examination including ankle cup sign, Stammer sign, thickening of skin consistent with right lower extremity lymphedema   - Received torsemide on 3/1 and Bumex 0.5 mg on 3/2/24.   - Limit sodium intake to 2000 mg daily.  Limit volume intake to 1.5 L daily.  Elevate legs when resting.   - Vascular Medicine follow up as outpatient for further lymphedema therapy  - Ordered US BL LE to check for venous insufficiency

## 2024-03-02 NOTE — SUBJECTIVE & OBJECTIVE
Interval hx:     Afebrile. HDS. Remains with right leg, testicle, penile, lower abdomen swelling. Urology and general surgery not concerned for nec fas. Without obstruction. Has had antibiotics ~ 1 month without significant improvement in swelling.     Past Medical History:   Diagnosis Date    Allergy     Asthma     Cellulitis     Elevated PSA     Prostate cancer        Past Surgical History:   Procedure Laterality Date    ENDOSCOPIC ULTRASOUND OF UPPER GASTROINTESTINAL TRACT N/A 2023    Procedure: ULTRASOUND, UPPER GI TRACT, ENDOSCOPIC;  Surgeon: Yoseph iL MD;  Location: [facility] ([address] [place] [address]);  Service: Endoscopy;  Laterality: N/A;  instr [facility]-va referral    SINUS SURGERY Bilateral        Review of patient's allergies indicates:   Allergen Reactions    Aspirin Shortness Of Breath       Medications:  Medications Prior to Admission   Medication Sig    albuterol (ACCUNEB) 1.25 mg/3 mL Nebu Take 1.25 mg by nebulization every 6 (six) hours as needed. Rescue    [] amoxicillin-clavulanate 875-125mg (AUGMENTIN) 875-125 mg per tablet Take 1 tablet by mouth every 12 (twelve) hours. for 10 days    budesonide-formoterol 160-4.5 mcg (SYMBICORT) 160-4.5 mcg/actuation HFAA Inhale 2 puffs into the lungs every 12 (twelve) hours. Controller    calcium carbonate (OS-CAROLYN) 500 mg calcium (1,250 mg) tablet     [] doxycycline (MONODOX) 100 MG capsule Take 1 capsule (100 mg total) by mouth every 12 (twelve) hours. for 10 days    dupilumab 300 mg/2 mL PnIj INJECT 300MG SUBCUTANEOUSLY EVERY TWO WEEKS    fluticasone propionate 93 mcg/actuation AerB 2 sprays by Nasal route 2 (two) times a day.    mometasone-formoterol (DULERA) 200-5 mcg/actuation inhaler Inhale 2 puffs into the lungs 2 (two) times daily.    montelukast (SINGULAIR) 5 MG chewable tablet Take 5 mg by mouth.    nasal exhalation resistanc.dev (NASAL EXHALATION RESISTANCE DV NASL) by Nasal route.    omeprazole 20 mg TbEC Take 20 mg by mouth once daily.     pantoprazole (PROTONIX) 40 MG tablet Take 40 mg by mouth.    rosuvastatin 10 mg CpSP     tadalafiL (CIALIS) 20 MG Tab 20 mg.    tiotropium (SPIRIVA) 18 mcg inhalation capsule Inhale 18 mcg into the lungs once daily. Controller    vitamin D (VITAMIN D3) 1000 units Tab Take 1,000 Units by mouth once daily.       Antibiotics (From admission, onward)      Start     Stop Route Frequency Ordered    02/28/24 2100  linezolid tablet 600 mg         -- Oral Every 12 hours 02/28/24 1446    02/28/24 1730  DAPTOmycin (CUBICIN) 670 mg in sodium chloride 0.9% SolP 50 mL IVPB         -- IV Every 24 hours (non-standard times) 02/28/24 1446    02/28/24 1630  cefTRIAXone (ROCEPHIN) 2 g in dextrose 5 % in water (D5W) 100 mL IVPB (MB+)         -- IV Every 24 hours (non-standard times) 02/28/24 1446          Antifungals (From admission, onward)      None          Antivirals (From admission, onward)      None               There is no immunization history on file for this patient.    Family History    Family history is unknown by patient.       Social History     Socioeconomic History    Marital status: Single   Tobacco Use    Smoking status: Never    Smokeless tobacco: Never   Substance and Sexual Activity    Alcohol use: Yes     Alcohol/week: 1.0 standard drink of alcohol     Types: 1 Cans of beer per week     Comment: week    Drug use: Never     Social Determinants of Health     Financial Resource Strain: Low Risk  (2/29/2024)    Overall Financial Resource Strain (CARDIA)     Difficulty of Paying Living Expenses: Not hard at all   Food Insecurity: No Food Insecurity (2/29/2024)    Hunger Vital Sign     Worried About Running Out of Food in the Last Year: Never true     Ran Out of Food in the Last Year: Never true   Transportation Needs: No Transportation Needs (2/29/2024)    PRAPARE - Transportation     Lack of Transportation (Medical): No     Lack of Transportation (Non-Medical): No   Physical Activity: Insufficiently Active  (2/29/2024)    Exercise Vital Sign     Days of Exercise per Week: 3 days     Minutes of Exercise per Session: 40 min   Stress: No Stress Concern Present (2/29/2024)    [employer] [place] [employer] - Occupational Stress Questionnaire     Feeling of Stress : Not at all   Social Connections: Moderately Integrated (2/29/2024)    Social Connection and Isolation Panel [NHANES]     Frequency of Communication with Friends and Family: More than three times a week     Frequency of Social Gatherings with Friends and Family: More than three times a week     Attends [employer] Services: More than 4 times per year     Active Member of Clubs or Organizations: No     Attends Club or Organization Meetings: Never     Marital Status: Living with partner   Housing Stability: Low Risk  (2/29/2024)    Housing Stability Vital Sign     Unable to Pay for Housing in the Last Year: No     Number of Places Lived in the Last Year: 1     Unstable Housing in the Last Year: No     Review of Systems   Constitutional:  Positive for chills. Negative for fatigue and fever.   Respiratory:  Negative for cough and shortness of breath.    Cardiovascular:  Positive for leg swelling.   Gastrointestinal:  Negative for abdominal pain, diarrhea, nausea and vomiting.   Genitourinary:  Positive for penile swelling and scrotal swelling. Negative for difficulty urinating, dysuria and hematuria.   Musculoskeletal:  Positive for myalgias.   Skin:  Positive for rash. Negative for wound.   Neurological:  Negative for seizures and syncope.   Psychiatric/Behavioral:  Negative for behavioral problems, confusion and self-injury.      Objective:     Vital Signs (Most Recent):  Temp: 98.3 °F (36.8 °C) (03/02/24 1147)  Pulse: 81 (03/02/24 1147)  Resp: 19 (03/02/24 1147)  BP: 136/73 (03/02/24 1147)  SpO2: 95 % (03/02/24 1147) Vital Signs (24h Range):  Temp:  [98 °F (36.7 °C)-98.3 °F (36.8 °C)] 98.3 °F (36.8 °C)  Pulse:  [73-87] 81  Resp:  [16-19] 19  SpO2:  [92  %-99 %] 95 %  BP: (125-155)/(69-81) 136/73     Weight: 83.5 kg (184 lb 1.4 oz)  Body mass index is 28.83 kg/m².    Estimated Creatinine Clearance: 45.4 mL/min (A) (based on SCr of 1.7 mg/dL (H)).     Physical Exam  Vitals and nursing note reviewed. Exam conducted with a chaperone present.   Constitutional:       General: He is not in acute distress.     Appearance: Normal appearance. He is not ill-appearing, toxic-appearing or diaphoretic.   HENT:      Head: Normocephalic and atraumatic.      Mouth/Throat:      Mouth: Mucous membranes are moist.      Pharynx: Oropharynx is clear.   Eyes:      General: No scleral icterus.     Conjunctiva/sclera: Conjunctivae normal.   Cardiovascular:      Rate and Rhythm: Normal rate.      Heart sounds: Normal heart sounds.   Pulmonary:      Effort: Pulmonary effort is normal.      Breath sounds: Normal breath sounds.   Abdominal:      General: Bowel sounds are normal. There is no distension.      Palpations: Abdomen is soft.      Tenderness: There is abdominal tenderness. There is no right CVA tenderness or left CVA tenderness.   Genitourinary:     Comments: Pelvic swelling globally, penile / scrotal swelling diffuse. Mild redness noted.     Musculoskeletal:         General: Swelling and tenderness present. Normal range of motion.      Cervical back: Normal range of motion. No rigidity or tenderness.      Right lower leg: Edema present.      Left lower leg: No edema.   Lymphadenopathy:      Cervical: No cervical adenopathy.   Skin:     General: Skin is warm and dry.      Findings: Erythema and rash present.   Neurological:      General: No focal deficit present.      Mental Status: He is alert and oriented to person, place, and time. Mental status is at baseline.   Psychiatric:         Mood and Affect: Mood normal.         Behavior: Behavior normal.         Thought Content: Thought content normal.                  Significant Labs: Blood Culture:   Recent Labs   Lab 02/16/24  5910  "02/16/24  1919   LABBLOO No growth after 5 days. No growth after 5 days.       CBC:   No results for input(s): "WBC", "HGB", "HCT", "PLT" in the last 48 hours.    CMP:   Recent Labs   Lab 03/01/24  0523 03/02/24  0442   * 133*   K 4.0 3.9    100   CO2 24 25   GLU 92 110   BUN 12 14   CREATININE 1.8* 1.7*   CALCIUM 8.9 8.8   ANIONGAP 9 8       Microbiology Results (last 7 days)       Procedure Component Value Units Date/Time    Culture, MRSA [[id number]] Collected: 02/28/24 0850    Order Status: Completed Specimen: Nares Updated: 03/01/24 1314     MRSA Surveillance Screen No MRSA isolated    MRSA Screen by PCR [[id number]] Collected: 02/28/24 0850    Order Status: Canceled Specimen: Nasopharyngeal Swab from Nasal           Pathology Results  (Last 10 years)      None          All pertinent labs within the past 24 hours have been reviewed.    Significant Imaging: I have reviewed all pertinent imaging results/findings within the past 24 hours.        "

## 2024-03-02 NOTE — ASSESSMENT & PLAN NOTE
- History and physical examination including ankle cup sign, Stammer sign, thickening of skin consistent with right lower extremity lymphedema   - give 1 dose of Bumex 0.5 mg today   - monitor renal function closely  - vascular Medicine consult as outpatient for further lymphedema therapy

## 2024-03-02 NOTE — ASSESSMENT & PLAN NOTE
Persistent. 3rd hospital admission. Improved on vancomycin and ceftriaxone during last hospitalization. Prescribed amoxicillin-clavulanate and doxycycline and it worsened. Appreciate Infectious Disease. Giving ceftriaxone, daptomycin, linezolid. Received torsemide on 3/1/24 for the swelling. Give Bumex 0.5 mg 1 dose today. Monitor renal function.

## 2024-03-03 LAB
ANION GAP SERPL CALC-SCNC: 10 MMOL/L (ref 8–16)
BUN SERPL-MCNC: 16 MG/DL (ref 8–23)
CALCIUM SERPL-MCNC: 8.9 MG/DL (ref 8.7–10.5)
CHLORIDE SERPL-SCNC: 100 MMOL/L (ref 95–110)
CO2 SERPL-SCNC: 24 MMOL/L (ref 23–29)
CREAT SERPL-MCNC: 1.9 MG/DL (ref 0.5–1.4)
CRP SERPL-MCNC: 35 MG/L (ref 0–8.2)
ERYTHROCYTE [DISTWIDTH] IN BLOOD BY AUTOMATED COUNT: 11.9 % (ref 11.5–14.5)
EST. GFR  (NO RACE VARIABLE): 38.9 ML/MIN/1.73 M^2
GLUCOSE SERPL-MCNC: 93 MG/DL (ref 70–110)
HCT VFR BLD AUTO: 29.7 % (ref 40–54)
HGB BLD-MCNC: 10.1 G/DL (ref 14–18)
MCH RBC QN AUTO: 31.7 PG (ref 27–31)
MCHC RBC AUTO-ENTMCNC: 34 G/DL (ref 32–36)
MCV RBC AUTO: 93 FL (ref 82–98)
PLATELET # BLD AUTO: 384 K/UL (ref 150–450)
PMV BLD AUTO: 8.5 FL (ref 9.2–12.9)
POTASSIUM SERPL-SCNC: 4.1 MMOL/L (ref 3.5–5.1)
RBC # BLD AUTO: 3.19 M/UL (ref 4.6–6.2)
SODIUM SERPL-SCNC: 134 MMOL/L (ref 136–145)
URATE SERPL-MCNC: 6.3 MG/DL (ref 3.4–7)
WBC # BLD AUTO: 9.19 K/UL (ref 3.9–12.7)

## 2024-03-03 PROCEDURE — 36415 COLL VENOUS BLD VENIPUNCTURE: CPT | Mod: XB | Performed by: STUDENT IN AN ORGANIZED HEALTH CARE EDUCATION/TRAINING PROGRAM

## 2024-03-03 PROCEDURE — 11000001 HC ACUTE MED/SURG PRIVATE ROOM

## 2024-03-03 PROCEDURE — 85027 COMPLETE CBC AUTOMATED: CPT | Performed by: STUDENT IN AN ORGANIZED HEALTH CARE EDUCATION/TRAINING PROGRAM

## 2024-03-03 PROCEDURE — 25000003 PHARM REV CODE 250: Performed by: STUDENT IN AN ORGANIZED HEALTH CARE EDUCATION/TRAINING PROGRAM

## 2024-03-03 PROCEDURE — 36415 COLL VENOUS BLD VENIPUNCTURE: CPT | Performed by: STUDENT IN AN ORGANIZED HEALTH CARE EDUCATION/TRAINING PROGRAM

## 2024-03-03 PROCEDURE — 80048 BASIC METABOLIC PNL TOTAL CA: CPT | Performed by: STUDENT IN AN ORGANIZED HEALTH CARE EDUCATION/TRAINING PROGRAM

## 2024-03-03 PROCEDURE — 25000003 PHARM REV CODE 250: Performed by: HOSPITALIST

## 2024-03-03 PROCEDURE — 63600175 PHARM REV CODE 636 W HCPCS: Performed by: STUDENT IN AN ORGANIZED HEALTH CARE EDUCATION/TRAINING PROGRAM

## 2024-03-03 PROCEDURE — 94640 AIRWAY INHALATION TREATMENT: CPT

## 2024-03-03 PROCEDURE — 94761 N-INVAS EAR/PLS OXIMETRY MLT: CPT

## 2024-03-03 PROCEDURE — 86140 C-REACTIVE PROTEIN: CPT | Performed by: STUDENT IN AN ORGANIZED HEALTH CARE EDUCATION/TRAINING PROGRAM

## 2024-03-03 PROCEDURE — 84550 ASSAY OF BLOOD/URIC ACID: CPT | Performed by: STUDENT IN AN ORGANIZED HEALTH CARE EDUCATION/TRAINING PROGRAM

## 2024-03-03 RX ADMIN — DAPTOMYCIN 670 MG: 350 INJECTION, POWDER, LYOPHILIZED, FOR SOLUTION INTRAVENOUS at 07:03

## 2024-03-03 RX ADMIN — FLUTICASONE FUROATE AND VILANTEROL TRIFENATATE 1 PUFF: 100; 25 POWDER RESPIRATORY (INHALATION) at 09:03

## 2024-03-03 RX ADMIN — TIOTROPIUM BROMIDE INHALATION SPRAY 2 PUFF: 3.12 SPRAY, METERED RESPIRATORY (INHALATION) at 09:03

## 2024-03-03 RX ADMIN — Medication 6 MG: at 09:03

## 2024-03-03 RX ADMIN — LINEZOLID 600 MG: 600 TABLET, FILM COATED ORAL at 09:03

## 2024-03-03 RX ADMIN — Medication 1 CAPSULE: at 09:03

## 2024-03-03 RX ADMIN — PANTOPRAZOLE SODIUM 40 MG: 40 TABLET, DELAYED RELEASE ORAL at 09:03

## 2024-03-03 RX ADMIN — CEFTRIAXONE 2 G: 2 INJECTION, POWDER, FOR SOLUTION INTRAMUSCULAR; INTRAVENOUS at 05:03

## 2024-03-03 NOTE — ASSESSMENT & PLAN NOTE
Patient with acute kidney injury/acute renal failure likely due to acute tubular necrosis. DALIA is currently stable. Baseline creatinine 1.2 - Labs reviewed- Renal function/electrolytes with Estimated Creatinine Clearance: 40.6 mL/min (A) (based on SCr of 1.9 mg/dL (H)). according to latest data. Monitor urine output and serial BMP and adjust therapy as needed. Avoid nephrotoxins and renally dose meds for GFR listed above.  Nephrology following

## 2024-03-03 NOTE — PLAN OF CARE
"  Problem: Adult Inpatient Plan of Care  Goal: Plan of Care Review  Outcome: Ongoing, Progressing  Goal: Patient-Specific Goal (Individualized)  Outcome: Ongoing, Progressing  Goal: Absence of Hospital-Acquired Illness or Injury  Outcome: Ongoing, Progressing  Goal: Optimal Comfort and Wellbeing  Outcome: Ongoing, Progressing  Goal: Readiness for Transition of Care  Outcome: Ongoing, Progressing   Alert and oreint times 4. Requested melatonin at 8 PM Pt received and slept most of this shift. Edema remain in penis RT leg the patient stated I"I hope that new medicine they gave me to help with this swelling work". Monitoring pt labs. No complaint. No fall this shift  "

## 2024-03-03 NOTE — SUBJECTIVE & OBJECTIVE
Interval History: Creatinine worsened to 1.9 on 3/3/24. Minimal improvement in swelling of right leg    Review of Systems   Constitutional:  Negative for chills, diaphoresis, fatigue and fever.   HENT:  Negative for congestion, ear pain, sore throat, tinnitus and trouble swallowing.    Eyes:  Negative for photophobia, discharge and visual disturbance.   Respiratory:  Negative for apnea, cough, choking, chest tightness and shortness of breath.    Cardiovascular:  Negative for chest pain, palpitations and leg swelling.   Gastrointestinal:  Negative for abdominal distention, abdominal pain, constipation, diarrhea, nausea and vomiting.   Endocrine: Negative for polydipsia, polyphagia and polyuria.   Genitourinary:  Positive for penile swelling and scrotal swelling. Negative for difficulty urinating and dysuria.   Musculoskeletal:  Positive for joint swelling. Negative for arthralgias, back pain and gait problem.        Right leg swelling   Skin:  Negative for color change and rash.   Neurological:  Negative for dizziness, syncope, speech difficulty, weakness, light-headedness and headaches.   Psychiatric/Behavioral:  Negative for agitation, confusion, dysphoric mood, hallucinations and sleep disturbance. The patient is not nervous/anxious.      Objective:     Vital Signs (Most Recent):  Temp: 98.4 °F (36.9 °C) (03/03/24 1642)  Pulse: 81 (03/03/24 1642)  Resp: 18 (03/03/24 1642)  BP: (!) 155/74 (03/03/24 1642)  SpO2: 97 % (03/03/24 1642) Vital Signs (24h Range):  Temp:  [98 °F (36.7 °C)-98.7 °F (37.1 °C)] 98.4 °F (36.9 °C)  Pulse:  [79-89] 81  Resp:  [16-18] 18  SpO2:  [95 %-99 %] 97 %  BP: (139-155)/(74-84) 155/74     Weight: 83.5 kg (184 lb 1.4 oz)  Body mass index is 28.83 kg/m².  No intake or output data in the 24 hours ending 03/03/24 1645      Physical Exam  Constitutional:       General: He is not in acute distress.     Appearance: Normal appearance. He is normal weight.   HENT:      Head: Normocephalic and  atraumatic.      Nose: No congestion.      Mouth/Throat:      Mouth: Mucous membranes are moist.   Eyes:      Extraocular Movements: Extraocular movements intact.      Conjunctiva/sclera: Conjunctivae normal.      Pupils: Pupils are equal, round, and reactive to light.   Neck:      Vascular: No carotid bruit.   Cardiovascular:      Rate and Rhythm: Normal rate and regular rhythm.      Pulses: Normal pulses.      Heart sounds: Normal heart sounds. No murmur heard.     No gallop.   Pulmonary:      Effort: Pulmonary effort is normal. No respiratory distress.      Breath sounds: Normal breath sounds. No wheezing, rhonchi or rales.   Chest:      Chest wall: No tenderness.   Abdominal:      General: Abdomen is flat. Bowel sounds are normal. There is no distension.      Tenderness: There is no abdominal tenderness.   Genitourinary:     Comments: Scrotal swelling +  Penile swelling +  Musculoskeletal:         General: Swelling and tenderness present.      Cervical back: No rigidity.      Right lower leg: Edema present.      Left lower leg: No edema.      Comments: Right lower extremity edema, with thickening of skin changes consistent with lymphedema   Ankle cuffing +  Stemmer sign +   Skin:     Capillary Refill: Capillary refill takes less than 2 seconds.   Neurological:      General: No focal deficit present.      Mental Status: He is alert and oriented to person, place, and time. Mental status is at baseline.      Cranial Nerves: No cranial nerve deficit.      Motor: No weakness.   Psychiatric:         Mood and Affect: Mood normal.         Behavior: Behavior normal.             Significant Labs: All pertinent labs within the past 24 hours have been reviewed.    Significant Imaging: I have reviewed all pertinent imaging results/findings within the past 24 hours.

## 2024-03-03 NOTE — HPI
63 year old male with a history of asthma, allergic rhinitis, HLD, IBS, prostate cancer sp radiation admitted for right leg cellulitis. This has been an ongoing issue, [facility] for which a CT with contrast was negative for acute process and discharged on cephalexin. Subsequently he was discharged on 2/20 for worsening of his celluitis - he was treated with broad spec abx. It was noted that his creatinine was 2.0 at time of admission and again discharged. On follow up with internal medicine, it was noted that his infection had worsened with increased swelling and tender erythema.     Previously his creatinine had been 1.1 a year prior. During his admission creatinine has been stable at 1.7-2.0. Ua 2/27/2024 with specific gravity of 1.015, pH of 5 and overall negative findings.     Nephrology consulted for DALIA

## 2024-03-03 NOTE — SUBJECTIVE & OBJECTIVE
Past Medical History:   Diagnosis Date    Allergy     Asthma     Cellulitis     Elevated PSA     Prostate cancer        Past Surgical History:   Procedure Laterality Date    ENDOSCOPIC ULTRASOUND OF UPPER GASTROINTESTINAL TRACT N/A 7/25/2023    Procedure: ULTRASOUND, UPPER GI TRACT, ENDOSCOPIC;  Surgeon: Yoseph Li MD;  Location: [facility] ([address] [place] [address]);  Service: Endoscopy;  Laterality: N/A;  instr [facility] referral    SINUS SURGERY Bilateral 2001       Review of patient's allergies indicates:   Allergen Reactions    Aspirin Shortness Of Breath     Current Facility-Administered Medications   Medication Frequency    acetaminophen tablet 650 mg Q6H PRN    albuterol inhaler 2 puff Q4H PRN    bisacodyL EC tablet 5 mg Daily PRN    calcium carbonate 200 mg calcium (500 mg) chewable tablet 500 mg TID PRN    cefTRIAXone (ROCEPHIN) 2 g in dextrose 5 % in water (D5W) 100 mL IVPB (MB+) Q24H    DAPTOmycin (CUBICIN) 670 mg in sodium chloride 0.9% SolP 50 mL IVPB Q24H    enoxaparin injection 40 mg Daily    fluticasone furoate-vilanteroL 100-25 mcg/dose diskus inhaler 1 puff Daily    HYDROcodone-acetaminophen 5-325 mg per tablet 1 tablet Q6H PRN    Lactobacillus rhamnosus GG capsule 1 capsule BID    melatonin tablet 6 mg Nightly PRN    ondansetron injection 4 mg Q6H PRN    pantoprazole EC tablet 40 mg Daily    tiotropium bromide 2.5 mcg/actuation inhaler 2 puff Daily     Family History    Family history is unknown by patient.       Tobacco Use    Smoking status: Never    Smokeless tobacco: Never   Substance and Sexual Activity    Alcohol use: Yes     Alcohol/week: 1.0 standard drink of alcohol     Types: 1 Cans of beer per week     Comment: week    Drug use: Never    Sexual activity: Not on file     Review of Systems   Constitutional:  Negative for chills and fever.   HENT:  Negative for rhinorrhea and sore throat.    Eyes:  Negative for pain and visual disturbance.   Respiratory:  Negative for cough and shortness of breath.     Cardiovascular:  Negative for chest pain and palpitations.   Gastrointestinal:  Negative for abdominal pain, constipation, diarrhea and nausea.   Endocrine: Negative for cold intolerance, heat intolerance and polyuria.   Genitourinary:  Negative for dysuria and flank pain.   Musculoskeletal:  Negative for back pain, gait problem and joint swelling.        Right leg pain   Skin:  Positive for rash.   Allergic/Immunologic: Negative for immunocompromised state.   Neurological:  Negative for light-headedness, numbness and headaches.     Objective:     Vital Signs (Most Recent):  Temp: 98 °F (36.7 °C) (03/03/24 1103)  Pulse: 79 (03/03/24 1103)  Resp: 18 (03/03/24 1103)  BP: 139/78 (03/03/24 1103)  SpO2: 95 % (03/03/24 1103) Vital Signs (24h Range):  Temp:  [97.6 °F (36.4 °C)-98.7 °F (37.1 °C)] 98 °F (36.7 °C)  Pulse:  [78-89] 79  Resp:  [16-18] 18  SpO2:  [95 %-99 %] 95 %  BP: (139-153)/(74-84) 139/78     Weight: 83.5 kg (184 lb 1.4 oz) (02/29/24 0034)  Body mass index is 28.83 kg/m².  Body surface area is 1.99 meters squared.    No intake/output data recorded.     Physical Exam  Constitutional:       Appearance: He is well-developed.   HENT:      Head: Normocephalic and atraumatic.   Eyes:      Pupils: Pupils are equal, round, and reactive to light.   Cardiovascular:      Rate and Rhythm: Normal rate and regular rhythm.      Heart sounds: Normal heart sounds.   Pulmonary:      Effort: Pulmonary effort is normal.      Breath sounds: Normal breath sounds.   Abdominal:      General: Bowel sounds are normal.      Palpations: Abdomen is soft.      Tenderness: There is no abdominal tenderness.   Musculoskeletal:         General: Normal range of motion.      Cervical back: Normal range of motion and neck supple.      Right lower leg: Edema present.   Skin:     Capillary Refill: Capillary refill takes less than 2 seconds.      Findings: Rash present.   Neurological:      Mental Status: He is alert and oriented to person,  place, and time.          Significant Labs:  All labs within the past 24 hours have been reviewed.    Significant Imaging:  Labs: Reviewed

## 2024-03-03 NOTE — ASSESSMENT & PLAN NOTE
Stable creatinine since initial admission at near 2.0  Previous creatinine 1.1 one year ago  Reports having a normal baseline, however [facility]    At this time, suspect at baseline creatinine, however possible AIN contributing to elevated creatinine    Plan/Recommendation  -uric acid  -renal US  -repeat UA  -urine microscopy when able  -Keep MAP > 65  -Keep hemoglobin > 7  -Strict ins and outs  -Avoid nephrotoxic agents if possible and renally dose medications  -Avoid drastic hemodynamic changes if possible

## 2024-03-03 NOTE — ASSESSMENT & PLAN NOTE
Persistent. 3rd hospital admission. Improved on vancomycin and ceftriaxone during last hospitalization. Prescribed amoxicillin-clavulanate and doxycycline and it worsened. Appreciate Infectious Disease. Giving ceftriaxone, daptomycin. DC linezolid on 3/3/24.

## 2024-03-03 NOTE — CONSULTS
[facility]  Nephrology  Consult Note    Patient Name: Bradley Collins  MRN: [id number]  Admission Date: 2/27/2024  Hospital Length of Stay: 5 days  Attending Provider: Gilmer Noel MD   Primary Care Physician: Reed Mcmullen  Principal Problem:Cellulitis of right lower limb    Inpatient consult to Nephrology  Consult performed by: Wilber Rodas MD  Consult ordered by: Gilmer Noel MD        Subjective:     HPI: 63 year old male with a history of asthma, allergic rhinitis, HLD, IBS, prostate cancer sp radiation admitted for right leg cellulitis. This has been an ongoing issue, [facility] for which a CT with contrast was negative for acute process and discharged on cephalexin. Subsequently he was discharged on 2/20 for worsening of his celluitis - he was treated with broad spec abx. It was noted that his creatinine was 2.0 at time of admission and again discharged. On follow up with internal medicine, it was noted that his infection had worsened with increased swelling and tender erythema.     Previously his creatinine had been 1.1 a year prior. During his admission creatinine has been stable at 1.7-2.0. Ua 2/27/2024 with specific gravity of 1.015, pH of 5 and overall negative findings.     Nephrology consulted for DALIA    Past Medical History:   Diagnosis Date    Allergy     Asthma     Cellulitis     Elevated PSA     Prostate cancer        Past Surgical History:   Procedure Laterality Date    ENDOSCOPIC ULTRASOUND OF UPPER GASTROINTESTINAL TRACT N/A 7/25/2023    Procedure: ULTRASOUND, UPPER GI TRACT, ENDOSCOPIC;  Surgeon: Yoseph Li MD;  Location: [facility] ([address] [place] [address]);  Service: Endoscopy;  Laterality: N/A;  instr [facility] referral    SINUS SURGERY Bilateral 2001       Review of patient's allergies indicates:   Allergen Reactions    Aspirin Shortness Of Breath     Current Facility-Administered Medications   Medication Frequency    acetaminophen tablet 650 mg Q6H PRN     albuterol inhaler 2 puff Q4H PRN    bisacodyL EC tablet 5 mg Daily PRN    calcium carbonate 200 mg calcium (500 mg) chewable tablet 500 mg TID PRN    cefTRIAXone (ROCEPHIN) 2 g in dextrose 5 % in water (D5W) 100 mL IVPB (MB+) Q24H    DAPTOmycin (CUBICIN) 670 mg in sodium chloride 0.9% SolP 50 mL IVPB Q24H    enoxaparin injection 40 mg Daily    fluticasone furoate-vilanteroL 100-25 mcg/dose diskus inhaler 1 puff Daily    HYDROcodone-acetaminophen 5-325 mg per tablet 1 tablet Q6H PRN    Lactobacillus rhamnosus GG capsule 1 capsule BID    melatonin tablet 6 mg Nightly PRN    ondansetron injection 4 mg Q6H PRN    pantoprazole EC tablet 40 mg Daily    tiotropium bromide 2.5 mcg/actuation inhaler 2 puff Daily     Family History    Family history is unknown by patient.       Tobacco Use    Smoking status: Never    Smokeless tobacco: Never   Substance and Sexual Activity    Alcohol use: Yes     Alcohol/week: 1.0 standard drink of alcohol     Types: 1 Cans of beer per week     Comment: week    Drug use: Never    Sexual activity: Not on file     Review of Systems   Constitutional:  Negative for chills and fever.   HENT:  Negative for rhinorrhea and sore throat.    Eyes:  Negative for pain and visual disturbance.   Respiratory:  Negative for cough and shortness of breath.    Cardiovascular:  Negative for chest pain and palpitations.   Gastrointestinal:  Negative for abdominal pain, constipation, diarrhea and nausea.   Endocrine: Negative for cold intolerance, heat intolerance and polyuria.   Genitourinary:  Negative for dysuria and flank pain.   Musculoskeletal:  Negative for back pain, gait problem and joint swelling.        Right leg pain   Skin:  Positive for rash.   Allergic/Immunologic: Negative for immunocompromised state.   Neurological:  Negative for light-headedness, numbness and headaches.     Objective:     Vital Signs (Most Recent):  Temp: 98 °F (36.7 °C) (03/03/24 1103)  Pulse: 79 (03/03/24 1103)  Resp: 18  (03/03/24 1103)  BP: 139/78 (03/03/24 1103)  SpO2: 95 % (03/03/24 1103) Vital Signs (24h Range):  Temp:  [97.6 °F (36.4 °C)-98.7 °F (37.1 °C)] 98 °F (36.7 °C)  Pulse:  [78-89] 79  Resp:  [16-18] 18  SpO2:  [95 %-99 %] 95 %  BP: (139-153)/(74-84) 139/78     Weight: 83.5 kg (184 lb 1.4 oz) (02/29/24 0034)  Body mass index is 28.83 kg/m².  Body surface area is 1.99 meters squared.    No intake/output data recorded.     Physical Exam  Constitutional:       Appearance: He is well-developed.   HENT:      Head: Normocephalic and atraumatic.   Eyes:      Pupils: Pupils are equal, round, and reactive to light.   Cardiovascular:      Rate and Rhythm: Normal rate and regular rhythm.      Heart sounds: Normal heart sounds.   Pulmonary:      Effort: Pulmonary effort is normal.      Breath sounds: Normal breath sounds.   Abdominal:      General: Bowel sounds are normal.      Palpations: Abdomen is soft.      Tenderness: There is no abdominal tenderness.   Musculoskeletal:         General: Normal range of motion.      Cervical back: Normal range of motion and neck supple.      Right lower leg: Edema present.   Skin:     Capillary Refill: Capillary refill takes less than 2 seconds.      Findings: Rash present.   Neurological:      Mental Status: He is alert and oriented to person, place, and time.          Significant Labs:  All labs within the past 24 hours have been reviewed.    Significant Imaging:  Labs: Reviewed  Assessment/Plan:     Renal/  DALIA (acute kidney injury)  Stable creatinine since initial admission at near 2.0  Previous creatinine 1.1 one year ago  Reports having a normal baseline, however [facility]    At this time, suspect at baseline creatinine, however possible AIN contributing to elevated creatinine    Plan/Recommendation  -uric acid  -renal US  -repeat UA  -urine microscopy when able  -Keep MAP > 65  -Keep hemoglobin > 7  -Strict ins and outs  -Avoid nephrotoxic agents if possible and renally dose  medications  -Avoid drastic hemodynamic changes if possible          Thank you for your consult. I will follow-up with patient. Please contact us if you have any additional questions.    Wilber Rodas MD  Nephrology  [facility]

## 2024-03-03 NOTE — PROGRESS NOTES
[facility] Medicine  Progress Note    Patient Name: Bradley Collins  MRN: [id number]  Patient Class: IP- Inpatient   Admission Date: 2/27/2024  Length of Stay: 5 days  Attending Physician: Gilmer Noel MD  Primary Care Provider: Administration, Reed        Subjective:     Principal Problem:Cellulitis of right lower limb        HPI:  Bradley Collins is a 63 year old Black man with asthma, allergic rhinitis, hyperlipidemia, irritable bowel syndrome with diarrhea, history of prostate cancer treated with radiation, history of sinus surgery in 2001. He lives in [place]. He works for [employer]. His primary care clinic is the [facility] clinic.    He was hospitalized at the [facility] on 2/12/2024 for right lower extremity cellulitis. He had a CT with contrast done and was told that it did not show anything. He was prescribed cephalexin.   He was hospitalized at [facility] from 2/16/2024 to 2/20/2024 for worsening cellulitis. Creatinine was elevated at 2.0 mg/dL (from 1.1 a year ago). He was initially put on piperacillin-tazobactam and vancomycin. Piperacillin-tazobactam was changed to ceftriaxone. Erythema improved but edema persisted. He had induration of the thigh. CT showed diffuse cutaneous thickening and edema with prominent right inguinal lymph nodes. Antibiotics were switched to oral amoxicillin-clavulanate and doxycycline on 2/19/2024. Edema improved. Induration persisted. He was discharged home.   He followed up at [facility] clinic on 2/27/2024. He had 4 days left of the antibiotics. Swelling had worsened and spread up to his abdomen. He had edema and erythema of the entire right lower extremity, right groin, and right lower abdomen. It was tender and warm. He was advised to return to the emergency department to resume intravenous antibiotic treatment. Labs showed creatinine to still be  elevated (1.7 mg/dL, from 1.8 on 2/19/2024). Non-contrast CT showed soft tissue induration throughout the right anterior pelvis, thigh,and leg, skin thickening and edema of the subcutaneous tissue, superficial fascia, and deep fascia, scrotal edema, and retroperitoneal/extraperitoneal edema along the sidewalls. He was given piperacillin-tazobactam and vancomycin. He was admitted to Hospital Medicine Team W.     Overview/Hospital Course:  He was put on vancomycin. Infectious Disease was consulted for recommendations changed vancomycin to ceftriaxone, daptomycin, and linezolid. General Surgery was consulted to evaluate and recommended no surgical intervention to help reduce swelling. Urology was consulted to evaluate his scrotal swelling and found no issues they had to manage. His right lower extremity, right pelvis, penile, and scrotal swelling decreased after a dose of torsemide on 3/1/24.  Giving his history physical examination diagnosis of lymphedema has been made.  His creatine is downtrending 1 dose of Bumex 0.5 mg on 03/02/2024. Creatinine worsened to 1.9 on 3/3/24. US of both LE to check for venous insufficiency ordered and nephrology consult requested for ongoing DALIA for past couple of weeks.    Interval History: Creatinine worsened to 1.9 on 3/3/24. Minimal improvement in swelling of right leg    Review of Systems   Constitutional:  Negative for chills, diaphoresis, fatigue and fever.   HENT:  Negative for congestion, ear pain, sore throat, tinnitus and trouble swallowing.    Eyes:  Negative for photophobia, discharge and visual disturbance.   Respiratory:  Negative for apnea, cough, choking, chest tightness and shortness of breath.    Cardiovascular:  Negative for chest pain, palpitations and leg swelling.   Gastrointestinal:  Negative for abdominal distention, abdominal pain, constipation, diarrhea, nausea and vomiting.   Endocrine: Negative for polydipsia, polyphagia and polyuria.   Genitourinary:   Positive for penile swelling and scrotal swelling. Negative for difficulty urinating and dysuria.   Musculoskeletal:  Positive for joint swelling. Negative for arthralgias, back pain and gait problem.        Right leg swelling   Skin:  Negative for color change and rash.   Neurological:  Negative for dizziness, syncope, speech difficulty, weakness, light-headedness and headaches.   Psychiatric/Behavioral:  Negative for agitation, confusion, dysphoric mood, hallucinations and sleep disturbance. The patient is not nervous/anxious.      Objective:     Vital Signs (Most Recent):  Temp: 98.4 °F (36.9 °C) (03/03/24 1642)  Pulse: 81 (03/03/24 1642)  Resp: 18 (03/03/24 1642)  BP: (!) 155/74 (03/03/24 1642)  SpO2: 97 % (03/03/24 1642) Vital Signs (24h Range):  Temp:  [98 °F (36.7 °C)-98.7 °F (37.1 °C)] 98.4 °F (36.9 °C)  Pulse:  [79-89] 81  Resp:  [16-18] 18  SpO2:  [95 %-99 %] 97 %  BP: (139-155)/(74-84) 155/74     Weight: 83.5 kg (184 lb 1.4 oz)  Body mass index is 28.83 kg/m².  No intake or output data in the 24 hours ending 03/03/24 1645      Physical Exam  Constitutional:       General: He is not in acute distress.     Appearance: Normal appearance. He is normal weight.   HENT:      Head: Normocephalic and atraumatic.      Nose: No congestion.      Mouth/Throat:      Mouth: Mucous membranes are moist.   Eyes:      Extraocular Movements: Extraocular movements intact.      Conjunctiva/sclera: Conjunctivae normal.      Pupils: Pupils are equal, round, and reactive to light.   Neck:      Vascular: No carotid bruit.   Cardiovascular:      Rate and Rhythm: Normal rate and regular rhythm.      Pulses: Normal pulses.      Heart sounds: Normal heart sounds. No murmur heard.     No gallop.   Pulmonary:      Effort: Pulmonary effort is normal. No respiratory distress.      Breath sounds: Normal breath sounds. No wheezing, rhonchi or rales.   Chest:      Chest wall: No tenderness.   Abdominal:      General: Abdomen is flat. Bowel  sounds are normal. There is no distension.      Tenderness: There is no abdominal tenderness.   Genitourinary:     Comments: Scrotal swelling +  Penile swelling +  Musculoskeletal:         General: Swelling and tenderness present.      Cervical back: No rigidity.      Right lower leg: Edema present.      Left lower leg: No edema.      Comments: Right lower extremity edema, with thickening of skin changes consistent with lymphedema   Ankle cuffing +  Stemmer sign +   Skin:     Capillary Refill: Capillary refill takes less than 2 seconds.   Neurological:      General: No focal deficit present.      Mental Status: He is alert and oriented to person, place, and time. Mental status is at baseline.      Cranial Nerves: No cranial nerve deficit.      Motor: No weakness.   Psychiatric:         Mood and Affect: Mood normal.         Behavior: Behavior normal.             Significant Labs: All pertinent labs within the past 24 hours have been reviewed.    Significant Imaging: I have reviewed all pertinent imaging results/findings within the past 24 hours.    Assessment/Plan:      * Cellulitis of right lower limb  Persistent. 3rd hospital admission. Improved on vancomycin and ceftriaxone during last hospitalization. Prescribed amoxicillin-clavulanate and doxycycline and it worsened. Appreciate Infectious Disease. Giving ceftriaxone, daptomycin. DC linezolid on 3/3/24.     Lymphedema  - History and physical examination including ankle cup sign, Stammer sign, thickening of skin consistent with right lower extremity lymphedema   - Received torsemide on 3/1 and Bumex 0.5 mg on 3/2/24.   - Limit sodium intake to 2000 mg daily.  Limit volume intake to 1.5 L daily.  Elevate legs when resting.   - Vascular Medicine follow up as outpatient for further lymphedema therapy  - Ordered US BL LE to check for venous insufficiency      Elevated serum creatinine  - Nephrology consult appreciated, recommended lab and imaging evaluation to  determine etiology, type of DALIA      Moderate persistent asthma, uncomplicated  - Stable  - Continue fluticasone-vilanterol in place of home mometasone-formoterol. Albuterol prn.       DALIA (acute kidney injury)  Patient with acute kidney injury/acute renal failure likely due to acute tubular necrosis. DALIA is currently stable. Baseline creatinine 1.2 - Labs reviewed- Renal function/electrolytes with Estimated Creatinine Clearance: 40.6 mL/min (A) (based on SCr of 1.9 mg/dL (H)). according to latest data. Monitor urine output and serial BMP and adjust therapy as needed. Avoid nephrotoxins and renally dose meds for GFR listed above.  Nephrology following      VTE Risk Mitigation (From admission, onward)           Ordered     enoxaparin injection 40 mg  Daily         02/27/24 1703                    Discharge Planning   CONSTANTINO: 3/4/2024     Code Status: Full Code   Is the patient medically ready for discharge?: No    Reason for patient still in hospital (select all that apply): Treatment and Imaging  Discharge Plan A: Home with family                  Gilmer Noel MD  Department of Hospital Medicine   [facility]

## 2024-03-03 NOTE — ASSESSMENT & PLAN NOTE
- Nephrology consult appreciated, recommended lab and imaging evaluation to determine etiology, type of ADLIA

## 2024-03-04 ENCOUNTER — TELEPHONE (OUTPATIENT)
Dept: CARDIOLOGY | Facility: CLINIC | Age: 64
End: 2024-03-04
Payer: COMMERCIAL

## 2024-03-04 PROBLEM — M79.89 LEG SWELLING: Status: ACTIVE | Noted: 2024-03-04

## 2024-03-04 LAB
ANION GAP SERPL CALC-SCNC: 9 MMOL/L (ref 8–16)
BUN SERPL-MCNC: 16 MG/DL (ref 8–23)
CALCIUM SERPL-MCNC: 8.8 MG/DL (ref 8.7–10.5)
CHLORIDE SERPL-SCNC: 103 MMOL/L (ref 95–110)
CO2 SERPL-SCNC: 24 MMOL/L (ref 23–29)
CREAT SERPL-MCNC: 1.6 MG/DL (ref 0.5–1.4)
ERYTHROCYTE [DISTWIDTH] IN BLOOD BY AUTOMATED COUNT: 11.9 % (ref 11.5–14.5)
EST. GFR  (NO RACE VARIABLE): 47.8 ML/MIN/1.73 M^2
GLUCOSE SERPL-MCNC: 83 MG/DL (ref 70–110)
HCT VFR BLD AUTO: 31.2 % (ref 40–54)
HGB BLD-MCNC: 10.4 G/DL (ref 14–18)
MCH RBC QN AUTO: 31.4 PG (ref 27–31)
MCHC RBC AUTO-ENTMCNC: 33.3 G/DL (ref 32–36)
MCV RBC AUTO: 94 FL (ref 82–98)
PLATELET # BLD AUTO: 406 K/UL (ref 150–450)
PMV BLD AUTO: 8.3 FL (ref 9.2–12.9)
POTASSIUM SERPL-SCNC: 4 MMOL/L (ref 3.5–5.1)
RBC # BLD AUTO: 3.31 M/UL (ref 4.6–6.2)
SODIUM SERPL-SCNC: 136 MMOL/L (ref 136–145)
WBC # BLD AUTO: 8.83 K/UL (ref 3.9–12.7)

## 2024-03-04 PROCEDURE — 36415 COLL VENOUS BLD VENIPUNCTURE: CPT | Performed by: STUDENT IN AN ORGANIZED HEALTH CARE EDUCATION/TRAINING PROGRAM

## 2024-03-04 PROCEDURE — 63600175 PHARM REV CODE 636 W HCPCS: Performed by: STUDENT IN AN ORGANIZED HEALTH CARE EDUCATION/TRAINING PROGRAM

## 2024-03-04 PROCEDURE — 63600175 PHARM REV CODE 636 W HCPCS: Performed by: HOSPITALIST

## 2024-03-04 PROCEDURE — 99223 1ST HOSP IP/OBS HIGH 75: CPT | Mod: ,,, | Performed by: SURGERY

## 2024-03-04 PROCEDURE — 25000003 PHARM REV CODE 250: Performed by: HOSPITALIST

## 2024-03-04 PROCEDURE — 99233 SBSQ HOSP IP/OBS HIGH 50: CPT | Mod: ,,, | Performed by: INTERNAL MEDICINE

## 2024-03-04 PROCEDURE — 94640 AIRWAY INHALATION TREATMENT: CPT

## 2024-03-04 PROCEDURE — 25000003 PHARM REV CODE 250: Performed by: STUDENT IN AN ORGANIZED HEALTH CARE EDUCATION/TRAINING PROGRAM

## 2024-03-04 PROCEDURE — 85027 COMPLETE CBC AUTOMATED: CPT | Performed by: STUDENT IN AN ORGANIZED HEALTH CARE EDUCATION/TRAINING PROGRAM

## 2024-03-04 PROCEDURE — 80048 BASIC METABOLIC PNL TOTAL CA: CPT | Performed by: STUDENT IN AN ORGANIZED HEALTH CARE EDUCATION/TRAINING PROGRAM

## 2024-03-04 PROCEDURE — 11000001 HC ACUTE MED/SURG PRIVATE ROOM

## 2024-03-04 PROCEDURE — 94761 N-INVAS EAR/PLS OXIMETRY MLT: CPT

## 2024-03-04 PROCEDURE — 99233 SBSQ HOSP IP/OBS HIGH 50: CPT | Mod: ,,, | Performed by: PHYSICIAN ASSISTANT

## 2024-03-04 RX ADMIN — Medication 1 CAPSULE: at 09:03

## 2024-03-04 RX ADMIN — HYDROCODONE BITARTRATE AND ACETAMINOPHEN 1 TABLET: 5; 325 TABLET ORAL at 09:03

## 2024-03-04 RX ADMIN — ENOXAPARIN SODIUM 40 MG: 40 INJECTION SUBCUTANEOUS at 04:03

## 2024-03-04 RX ADMIN — DAPTOMYCIN 670 MG: 350 INJECTION, POWDER, LYOPHILIZED, FOR SOLUTION INTRAVENOUS at 05:03

## 2024-03-04 RX ADMIN — CEFTRIAXONE 2 G: 2 INJECTION, POWDER, FOR SOLUTION INTRAMUSCULAR; INTRAVENOUS at 04:03

## 2024-03-04 RX ADMIN — PANTOPRAZOLE SODIUM 40 MG: 40 TABLET, DELAYED RELEASE ORAL at 09:03

## 2024-03-04 RX ADMIN — TIOTROPIUM BROMIDE INHALATION SPRAY 2 PUFF: 3.12 SPRAY, METERED RESPIRATORY (INHALATION) at 10:03

## 2024-03-04 RX ADMIN — FLUTICASONE FUROATE AND VILANTEROL TRIFENATATE 1 PUFF: 100; 25 POWDER RESPIRATORY (INHALATION) at 10:03

## 2024-03-04 NOTE — SUBJECTIVE & OBJECTIVE
Interval History: Patient seen and examined this AM. No acute events overnight. Afebrile with pulse ranging from 80-70s bpm. Systolic blood pressures ranging from 150-140s mmHg. He is saturating +96% on room air with x3 unmeasured voids in the last 24 hours. Renal function improving.     Review of patient's allergies indicates:   Allergen Reactions    Aspirin Shortness Of Breath     Current Facility-Administered Medications   Medication Frequency    acetaminophen tablet 650 mg Q6H PRN    albuterol inhaler 2 puff Q4H PRN    bisacodyL EC tablet 5 mg Daily PRN    calcium carbonate 200 mg calcium (500 mg) chewable tablet 500 mg TID PRN    cefTRIAXone (ROCEPHIN) 2 g in dextrose 5 % in water (D5W) 100 mL IVPB (MB+) Q24H    DAPTOmycin (CUBICIN) 670 mg in sodium chloride 0.9% SolP 50 mL IVPB Q24H    enoxaparin injection 40 mg Daily    fluticasone furoate-vilanteroL 100-25 mcg/dose diskus inhaler 1 puff Daily    HYDROcodone-acetaminophen 5-325 mg per tablet 1 tablet Q6H PRN    Lactobacillus rhamnosus GG capsule 1 capsule BID    melatonin tablet 6 mg Nightly PRN    ondansetron injection 4 mg Q6H PRN    pantoprazole EC tablet 40 mg Daily    tiotropium bromide 2.5 mcg/actuation inhaler 2 puff Daily       Objective:     Vital Signs (Most Recent):  Temp: 98.3 °F (36.8 °C) (03/04/24 0808)  Pulse: 81 (03/04/24 0808)  Resp: 16 (03/04/24 0808)  BP: (!) 152/85 (03/04/24 0808)  SpO2: 98 % (03/04/24 0808) Vital Signs (24h Range):  Temp:  [98 °F (36.7 °C)-98.8 °F (37.1 °C)] 98.3 °F (36.8 °C)  Pulse:  [76-81] 81  Resp:  [16-20] 16  SpO2:  [95 %-98 %] 98 %  BP: (139-159)/(74-88) 152/85     Weight: 83.5 kg (184 lb 1.4 oz) (02/29/24 0034)  Body mass index is 28.83 kg/m².  Body surface area is 1.99 meters squared.    No intake/output data recorded.     Physical Exam  Vitals and nursing note reviewed.   Constitutional:       General: He is awake. He is not in acute distress.     Appearance: He is normal weight. He is ill-appearing. He is not  diaphoretic.   HENT:      Head: Normocephalic and atraumatic.      Right Ear: External ear normal.      Left Ear: External ear normal.      Nose: Nose normal. No congestion or rhinorrhea.      Mouth/Throat:      Mouth: Mucous membranes are moist.      Pharynx: Oropharynx is clear. No oropharyngeal exudate or posterior oropharyngeal erythema.   Eyes:      General: No scleral icterus.        Right eye: No discharge.         Left eye: No discharge.      Extraocular Movements: Extraocular movements intact.      Conjunctiva/sclera: Conjunctivae normal.   Cardiovascular:      Rate and Rhythm: Normal rate.      Heart sounds: No murmur heard.     No friction rub. No gallop.   Pulmonary:      Effort: Pulmonary effort is normal. No respiratory distress.      Breath sounds: No wheezing, rhonchi or rales.   Abdominal:      General: Bowel sounds are normal. There is no distension.      Palpations: Abdomen is soft.      Tenderness: There is no abdominal tenderness.   Genitourinary:     Penis: Swelling present.       Testes:         Right: Swelling present.         Left: Swelling present.   Musculoskeletal:         General: Swelling and tenderness present.      Cervical back: Neck supple.      Right lower leg: Edema present.      Left lower leg: No edema.      Comments: Right lower extremity edema with Peau d'orange.   Skin:     General: Skin is warm and dry.      Coloration: Skin is not jaundiced.      Findings: Erythema and rash present.   Neurological:      General: No focal deficit present.      Mental Status: He is alert. Mental status is at baseline.      Cranial Nerves: No cranial nerve deficit.      Motor: No weakness.   Psychiatric:         Mood and Affect: Mood normal.         Behavior: Behavior normal. Behavior is cooperative.          Significant Labs:  BMP:   Recent Labs   Lab 03/04/24  2331   GLU 83      K 4.0      CO2 24   BUN 16   CREATININE 1.6*   CALCIUM 8.8     CBC:   Recent Labs   Lab 03/04/24  8341    WBC 8.83   RBC 3.31*   HGB 10.4*   HCT 31.2*      MCV 94   MCH 31.4*   MCHC 33.3     CMP:   Recent Labs   Lab 02/27/24  1359 02/28/24  0409 03/04/24  0411   GLU 76   < > 83   CALCIUM 9.7   < > 8.8   ALBUMIN 3.4*  --   --    PROT 7.8  --   --    *   < > 136   K 4.5   < > 4.0   CO2 23   < > 24   CL 97   < > 103   BUN 16   < > 16   CREATININE 1.7*   < > 1.6*   ALKPHOS 86  --   --    ALT 16  --   --    AST 26  --   --    BILITOT 0.5  --   --     < > = values in this interval not displayed.     LFTs:   Recent Labs   Lab 02/27/24  1359   ALT 16   AST 26   ALKPHOS 86   BILITOT 0.5   PROT 7.8   ALBUMIN 3.4*     Microbiology Results (last 7 days)       Procedure Component Value Units Date/Time    Culture, MRSA [[id number]] Collected: 02/28/24 0850    Order Status: Completed Specimen: Nares Updated: 03/01/24 1314     MRSA Surveillance Screen No MRSA isolated    MRSA Screen by PCR [[id number]] Collected: 02/28/24 0850    Order Status: Canceled Specimen: Nasopharyngeal Swab from Nasal           Specimen (24h ago, onward)      None          Recent Labs   Lab 02/27/24 2053   COLORU Yellow   SPECGRAV 1.015   PHUR 5.0   PROTEINUA Negative   NITRITE Negative   LEUKOCYTESUR Negative      Significant Imaging:  I have reviewed all imagining in the last 24 hours.

## 2024-03-04 NOTE — ASSESSMENT & PLAN NOTE
- History and physical examination including ankle cup sign, Stammer sign, thickening of skin consistent with right lower extremity lymphedema   - Received torsemide on 3/1 and Bumex 0.5 mg on 3/2/24.   - Limit sodium intake to 2000 mg daily.  Limit volume intake to 1.5 L daily.  Elevate legs when resting.   - Vascular Medicine follow up as outpatient for further lymphedema therapy  - Ordered US BL LE to check for venous insufficiency  - Compression stockings

## 2024-03-04 NOTE — CONSULTS
[facility] Surg  Vascular Surgery  Consult Note    Inpatient consult to Vascular Surgery  Consult performed by: Joseph Nguyen MD  Consult ordered by: Chandra Cheema MD        Subjective:     Chief Complaint/Reason for Admission: Right LE swelling    History of Present Illness: Bradley Collins is a 63 year old man with asthma, allergic rhinitis, hyperlipidemia, irritable bowel syndrome with diarrhea, history of prostate cancer treated with radiation, history of sinus surgery in 2001. [facility]. There was concerns for cellulitis and he was discharged on oral antibiotics. Due to persistent symptoms, he went to [facility] ED few days after discharge. He was admitted and treated with IV antibiotics. [facility] concerning [facility]ities. There was reported slowing of the venous flow in the bilateral lower extremity veins. He reported moderate improvement in the swelling after this. He presented to [facility] 6 days ago for continued swelling. The swelling and redness has tracked up his entire RLE  including the pubic region. No significant pain was associated with his symptoms. He was admitted and treated with IV antibiotics. Non-contrast CT showed soft tissue induration throughout the right anterior pelvis, thigh,and leg, skin thickening and edema of the subcutaneous tissue, superficial fascia, and deep fascia, scrotal edema, and retroperitoneal/extraperitoneal edema along the sidewalls. ID was consulted for evaluation and management.   He denied recent history of cuts to his lower extremities or being around water activities. He is retired. He denied history of tobacco use or IV drug use. He endorsed social alcohol use. He is being followed up by ID for cellulitis treatment. Vascular surgery was consulted due to concerns for venous insufficiency vs lymphedema. Primary team did not have concerns for limb ischemia.      Medications Prior to Admission   Medication Sig Dispense Refill Last Dose    albuterol (ACCUNEB) 1.25 mg/3 mL Nebu Take 1.25 mg by nebulization every 6 (six) hours as needed. Rescue       [] amoxicillin-clavulanate 875-125mg (AUGMENTIN) 875-125 mg per tablet Take 1 tablet by mouth every 12 (twelve) hours. for 10 days 20 tablet 0     budesonide-formoterol 160-4.5 mcg (SYMBICORT) 160-4.5 mcg/actuation HFAA Inhale 2 puffs into the lungs every 12 (twelve) hours. Controller       calcium carbonate (OS-CAROLYN) 500 mg calcium (1,250 mg) tablet        [] doxycycline (MONODOX) 100 MG capsule Take 1 capsule (100 mg total) by mouth every 12 (twelve) hours. for 10 days 20 capsule 0     dupilumab 300 mg/2 mL PnIj INJECT 300MG SUBCUTANEOUSLY EVERY TWO WEEKS       fluticasone propionate 93 mcg/actuation AerB 2 sprays by Nasal route 2 (two) times a day.       mometasone-formoterol (DULERA) 200-5 mcg/actuation inhaler Inhale 2 puffs into the lungs 2 (two) times daily.       montelukast (SINGULAIR) 5 MG chewable tablet Take 5 mg by mouth.       nasal exhalation resistanc.dev (NASAL EXHALATION RESISTANCE DV NASL) by Nasal route.       omeprazole 20 mg TbEC Take 20 mg by mouth once daily.       pantoprazole (PROTONIX) 40 MG tablet Take 40 mg by mouth.       rosuvastatin 10 mg CpSP        tadalafiL (CIALIS) 20 MG Tab 20 mg.       tiotropium (SPIRIVA) 18 mcg inhalation capsule Inhale 18 mcg into the lungs once daily. Controller       vitamin D (VITAMIN D3) 1000 units Tab Take 1,000 Units by mouth once daily.          Review of patient's allergies indicates:   Allergen Reactions    Aspirin Shortness Of Breath       Past Medical History:   Diagnosis Date    Allergy     Asthma     Cellulitis     Elevated PSA     Prostate cancer      Past Surgical History:   Procedure Laterality Date    ENDOSCOPIC ULTRASOUND OF UPPER GASTROINTESTINAL TRACT N/A 2023    Procedure: ULTRASOUND, UPPER GI TRACT, ENDOSCOPIC;  Surgeon: Yoseph CHAU  MD Guillermo;  Location: [facility] ([address] [place] [address]);  Service: Endoscopy;  Laterality: N/A;  instr portal-va referral    SINUS SURGERY Bilateral 2001     Family History    Family history is unknown by patient.       Tobacco Use    Smoking status: Never    Smokeless tobacco: Never   Substance and Sexual Activity    Alcohol use: Yes     Alcohol/week: 1.0 standard drink of alcohol     Types: 1 Cans of beer per week     Comment: week    Drug use: Never    Sexual activity: Not on file     Review of Systems   Constitutional:  Positive for chills. Negative for fatigue and fever.   Respiratory:  Negative for choking, chest tightness and shortness of breath.    Cardiovascular:  Positive for leg swelling. Negative for chest pain.   Gastrointestinal:  Negative for abdominal pain, diarrhea, nausea and vomiting.   Skin:  Positive for color change and rash.     Objective:     Vital Signs (Most Recent):  Temp: 97.7 °F (36.5 °C) (03/04/24 1141)  Pulse: 78 (03/04/24 1141)  Resp: 16 (03/04/24 1141)  BP: (!) 160/86 (03/04/24 1141)  SpO2: 98 % (03/04/24 1141) Vital Signs (24h Range):  Temp:  [97.7 °F (36.5 °C)-98.8 °F (37.1 °C)] 97.7 °F (36.5 °C)  Pulse:  [69-81] 78  Resp:  [16-20] 16  SpO2:  [96 %-98 %] 98 %  BP: (144-160)/(74-88) 160/86     Weight: 83.5 kg (184 lb 1.4 oz)  Body mass index is 28.83 kg/m².      Physical Exam  Constitutional:       Appearance: Normal appearance.   Cardiovascular:      Rate and Rhythm: Normal rate.   Pulmonary:      Effort: No respiratory distress.      Breath sounds: No wheezing.   Abdominal:      General: Abdomen is flat.      Palpations: There is no mass.      Tenderness: There is no abdominal tenderness.   Musculoskeletal:         General: Swelling and tenderness present. Normal range of motion.      Cervical back: Normal range of motion. No rigidity or tenderness.      Right lower leg: Edema present.      Comments: -- Circumferential erythema and swelling of the right lower extremity from the foot to the  thigh, involving the groin.  -- Palpable thickening of the skin, concerning for cellulitis  -- Strong dopplerable PT & DP pulses bilaterally  -- Mild TTP about the upper thigh  -- Full range of motion of bilateral lower extremities without pain  -- Sensation intact to light touch throughout   Skin:     General: Skin is warm and dry.      Capillary Refill: Capillary refill takes less than 2 seconds.      Findings: Erythema and rash present.   Neurological:      General: No focal deficit present.      Mental Status: He is alert and oriented to person, place, and time.   Psychiatric:         Mood and Affect: Mood normal.          Significant Labs:  CBC:   Recent Labs   Lab 03/04/24 0411   WBC 8.83   RBC 3.31*   HGB 10.4*   HCT 31.2*      MCV 94   MCH 31.4*   MCHC 33.3     CMP:   Recent Labs   Lab 03/04/24 0411   GLU 83   CALCIUM 8.8      K 4.0   CO2 24      BUN 16   CREATININE 1.6*       Significant Diagnostics:  CT: Extensive soft tissue induration throughout the right thigh and leg as well as anterior pelvis. Deep fascial edema noted at the level of the thigh. No evidence for soft tissue gas. Diagnostic considerations include infectious process such as cellulitis, myositis, and fasciitis. Alternatively, appearance may be seen with inflammatory process, venous/lymphatic obstruction, or drug/allergic reaction.       U/S: BL LE, Venous  No evidence of right deep venous thrombosis.  Slow flow.  Subcutaneous edema.     U/S Arterial Right LE  No hemodynamically significant stenosis seen.  Normal JOSE CARLOS measurements.    Assessment/Plan:     * Cellulitis of right lower limb  64 yo m with with 2 months history of right lower extremity swelling, redness and tenderness improving on IV antibiotics. There are concerns for cellulitis and venous insufficieny.     -- HM medicine and ID Management for cellulitis.  -- compression and elevation of RLE  -- CT venogram once kidney function improves to evaluate for  obstruction  -- He has an appointment with Dr. Jus Wong, vascular medicine scheduled in 2 days (03/06)  -- No concerns for limb ischemia                Joseph Nguyen MD  Vascular Surgery  [facility]

## 2024-03-04 NOTE — ASSESSMENT & PLAN NOTE
Stable creatinine since initial admission at near 2.0  Previous creatinine 1.1 one year ago  Reports having a normal baseline, however [facility]    At this time, suspect at baseline creatinine, however possible AIN contributing to elevated creatinine    Plan/Recommendation:  - renal function improving now   - daily RFPs and magnesium levels   - renal diet/tube feeds when not NPO  - strict I/Os and daily weights  - keep MAP > 65 mmHg  - renally all dose medications to eGFR  - avoid nephrotoxic agents wean feasible (i.e. NSAIDs, intra-arterial contrast, supra-therapeutic vancomycin levels, etc.)  - no acute indications for RRT at this time however will continue to monitoring

## 2024-03-04 NOTE — TELEPHONE ENCOUNTER
----- Message from Gilmer Noel MD sent at 3/3/2024  5:17 PM CST -----  Regarding: Schedule appointment  Hi,    He is suffering from Lymphedema. Please arrange his appointment with Dr. Petersen so he can start Lymphedema clinic therapy.    Thank you,  Gilmer

## 2024-03-04 NOTE — ASSESSMENT & PLAN NOTE
Patient with acute kidney injury/acute renal failure likely due to acute tubular necrosis. DALIA is currently stable. Baseline creatinine 1.2 - Labs reviewed- Renal function/electrolytes with Estimated Creatinine Clearance: 48.2 mL/min (A) (based on SCr of 1.6 mg/dL (H)). according to latest data. Monitor urine output and serial BMP and adjust therapy as needed. Avoid nephrotoxins and renally dose meds for GFR listed above.  Nephrology following  Improving

## 2024-03-04 NOTE — ASSESSMENT & PLAN NOTE
62 yo m with with 2 months history of right lower extremity swelling, redness and tenderness improving on IV antibiotics. There are concerns for cellulitis and venous insufficieny.     -- HM medicine and ID Management for cellulitis.  -- compression and elevation of RLE  -- CT venogram once kidney function improves to evaluate for obstruction  -- He has an appointment with Dr. Jus Wong, vascular medicine scheduled in 2 days (03/06)  -- No concerns for limb ischemia

## 2024-03-04 NOTE — SUBJECTIVE & OBJECTIVE
Interval History: NAEON. Afebrile. Kidney function improving. Persistent RLE and scrotal swelling. Erythema of RLE improving. Case discussed with vascular surgery who recommend outpatient referral.     Review of Systems   Constitutional:  Negative for chills, diaphoresis, fatigue and fever.   HENT:  Negative for congestion, ear pain, sore throat, tinnitus and trouble swallowing.    Eyes:  Negative for photophobia, discharge and visual disturbance.   Respiratory:  Negative for apnea, cough, choking, chest tightness and shortness of breath.    Cardiovascular:  Negative for chest pain, palpitations and leg swelling.   Gastrointestinal:  Negative for abdominal distention, abdominal pain, constipation, diarrhea, nausea and vomiting.   Endocrine: Negative for polydipsia, polyphagia and polyuria.   Genitourinary:  Positive for penile swelling and scrotal swelling. Negative for difficulty urinating and dysuria.   Musculoskeletal:  Positive for joint swelling. Negative for arthralgias, back pain and gait problem.        Right leg swelling   Skin:  Negative for color change and rash.   Neurological:  Negative for dizziness, syncope, speech difficulty, weakness, light-headedness and headaches.   Psychiatric/Behavioral:  Negative for agitation, confusion, dysphoric mood, hallucinations and sleep disturbance. The patient is not nervous/anxious.      Objective:     Vital Signs (Most Recent):  Temp: 97.7 °F (36.5 °C) (03/04/24 1141)  Pulse: 78 (03/04/24 1141)  Resp: 16 (03/04/24 1141)  BP: (!) 160/86 (03/04/24 1141)  SpO2: 98 % (03/04/24 1141) Vital Signs (24h Range):  Temp:  [97.7 °F (36.5 °C)-98.8 °F (37.1 °C)] 97.7 °F (36.5 °C)  Pulse:  [69-81] 78  Resp:  [16-20] 16  SpO2:  [96 %-98 %] 98 %  BP: (144-160)/(74-88) 160/86     Weight: 83.5 kg (184 lb 1.4 oz)  Body mass index is 28.83 kg/m².  No intake or output data in the 24 hours ending 03/04/24 1151      Physical Exam  Constitutional:       General: He is not in acute  distress.     Appearance: Normal appearance. He is normal weight.   HENT:      Head: Normocephalic and atraumatic.      Nose: No congestion.      Mouth/Throat:      Mouth: Mucous membranes are moist.   Eyes:      Extraocular Movements: Extraocular movements intact.      Conjunctiva/sclera: Conjunctivae normal.      Pupils: Pupils are equal, round, and reactive to light.   Neck:      Vascular: No carotid bruit.   Cardiovascular:      Rate and Rhythm: Normal rate and regular rhythm.      Pulses: Normal pulses.      Heart sounds: Normal heart sounds. No murmur heard.     No gallop.   Pulmonary:      Effort: Pulmonary effort is normal. No respiratory distress.      Breath sounds: Normal breath sounds. No wheezing, rhonchi or rales.   Chest:      Chest wall: No tenderness.   Abdominal:      General: Abdomen is flat. Bowel sounds are normal. There is no distension.      Tenderness: There is no abdominal tenderness.   Genitourinary:     Comments: Scrotal swelling +  Penile swelling +  Musculoskeletal:         General: Swelling and tenderness present.      Cervical back: No rigidity.      Right lower leg: Edema present.      Left lower leg: No edema.      Comments: Right lower extremity edema, with thickening of skin changes consistent with lymphedema   Ankle cuffing +  Stemmer sign +   Skin:     Capillary Refill: Capillary refill takes less than 2 seconds.   Neurological:      General: No focal deficit present.      Mental Status: He is alert and oriented to person, place, and time. Mental status is at baseline.      Cranial Nerves: No cranial nerve deficit.      Motor: No weakness.   Psychiatric:         Mood and Affect: Mood normal.         Behavior: Behavior normal.             Significant Labs: All pertinent labs within the past 24 hours have been reviewed.    Significant Imaging: I have reviewed all pertinent imaging results/findings within the past 24 hours.

## 2024-03-04 NOTE — TELEPHONE ENCOUNTER
Patient scheduled.    Future Appointments   Date Time Provider Department Center   3/4/2024  2:00 PM VASCULAR, CARDIOLOGY [facility] SU[facility]IGURDEEP [facility]   3/6/2024  8:00 AM Lane Petersen MD [facility]   3/14/2024  1:00 PM Elena Alexander, GUNNER [facility] UROLOG [place] Cisse

## 2024-03-04 NOTE — HPI
Bradley Collins is a 63 year old man with asthma, allergic rhinitis, hyperlipidemia, irritable bowel syndrome with diarrhea, history of prostate cancer treated with radiation, history of sinus surgery in 2001. [facility]. There was concerns for cellulitis and he was discharged on oral antibiotics. Due to persistent symptoms, he went to [facility] ED few days after discharge. He was admitted and treated with IV antibiotics. [facility] concerning for [facility]ulities. There was reported slowing of the venous flow in the bilateral lower extremity veins. He reported moderate improvement in the swelling after this. He presented to [facility] 6 days ago for continued swelling. The swelling and redness has tracked up his entire RLE  including the pubic region. No significant pain was associated with his symptoms. He was admitted and treated with IV antibiotics. Non-contrast CT showed soft tissue induration throughout the right anterior pelvis, thigh,and leg, skin thickening and edema of the subcutaneous tissue, superficial fascia, and deep fascia, scrotal edema, and retroperitoneal/extraperitoneal edema along the sidewalls. ID was consulted for evaluation and management.   He denied recent history of cuts to his lower extremities or being around water activities. He is retired. He denied history of tobacco use or IV drug use. He endorsed social alcohol use. He is being followed up by ID for cellulitis treatment. Vascular surgery was consulted due to concerns for venous insufficiency vs lymphedema. Primary team did not have concerns for limb ischemia.

## 2024-03-04 NOTE — SUBJECTIVE & OBJECTIVE
Medications Prior to Admission   Medication Sig Dispense Refill Last Dose    albuterol (ACCUNEB) 1.25 mg/3 mL Nebu Take 1.25 mg by nebulization every 6 (six) hours as needed. Rescue       [] amoxicillin-clavulanate 875-125mg (AUGMENTIN) 875-125 mg per tablet Take 1 tablet by mouth every 12 (twelve) hours. for 10 days 20 tablet 0     budesonide-formoterol 160-4.5 mcg (SYMBICORT) 160-4.5 mcg/actuation HFAA Inhale 2 puffs into the lungs every 12 (twelve) hours. Controller       calcium carbonate (OS-CAROLYN) 500 mg calcium (1,250 mg) tablet        [] doxycycline (MONODOX) 100 MG capsule Take 1 capsule (100 mg total) by mouth every 12 (twelve) hours. for 10 days 20 capsule 0     dupilumab 300 mg/2 mL PnIj INJECT 300MG SUBCUTANEOUSLY EVERY TWO WEEKS       fluticasone propionate 93 mcg/actuation AerB 2 sprays by Nasal route 2 (two) times a day.       mometasone-formoterol (DULERA) 200-5 mcg/actuation inhaler Inhale 2 puffs into the lungs 2 (two) times daily.       montelukast (SINGULAIR) 5 MG chewable tablet Take 5 mg by mouth.       nasal exhalation resistanc.dev (NASAL EXHALATION RESISTANCE DV NASL) by Nasal route.       omeprazole 20 mg TbEC Take 20 mg by mouth once daily.       pantoprazole (PROTONIX) 40 MG tablet Take 40 mg by mouth.       rosuvastatin 10 mg CpSP        tadalafiL (CIALIS) 20 MG Tab 20 mg.       tiotropium (SPIRIVA) 18 mcg inhalation capsule Inhale 18 mcg into the lungs once daily. Controller       vitamin D (VITAMIN D3) 1000 units Tab Take 1,000 Units by mouth once daily.          Review of patient's allergies indicates:   Allergen Reactions    Aspirin Shortness Of Breath       Past Medical History:   Diagnosis Date    Allergy     Asthma     Cellulitis     Elevated PSA     Prostate cancer      Past Surgical History:   Procedure Laterality Date    ENDOSCOPIC ULTRASOUND OF UPPER GASTROINTESTINAL TRACT N/A 2023    Procedure: ULTRASOUND, UPPER GI TRACT, ENDOSCOPIC;  Surgeon: Yoseph Li,  MD;  Location: [facility] ([address] [place] [address]);  Service: Endoscopy;  Laterality: N/A;  instr portal-va referral    SINUS SURGERY Bilateral 2001     Family History    Family history is unknown by patient.       Tobacco Use    Smoking status: Never    Smokeless tobacco: Never   Substance and Sexual Activity    Alcohol use: Yes     Alcohol/week: 1.0 standard drink of alcohol     Types: 1 Cans of beer per week     Comment: week    Drug use: Never    Sexual activity: Not on file     Review of Systems   Constitutional:  Positive for chills. Negative for fatigue and fever.   Respiratory:  Negative for choking, chest tightness and shortness of breath.    Cardiovascular:  Positive for leg swelling. Negative for chest pain.   Gastrointestinal:  Negative for abdominal pain, diarrhea, nausea and vomiting.   Skin:  Positive for color change and rash.     Objective:     Vital Signs (Most Recent):  Temp: 97.7 °F (36.5 °C) (03/04/24 1141)  Pulse: 78 (03/04/24 1141)  Resp: 16 (03/04/24 1141)  BP: (!) 160/86 (03/04/24 1141)  SpO2: 98 % (03/04/24 1141) Vital Signs (24h Range):  Temp:  [97.7 °F (36.5 °C)-98.8 °F (37.1 °C)] 97.7 °F (36.5 °C)  Pulse:  [69-81] 78  Resp:  [16-20] 16  SpO2:  [96 %-98 %] 98 %  BP: (144-160)/(74-88) 160/86     Weight: 83.5 kg (184 lb 1.4 oz)  Body mass index is 28.83 kg/m².      Physical Exam  Constitutional:       Appearance: Normal appearance.   Cardiovascular:      Rate and Rhythm: Normal rate.   Pulmonary:      Effort: No respiratory distress.      Breath sounds: No wheezing.   Abdominal:      General: Abdomen is flat.      Palpations: There is no mass.      Tenderness: There is no abdominal tenderness.   Musculoskeletal:         General: Swelling and tenderness present. Normal range of motion.      Cervical back: Normal range of motion. No rigidity or tenderness.      Right lower leg: Edema present.      Comments: -- Circumferential erythema and swelling of the right lower extremity from the foot to the thigh,  involving the groin.  -- Palpable thickening of the skin, concerning for cellulitis  -- Strong dopplerable PT & DP pulses bilaterally  -- Mild TTP about the upper thigh  -- Full range of motion of bilateral lower extremities without pain  -- Sensation intact to light touch throughout   Skin:     General: Skin is warm and dry.      Capillary Refill: Capillary refill takes less than 2 seconds.      Findings: Erythema and rash present.   Neurological:      General: No focal deficit present.      Mental Status: He is alert and oriented to person, place, and time.   Psychiatric:         Mood and Affect: Mood normal.          Significant Labs:  CBC:   Recent Labs   Lab 03/04/24 0411   WBC 8.83   RBC 3.31*   HGB 10.4*   HCT 31.2*      MCV 94   MCH 31.4*   MCHC 33.3     CMP:   Recent Labs   Lab 03/04/24 0411   GLU 83   CALCIUM 8.8      K 4.0   CO2 24      BUN 16   CREATININE 1.6*       Significant Diagnostics:  CT: Extensive soft tissue induration throughout the right thigh and leg as well as anterior pelvis. Deep fascial edema noted at the level of the thigh. No evidence for soft tissue gas. Diagnostic considerations include infectious process such as cellulitis, myositis, and fasciitis. Alternatively, appearance may be seen with inflammatory process, venous/lymphatic obstruction, or drug/allergic reaction.       U/S: BL LE, Venous  No evidence of right deep venous thrombosis.  Slow flow.  Subcutaneous edema.     U/S Arterial Right LE  No hemodynamically significant stenosis seen.  Normal JOSE CARLOS measurements.

## 2024-03-04 NOTE — PROGRESS NOTES
[facility] Medicine  Progress Note    Patient Name: Bradley Collins  MRN: [id number]  Patient Class: IP- Inpatient   Admission Date: 2/27/2024  Length of Stay: 6 days  Attending Physician: Chandra Cheema MD  Primary Care Provider: Administration, [facility]        Subjective:     Principal Problem:Cellulitis of right lower limb        HPI:  Bradley Collins is a 63 year old Black man with asthma, allergic rhinitis, hyperlipidemia, irritable bowel syndrome with diarrhea, history of prostate cancer treated with radiation, history of sinus surgery in 2001. He lives in [place]. He works for [employer]. His primary care clinic is the [facility] clinic.    He was hospitalized at the [facility] on 2/12/2024 for right lower extremity cellulitis. He had a CT with contrast done and was told that it did not show anything. He was prescribed cephalexin.   He was hospitalized at [facility] from 2/16/2024 to 2/20/2024 for worsening cellulitis. Creatinine was elevated at 2.0 mg/dL (from 1.1 a year ago). He was initially put on piperacillin-tazobactam and vancomycin. Piperacillin-tazobactam was changed to ceftriaxone. Erythema improved but edema persisted. He had induration of the thigh. CT showed diffuse cutaneous thickening and edema with prominent right inguinal lymph nodes. Antibiotics were switched to oral amoxicillin-clavulanate and doxycycline on 2/19/2024. Edema improved. Induration persisted. He was discharged home.   He followed up at [facility] on 2/27/2024. He had 4 days left of the antibiotics. Swelling had worsened and spread up to his abdomen. He had edema and erythema of the entire right lower extremity, right groin, and right lower abdomen. It was tender and warm. He was advised to return to the emergency department to resume intravenous antibiotic treatment. Labs showed creatinine to  still be elevated (1.7 mg/dL, from 1.8 on 2/19/2024). Non-contrast CT showed soft tissue induration throughout the right anterior pelvis, thigh,and leg, skin thickening and edema of the subcutaneous tissue, superficial fascia, and deep fascia, scrotal edema, and retroperitoneal/extraperitoneal edema along the sidewalls. He was given piperacillin-tazobactam and vancomycin. He was admitted to Hospital Medicine Team W.     Overview/Hospital Course:  He was put on vancomycin. Infectious Disease was consulted for recommendations changed vancomycin to ceftriaxone, daptomycin, and linezolid. General Surgery was consulted to evaluate and recommended no surgical intervention to help reduce swelling. Urology was consulted to evaluate his scrotal swelling and found no issues they had to manage. His right lower extremity, right pelvis, penile, and scrotal swelling decreased after a dose of torsemide on 3/1/24.  Giving his history physical examination diagnosis of lymphedema has been made.  His creatine is downtrending 1 dose of Bumex 0.5 mg on 03/02/2024. Creatinine worsened to 1.9 on 3/3/24. US of both LE to check for venous insufficiency ordered and nephrology consult requested for ongoing DALIA for past couple of weeks.    Interval History: NAEON. Afebrile. Kidney function improving. Persistent RLE and scrotal swelling. Erythema of RLE improving. Case discussed with vascular surgery who recommend outpatient referral.     Review of Systems   Constitutional:  Negative for chills, diaphoresis, fatigue and fever.   HENT:  Negative for congestion, ear pain, sore throat, tinnitus and trouble swallowing.    Eyes:  Negative for photophobia, discharge and visual disturbance.   Respiratory:  Negative for apnea, cough, choking, chest tightness and shortness of breath.    Cardiovascular:  Negative for chest pain, palpitations and leg swelling.   Gastrointestinal:  Negative for abdominal distention, abdominal pain, constipation, diarrhea,  nausea and vomiting.   Endocrine: Negative for polydipsia, polyphagia and polyuria.   Genitourinary:  Positive for penile swelling and scrotal swelling. Negative for difficulty urinating and dysuria.   Musculoskeletal:  Positive for joint swelling. Negative for arthralgias, back pain and gait problem.        Right leg swelling   Skin:  Negative for color change and rash.   Neurological:  Negative for dizziness, syncope, speech difficulty, weakness, light-headedness and headaches.   Psychiatric/Behavioral:  Negative for agitation, confusion, dysphoric mood, hallucinations and sleep disturbance. The patient is not nervous/anxious.      Objective:     Vital Signs (Most Recent):  Temp: 97.7 °F (36.5 °C) (03/04/24 1141)  Pulse: 78 (03/04/24 1141)  Resp: 16 (03/04/24 1141)  BP: (!) 160/86 (03/04/24 1141)  SpO2: 98 % (03/04/24 1141) Vital Signs (24h Range):  Temp:  [97.7 °F (36.5 °C)-98.8 °F (37.1 °C)] 97.7 °F (36.5 °C)  Pulse:  [69-81] 78  Resp:  [16-20] 16  SpO2:  [96 %-98 %] 98 %  BP: (144-160)/(74-88) 160/86     Weight: 83.5 kg (184 lb 1.4 oz)  Body mass index is 28.83 kg/m².  No intake or output data in the 24 hours ending 03/04/24 1151      Physical Exam  Constitutional:       General: He is not in acute distress.     Appearance: Normal appearance. He is normal weight.   HENT:      Head: Normocephalic and atraumatic.      Nose: No congestion.      Mouth/Throat:      Mouth: Mucous membranes are moist.   Eyes:      Extraocular Movements: Extraocular movements intact.      Conjunctiva/sclera: Conjunctivae normal.      Pupils: Pupils are equal, round, and reactive to light.   Neck:      Vascular: No carotid bruit.   Cardiovascular:      Rate and Rhythm: Normal rate and regular rhythm.      Pulses: Normal pulses.      Heart sounds: Normal heart sounds. No murmur heard.     No gallop.   Pulmonary:      Effort: Pulmonary effort is normal. No respiratory distress.      Breath sounds: Normal breath sounds. No wheezing,  rhonchi or rales.   Chest:      Chest wall: No tenderness.   Abdominal:      General: Abdomen is flat. Bowel sounds are normal. There is no distension.      Tenderness: There is no abdominal tenderness.   Genitourinary:     Comments: Scrotal swelling +  Penile swelling +  Musculoskeletal:         General: Swelling and tenderness present.      Cervical back: No rigidity.      Right lower leg: Edema present.      Left lower leg: No edema.      Comments: Right lower extremity edema, with thickening of skin changes consistent with lymphedema   Ankle cuffing +  Stemmer sign +   Skin:     Capillary Refill: Capillary refill takes less than 2 seconds.   Neurological:      General: No focal deficit present.      Mental Status: He is alert and oriented to person, place, and time. Mental status is at baseline.      Cranial Nerves: No cranial nerve deficit.      Motor: No weakness.   Psychiatric:         Mood and Affect: Mood normal.         Behavior: Behavior normal.             Significant Labs: All pertinent labs within the past 24 hours have been reviewed.    Significant Imaging: I have reviewed all pertinent imaging results/findings within the past 24 hours.    Assessment/Plan:      * Cellulitis of right lower limb  Persistent. 3rd hospital admission. Improved on vancomycin and ceftriaxone during last hospitalization. Prescribed amoxicillin-clavulanate and doxycycline and it worsened. Appreciate Infectious Disease. Giving ceftriaxone, daptomycin. DC linezolid on 3/3/24.   Will discuss final abx recs with ID    DALIA (acute kidney injury)  Patient with acute kidney injury/acute renal failure likely due to acute tubular necrosis. DALIA is currently stable. Baseline creatinine 1.2 - Labs reviewed- Renal function/electrolytes with Estimated Creatinine Clearance: 48.2 mL/min (A) (based on SCr of 1.6 mg/dL (H)). according to latest data. Monitor urine output and serial BMP and adjust therapy as needed. Avoid nephrotoxins and renally  dose meds for GFR listed above.  Nephrology following  Improving    Lymphedema  - History and physical examination including ankle cup sign, Stammer sign, thickening of skin consistent with right lower extremity lymphedema   - Received torsemide on 3/1 and Bumex 0.5 mg on 3/2/24.   - Limit sodium intake to 2000 mg daily.  Limit volume intake to 1.5 L daily.  Elevate legs when resting.   - Vascular Medicine follow up as outpatient for further lymphedema therapy  - Ordered US BL LE to check for venous insufficiency  - Compression stockings      Moderate persistent asthma, uncomplicated  - Stable  - Continue fluticasone-vilanterol in place of home mometasone-formoterol. Albuterol prn.       Elevated serum creatinine  - Nephrology consult appreciated, recommended lab and imaging evaluation to determine etiology, type of DALIA        VTE Risk Mitigation (From admission, onward)           Ordered     enoxaparin injection 40 mg  Daily         02/27/24 1703                    Discharge Planning   CONSTANTINO: 3/4/2024     Code Status: Full Code   Is the patient medically ready for discharge?: No    Reason for patient still in hospital (select all that apply): Treatment  Discharge Plan A: Home with family                  Chandra Cheema MD  Department of Hospital Medicine   [facility]

## 2024-03-04 NOTE — SUBJECTIVE & OBJECTIVE
Interval History:   No acute events.   Afebrile and WBC WNL.  Leg edema and redness essentially unchanged.  Denies pain at rest  CRP improved.   The patient denies any recent fever, chills, or sweats.      Review of Systems   Constitutional:  Negative for chills, diaphoresis and fever.   Respiratory:  Negative for shortness of breath.    Cardiovascular:  Positive for leg swelling (RLE). Negative for chest pain.   Gastrointestinal:  Negative for abdominal pain, diarrhea, nausea and vomiting.   Genitourinary:  Positive for penile swelling and scrotal swelling. Negative for dysuria and hematuria.     Objective:     Vital Signs (Most Recent):  Temp: 97.7 °F (36.5 °C) (03/04/24 1141)  Pulse: 78 (03/04/24 1141)  Resp: 16 (03/04/24 1141)  BP: (!) 160/86 (03/04/24 1141)  SpO2: 98 % (03/04/24 1141) Vital Signs (24h Range):  Temp:  [97.7 °F (36.5 °C)-98.8 °F (37.1 °C)] 97.7 °F (36.5 °C)  Pulse:  [69-81] 78  Resp:  [16-20] 16  SpO2:  [96 %-98 %] 98 %  BP: (144-160)/(74-88) 160/86     Weight: 83.5 kg (184 lb 1.4 oz)  Body mass index is 28.83 kg/m².    Estimated Creatinine Clearance: 48.2 mL/min (A) (based on SCr of 1.6 mg/dL (H)).     Physical Exam  Constitutional:       General: He is not in acute distress.     Appearance: Normal appearance. He is well-developed. He is not ill-appearing, toxic-appearing or diaphoretic.   HENT:      Head: Normocephalic and atraumatic.   Cardiovascular:      Rate and Rhythm: Normal rate and regular rhythm.      Heart sounds: Normal heart sounds. No murmur heard.     No friction rub. No gallop.   Pulmonary:      Effort: Pulmonary effort is normal. No respiratory distress.      Breath sounds: Normal breath sounds. No wheezing or rales.   Abdominal:      General: Bowel sounds are normal. There is no distension.      Palpations: Abdomen is soft. There is no mass.      Tenderness: There is no abdominal tenderness. There is no guarding or rebound.   Musculoskeletal:      Right lower leg: Edema (with  "slight rubor and warmth) present.      Left lower leg: No edema.   Skin:     General: Skin is warm and dry.   Neurological:      Mental Status: He is alert and oriented to person, place, and time.   Psychiatric:         Behavior: Behavior normal.          Significant Labs: Blood Culture:   Recent Labs   Lab 02/16/24  1843 02/16/24  1919   LABBLOO No growth after 5 days. No growth after 5 days.     CBC:   Recent Labs   Lab 03/03/24  0628 03/04/24  0411   WBC 9.19 8.83   HGB 10.1* 10.4*   HCT 29.7* 31.2*    406     CMP:   Recent Labs   Lab 03/03/24 0628 03/04/24 0411   * 136   K 4.1 4.0    103   CO2 24 24   GLU 93 83   BUN 16 16   CREATININE 1.9* 1.6*   CALCIUM 8.9 8.8   ANIONGAP 10 9     Wound Culture: No results for input(s): "LABAERO" in the last 4320 hours.  All pertinent labs within the past 24 hours have been reviewed.    Significant Imaging: I have reviewed all pertinent imaging results/findings within the past 24 hours.  Procedure Component Value Units Date/Time   US Retroperitoneal Complete [[id number]] Resulted: 03/04/24 0055   Order Status: Completed Updated: 03/04/24 0058   Narrative:     EXAMINATION:  US RETROPERITONEAL COMPLETE    CLINICAL HISTORY:  DALIA;    TECHNIQUE:  Ultrasound of the kidneys and urinary bladder was performed including color flow and Doppler evaluation of the kidneys.    COMPARISON:  None.    FINDINGS:  Right kidney: The right kidney measures 11.2 cm. No cortical thinning. No loss of corticomedullary distinction. Resistive index measures 0.61.  Multiple simple appearing renal cysts, with the largest measuring up to 2.1 cm in size.  No mass. No renal stone. No hydronephrosis.    Left kidney: The left kidney measures 11.0 cm. No cortical thinning. No loss of corticomedullary distinction. Resistive index measures 0.69.  Multiple simple appearing renal cysts, with the largest measuring up to 2.3 cm in size.  No mass. No renal stone. No hydronephrosis.    The bladder is " partially distended at the time of scanning and has an unremarkable appearance.   Impression:       No hydronephrosis.    Bilateral simple appearing renal cysts.      Electronically signed by: Tee Silverman MD  Date: 03/04/2024  Time: 00:55   US Scrotum And Testicles [[id number]] Resulted: 02/29/24 0816   Order Status: Completed Updated: 02/29/24 0819   Narrative:     EXAMINATION:  US SCROTUM AND TESTICLES    CLINICAL HISTORY:  scrotal swelling;    TECHNIQUE:  Sonography of the scrotum and testes.    COMPARISON:  None.    FINDINGS:  Right Testicle:    *Size: 4.2 x 2.9 x 2.9 cm  *Appearance: Normal.  *Flow: Normal arterial and venous flow  *Epididymis: Normal.  *Hydrocele: None.  *Varicocele: None.  .    Left Testicle:    *Size: 4.6 x 3.0 x 2.7 cm  *Appearance: Normal.  *Flow: Normal arterial and venous flow  *Epididymis: Normal.  *Hydrocele: None.  *Varicocele: None.  .    Other findings: Diffuse thickening throughout the scrotal wall with increased perfusion which may be seen with cellulitis or edema.  No discrete fluid collection.   Impression:       Diffuse scrotal wall thickening with increased perfusion which may be seen with cellulitis or edema.  No evidence of fluid collection.  No intratesticular abnormality.      Electronically signed by: Pastor Taveras MD  Date: 02/29/2024  Time: 08:16   CT Leg (Tibia-Fibula) Without Contrast Right [[id number]] Resulted: 02/27/24 1613   Order Status: Completed Updated: 02/27/24 1616   Narrative:     EXAMINATION:  CT PELVIS WITHOUT CONTRAST; CT THIGH WITHOUT CONTRAST RIGHT; CT LEG (TIBIA-FIBULA) WITHOUT CONTRAST RIGHT    CLINICAL HISTORY:  Soft tissue infection suspected;    TECHNIQUE:  CT of pelvis, right thigh, and right leg performed without contrast.  Coronal and sagittal reformats provided.    COMPARISON:  CT dated 02/18/2024    FINDINGS:  There is severe soft tissue induration throughout the right thigh and leg as well as the anterior pelvis.  There is skin  thickening with subcutaneous, superficial fascial, and deep fascial edema.  There is skin thickening and subcutaneous edema of the leg.  There is no soft tissue gas.  There is no evidence for confluent fluid collection, noting somewhat limited noncontrast assessment.    Note made of scrotal edema.    No pelvic ascites.  Mild retroperitoneal/extraperitoneal edema noted along the sidewalls.  Metallic bodies within the prostate, presumably fiducial markers.    No fracture.  No lytic or blastic lesion.  Degenerative changes are seen in the lower lumbar spine.  There is a small knee effusion.   Impression:       Extensive soft tissue induration throughout the right thigh and leg as well as anterior pelvis.  Deep fascial edema noted at the level of the thigh.  No evidence for soft tissue gas.  Diagnostic considerations include infectious process such as cellulitis, myositis, and fasciitis.  Alternatively, appearance may be seen with inflammatory process, venous/lymphatic obstruction, or drug/allergic reaction.      Electronically signed by: Javier Rutherford MD  Date: 02/27/2024  Time: 16:13   CT Thigh Without Contrast Right [[id number]] Resulted: 02/27/24 1613   Order Status: Completed Updated: 02/27/24 1616   Narrative:     EXAMINATION:  CT PELVIS WITHOUT CONTRAST; CT THIGH WITHOUT CONTRAST RIGHT; CT LEG (TIBIA-FIBULA) WITHOUT CONTRAST RIGHT    CLINICAL HISTORY:  Soft tissue infection suspected;    TECHNIQUE:  CT of pelvis, right thigh, and right leg performed without contrast.  Coronal and sagittal reformats provided.    COMPARISON:  CT dated 02/18/2024    FINDINGS:  There is severe soft tissue induration throughout the right thigh and leg as well as the anterior pelvis.  There is skin thickening with subcutaneous, superficial fascial, and deep fascial edema.  There is skin thickening and subcutaneous edema of the leg.  There is no soft tissue gas.  There is no evidence for confluent fluid collection, noting somewhat  limited noncontrast assessment.    Note made of scrotal edema.    No pelvic ascites.  Mild retroperitoneal/extraperitoneal edema noted along the sidewalls.  Metallic bodies within the prostate, presumably fiducial markers.    No fracture.  No lytic or blastic lesion.  Degenerative changes are seen in the lower lumbar spine.  There is a small knee effusion.   Impression:       Extensive soft tissue induration throughout the right thigh and leg as well as anterior pelvis.  Deep fascial edema noted at the level of the thigh.  No evidence for soft tissue gas.  Diagnostic considerations include infectious process such as cellulitis, myositis, and fasciitis.  Alternatively, appearance may be seen with inflammatory process, venous/lymphatic obstruction, or drug/allergic reaction.      Electronically signed by: Javier Rutherford MD  Date: 02/27/2024  Time: 16:13   CT Pelvis Without Contrast [[id number]] Resulted: 02/27/24 1613   Order Status: Completed Updated: 02/27/24 1616   Narrative:     EXAMINATION:  CT PELVIS WITHOUT CONTRAST; CT THIGH WITHOUT CONTRAST RIGHT; CT LEG (TIBIA-FIBULA) WITHOUT CONTRAST RIGHT    CLINICAL HISTORY:  Soft tissue infection suspected;    TECHNIQUE:  CT of pelvis, right thigh, and right leg performed without contrast.  Coronal and sagittal reformats provided.    COMPARISON:  CT dated 02/18/2024    FINDINGS:  There is severe soft tissue induration throughout the right thigh and leg as well as the anterior pelvis.  There is skin thickening with subcutaneous, superficial fascial, and deep fascial edema.  There is skin thickening and subcutaneous edema of the leg.  There is no soft tissue gas.  There is no evidence for confluent fluid collection, noting somewhat limited noncontrast assessment.    Note made of scrotal edema.    No pelvic ascites.  Mild retroperitoneal/extraperitoneal edema noted along the sidewalls.  Metallic bodies within the prostate, presumably fiducial markers.    No fracture.  No  lytic or blastic lesion.  Degenerative changes are seen in the lower lumbar spine.  There is a small knee effusion.   Impression:       Extensive soft tissue induration throughout the right thigh and leg as well as anterior pelvis.  Deep fascial edema noted at the level of the thigh.  No evidence for soft tissue gas.  Diagnostic considerations include infectious process such as cellulitis, myositis, and fasciitis.  Alternatively, appearance may be seen with inflammatory process, venous/lymphatic obstruction, or drug/allergic reaction.      Electronically signed by: Javier Rutherford MD  Date: 02/27/2024  Time: 16:13     Imaging History    2024    Date Procedure Name Study Review Link PACS Link Status Accession Number Location   03/03/24 10:57 PM US Retroperitoneal Complete Study Review  Images Final [id number] [facility]   02/29/24 07:58 AM US Scrotum And Testicles Study Review  Images Final [id number] [facility]   02/27/24 03:55 PM CT Thigh Without Contrast Right Study Review  Images Final [id number] [facility]   02/27/24 03:55 PM CT Leg (Tibia-Fibula) Without Contrast Right Study Review  Images Final [id number] [facility]   02/27/24 03:54 PM CT Pelvis Without Contrast Study Review  Images Final [id number] [facility]   02/20/24 11:45 AM US Lower Extrem Arteries Right with JOSE CARLOS (xpd) Study Review  Images Final [id number] [facility]   02/18/24 02:02 PM CT Leg (Tibia-Fibula) Wtih Contrast Right Study Review  Images Final [id number] [facility]   02/18/24 02:02 PM CT Thigh With Contrast Right Study Review  Images Final [id number] [facility]   02/16/24 08:06 PM US Lower Extremity Veins Right Study Review  Images Final [id number] [facility]   02/16/24 12:00 AM CARDIAC MONITORING STRIPS Study Review  Final

## 2024-03-04 NOTE — ASSESSMENT & PLAN NOTE
"63M with asthma, allergic rhinitis (on Dupixent, bi-weekly injection), IBS, h/o prostate cancer (prior tx w/ radiation 2020), and recent h/o persistent RLE cellulitis for the past month; which seems to have begun 24h following routine dupixent injections to R lower side/back. Pt has had multiple recent hospital admissions, but RLE cellulitis failing to resolve with abx tx outpt.     Pt now presents to [facility] for worsening RLE redness, swelling, induration of entire L foot/leg/thigh, with spread to groin/perineum and lower pelvis/abdomen over last week. Also with concerns for scrotal swelling, involvement.     CT of Right lower leg/thigh/pelvis: showed severe soft tissue induration throughout right leg, pelvis. With edema to deep fascia, with scrotal edema, and retroperitoneal/extraperitoneal edema along the sidewalls. No abscess or tissue gas seen. Scrotal US with cellulitis, edema. Without fluid collection. US of lower right extremity without evidence of DVT on 2/16, though noting a "slow flow". US of lower right arteries noting no significant stenosis. Normal ABIs.     ID consulted for abx recs of progressive RLE cellulitis on oral abx outpt.     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 Pt was transitioned to daptomycin, linezolid (toxin production inhibitor), and ceftriaxone. Urology and general surgery following. Urology without concern for tiffany's, though recommending scrotal US. General surgery noting no evidence of fourniers. No surgical/urological recommendations regarding persistent swelling.     3/4/24:  RLE essentially unchanged despite abx.  Vasc sx following and rec CT venogram RLE - agree as vascular cause favored over infection.  Stable without systemic sign of infection.    Recommendations / Plan:  Continue daptomycin and ceftriaxone for cellulitis while inpatient.   Continue scrotal elevation, and judicious leg elevation.   CT venogram RLE when able given renal dysfunction.  Plan " reviewed with ID staff. ID will follow with you.

## 2024-03-04 NOTE — PROGRESS NOTES
[facility] - [facility]  Nephrology  Progress Note    Patient Name: Bradley Collins  MRN: [id number]  Admission Date: 2/27/2024  Hospital Length of Stay: 6 days  Attending Provider: Chandra Cheema MD   Primary Care Physician: Reed Mcmullen  Principal Problem:Cellulitis of right lower limb    Subjective:     HPI: 63 year old male with a history of asthma, allergic rhinitis, HLD, IBS, prostate cancer sp radiation admitted for right leg cellulitis. This has been an ongoing issue, [facility] for which a CT with contrast was negative for acute process and discharged on cephalexin. Subsequently he was discharged on 2/20 for worsening of his celluitis - he was treated with broad spec abx. It was noted that his creatinine was 2.0 at time of admission and again discharged. On follow up with internal medicine, it was noted that his infection had worsened with increased swelling and tender erythema.     Previously his creatinine had been 1.1 a year prior. During his admission creatinine has been stable at 1.7-2.0. Ua 2/27/2024 with specific gravity of 1.015, pH of 5 and overall negative findings.     Nephrology consulted for DALIA    Interval History: Patient seen and examined this AM. No acute events overnight. Afebrile with pulse ranging from 80-70s bpm. Systolic blood pressures ranging from 150-140s mmHg. He is saturating +96% on room air with x3 unmeasured voids in the last 24 hours. Renal function improving.     Review of patient's allergies indicates:   Allergen Reactions    Aspirin Shortness Of Breath     Current Facility-Administered Medications   Medication Frequency    acetaminophen tablet 650 mg Q6H PRN    albuterol inhaler 2 puff Q4H PRN    bisacodyL EC tablet 5 mg Daily PRN    calcium carbonate 200 mg calcium (500 mg) chewable tablet 500 mg TID PRN    cefTRIAXone (ROCEPHIN) 2 g in dextrose 5 % in water (D5W) 100 mL IVPB (MB+) Q24H    DAPTOmycin (CUBICIN) 670 mg in sodium chloride  0.9% SolP 50 mL IVPB Q24H    enoxaparin injection 40 mg Daily    fluticasone furoate-vilanteroL 100-25 mcg/dose diskus inhaler 1 puff Daily    HYDROcodone-acetaminophen 5-325 mg per tablet 1 tablet Q6H PRN    Lactobacillus rhamnosus GG capsule 1 capsule BID    melatonin tablet 6 mg Nightly PRN    ondansetron injection 4 mg Q6H PRN    pantoprazole EC tablet 40 mg Daily    tiotropium bromide 2.5 mcg/actuation inhaler 2 puff Daily       Objective:     Vital Signs (Most Recent):  Temp: 98.3 °F (36.8 °C) (03/04/24 0808)  Pulse: 81 (03/04/24 0808)  Resp: 16 (03/04/24 0808)  BP: (!) 152/85 (03/04/24 0808)  SpO2: 98 % (03/04/24 0808) Vital Signs (24h Range):  Temp:  [98 °F (36.7 °C)-98.8 °F (37.1 °C)] 98.3 °F (36.8 °C)  Pulse:  [76-81] 81  Resp:  [16-20] 16  SpO2:  [95 %-98 %] 98 %  BP: (139-159)/(74-88) 152/85     Weight: 83.5 kg (184 lb 1.4 oz) (02/29/24 0034)  Body mass index is 28.83 kg/m².  Body surface area is 1.99 meters squared.    No intake/output data recorded.     Physical Exam  Vitals and nursing note reviewed.   Constitutional:       General: He is awake. He is not in acute distress.     Appearance: He is normal weight. He is ill-appearing. He is not diaphoretic.   HENT:      Head: Normocephalic and atraumatic.      Right Ear: External ear normal.      Left Ear: External ear normal.      Nose: Nose normal. No congestion or rhinorrhea.      Mouth/Throat:      Mouth: Mucous membranes are moist.      Pharynx: Oropharynx is clear. No oropharyngeal exudate or posterior oropharyngeal erythema.   Eyes:      General: No scleral icterus.        Right eye: No discharge.         Left eye: No discharge.      Extraocular Movements: Extraocular movements intact.      Conjunctiva/sclera: Conjunctivae normal.   Cardiovascular:      Rate and Rhythm: Normal rate.      Heart sounds: No murmur heard.     No friction rub. No gallop.   Pulmonary:      Effort: Pulmonary effort is normal. No respiratory distress.      Breath sounds:  No wheezing, rhonchi or rales.   Abdominal:      General: Bowel sounds are normal. There is no distension.      Palpations: Abdomen is soft.      Tenderness: There is no abdominal tenderness.   Genitourinary:     Penis: Swelling present.       Testes:         Right: Swelling present.         Left: Swelling present.   Musculoskeletal:         General: Swelling and tenderness present.      Cervical back: Neck supple.      Right lower leg: Edema present.      Left lower leg: No edema.      Comments: Right lower extremity edema with Peau d'orange.   Skin:     General: Skin is warm and dry.      Coloration: Skin is not jaundiced.      Findings: Erythema and rash present.   Neurological:      General: No focal deficit present.      Mental Status: He is alert. Mental status is at baseline.      Cranial Nerves: No cranial nerve deficit.      Motor: No weakness.   Psychiatric:         Mood and Affect: Mood normal.         Behavior: Behavior normal. Behavior is cooperative.          Significant Labs:  BMP:   Recent Labs   Lab 03/04/24  0411   GLU 83      K 4.0      CO2 24   BUN 16   CREATININE 1.6*   CALCIUM 8.8     CBC:   Recent Labs   Lab 03/04/24  0411   WBC 8.83   RBC 3.31*   HGB 10.4*   HCT 31.2*      MCV 94   MCH 31.4*   MCHC 33.3     CMP:   Recent Labs   Lab 02/27/24  1359 02/28/24  0409 03/04/24  0411   GLU 76   < > 83   CALCIUM 9.7   < > 8.8   ALBUMIN 3.4*  --   --    PROT 7.8  --   --    *   < > 136   K 4.5   < > 4.0   CO2 23   < > 24   CL 97   < > 103   BUN 16   < > 16   CREATININE 1.7*   < > 1.6*   ALKPHOS 86  --   --    ALT 16  --   --    AST 26  --   --    BILITOT 0.5  --   --     < > = values in this interval not displayed.     LFTs:   Recent Labs   Lab 02/27/24  1359   ALT 16   AST 26   ALKPHOS 86   BILITOT 0.5   PROT 7.8   ALBUMIN 3.4*     Microbiology Results (last 7 days)       Procedure Component Value Units Date/Time    Culture, MRSA [[id number]] Collected: 02/28/24 0850    Order  Status: Completed Specimen: Nares Updated: 03/01/24 1314     MRSA Surveillance Screen No MRSA isolated    MRSA Screen by PCR [[id number]] Collected: 02/28/24 0850    Order Status: Canceled Specimen: Nasopharyngeal Swab from Nasal           Specimen (24h ago, onward)      None          Recent Labs   Lab 02/27/24 2053   COLORU Yellow   SPECGRAV 1.015   PHUR 5.0   PROTEINUA Negative   NITRITE Negative   LEUKOCYTESUR Negative      Significant Imaging:  I have reviewed all imagining in the last 24 hours.  Assessment/Plan:     Renal/  DALIA (acute kidney injury)  Stable creatinine since initial admission at near 2.0  Previous creatinine 1.1 one year ago  Reports having a normal baseline, however [facility]    At this time, suspect at baseline creatinine, however possible AIN contributing to elevated creatinine    Plan/Recommendation:  - renal function improving now   - daily RFPs and magnesium levels   - renal diet/tube feeds when not NPO  - strict I/Os and daily weights  - keep MAP > 65 mmHg  - renally all dose medications to eGFR  - avoid nephrotoxic agents wean feasible (i.e. NSAIDs, intra-arterial contrast, supra-therapeutic vancomycin levels, etc.)  - no acute indications for RRT at this time however will continue to monitoring       ID  * Cellulitis of right lower limb  Cellulitis  - management per primary team  - currently on daptomycin and Rocephin     Thank you for your consult. I will follow-up with patient. Please contact us if you have any additional questions.    Liam Barber MD  Nephrology  [facility] - Med S[facility]

## 2024-03-04 NOTE — PROGRESS NOTES
"[facility] Surg  Infectious Disease  Progress Note    Patient Name: Bradley Collins  MRN: [id number]  Admission Date: 2/27/2024  Length of Stay: 6 days  Attending Physician: Chandra Cheema MD  Primary Care Provider: Administration, [facility]    Isolation Status: No active isolations  Assessment/Plan:      ID  * Cellulitis of right lower limb  63M with asthma, allergic rhinitis (on Dupixent, bi-weekly injection), IBS, h/o prostate cancer (prior tx w/ radiation 2020), and recent h/o persistent RLE cellulitis for the past month; which seems to have begun 24h following routine dupixent injections to R lower side/back. Pt has had multiple recent hospital admissions, but RLE cellulitis failing to resolve with abx tx outpt.     Pt now presents to [facility] for worsening RLE redness, swelling, induration of entire L foot/leg/thigh, with spread to groin/perineum and lower pelvis/abdomen over last week. Also with concerns for scrotal swelling, involvement.     CT of Right lower leg/thigh/pelvis: showed severe soft tissue induration throughout right leg, pelvis. With edema to deep fascia, with scrotal edema, and retroperitoneal/extraperitoneal edema along the sidewalls. No abscess or tissue gas seen. Scrotal US with cellulitis, edema. Without fluid collection. US of lower right extremity without evidence of DVT on 2/16, though noting a "slow flow". US of lower right arteries noting no significant stenosis. Normal ABIs.     ID consulted for abx recs of progressive RLE cellulitis on oral abx outpt.     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 Pt was transitioned to daptomycin, linezolid (toxin production inhibitor), and ceftriaxone. Urology and general surgery following. Urology without concern for tiffany's, though recommending scrotal US. General surgery noting no evidence of fourniers. No surgical/urological recommendations regarding persistent swelling.     3/4/24:  RLE essentially unchanged despite abx.  Vasc sx " following and rec CT venogram RLE - agree as vascular cause favored over infection.  Stable without systemic sign of infection.    Recommendations / Plan:  Continue daptomycin and ceftriaxone for cellulitis while inpatient.   Continue scrotal elevation, and judicious leg elevation.   CT venogram RLE when able given renal dysfunction.  Plan reviewed with ID staff. ID will follow with you.                   Anticipated Disposition: tbd    Thank you for your consult. I will follow-up with patient. Please contact us if you have any additional questions.    EZEKIEL Silverio  Infectious Disease  [facility] Surg    Subjective:     Principal Problem:Cellulitis of right lower limb    HPI: 63M with asthma, allergic rhinitis (on Dupixent, bi-weekly injection), IBS, h/o prostate cancer (prior tx w/ radiation 2020), and recent h/o persistent RLE cellulitis requiring multiple recent hospital admissions, failing to resolve with abx tx.     [facility] for RLE cellul[facility]; which apparently began 1-2 days following routine bi-weekly injection of Dupixent to Left thigh. Ct-leg unremarkable for deeper infection then. Given cephalexin, but went to [facility] (02/16) for worsening RLE cellulitis, noted to have significant DALIA (Cr 2.0; baseline 1.1?). Given zosyn, de-escalated to CTX inpatient. Erythema improved but edema persisted. He had induration of the thigh. CT showed diffuse cutaneous thickening and edema with prominent right inguinal lymph nodes. IV-CTX switched to oral augmentin and doxycycline on 2/19/2024. Redness and Edema improved; but induration persisted. He was discharged home on doxy / augmentin for 14d, HERNANDO 03/01.     Pt seen for f/u at [facility] 02/27; worsening redness/ swelling with spread up to his abdomen. He had edema and erythema of the entire right lower extremity, right groin, and right lower abdomen. It was tender and warm.  Thus, pt re-admitted to [facility] ([facility]) for worsening RLE cellulitis and ongoing DALIA (Cr 1.8).     Pt arrived AF, VSS, HDS, wbc nml, started on empiric Iv-zosyn / vanc.    Non-contrast CT showed soft tissue induration throughout the right anterior pelvis, thigh,and leg, skin thickening and edema of the subcutaneous tissue, superficial fascia, and deep fascia, scrotal edema, and retroperitoneal/extraperitoneal edema along the sidewalls.  ID consulted for abx recs of progressive RLE cellulitis on oral abx.           Interval History:   No acute events.   Afebrile and WBC WNL.  Leg edema and redness essentially unchanged.  Denies pain at rest  CRP improved.   The patient denies any recent fever, chills, or sweats.      Review of Systems   Constitutional:  Negative for chills, diaphoresis and fever.   Respiratory:  Negative for shortness of breath.    Cardiovascular:  Positive for leg swelling (RLE). Negative for chest pain.   Gastrointestinal:  Negative for abdominal pain, diarrhea, nausea and vomiting.   Genitourinary:  Positive for penile swelling and scrotal swelling. Negative for dysuria and hematuria.     Objective:     Vital Signs (Most Recent):  Temp: 97.7 °F (36.5 °C) (03/04/24 1141)  Pulse: 78 (03/04/24 1141)  Resp: 16 (03/04/24 1141)  BP: (!) 160/86 (03/04/24 1141)  SpO2: 98 % (03/04/24 1141) Vital Signs (24h Range):  Temp:  [97.7 °F (36.5 °C)-98.8 °F (37.1 °C)] 97.7 °F (36.5 °C)  Pulse:  [69-81] 78  Resp:  [16-20] 16  SpO2:  [96 %-98 %] 98 %  BP: (144-160)/(74-88) 160/86     Weight: 83.5 kg (184 lb 1.4 oz)  Body mass index is 28.83 kg/m².    Estimated Creatinine Clearance: 48.2 mL/min (A) (based on SCr of 1.6 mg/dL (H)).     Physical Exam  Constitutional:       General: He is not in acute distress.     Appearance: Normal appearance. He is well-developed. He is not ill-appearing, toxic-appearing or diaphoretic.   HENT:      Head: Normocephalic and atraumatic.   Cardiovascular:      Rate and Rhythm: Normal rate and regular rhythm.      Heart  "sounds: Normal heart sounds. No murmur heard.     No friction rub. No gallop.   Pulmonary:      Effort: Pulmonary effort is normal. No respiratory distress.      Breath sounds: Normal breath sounds. No wheezing or rales.   Abdominal:      General: Bowel sounds are normal. There is no distension.      Palpations: Abdomen is soft. There is no mass.      Tenderness: There is no abdominal tenderness. There is no guarding or rebound.   Musculoskeletal:      Right lower leg: Edema (with slight rubor and warmth) present.      Left lower leg: No edema.   Skin:     General: Skin is warm and dry.   Neurological:      Mental Status: He is alert and oriented to person, place, and time.   Psychiatric:         Behavior: Behavior normal.          Significant Labs: Blood Culture:   Recent Labs   Lab 02/16/24  1843 02/16/24  1919   LABBLOO No growth after 5 days. No growth after 5 days.     CBC:   Recent Labs   Lab 03/03/24  0628 03/04/24  0411   WBC 9.19 8.83   HGB 10.1* 10.4*   HCT 29.7* 31.2*    406     CMP:   Recent Labs   Lab 03/03/24  0628 03/04/24  0411   * 136   K 4.1 4.0    103   CO2 24 24   GLU 93 83   BUN 16 16   CREATININE 1.9* 1.6*   CALCIUM 8.9 8.8   ANIONGAP 10 9     Wound Culture: No results for input(s): "LABAERO" in the last 4320 hours.  All pertinent labs within the past 24 hours have been reviewed.    Significant Imaging: I have reviewed all pertinent imaging results/findings within the past 24 hours.  Procedure Component Value Units Date/Time   US Retroperitoneal Complete [[id number]] Resulted: 03/04/24 0055   Order Status: Completed Updated: 03/04/24 0058   Narrative:     EXAMINATION:  US RETROPERITONEAL COMPLETE    CLINICAL HISTORY:  DALIA;    TECHNIQUE:  Ultrasound of the kidneys and urinary bladder was performed including color flow and Doppler evaluation of the kidneys.    COMPARISON:  None.    FINDINGS:  Right kidney: The right kidney measures 11.2 cm. No cortical thinning. No loss of " corticomedullary distinction. Resistive index measures 0.61.  Multiple simple appearing renal cysts, with the largest measuring up to 2.1 cm in size.  No mass. No renal stone. No hydronephrosis.    Left kidney: The left kidney measures 11.0 cm. No cortical thinning. No loss of corticomedullary distinction. Resistive index measures 0.69.  Multiple simple appearing renal cysts, with the largest measuring up to 2.3 cm in size.  No mass. No renal stone. No hydronephrosis.    The bladder is partially distended at the time of scanning and has an unremarkable appearance.   Impression:       No hydronephrosis.    Bilateral simple appearing renal cysts.      Electronically signed by: Tee Silverman MD  Date: 03/04/2024  Time: 00:55   US Scrotum And Testicles [[id number]] Resulted: 02/29/24 0816   Order Status: Completed Updated: 02/29/24 0819   Narrative:     EXAMINATION:  US SCROTUM AND TESTICLES    CLINICAL HISTORY:  scrotal swelling;    TECHNIQUE:  Sonography of the scrotum and testes.    COMPARISON:  None.    FINDINGS:  Right Testicle:    *Size: 4.2 x 2.9 x 2.9 cm  *Appearance: Normal.  *Flow: Normal arterial and venous flow  *Epididymis: Normal.  *Hydrocele: None.  *Varicocele: None.  .    Left Testicle:    *Size: 4.6 x 3.0 x 2.7 cm  *Appearance: Normal.  *Flow: Normal arterial and venous flow  *Epididymis: Normal.  *Hydrocele: None.  *Varicocele: None.  .    Other findings: Diffuse thickening throughout the scrotal wall with increased perfusion which may be seen with cellulitis or edema.  No discrete fluid collection.   Impression:       Diffuse scrotal wall thickening with increased perfusion which may be seen with cellulitis or edema.  No evidence of fluid collection.  No intratesticular abnormality.      Electronically signed by: Pastor Taveras MD  Date: 02/29/2024  Time: 08:16   CT Leg (Tibia-Fibula) Without Contrast Right [[id number]] Resulted: 02/27/24 1613   Order Status: Completed Updated: 02/27/24 1616    Narrative:     EXAMINATION:  CT PELVIS WITHOUT CONTRAST; CT THIGH WITHOUT CONTRAST RIGHT; CT LEG (TIBIA-FIBULA) WITHOUT CONTRAST RIGHT    CLINICAL HISTORY:  Soft tissue infection suspected;    TECHNIQUE:  CT of pelvis, right thigh, and right leg performed without contrast.  Coronal and sagittal reformats provided.    COMPARISON:  CT dated 02/18/2024    FINDINGS:  There is severe soft tissue induration throughout the right thigh and leg as well as the anterior pelvis.  There is skin thickening with subcutaneous, superficial fascial, and deep fascial edema.  There is skin thickening and subcutaneous edema of the leg.  There is no soft tissue gas.  There is no evidence for confluent fluid collection, noting somewhat limited noncontrast assessment.    Note made of scrotal edema.    No pelvic ascites.  Mild retroperitoneal/extraperitoneal edema noted along the sidewalls.  Metallic bodies within the prostate, presumably fiducial markers.    No fracture.  No lytic or blastic lesion.  Degenerative changes are seen in the lower lumbar spine.  There is a small knee effusion.   Impression:       Extensive soft tissue induration throughout the right thigh and leg as well as anterior pelvis.  Deep fascial edema noted at the level of the thigh.  No evidence for soft tissue gas.  Diagnostic considerations include infectious process such as cellulitis, myositis, and fasciitis.  Alternatively, appearance may be seen with inflammatory process, venous/lymphatic obstruction, or drug/allergic reaction.      Electronically signed by: Javier Rutherford MD  Date: 02/27/2024  Time: 16:13   CT Thigh Without Contrast Right [[id number]] Resulted: 02/27/24 1613   Order Status: Completed Updated: 02/27/24 1616   Narrative:     EXAMINATION:  CT PELVIS WITHOUT CONTRAST; CT THIGH WITHOUT CONTRAST RIGHT; CT LEG (TIBIA-FIBULA) WITHOUT CONTRAST RIGHT    CLINICAL HISTORY:  Soft tissue infection suspected;    TECHNIQUE:  CT of pelvis, right thigh, and  right leg performed without contrast.  Coronal and sagittal reformats provided.    COMPARISON:  CT dated 02/18/2024    FINDINGS:  There is severe soft tissue induration throughout the right thigh and leg as well as the anterior pelvis.  There is skin thickening with subcutaneous, superficial fascial, and deep fascial edema.  There is skin thickening and subcutaneous edema of the leg.  There is no soft tissue gas.  There is no evidence for confluent fluid collection, noting somewhat limited noncontrast assessment.    Note made of scrotal edema.    No pelvic ascites.  Mild retroperitoneal/extraperitoneal edema noted along the sidewalls.  Metallic bodies within the prostate, presumably fiducial markers.    No fracture.  No lytic or blastic lesion.  Degenerative changes are seen in the lower lumbar spine.  There is a small knee effusion.   Impression:       Extensive soft tissue induration throughout the right thigh and leg as well as anterior pelvis.  Deep fascial edema noted at the level of the thigh.  No evidence for soft tissue gas.  Diagnostic considerations include infectious process such as cellulitis, myositis, and fasciitis.  Alternatively, appearance may be seen with inflammatory process, venous/lymphatic obstruction, or drug/allergic reaction.      Electronically signed by: Javier Rutherford MD  Date: 02/27/2024  Time: 16:13   CT Pelvis Without Contrast [[id number]] Resulted: 02/27/24 1613   Order Status: Completed Updated: 02/27/24 1616   Narrative:     EXAMINATION:  CT PELVIS WITHOUT CONTRAST; CT THIGH WITHOUT CONTRAST RIGHT; CT LEG (TIBIA-FIBULA) WITHOUT CONTRAST RIGHT    CLINICAL HISTORY:  Soft tissue infection suspected;    TECHNIQUE:  CT of pelvis, right thigh, and right leg performed without contrast.  Coronal and sagittal reformats provided.    COMPARISON:  CT dated 02/18/2024    FINDINGS:  There is severe soft tissue induration throughout the right thigh and leg as well as the anterior pelvis.  There is  skin thickening with subcutaneous, superficial fascial, and deep fascial edema.  There is skin thickening and subcutaneous edema of the leg.  There is no soft tissue gas.  There is no evidence for confluent fluid collection, noting somewhat limited noncontrast assessment.    Note made of scrotal edema.    No pelvic ascites.  Mild retroperitoneal/extraperitoneal edema noted along the sidewalls.  Metallic bodies within the prostate, presumably fiducial markers.    No fracture.  No lytic or blastic lesion.  Degenerative changes are seen in the lower lumbar spine.  There is a small knee effusion.   Impression:       Extensive soft tissue induration throughout the right thigh and leg as well as anterior pelvis.  Deep fascial edema noted at the level of the thigh.  No evidence for soft tissue gas.  Diagnostic considerations include infectious process such as cellulitis, myositis, and fasciitis.  Alternatively, appearance may be seen with inflammatory process, venous/lymphatic obstruction, or drug/allergic reaction.      Electronically signed by: Javier Rutherford MD  Date: 02/27/2024  Time: 16:13     Imaging History    2024    Date Procedure Name Study Review Link PACS Link Status Accession Number Location   03/03/24 10:57 PM US Retroperitoneal Complete Study Review  Images Final [id number] [facility]   02/29/24 07:58 AM US Scrotum And Testicles Study Review  Images Final [id number] [facility]   02/27/24 03:55 PM CT Thigh Without Contrast Right Study Review  Images Final [id number] [facility]   02/27/24 03:55 PM CT Leg (Tibia-Fibula) Without Contrast Right Study Review  Images Final [id number] [facility]   02/27/24 03:54 PM CT Pelvis Without Contrast Study Review  Images Final [id number] [facility]   02/20/24 11:45 AM US Lower Extrem Arteries Right with JOSE CARLOS (xpd) Study Review  Images Final [id number] [facility]   02/18/24 02:02 PM CT Leg (Tibia-Fibula) Wtih Contrast Right Study Review  Images Final [id number] [facility]   02/18/24 02:02 PM CT Thigh With Contrast Right  Study Review  Images Final [id number] [facility]   02/16/24 08:06 PM US Lower Extremity Veins Right Study Review  Images Final [id number] [facility]   02/16/24 12:00 AM CARDIAC MONITORING STRIPS Study Review  Final

## 2024-03-04 NOTE — ASSESSMENT & PLAN NOTE
Persistent. 3rd hospital admission. Improved on vancomycin and ceftriaxone during last hospitalization. Prescribed amoxicillin-clavulanate and doxycycline and it worsened. Appreciate Infectious Disease. Giving ceftriaxone, daptomycin. DC linezolid on 3/3/24.   Will discuss final abx recs with ID

## 2024-03-04 NOTE — ASSESSMENT & PLAN NOTE
- Nephrology consult appreciated, recommended lab and imaging evaluation to determine etiology, type of DALIA

## 2024-03-05 LAB
ANION GAP SERPL CALC-SCNC: 10 MMOL/L (ref 8–16)
BUN SERPL-MCNC: 16 MG/DL (ref 8–23)
CALCIUM SERPL-MCNC: 9.3 MG/DL (ref 8.7–10.5)
CHLORIDE SERPL-SCNC: 104 MMOL/L (ref 95–110)
CO2 SERPL-SCNC: 22 MMOL/L (ref 23–29)
CREAT SERPL-MCNC: 1.5 MG/DL (ref 0.5–1.4)
ERYTHROCYTE [DISTWIDTH] IN BLOOD BY AUTOMATED COUNT: 12.1 % (ref 11.5–14.5)
EST. GFR  (NO RACE VARIABLE): 51.7 ML/MIN/1.73 M^2
GLUCOSE SERPL-MCNC: 87 MG/DL (ref 70–110)
HCT VFR BLD AUTO: 31 % (ref 40–54)
HGB BLD-MCNC: 10.4 G/DL (ref 14–18)
LEFT GREAT SAPHENOUS DISTAL THIGH DIA: 0.22 CM
LEFT GREAT SAPHENOUS JUNCTION DIA: 0.36 CM
LEFT GREAT SAPHENOUS KNEE DIA: 0.2 CM
LEFT GREAT SAPHENOUS MIDDLE THIGH DIA: 0.23 CM
LEFT GREAT SAPHENOUS PROXIMAL CALF DIA: 0.2 CM
LEFT SMALL SAPHENOUS KNEE DIA: 0.2 CM
LEFT SMALL SAPHENOUS SPJ DIA: 0.2 CM
MCH RBC QN AUTO: 31.4 PG (ref 27–31)
MCHC RBC AUTO-ENTMCNC: 33.5 G/DL (ref 32–36)
MCV RBC AUTO: 94 FL (ref 82–98)
PLATELET # BLD AUTO: 353 K/UL (ref 150–450)
PMV BLD AUTO: 8.1 FL (ref 9.2–12.9)
POTASSIUM SERPL-SCNC: 4.3 MMOL/L (ref 3.5–5.1)
RBC # BLD AUTO: 3.31 M/UL (ref 4.6–6.2)
RIGHT GREAT SAPHENOUS DISTAL THIGH DIA: 0.3 CM
RIGHT GREAT SAPHENOUS KNEE DIA: 0.26 CM
RIGHT GREAT SAPHENOUS MIDDLE THIGH DIA: 0.42 CM
RIGHT GREAT SAPHENOUS PROXIMAL CALF DIA: 0.25 CM
RIGHT SMALL SAPHENOUS KNEE DIA: 0.34 CM
RIGHT SMALL SAPHENOUS SPJ DIA: 0.38 CM
SODIUM SERPL-SCNC: 136 MMOL/L (ref 136–145)
WBC # BLD AUTO: 8.76 K/UL (ref 3.9–12.7)

## 2024-03-05 PROCEDURE — 80048 BASIC METABOLIC PNL TOTAL CA: CPT | Performed by: STUDENT IN AN ORGANIZED HEALTH CARE EDUCATION/TRAINING PROGRAM

## 2024-03-05 PROCEDURE — 11000001 HC ACUTE MED/SURG PRIVATE ROOM

## 2024-03-05 PROCEDURE — 25500020 PHARM REV CODE 255: Performed by: INTERNAL MEDICINE

## 2024-03-05 PROCEDURE — 85027 COMPLETE CBC AUTOMATED: CPT | Performed by: STUDENT IN AN ORGANIZED HEALTH CARE EDUCATION/TRAINING PROGRAM

## 2024-03-05 PROCEDURE — 25000003 PHARM REV CODE 250: Performed by: HOSPITALIST

## 2024-03-05 PROCEDURE — 25000003 PHARM REV CODE 250: Performed by: STUDENT IN AN ORGANIZED HEALTH CARE EDUCATION/TRAINING PROGRAM

## 2024-03-05 PROCEDURE — 36415 COLL VENOUS BLD VENIPUNCTURE: CPT | Performed by: STUDENT IN AN ORGANIZED HEALTH CARE EDUCATION/TRAINING PROGRAM

## 2024-03-05 PROCEDURE — 25000003 PHARM REV CODE 250: Performed by: PHYSICIAN ASSISTANT

## 2024-03-05 PROCEDURE — 99232 SBSQ HOSP IP/OBS MODERATE 35: CPT | Mod: ,,, | Performed by: PHYSICIAN ASSISTANT

## 2024-03-05 PROCEDURE — 63600175 PHARM REV CODE 636 W HCPCS: Performed by: STUDENT IN AN ORGANIZED HEALTH CARE EDUCATION/TRAINING PROGRAM

## 2024-03-05 PROCEDURE — 25000003 PHARM REV CODE 250: Performed by: INTERNAL MEDICINE

## 2024-03-05 RX ORDER — AMLODIPINE BESYLATE 5 MG/1
5 TABLET ORAL DAILY
Status: DISCONTINUED | OUTPATIENT
Start: 2024-03-05 | End: 2024-03-09

## 2024-03-05 RX ORDER — SODIUM CHLORIDE 9 MG/ML
INJECTION, SOLUTION INTRAVENOUS CONTINUOUS
Status: DISCONTINUED | OUTPATIENT
Start: 2024-03-05 | End: 2024-03-06

## 2024-03-05 RX ORDER — SODIUM CHLORIDE, SODIUM LACTATE, POTASSIUM CHLORIDE, CALCIUM CHLORIDE 600; 310; 30; 20 MG/100ML; MG/100ML; MG/100ML; MG/100ML
INJECTION, SOLUTION INTRAVENOUS CONTINUOUS
Status: DISCONTINUED | OUTPATIENT
Start: 2024-03-05 | End: 2024-03-05

## 2024-03-05 RX ORDER — DOXYCYCLINE HYCLATE 100 MG
100 TABLET ORAL EVERY 12 HOURS
Status: DISCONTINUED | OUTPATIENT
Start: 2024-03-05 | End: 2024-03-06

## 2024-03-05 RX ADMIN — TIOTROPIUM BROMIDE INHALATION SPRAY 2 PUFF: 3.12 SPRAY, METERED RESPIRATORY (INHALATION) at 08:03

## 2024-03-05 RX ADMIN — CEFTRIAXONE 2 G: 2 INJECTION, POWDER, FOR SOLUTION INTRAMUSCULAR; INTRAVENOUS at 03:03

## 2024-03-05 RX ADMIN — SODIUM CHLORIDE: 9 INJECTION, SOLUTION INTRAVENOUS at 11:03

## 2024-03-05 RX ADMIN — IOHEXOL 100 ML: 350 INJECTION, SOLUTION INTRAVENOUS at 06:03

## 2024-03-05 RX ADMIN — FLUTICASONE FUROATE AND VILANTEROL TRIFENATATE 1 PUFF: 100; 25 POWDER RESPIRATORY (INHALATION) at 08:03

## 2024-03-05 RX ADMIN — DOXYCYCLINE HYCLATE 100 MG: 100 TABLET, COATED ORAL at 08:03

## 2024-03-05 RX ADMIN — Medication 1 CAPSULE: at 08:03

## 2024-03-05 RX ADMIN — PANTOPRAZOLE SODIUM 40 MG: 40 TABLET, DELAYED RELEASE ORAL at 08:03

## 2024-03-05 RX ADMIN — SODIUM CHLORIDE: 9 INJECTION, SOLUTION INTRAVENOUS at 08:03

## 2024-03-05 RX ADMIN — AMLODIPINE BESYLATE 5 MG: 5 TABLET ORAL at 01:03

## 2024-03-05 NOTE — ASSESSMENT & PLAN NOTE
- History and physical examination including ankle cup sign, Stammer sign, thickening of skin consistent with right lower extremity lymphedema   - Received torsemide on 3/1 and Bumex 0.5 mg on 3/2/24.   - Limit sodium intake to 2000 mg daily.  Limit volume intake to 1.5 L daily.  Elevate legs when resting.   - Vascular Medicine follow up as outpatient for further lymphedema therapy  - Compression stockings and leg elevation  - CT venogram pending to evaluate for R iliac vein thrombosis  - Will give IV hydration pre and post procedure

## 2024-03-05 NOTE — PLAN OF CARE
Problem: Fluid and Electrolyte Imbalance (Acute Kidney Injury/Impairment)  Goal: Fluid and Electrolyte Balance  Outcome: Ongoing, Progressing     Problem: Fluid and Electrolyte Imbalance (Acute Kidney Injury/Impairment)  Goal: Fluid and Electrolyte Balance  Outcome: Ongoing, Progressing     Problem: Oral Intake Inadequate (Acute Kidney Injury/Impairment)  Goal: Optimal Nutrition Intake  Outcome: Ongoing, Progressing  Intervention: Promote and Optimize Nutrition  Flowsheets (Taken 3/5/2024 0320)  Oral Nutrition Promotion:   calorie-dense foods provided   medicated     Problem: Oral Intake Inadequate (Acute Kidney Injury/Impairment)  Goal: Optimal Nutrition Intake  Outcome: Ongoing, Progressing     Problem: Oral Intake Inadequate (Acute Kidney Injury/Impairment)  Goal: Optimal Nutrition Intake  Intervention: Promote and Optimize Nutrition  Flowsheets (Taken 3/5/2024 0320)  Oral Nutrition Promotion:   calorie-dense foods provided   medicated

## 2024-03-05 NOTE — SUBJECTIVE & OBJECTIVE
Interval History: NAEON. Afebrile. Kidney function cont to improve, now at baseline. Persistent RLE and scrotal swelling. CT venogram pending to evaluate for R iliac vein thrombosis.     Review of Systems   Constitutional:  Negative for chills, diaphoresis, fatigue and fever.   HENT:  Negative for congestion, ear pain, sore throat, tinnitus and trouble swallowing.    Eyes:  Negative for photophobia, discharge and visual disturbance.   Respiratory:  Negative for apnea, cough, choking, chest tightness and shortness of breath.    Cardiovascular:  Negative for chest pain, palpitations and leg swelling.   Gastrointestinal:  Negative for abdominal distention, abdominal pain, constipation, diarrhea, nausea and vomiting.   Endocrine: Negative for polydipsia, polyphagia and polyuria.   Genitourinary:  Positive for penile swelling and scrotal swelling. Negative for difficulty urinating and dysuria.   Musculoskeletal:  Positive for joint swelling. Negative for arthralgias, back pain and gait problem.        Right leg swelling   Skin:  Negative for color change and rash.   Neurological:  Negative for dizziness, syncope, speech difficulty, weakness, light-headedness and headaches.   Psychiatric/Behavioral:  Negative for agitation, confusion, dysphoric mood, hallucinations and sleep disturbance. The patient is not nervous/anxious.      Objective:     Vital Signs (Most Recent):  Temp: 99 °F (37.2 °C) (03/05/24 1135)  Pulse: 82 (03/05/24 1135)  Resp: 18 (03/05/24 1135)  BP: (!) 167/79 (03/05/24 1135)  SpO2: 97 % (03/05/24 1135) Vital Signs (24h Range):  Temp:  [98.1 °F (36.7 °C)-99 °F (37.2 °C)] 99 °F (37.2 °C)  Pulse:  [74-82] 82  Resp:  [16-18] 18  SpO2:  [97 %-98 %] 97 %  BP: (145-167)/(79-91) 167/79     Weight: 83.5 kg (184 lb 1.4 oz)  Body mass index is 28.83 kg/m².  No intake or output data in the 24 hours ending 03/05/24 1222      Physical Exam  Constitutional:       General: He is not in acute distress.     Appearance:  Normal appearance. He is normal weight.   HENT:      Head: Normocephalic and atraumatic.      Nose: No congestion.      Mouth/Throat:      Mouth: Mucous membranes are moist.   Eyes:      Extraocular Movements: Extraocular movements intact.      Conjunctiva/sclera: Conjunctivae normal.      Pupils: Pupils are equal, round, and reactive to light.   Neck:      Vascular: No carotid bruit.   Cardiovascular:      Rate and Rhythm: Normal rate and regular rhythm.      Pulses: Normal pulses.      Heart sounds: Normal heart sounds. No murmur heard.     No gallop.   Pulmonary:      Effort: Pulmonary effort is normal. No respiratory distress.      Breath sounds: Normal breath sounds. No wheezing, rhonchi or rales.   Chest:      Chest wall: No tenderness.   Abdominal:      General: Abdomen is flat. Bowel sounds are normal. There is no distension.      Tenderness: There is no abdominal tenderness.   Genitourinary:     Comments: Scrotal swelling +  Penile swelling +  Musculoskeletal:         General: Swelling and tenderness present.      Cervical back: No rigidity.      Right lower leg: Edema present.      Left lower leg: No edema.      Comments: Right lower extremity edema, with thickening of skin changes consistent with lymphedema   Ankle cuffing +  Stemmer sign +   Skin:     Capillary Refill: Capillary refill takes less than 2 seconds.   Neurological:      General: No focal deficit present.      Mental Status: He is alert and oriented to person, place, and time. Mental status is at baseline.      Cranial Nerves: No cranial nerve deficit.      Motor: No weakness.   Psychiatric:         Mood and Affect: Mood normal.         Behavior: Behavior normal.             Significant Labs: All pertinent labs within the past 24 hours have been reviewed.    Significant Imaging: I have reviewed all pertinent imaging results/findings within the past 24 hours.

## 2024-03-05 NOTE — SUBJECTIVE & OBJECTIVE
Interval History:   No acute events.   Afebrile and WBC WNL.  Leg edema and redness essentially unchanged.  Denies pain at rest except some pins and needles at night  CRP improved.   The patient denies any recent fever, chills, or sweats.      Review of Systems   Constitutional:  Negative for chills, diaphoresis and fever.   Respiratory:  Negative for shortness of breath.    Cardiovascular:  Positive for leg swelling (RLE). Negative for chest pain.   Gastrointestinal:  Negative for abdominal pain, diarrhea, nausea and vomiting.   Genitourinary:  Positive for penile swelling and scrotal swelling. Negative for dysuria and hematuria.     Objective:     Vital Signs (Most Recent):  Temp: 98.3 °F (36.8 °C) (03/05/24 1306)  Pulse: 82 (03/05/24 1306)  Resp: 17 (03/05/24 1306)  BP: (!) 171/88 (03/05/24 1306)  SpO2: 99 % (03/05/24 1306) Vital Signs (24h Range):  Temp:  [98.1 °F (36.7 °C)-99 °F (37.2 °C)] 98.3 °F (36.8 °C)  Pulse:  [74-82] 82  Resp:  [16-18] 17  SpO2:  [97 %-99 %] 99 %  BP: (145-171)/(79-91) 171/88     Weight: 83.5 kg (184 lb 1.4 oz)  Body mass index is 28.83 kg/m².    Estimated Creatinine Clearance: 51.4 mL/min (A) (based on SCr of 1.5 mg/dL (H)).     Physical Exam  Constitutional:       General: He is not in acute distress.     Appearance: Normal appearance. He is well-developed. He is not ill-appearing, toxic-appearing or diaphoretic.   HENT:      Head: Normocephalic and atraumatic.   Cardiovascular:      Rate and Rhythm: Normal rate and regular rhythm.      Heart sounds: Normal heart sounds. No murmur heard.     No friction rub. No gallop.   Pulmonary:      Effort: Pulmonary effort is normal. No respiratory distress.      Breath sounds: Normal breath sounds. No wheezing or rales.   Abdominal:      General: Bowel sounds are normal. There is no distension.      Palpations: Abdomen is soft. There is no mass.      Tenderness: There is no abdominal tenderness. There is no guarding or rebound.   Musculoskeletal:  "     Right lower leg: Edema (with slight rubor and warmth) present.      Left lower leg: No edema.   Skin:     General: Skin is warm and dry.   Neurological:      Mental Status: He is alert and oriented to person, place, and time.   Psychiatric:         Behavior: Behavior normal.          Significant Labs: Blood Culture:   Recent Labs   Lab 02/16/24  1843 02/16/24  1919   LABBLOO No growth after 5 days. No growth after 5 days.       CBC:   Recent Labs   Lab 03/04/24 0411 03/05/24  0359   WBC 8.83 8.76   HGB 10.4* 10.4*   HCT 31.2* 31.0*    353       CMP:   Recent Labs   Lab 03/04/24 0411 03/05/24 0359    136   K 4.0 4.3    104   CO2 24 22*   GLU 83 87   BUN 16 16   CREATININE 1.6* 1.5*   CALCIUM 8.8 9.3   ANIONGAP 9 10       Wound Culture: No results for input(s): "LABAERO" in the last 4320 hours.  All pertinent labs within the past 24 hours have been reviewed.    Significant Imaging: I have reviewed all pertinent imaging results/findings within the past 24 hours.  Procedure Component Value Units Date/Time   US Retroperitoneal Complete [[id number]] Resulted: 03/04/24 0055   Order Status: Completed Updated: 03/04/24 0058   Narrative:     EXAMINATION:  US RETROPERITONEAL COMPLETE    CLINICAL HISTORY:  DALIA;    TECHNIQUE:  Ultrasound of the kidneys and urinary bladder was performed including color flow and Doppler evaluation of the kidneys.    COMPARISON:  None.    FINDINGS:  Right kidney: The right kidney measures 11.2 cm. No cortical thinning. No loss of corticomedullary distinction. Resistive index measures 0.61.  Multiple simple appearing renal cysts, with the largest measuring up to 2.1 cm in size.  No mass. No renal stone. No hydronephrosis.    Left kidney: The left kidney measures 11.0 cm. No cortical thinning. No loss of corticomedullary distinction. Resistive index measures 0.69.  Multiple simple appearing renal cysts, with the largest measuring up to 2.3 cm in size.  No mass. No renal " stone. No hydronephrosis.    The bladder is partially distended at the time of scanning and has an unremarkable appearance.   Impression:       No hydronephrosis.    Bilateral simple appearing renal cysts.      Electronically signed by: Tee Silverman MD  Date: 03/04/2024  Time: 00:55   US Scrotum And Testicles [[id number]] Resulted: 02/29/24 0816   Order Status: Completed Updated: 02/29/24 0819   Narrative:     EXAMINATION:  US SCROTUM AND TESTICLES    CLINICAL HISTORY:  scrotal swelling;    TECHNIQUE:  Sonography of the scrotum and testes.    COMPARISON:  None.    FINDINGS:  Right Testicle:    *Size: 4.2 x 2.9 x 2.9 cm  *Appearance: Normal.  *Flow: Normal arterial and venous flow  *Epididymis: Normal.  *Hydrocele: None.  *Varicocele: None.  .    Left Testicle:    *Size: 4.6 x 3.0 x 2.7 cm  *Appearance: Normal.  *Flow: Normal arterial and venous flow  *Epididymis: Normal.  *Hydrocele: None.  *Varicocele: None.  .    Other findings: Diffuse thickening throughout the scrotal wall with increased perfusion which may be seen with cellulitis or edema.  No discrete fluid collection.   Impression:       Diffuse scrotal wall thickening with increased perfusion which may be seen with cellulitis or edema.  No evidence of fluid collection.  No intratesticular abnormality.      Electronically signed by: Pastor Taveras MD  Date: 02/29/2024  Time: 08:16   CT Leg (Tibia-Fibula) Without Contrast Right [[id number]] Resulted: 02/27/24 1613   Order Status: Completed Updated: 02/27/24 1616   Narrative:     EXAMINATION:  CT PELVIS WITHOUT CONTRAST; CT THIGH WITHOUT CONTRAST RIGHT; CT LEG (TIBIA-FIBULA) WITHOUT CONTRAST RIGHT    CLINICAL HISTORY:  Soft tissue infection suspected;    TECHNIQUE:  CT of pelvis, right thigh, and right leg performed without contrast.  Coronal and sagittal reformats provided.    COMPARISON:  CT dated 02/18/2024    FINDINGS:  There is severe soft tissue induration throughout the right thigh and leg as well  as the anterior pelvis.  There is skin thickening with subcutaneous, superficial fascial, and deep fascial edema.  There is skin thickening and subcutaneous edema of the leg.  There is no soft tissue gas.  There is no evidence for confluent fluid collection, noting somewhat limited noncontrast assessment.    Note made of scrotal edema.    No pelvic ascites.  Mild retroperitoneal/extraperitoneal edema noted along the sidewalls.  Metallic bodies within the prostate, presumably fiducial markers.    No fracture.  No lytic or blastic lesion.  Degenerative changes are seen in the lower lumbar spine.  There is a small knee effusion.   Impression:       Extensive soft tissue induration throughout the right thigh and leg as well as anterior pelvis.  Deep fascial edema noted at the level of the thigh.  No evidence for soft tissue gas.  Diagnostic considerations include infectious process such as cellulitis, myositis, and fasciitis.  Alternatively, appearance may be seen with inflammatory process, venous/lymphatic obstruction, or drug/allergic reaction.      Electronically signed by: Javier Rutherford MD  Date: 02/27/2024  Time: 16:13   CT Thigh Without Contrast Right [[id number]] Resulted: 02/27/24 1613   Order Status: Completed Updated: 02/27/24 1616   Narrative:     EXAMINATION:  CT PELVIS WITHOUT CONTRAST; CT THIGH WITHOUT CONTRAST RIGHT; CT LEG (TIBIA-FIBULA) WITHOUT CONTRAST RIGHT    CLINICAL HISTORY:  Soft tissue infection suspected;    TECHNIQUE:  CT of pelvis, right thigh, and right leg performed without contrast.  Coronal and sagittal reformats provided.    COMPARISON:  CT dated 02/18/2024    FINDINGS:  There is severe soft tissue induration throughout the right thigh and leg as well as the anterior pelvis.  There is skin thickening with subcutaneous, superficial fascial, and deep fascial edema.  There is skin thickening and subcutaneous edema of the leg.  There is no soft tissue gas.  There is no evidence for confluent  fluid collection, noting somewhat limited noncontrast assessment.    Note made of scrotal edema.    No pelvic ascites.  Mild retroperitoneal/extraperitoneal edema noted along the sidewalls.  Metallic bodies within the prostate, presumably fiducial markers.    No fracture.  No lytic or blastic lesion.  Degenerative changes are seen in the lower lumbar spine.  There is a small knee effusion.   Impression:       Extensive soft tissue induration throughout the right thigh and leg as well as anterior pelvis.  Deep fascial edema noted at the level of the thigh.  No evidence for soft tissue gas.  Diagnostic considerations include infectious process such as cellulitis, myositis, and fasciitis.  Alternatively, appearance may be seen with inflammatory process, venous/lymphatic obstruction, or drug/allergic reaction.      Electronically signed by: Javier Rutherford MD  Date: 02/27/2024  Time: 16:13   CT Pelvis Without Contrast [[id number]] Resulted: 02/27/24 1613   Order Status: Completed Updated: 02/27/24 1616   Narrative:     EXAMINATION:  CT PELVIS WITHOUT CONTRAST; CT THIGH WITHOUT CONTRAST RIGHT; CT LEG (TIBIA-FIBULA) WITHOUT CONTRAST RIGHT    CLINICAL HISTORY:  Soft tissue infection suspected;    TECHNIQUE:  CT of pelvis, right thigh, and right leg performed without contrast.  Coronal and sagittal reformats provided.    COMPARISON:  CT dated 02/18/2024    FINDINGS:  There is severe soft tissue induration throughout the right thigh and leg as well as the anterior pelvis.  There is skin thickening with subcutaneous, superficial fascial, and deep fascial edema.  There is skin thickening and subcutaneous edema of the leg.  There is no soft tissue gas.  There is no evidence for confluent fluid collection, noting somewhat limited noncontrast assessment.    Note made of scrotal edema.    No pelvic ascites.  Mild retroperitoneal/extraperitoneal edema noted along the sidewalls.  Metallic bodies within the prostate, presumably  fiducial markers.    No fracture.  No lytic or blastic lesion.  Degenerative changes are seen in the lower lumbar spine.  There is a small knee effusion.   Impression:       Extensive soft tissue induration throughout the right thigh and leg as well as anterior pelvis.  Deep fascial edema noted at the level of the thigh.  No evidence for soft tissue gas.  Diagnostic considerations include infectious process such as cellulitis, myositis, and fasciitis.  Alternatively, appearance may be seen with inflammatory process, venous/lymphatic obstruction, or drug/allergic reaction.      Electronically signed by: Javier Rutherford MD  Date: 02/27/2024  Time: 16:13     Imaging History    2024    Date Procedure Name Study Review Link PACS Link Status Accession Number Location   03/03/24 10:57 PM US Retroperitoneal Complete Study Review  Images Final [id number] [facility]   02/29/24 07:58 AM US Scrotum And Testicles Study Review  Images Final [id number] [facility]   02/27/24 03:55 PM CT Thigh Without Contrast Right Study Review  Images Final [id number] [facility]   02/27/24 03:55 PM CT Leg (Tibia-Fibula) Without Contrast Right Study Review  Images Final [id number] [facility]   02/27/24 03:54 PM CT Pelvis Without Contrast Study Review  Images Final [id number] [facility]   02/20/24 11:45 AM US Lower Extrem Arteries Right with JOSE CARLOS (xpd) Study Review  Images Final [id number] [facility]   02/18/24 02:02 PM CT Leg (Tibia-Fibula) Wtih Contrast Right Study Review  Images Final [id number] [facility]   02/18/24 02:02 PM CT Thigh With Contrast Right Study Review  Images Final [id number] [facility]   02/16/24 08:06 PM US Lower Extremity Veins Right Study Review  Images Final [id number] [facility]   02/16/24 12:00 AM CARDIAC MONITORING STRIPS Study Review  Final

## 2024-03-05 NOTE — SUBJECTIVE & OBJECTIVE
Interval History: Patient seen and examined this AM. No acute events overnight. Afebrile with pulse ranging from 80-70s bpm. Systolic blood pressures ranging from 150-140s mmHg. He is saturating +96% on room air with x3 unmeasured voids in the last 24 hours. Renal function improving.     Review of patient's allergies indicates:   Allergen Reactions    Aspirin Shortness Of Breath     Current Facility-Administered Medications   Medication Frequency    acetaminophen tablet 650 mg Q6H PRN    albuterol inhaler 2 puff Q4H PRN    bisacodyL EC tablet 5 mg Daily PRN    calcium carbonate 200 mg calcium (500 mg) chewable tablet 500 mg TID PRN    cefTRIAXone (ROCEPHIN) 2 g in dextrose 5 % in water (D5W) 100 mL IVPB (MB+) Q24H    DAPTOmycin (CUBICIN) 670 mg in sodium chloride 0.9% SolP 50 mL IVPB Q24H    enoxaparin injection 40 mg Daily    fluticasone furoate-vilanteroL 100-25 mcg/dose diskus inhaler 1 puff Daily    HYDROcodone-acetaminophen 5-325 mg per tablet 1 tablet Q6H PRN    Lactobacillus rhamnosus GG capsule 1 capsule BID    melatonin tablet 6 mg Nightly PRN    ondansetron injection 4 mg Q6H PRN    pantoprazole EC tablet 40 mg Daily    tiotropium bromide 2.5 mcg/actuation inhaler 2 puff Daily       Objective:     Vital Signs (Most Recent):  Temp: 98.1 °F (36.7 °C) (03/05/24 0441)  Pulse: 79 (03/05/24 0441)  Resp: 16 (03/05/24 0441)  BP: (!) 145/81 (03/05/24 0441)  SpO2: 97 % (03/05/24 0441) Vital Signs (24h Range):  Temp:  [97.7 °F (36.5 °C)-98.3 °F (36.8 °C)] 98.1 °F (36.7 °C)  Pulse:  [69-81] 79  Resp:  [16-18] 16  SpO2:  [97 %-98 %] 97 %  BP: (145-160)/(81-88) 145/81     Weight: 83.5 kg (184 lb 1.4 oz) (02/29/24 0034)  Body mass index is 28.83 kg/m².  Body surface area is 1.99 meters squared.    No intake/output data recorded.     Physical Exam  Vitals and nursing note reviewed.   Constitutional:       General: He is awake. He is not in acute distress.     Appearance: He is normal weight. He is ill-appearing. He is not  diaphoretic.   HENT:      Head: Normocephalic and atraumatic.      Right Ear: External ear normal.      Left Ear: External ear normal.      Nose: Nose normal. No congestion or rhinorrhea.      Mouth/Throat:      Mouth: Mucous membranes are moist.      Pharynx: Oropharynx is clear. No oropharyngeal exudate or posterior oropharyngeal erythema.   Eyes:      General: No scleral icterus.        Right eye: No discharge.         Left eye: No discharge.      Extraocular Movements: Extraocular movements intact.      Conjunctiva/sclera: Conjunctivae normal.   Cardiovascular:      Rate and Rhythm: Normal rate.      Heart sounds: No murmur heard.     No friction rub. No gallop.   Pulmonary:      Effort: Pulmonary effort is normal. No respiratory distress.      Breath sounds: No wheezing, rhonchi or rales.   Abdominal:      General: Bowel sounds are normal. There is no distension.      Palpations: Abdomen is soft.      Tenderness: There is no abdominal tenderness.   Genitourinary:     Penis: Swelling present.       Testes:         Right: Swelling present.         Left: Swelling present.   Musculoskeletal:         General: Swelling and tenderness present.      Cervical back: Neck supple.      Right lower leg: Edema present.      Left lower leg: No edema.      Comments: Right lower extremity edema with Peau d'orange.   Skin:     General: Skin is warm and dry.      Coloration: Skin is not jaundiced.      Findings: Erythema and rash present.   Neurological:      General: No focal deficit present.      Mental Status: He is alert. Mental status is at baseline.      Cranial Nerves: No cranial nerve deficit.      Motor: No weakness.   Psychiatric:         Mood and Affect: Mood normal.         Behavior: Behavior normal. Behavior is cooperative.          Significant Labs:  BMP:   Recent Labs   Lab 03/05/24  0359   GLU 87      K 4.3      CO2 22*   BUN 16   CREATININE 1.5*   CALCIUM 9.3       CBC:   Recent Labs   Lab  03/05/24  0359   WBC 8.76   RBC 3.31*   HGB 10.4*   HCT 31.0*      MCV 94   MCH 31.4*   MCHC 33.5       CMP:   Recent Labs   Lab 02/27/24  1359 02/28/24  0409 03/05/24  0359   GLU 76   < > 87   CALCIUM 9.7   < > 9.3   ALBUMIN 3.4*  --   --    PROT 7.8  --   --    *   < > 136   K 4.5   < > 4.3   CO2 23   < > 22*   CL 97   < > 104   BUN 16   < > 16   CREATININE 1.7*   < > 1.5*   ALKPHOS 86  --   --    ALT 16  --   --    AST 26  --   --    BILITOT 0.5  --   --     < > = values in this interval not displayed.       LFTs:   Recent Labs   Lab 02/27/24  1359   ALT 16   AST 26   ALKPHOS 86   BILITOT 0.5   PROT 7.8   ALBUMIN 3.4*       Microbiology Results (last 7 days)       Procedure Component Value Units Date/Time    Culture, MRSA [[id number]] Collected: 02/28/24 0850    Order Status: Completed Specimen: Nares Updated: 03/01/24 1314     MRSA Surveillance Screen No MRSA isolated    MRSA Screen by PCR [[id number]] Collected: 02/28/24 0850    Order Status: Canceled Specimen: Nasopharyngeal Swab from Nasal           Specimen (24h ago, onward)      None          Recent Labs   Lab 02/27/24 2053   COLORU Yellow   SPECGRAV 1.015   PHUR 5.0   PROTEINUA Negative   NITRITE Negative   LEUKOCYTESUR Negative        Significant Imaging:  I have reviewed all imagining in the last 24 hours.

## 2024-03-05 NOTE — PROGRESS NOTES
[facility]  Progress Note    Patient Name: Bradley Collins  MRN: [id number]  Patient Class: IP- Inpatient   Admission Date: 2/27/2024  Length of Stay: 7 days  Attending Physician: Chandra Cheema MD  Primary Care Provider: Administration, [facility]        Subjective:     Principal Problem:Cellulitis of right lower limb        HPI:  Bradley Collins is a 63 year old Black man with asthma, allergic rhinitis, hyperlipidemia, irritable bowel syndrome with diarrhea, history of prostate cancer treated with radiation, history of sinus surgery in 2001. He lives in [facility]. He works for [employer]. His primary care clinic is the [facility] clinic.    He was hospitalized at the [facility] on 2/12/2024 for right lower extremity cellulitis. He had a CT with contrast done and was told that it did not show anything. He was prescribed cephalexin.   He was hospitalized at [facility] from 2/16/2024 to 2/20/2024 for worsening cellulitis. Creatinine was elevated at 2.0 mg/dL (from 1.1 a year ago). He was initially put on piperacillin-tazobactam and vancomycin. Piperacillin-tazobactam was changed to ceftriaxone. Erythema improved but edema persisted. He had induration of the thigh. CT showed diffuse cutaneous thickening and edema with prominent right inguinal lymph nodes. Antibiotics were switched to oral amoxicillin-clavulanate and doxycycline on 2/19/2024. Edema improved. Induration persisted. He was discharged home.   He followed up at [facility] on 2/27/2024. He had 4 days left of the antibiotics. Swelling had worsened and spread up to his abdomen. He had edema and erythema of the entire right lower extremity, right groin, and right lower abdomen. It was tender and warm. He was advised to return to the emergency department to resume intravenous antibiotic treatment. Labs showed creatinine to  still be elevated (1.7 mg/dL, from 1.8 on 2/19/2024). Non-contrast CT showed soft tissue induration throughout the right anterior pelvis, thigh,and leg, skin thickening and edema of the subcutaneous tissue, superficial fascia, and deep fascia, scrotal edema, and retroperitoneal/extraperitoneal edema along the sidewalls. He was given piperacillin-tazobactam and vancomycin. He was admitted to Hospital Medicine Team W.     Overview/Hospital Course:  He was put on vancomycin. Infectious Disease was consulted for recommendations changed vancomycin to ceftriaxone, daptomycin, and linezolid. General Surgery was consulted to evaluate and recommended no surgical intervention to help reduce swelling. Urology was consulted to evaluate his scrotal swelling and found no issues they had to manage. His right lower extremity, right pelvis, penile, and scrotal swelling decreased after a dose of torsemide on 3/1/24.  Giving his history physical examination diagnosis of lymphedema has been made.  His creatine is downtrending 1 dose of Bumex 0.5 mg on 03/02/2024. Creatinine worsened to 1.9 on 3/3/24. US of both LE to check for venous insufficiency ordered and nephrology consult requested for ongoing DALIA for past couple of weeks.    Interval History: NAEON. Afebrile. Kidney function cont to improve, now at baseline. Persistent RLE and scrotal swelling. CT venogram pending to evaluate for R iliac vein thrombosis. Risks/Benefits of IV contrast discussed with patient, he is aware of risk of kidney injury and has chosen to proceed with study.    Review of Systems   Constitutional:  Negative for chills, diaphoresis, fatigue and fever.   HENT:  Negative for congestion, ear pain, sore throat, tinnitus and trouble swallowing.    Eyes:  Negative for photophobia, discharge and visual disturbance.   Respiratory:  Negative for apnea, cough, choking, chest tightness and shortness of breath.    Cardiovascular:  Negative for chest pain, palpitations  and leg swelling.   Gastrointestinal:  Negative for abdominal distention, abdominal pain, constipation, diarrhea, nausea and vomiting.   Endocrine: Negative for polydipsia, polyphagia and polyuria.   Genitourinary:  Positive for penile swelling and scrotal swelling. Negative for difficulty urinating and dysuria.   Musculoskeletal:  Positive for joint swelling. Negative for arthralgias, back pain and gait problem.        Right leg swelling   Skin:  Negative for color change and rash.   Neurological:  Negative for dizziness, syncope, speech difficulty, weakness, light-headedness and headaches.   Psychiatric/Behavioral:  Negative for agitation, confusion, dysphoric mood, hallucinations and sleep disturbance. The patient is not nervous/anxious.      Objective:     Vital Signs (Most Recent):  Temp: 99 °F (37.2 °C) (03/05/24 1135)  Pulse: 82 (03/05/24 1135)  Resp: 18 (03/05/24 1135)  BP: (!) 167/79 (03/05/24 1135)  SpO2: 97 % (03/05/24 1135) Vital Signs (24h Range):  Temp:  [98.1 °F (36.7 °C)-99 °F (37.2 °C)] 99 °F (37.2 °C)  Pulse:  [74-82] 82  Resp:  [16-18] 18  SpO2:  [97 %-98 %] 97 %  BP: (145-167)/(79-91) 167/79     Weight: 83.5 kg (184 lb 1.4 oz)  Body mass index is 28.83 kg/m².  No intake or output data in the 24 hours ending 03/05/24 1222      Physical Exam  Constitutional:       General: He is not in acute distress.     Appearance: Normal appearance. He is normal weight.   HENT:      Head: Normocephalic and atraumatic.      Nose: No congestion.      Mouth/Throat:      Mouth: Mucous membranes are moist.   Eyes:      Extraocular Movements: Extraocular movements intact.      Conjunctiva/sclera: Conjunctivae normal.      Pupils: Pupils are equal, round, and reactive to light.   Neck:      Vascular: No carotid bruit.   Cardiovascular:      Rate and Rhythm: Normal rate and regular rhythm.      Pulses: Normal pulses.      Heart sounds: Normal heart sounds. No murmur heard.     No gallop.   Pulmonary:      Effort:  Pulmonary effort is normal. No respiratory distress.      Breath sounds: Normal breath sounds. No wheezing, rhonchi or rales.   Chest:      Chest wall: No tenderness.   Abdominal:      General: Abdomen is flat. Bowel sounds are normal. There is no distension.      Tenderness: There is no abdominal tenderness.   Genitourinary:     Comments: Scrotal swelling +  Penile swelling +  Musculoskeletal:         General: Swelling and tenderness present.      Cervical back: No rigidity.      Right lower leg: Edema present.      Left lower leg: No edema.      Comments: Right lower extremity edema, with thickening of skin changes consistent with lymphedema   Ankle cuffing +  Stemmer sign +   Skin:     Capillary Refill: Capillary refill takes less than 2 seconds.   Neurological:      General: No focal deficit present.      Mental Status: He is alert and oriented to person, place, and time. Mental status is at baseline.      Cranial Nerves: No cranial nerve deficit.      Motor: No weakness.   Psychiatric:         Mood and Affect: Mood normal.         Behavior: Behavior normal.             Significant Labs: All pertinent labs within the past 24 hours have been reviewed.    Significant Imaging: I have reviewed all pertinent imaging results/findings within the past 24 hours.    Assessment/Plan:      * Cellulitis of right lower limb  Persistent. 3rd hospital admission. Improved on vancomycin and ceftriaxone during last hospitalization. Prescribed amoxicillin-clavulanate and doxycycline and it worsened. Appreciate Infectious Disease. Giving ceftriaxone, daptomycin. DC linezolid on 3/3/24.   Will discuss final abx recs with ID    Leg swelling        DALIA (acute kidney injury)  Patient with acute kidney injury/acute renal failure likely due to acute tubular necrosis. DALIA is currently stable. Baseline creatinine 1.2 - Labs reviewed- Renal function/electrolytes with Estimated Creatinine Clearance: 51.4 mL/min (A) (based on SCr of 1.5 mg/dL  (H)). according to latest data. Monitor urine output and serial BMP and adjust therapy as needed. Avoid nephrotoxins and renally dose meds for GFR listed above.  Nephrology following  Improving    Lymphedema  - History and physical examination including ankle cup sign, Stammer sign, thickening of skin consistent with right lower extremity lymphedema   - Received torsemide on 3/1 and Bumex 0.5 mg on 3/2/24.   - Limit sodium intake to 2000 mg daily.  Limit volume intake to 1.5 L daily.  Elevate legs when resting.   - Vascular Medicine follow up as outpatient for further lymphedema therapy  - Compression stockings and leg elevation  - CT venogram pending to evaluate for R iliac vein thrombosis  - Will give IV hydration pre and post procedure      Moderate persistent asthma, uncomplicated  - Stable  - Continue fluticasone-vilanterol in place of home mometasone-formoterol. Albuterol prn.       Elevated serum creatinine  - Nephrology consult appreciated, recommended lab and imaging evaluation to determine etiology, type of DALIA      Cellulitis          VTE Risk Mitigation (From admission, onward)           Ordered     enoxaparin injection 40 mg  Daily         02/27/24 1703                    Discharge Planning   CONSTANTINO: 3/6/2024     Code Status: Full Code   Is the patient medically ready for discharge?: No    Reason for patient still in hospital (select all that apply): Treatment  Discharge Plan A: Home with family                  Chandra Cheema MD  Department of Hospital Medicine   [facility]

## 2024-03-05 NOTE — PLAN OF CARE
[facility] Med S[facility]  Discharge Reassessment    Primary Care Provider: Reed Mcmullen    Expected Discharge Date: 3/6/2024    SW met with pt to discuss discharge plan.  Pt ambulatory.  Pt stated he does not have any home needs.  Pt's family will provide transportation home.      Pt is not medically ready for d/c at this time.  Waiting for final recs from ID. Pt has CT scan today.       Discharge Plan A and Plan B have been determined by review of patient's clinical status, future medical and therapeutic needs, and coverage/benefits for post-acute care in coordination with multidisciplinary team members.    Reassessment (most recent)       Discharge Reassessment - 03/05/24 1521          Discharge Reassessment    Assessment Type Discharge Planning Reassessment     Did the patient's condition or plan change since previous assessment? No     Discharge Plan discussed with: Patient     Communicated CONSTANTINO with patient/caregiver Yes     Discharge Plan A Home;Home with family     Discharge Plan B Home     DME Needed Upon Discharge  none     Transition of Care Barriers None     Why the patient remains in the hospital Requires continued medical care        Post-Acute Status    Post-Acute Authorization Other     Coverage [facility] [employer]     Other Status No Post-Acute Service Needs     Discharge Delays None known at this time                   Celeste aWkefield LMSW  Part-Time-  [facility]  Ext. [phone]

## 2024-03-05 NOTE — ASSESSMENT & PLAN NOTE
Patient with acute kidney injury/acute renal failure likely due to acute tubular necrosis. DALIA is currently stable. Baseline creatinine 1.2 - Labs reviewed- Renal function/electrolytes with Estimated Creatinine Clearance: 51.4 mL/min (A) (based on SCr of 1.5 mg/dL (H)). according to latest data. Monitor urine output and serial BMP and adjust therapy as needed. Avoid nephrotoxins and renally dose meds for GFR listed above.  Nephrology following  Improving

## 2024-03-05 NOTE — PLAN OF CARE
Nephrology Chart Review    No intake or output data in the 24 hours ending 03/05/24 1512    Vitals:    03/05/24 0848 03/05/24 0854 03/05/24 1135 03/05/24 1306   BP:  (!) 148/91 (!) 167/79 (!) 171/88   Pulse: 78 74 82 82   Resp: 16 18 18 17   Temp:  98.6 °F (37 °C) 99 °F (37.2 °C) 98.3 °F (36.8 °C)   TempSrc:  Oral Oral Oral   SpO2: 97% 98% 97% 99%   Weight:       Height:           Recent Labs   Lab 03/03/24  0628 03/04/24  0411 03/05/24  0359   * 136 136   K 4.1 4.0 4.3    103 104   CO2 24 24 22*   BUN 16 16 16   CREATININE 1.9* 1.6* 1.5*   CALCIUM 8.9 8.8 9.3     Patients' chart and laboratory studies reviewed. Continue improvement in renal function. Will need referral for outpatient nephrology as outpatient on discharge.  No other related issues identified. Please call nephrology as needed. We will sign-off at this time.    Liam Barber MD  Nephrology

## 2024-03-05 NOTE — ASSESSMENT & PLAN NOTE
"63M with asthma, allergic rhinitis (on Dupixent, bi-weekly injection), IBS, h/o prostate cancer (prior tx w/ radiation 2020), and recent h/o persistent RLE cellulitis for the past month; which seems to have begun 24h following routine dupixent injections to R lower side/back. Pt has had multiple recent hospital admissions, but RLE cellulitis failing to resolve with abx tx outpt.     Pt now presents to [facility] for worsening RLE redness, swelling, induration of entire L foot/leg/thigh, with spread to groin/perineum and lower pelvis/abdomen over last week. Also with concerns for scrotal swelling, involvement.     CT of Right lower leg/thigh/pelvis: showed severe soft tissue induration throughout right leg, pelvis. With edema to deep fascia, with scrotal edema, and retroperitoneal/extraperitoneal edema along the sidewalls. No abscess or tissue gas seen. Scrotal US with cellulitis, edema. Without fluid collection. US of lower right extremity without evidence of DVT on 2/16, though noting a "slow flow". US of lower right arteries noting no significant stenosis. Normal ABIs.     ID consulted for abx recs of progressive RLE cellulitis on oral abx outpt.     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 Pt was transitioned to daptomycin, linezolid (toxin production inhibitor), and ceftriaxone. Urology and general surgery following. Urology without concern for tiffany's, though recommending scrotal US. General surgery noting no evidence of fourniers. No surgical/urological recommendations regarding persistent swelling.     3/4/24:  RLE essentially unchanged despite abx.  Vasc sx following and rec CT venogram RLE - agree as vascular cause favored over infection.  Stable without systemic sign of infection.  3/5/24: Afebrile and WBC WNL. Creatinine decreasing.  No new changes.  Stable non septic    Recommendations / Plan:  DC daptomycin and ceftriaxone  Start doxycycline 100mg po bid.   Continue scrotal elevation, and " judicious leg elevation.   CT RLE when able given renal dysfunction - discussed with radiology to ensure images capture what needs assessment - venous system  Plan reviewed with ID staff. ID will follow with you.

## 2024-03-06 PROBLEM — M79.89 SWELLING OF RIGHT LOWER EXTREMITY: Status: ACTIVE | Noted: 2024-03-02

## 2024-03-06 LAB
ANION GAP SERPL CALC-SCNC: 9 MMOL/L (ref 8–16)
BUN SERPL-MCNC: 14 MG/DL (ref 8–23)
CALCIUM SERPL-MCNC: 9.1 MG/DL (ref 8.7–10.5)
CHLORIDE SERPL-SCNC: 104 MMOL/L (ref 95–110)
CO2 SERPL-SCNC: 23 MMOL/L (ref 23–29)
CREAT SERPL-MCNC: 1.8 MG/DL (ref 0.5–1.4)
EST. GFR  (NO RACE VARIABLE): 41.5 ML/MIN/1.73 M^2
GLUCOSE SERPL-MCNC: 99 MG/DL (ref 70–110)
POTASSIUM SERPL-SCNC: 4.1 MMOL/L (ref 3.5–5.1)
SODIUM SERPL-SCNC: 136 MMOL/L (ref 136–145)

## 2024-03-06 PROCEDURE — 25000003 PHARM REV CODE 250: Performed by: HOSPITALIST

## 2024-03-06 PROCEDURE — 11000001 HC ACUTE MED/SURG PRIVATE ROOM

## 2024-03-06 PROCEDURE — 80048 BASIC METABOLIC PNL TOTAL CA: CPT | Performed by: INTERNAL MEDICINE

## 2024-03-06 PROCEDURE — 36415 COLL VENOUS BLD VENIPUNCTURE: CPT | Performed by: INTERNAL MEDICINE

## 2024-03-06 PROCEDURE — 94640 AIRWAY INHALATION TREATMENT: CPT

## 2024-03-06 PROCEDURE — 25000003 PHARM REV CODE 250: Performed by: PHYSICIAN ASSISTANT

## 2024-03-06 PROCEDURE — 99232 SBSQ HOSP IP/OBS MODERATE 35: CPT | Mod: ,,, | Performed by: PHYSICIAN ASSISTANT

## 2024-03-06 PROCEDURE — 94761 N-INVAS EAR/PLS OXIMETRY MLT: CPT

## 2024-03-06 PROCEDURE — 25000003 PHARM REV CODE 250: Performed by: INTERNAL MEDICINE

## 2024-03-06 RX ADMIN — Medication 1 CAPSULE: at 09:03

## 2024-03-06 RX ADMIN — DOXYCYCLINE HYCLATE 100 MG: 100 TABLET, COATED ORAL at 09:03

## 2024-03-06 RX ADMIN — FLUTICASONE FUROATE AND VILANTEROL TRIFENATATE 1 PUFF: 100; 25 POWDER RESPIRATORY (INHALATION) at 07:03

## 2024-03-06 RX ADMIN — Medication 6 MG: at 08:03

## 2024-03-06 RX ADMIN — AMLODIPINE BESYLATE 5 MG: 5 TABLET ORAL at 09:03

## 2024-03-06 RX ADMIN — Medication 1 CAPSULE: at 08:03

## 2024-03-06 RX ADMIN — PANTOPRAZOLE SODIUM 40 MG: 40 TABLET, DELAYED RELEASE ORAL at 09:03

## 2024-03-06 RX ADMIN — DOXYCYCLINE HYCLATE 100 MG: 100 TABLET, COATED ORAL at 08:03

## 2024-03-06 RX ADMIN — TIOTROPIUM BROMIDE INHALATION SPRAY 2 PUFF: 3.12 SPRAY, METERED RESPIRATORY (INHALATION) at 07:03

## 2024-03-06 RX ADMIN — SODIUM CHLORIDE: 9 INJECTION, SOLUTION INTRAVENOUS at 03:03

## 2024-03-06 NOTE — ASSESSMENT & PLAN NOTE
63M with worsening cellulitis refractory to multiple courses of oral antibiotics.  Urology consulted for bilateral hydronephrosis, scrotal/penile swelling and DALIA.    - Recommend PVR bladder scan  - Recommend IR consult for inguinal node biopsy for pathologic diagnosis  - Antibiotics per primary, currently de-escalated to doxy  - No indication for indwelling lee catheter unless PVR bladder scan elevated  - Patient would be high likelihood of stent failure due to RP Fibrosis  - please reach out to urology if worsening of  exam or if any concerns for development of Fanny's gangrene   - rest of care per primary

## 2024-03-06 NOTE — PLAN OF CARE
Problem: Adult Inpatient Plan of Care  Goal: Plan of Care Review  Outcome: Ongoing, Progressing  Goal: Patient-Specific Goal (Individualized)  Outcome: Ongoing, Progressing  Goal: Absence of Hospital-Acquired Illness or Injury  Outcome: Ongoing, Progressing  Goal: Optimal Comfort and Wellbeing  Outcome: Ongoing, Progressing  Goal: Readiness for Transition of Care  Outcome: Ongoing, Progressing     Problem: Impaired Wound Healing  Goal: Optimal Wound Healing  Outcome: Ongoing, Progressing     Problem: Fluid and Electrolyte Imbalance (Acute Kidney Injury/Impairment)  Goal: Fluid and Electrolyte Balance  Outcome: Ongoing, Progressing     Problem: Oral Intake Inadequate (Acute Kidney Injury/Impairment)  Goal: Optimal Nutrition Intake  Outcome: Ongoing, Progressing     Problem: Renal Function Impairment (Acute Kidney Injury/Impairment)  Goal: Effective Renal Function  Outcome: Ongoing, Progressing     Problem: Infection  Goal: Absence of Infection Signs and Symptoms  Outcome: Ongoing, Progressing     Problem: Pain Acute  Goal: Acceptable Pain Control and Functional Ability  Outcome: Ongoing, Progressing     Problem: Fall Injury Risk  Goal: Absence of Fall and Fall-Related Injury  Outcome: Ongoing, Progressing

## 2024-03-06 NOTE — PLAN OF CARE
Problem: Adult Inpatient Plan of Care  Goal: Plan of Care Review  Outcome: Ongoing, Progressing  Goal: Patient-Specific Goal (Individualized)  Outcome: Ongoing, Progressing  Flowsheets (Taken 3/5/2024 1900)  Anxieties, Fears or Concerns: To go home without infection coming back  Individualized Care Needs: None  Goal: Absence of Hospital-Acquired Illness or Injury  Outcome: Ongoing, Progressing  Intervention: Identify and Manage Fall Risk  Flowsheets (Taken 3/5/2024 1900)  Safety Promotion/Fall Prevention:   assistive device/personal item within reach   bed alarm refused   Fall Risk reviewed with patient/family   medications reviewed   nonskid shoes/socks when out of bed   toileting scheduled  Intervention: Prevent Skin Injury  Flowsheets (Taken 3/5/2024 1900)  Body Position: position changed independently  Skin Protection:   adhesive use limited   skin-to-device areas padded   skin-to-skin areas padded   transparent dressing maintained   skin sealant/moisture barrier applied   tubing/devices free from skin contact  Intervention: Prevent and Manage VTE (Venous Thromboembolism) Risk  Flowsheets (Taken 3/5/2024 1900)  Activity Management:   Ambulated -L4   Ambulated to bathroom - L4   Ambulated in room - L4   Rolling - L1   Standing - L3   Step forward and back - L3   Walking in place - L3  VTE Prevention/Management:   ambulation promoted   bleeding precations maintained   bleeding risk assessed   bleeding risk factor(s) identified, provider notified   dorsiflexion/plantar flexion performed   ROM (active) performed   ROM (passive) performed  Range of Motion:   active ROM (range of motion) encouraged   ROM (range of motion) performed  Intervention: Prevent Infection  Flowsheets (Taken 3/5/2024 1900)  Infection Prevention:   equipment surfaces disinfected   rest/sleep promoted   single patient room provided   personal protective equipment utilized   environmental surveillance performed  Goal: Optimal Comfort and  Wellbeing  Outcome: Ongoing, Progressing  Intervention: Monitor Pain and Promote Comfort  Flowsheets (Taken 3/5/2024 1900)  Pain Management Interventions:   pain management plan reviewed with patient/caregiver   pillow support provided   position adjusted   quiet environment facilitated   relaxation techniques promoted  Intervention: Provide Person-Centered Care  Flowsheets (Taken 3/5/2024 1900)  Trust Relationship/Rapport:   care explained   choices provided   questions encouraged   thoughts/feelings acknowledged  Goal: Readiness for Transition of Care  Outcome: Ongoing, Progressing  Intervention: Mutually Develop Transition Plan  Flowsheets (Taken 3/5/2024 1900)  Equipment Currently Used at Home: none  Transportation Anticipated: family or friend will provide  Communicated CONSTANTINO with patient/caregiver: Yes  Do you expect to return to your current living situation?: Yes  Do you have help at home or someone to help you manage your care at home?: Yes  Readmission within 30 days?: Yes  Do you currently have service(s) that help you manage your care at home?: No  Is the pt/caregiver preference to resume services with current agency: No     Problem: Impaired Wound Healing  Goal: Optimal Wound Healing  Outcome: Ongoing, Progressing  Intervention: Promote Wound Healing  Flowsheets (Taken 3/5/2024 1900)  Oral Nutrition Promotion:   physical activity promoted   medicated   safe use of adaptive equipment encouraged  Sleep/Rest Enhancement:   noise level reduced   relaxation techniques promoted   regular sleep/rest pattern promoted  Activity Management:   Ambulated -L4   Ambulated to bathroom - L4   Ambulated in room - L4   Rolling - L1   Standing - L3   Step forward and back - L3   Walking in place - L3  Pain Management Interventions:   pain management plan reviewed with patient/caregiver   pillow support provided   position adjusted   quiet environment facilitated   relaxation techniques promoted     Problem: Fluid and  Electrolyte Imbalance (Acute Kidney Injury/Impairment)  Goal: Fluid and Electrolyte Balance  Outcome: Ongoing, Progressing  Intervention: Monitor and Manage Fluid and Electrolyte Balance  Flowsheets (Taken 3/5/2024 1900)  Fluid/Electrolyte Management: intravenous fluid replacement initiated     Problem: Oral Intake Inadequate (Acute Kidney Injury/Impairment)  Goal: Optimal Nutrition Intake  Outcome: Ongoing, Progressing     Problem: Renal Function Impairment (Acute Kidney Injury/Impairment)  Goal: Effective Renal Function  Outcome: Ongoing, Progressing  Intervention: Monitor and Support Renal Function  Flowsheets (Taken 3/5/2024 1900)  Medication Review/Management:   medications reviewed   high-risk medications identified     Problem: Infection  Goal: Absence of Infection Signs and Symptoms  Outcome: Ongoing, Progressing  Intervention: Prevent or Manage Infection  Flowsheets (Taken 3/5/2024 1900)  Infection Management: aseptic technique maintained  Isolation Precautions: protective

## 2024-03-06 NOTE — CONSULTS
[facility] Surg  Urology  Consult Note    Patient Name: Bradley Collins  MRN: [id number]  Admission Date: 2/27/2024  Hospital Length of Stay: 8   Code Status: Full Code   Attending Provider: Chandra Cheema MD   Consulting Provider: Ahmet Garay MD  Primary Care Physician: Reed Mcmullen  Principal Problem:Swelling of right lower extremity    Inpatient consult to Urology  Consult performed by: Ahmet Garay MD  Consult ordered by: Chandra Cheema MD  Reason for consult: Hydronephrosis, penile/scrotal swelling, DALIA          Subjective:     HPI:  63M with PMH of asthma, Marsha 4+4 prostate cancer treated with XRT and ADT in 2020, presents with progressively worsening soft tissue swelling.  Urology re consulted for hydronephrosis, previously seen for hydronephrosis.    Patient reports edema and swelling that began in his R leg one month ago and has slowly progressed to the right gluteal region and now involves the penis/scrotum.  [facility] and was discharged on oral antibiotics but swelling has continued to progress.  He denies fevers but does report subjective chills.  He denies difficulty urinating and has voided multiple times today.  He denies hematuria.  Bladder scan PVR last week was 20 ml.     On assessment, he is AFVSS. Cr 1.8 from 1.5, which has been stable for two months. Previously normal baseline.  UA 2/27 negative for tested components.    CT 3/5/24: bilateral hydronephrosis to level of partially distended bladder, thickened bladder wall. Difficult to assess distal right ureter. Bilateral inguinal and retroperitoneal nodes. Severe edema of scrotum and penis consistent with physical exam.    Previous CT pelvis and thigh shows extensive soft tissue edema and induration through the R leg and groin.  No air and no organized fluid collection.      Past Medical History:   Diagnosis Date    Allergy     Asthma     Cellulitis      Elevated PSA     Prostate cancer        Past Surgical History:   Procedure Laterality Date    ENDOSCOPIC ULTRASOUND OF UPPER GASTROINTESTINAL TRACT N/A 7/25/2023    Procedure: ULTRASOUND, UPPER GI TRACT, ENDOSCOPIC;  Surgeon: Yoseph Li MD;  Location: [facility] ([address] [place] [address]);  Service: Endoscopy;  Laterality: N/A;  instr Coker-va referral    SINUS SURGERY Bilateral 2001       Review of patient's allergies indicates:   Allergen Reactions    Aspirin Shortness Of Breath       Family History    Family history is unknown by patient.         Tobacco Use    Smoking status: Never    Smokeless tobacco: Never   Substance and Sexual Activity    Alcohol use: Yes     Alcohol/week: 1.0 standard drink of alcohol     Types: 1 Cans of beer per week     Comment: week    Drug use: Never    Sexual activity: Not on file       Review of Systems   Constitutional:  Negative for chills, fatigue and fever.   HENT: Negative.     Respiratory: Negative.     Cardiovascular: Negative.    Gastrointestinal:  Positive for abdominal pain. Negative for constipation, nausea and vomiting.   Genitourinary:  Positive for penile swelling and scrotal swelling. Negative for dysuria, flank pain, hematuria and testicular pain.   Musculoskeletal:  Negative for arthralgias and back pain.   Skin:  Positive for color change and pallor.   Neurological: Negative.  Negative for seizures, speech difficulty and headaches.   Psychiatric/Behavioral:  Negative for agitation and confusion.        Objective:     Temp:  [98.4 °F (36.9 °C)-99.6 °F (37.6 °C)] 99.2 °F (37.3 °C)  Pulse:  [77-93] 91  Resp:  [18-19] 18  SpO2:  [96 %-98 %] 96 %  BP: (136-163)/(74-87) 152/77  Weight: 83.5 kg (184 lb 1.4 oz)  Body mass index is 28.83 kg/m².           Drains       None                    Physical Exam  Constitutional:       General: He is not in acute distress.     Appearance: Normal appearance.   HENT:      Head: Normocephalic.   Cardiovascular:      Rate and Rhythm: Normal rate.  "  Pulmonary:      Effort: Pulmonary effort is normal.   Abdominal:      General: There is no distension.      Tenderness: There is no abdominal tenderness. There is no right CVA tenderness or left CVA tenderness.   Genitourinary:     Comments: Uncircumcised penis with severe swelling and scrotal swelling, unable to visualize glans or meatus. Unable to palpable testicles within scrotum 2/2 swelling.  Warmth, erythema and induration of suprapubic region but no crepitus or overlying skin changes.  Musculoskeletal:      Comments: Warmth induration and swelling extending from R gluteal region to ankle.    Skin:     General: Skin is warm.   Neurological:      General: No focal deficit present.      Mental Status: He is alert and oriented to person, place, and time.          Significant Labs:    BMP:  Recent Labs   Lab 03/04/24 0411 03/05/24 0359 03/06/24  1013    136 136   K 4.0 4.3 4.1    104 104   CO2 24 22* 23   BUN 16 16 14   CREATININE 1.6* 1.5* 1.8*   CALCIUM 8.8 9.3 9.1       CBC:  Recent Labs   Lab 03/03/24 0628 03/04/24 0411 03/05/24  0359   WBC 9.19 8.83 8.76   HGB 10.1* 10.4* 10.4*   HCT 29.7* 31.2* 31.0*    406 353       Blood Culture: No results for input(s): "LABBLOO" in the last 168 hours.  Urine Culture: No results for input(s): "LABURIN" in the last 168 hours.  Urine Studies: No results for input(s): "COLORU", "APPEARANCEUA", "PHUR", "SPECGRAV", "PROTEINUA", "GLUCUA", "KETONESU", "BILIRUBINUA", "OCCULTUA", "NITRITE", "UROBILINOGEN", "LEUKOCYTESUR", "RBCUA", "WBCUA", "BACTERIA", "SQUAMEPITHEL", "HYALINECASTS" in the last 168 hours.    Invalid input(s): "WRIGHTSUR"    Significant Imaging:  CT: I have reviewed all results within the past 24 hours and my personal findings are:  as per HPI.      Assessment and Plan:     Cellulitis of right lower limb  63M with worsening cellulitis refractory to multiple courses of oral antibiotics.  Urology consulted for bilateral hydronephrosis, " scrotal/penile swelling and DALIA.    - Recommend PVR bladder scan  - Recommend IR consult for inguinal node biopsy for pathologic diagnosis  - Antibiotics per primary, currently de-escalated to doxy  - No indication for indwelling lee catheter unless PVR bladder scan elevated  - Patient would be high likelihood of stent failure due to RP Fibrosis  - please reach out to urology if worsening of  exam or if any concerns for development of Fanny's gangrene   - rest of care per primary         VTE Risk Mitigation (From admission, onward)           Ordered     enoxaparin injection 40 mg  Daily         02/27/24 8111                    Thank you for your consult. I will follow-up with patient. Please contact us if you have any additional questions.    Ahmet Garay MD  Urology  [facility] - Med Surg

## 2024-03-06 NOTE — PROGRESS NOTES
[facility] Medicine  Progress Note    Patient Name: Bradley Collins  MRN: [id number]  Patient Class: IP- Inpatient   Admission Date: 2/27/2024  Length of Stay: 8 days  Attending Physician: Chandra Cheema MD  Primary Care Provider: Administration, [facility]        Subjective:     Principal Problem:Swelling of right lower extremity        HPI:  Bradley Collins is a 63 year old Black man with asthma, allergic rhinitis, hyperlipidemia, irritable bowel syndrome with diarrhea, history of prostate cancer treated with radiation, history of sinus surgery in 2001. He lives in [facility]. He works for [employer]. His primary care clinic is the [facility].    He was hospitalized at the [facility] on 2/12/2024 for right lower extremity cellulitis. He had a CT with contrast done and was told that it did not show anything. He was prescribed cephalexin.   He was hospitalized at [facility] from 2/16/2024 to 2/20/2024 for worsening cellulitis. Creatinine was elevated at 2.0 mg/dL (from 1.1 a year ago). He was initially put on piperacillin-tazobactam and vancomycin. Piperacillin-tazobactam was changed to ceftriaxone. Erythema improved but edema persisted. He had induration of the thigh. CT showed diffuse cutaneous thickening and edema with prominent right inguinal lymph nodes. Antibiotics were switched to oral amoxicillin-clavulanate and doxycycline on 2/19/2024. Edema improved. Induration persisted. He was discharged home.   He followed up at [facility] on 2/27/2024. He had 4 days left of the antibiotics. Swelling had worsened and spread up to his abdomen. He had edema and erythema of the entire right lower extremity, right groin, and right lower abdomen. It was tender and warm. He was advised to return to the emergency department to resume intravenous antibiotic treatment. Labs showed creatinine to  still be elevated (1.7 mg/dL, from 1.8 on 2/19/2024). Non-contrast CT showed soft tissue induration throughout the right anterior pelvis, thigh,and leg, skin thickening and edema of the subcutaneous tissue, superficial fascia, and deep fascia, scrotal edema, and retroperitoneal/extraperitoneal edema along the sidewalls. He was given piperacillin-tazobactam and vancomycin. He was admitted to Hospital Medicine Team W.     Overview/Hospital Course:  He was put on vancomycin. Infectious Disease was consulted for recommendations changed vancomycin to ceftriaxone, daptomycin, and linezolid. General Surgery was consulted to evaluate and recommended no surgical intervention to help reduce swelling. Urology was consulted to evaluate his scrotal swelling and found no issues they had to manage. His right lower extremity, right pelvis, penile, and scrotal swelling decreased after a dose of torsemide on 3/1/24.  Giving his history physical examination diagnosis of lymphedema has been made.  His creatine is downtrending 1 dose of Bumex 0.5 mg on 03/02/2024. Creatinine worsened to 1.9 on 3/3/24. US of both LE to check for venous insufficiency ordered and nephrology consult requested for ongoing DALIA for past couple of weeks.    Interval History: NAEON. Afebrile. 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 Cr again worsening. Persistent RLE and scrotal swelling.     Review of Systems   Constitutional:  Negative for chills, diaphoresis, fatigue and fever.   HENT:  Negative for congestion, ear pain, sore throat, tinnitus and trouble swallowing.    Eyes:  Negative for photophobia, discharge and visual disturbance.   Respiratory:  Negative for apnea, cough, choking, chest tightness and shortness of breath.    Cardiovascular:  Negative for chest pain, palpitations and leg swelling.   Gastrointestinal:  Negative for abdominal distention,  abdominal pain, constipation, diarrhea, nausea and vomiting.   Endocrine: Negative for polydipsia, polyphagia and polyuria.   Genitourinary:  Positive for penile swelling and scrotal swelling. Negative for difficulty urinating and dysuria.   Musculoskeletal:  Positive for joint swelling. Negative for arthralgias, back pain and gait problem.        Right leg swelling   Skin:  Negative for color change and rash.   Neurological:  Negative for dizziness, syncope, speech difficulty, weakness, light-headedness and headaches.   Psychiatric/Behavioral:  Negative for agitation, confusion, dysphoric mood, hallucinations and sleep disturbance. The patient is not nervous/anxious.      Objective:     Vital Signs (Most Recent):  Temp: 99.5 °F (37.5 °C) (03/06/24 1048)  Pulse: 87 (03/06/24 1048)  Resp: 18 (03/06/24 1048)  BP: (!) 149/74 (03/06/24 1048)  SpO2: 97 % (03/06/24 1048) Vital Signs (24h Range):  Temp:  [98.4 °F (36.9 °C)-99.6 °F (37.6 °C)] 99.5 °F (37.5 °C)  Pulse:  [77-93] 87  Resp:  [18-19] 18  SpO2:  [96 %-98 %] 97 %  BP: (136-163)/(74-87) 149/74     Weight: 83.5 kg (184 lb 1.4 oz)  Body mass index is 28.83 kg/m².    Intake/Output Summary (Last 24 hours) at 3/6/2024 1356  Last data filed at 3/5/2024 1800  Gross per 24 hour   Intake 300 ml   Output --   Net 300 ml         Physical Exam  Constitutional:       General: He is not in acute distress.     Appearance: Normal appearance. He is normal weight.   HENT:      Head: Normocephalic and atraumatic.      Nose: No congestion.      Mouth/Throat:      Mouth: Mucous membranes are moist.   Eyes:      Extraocular Movements: Extraocular movements intact.      Conjunctiva/sclera: Conjunctivae normal.      Pupils: Pupils are equal, round, and reactive to light.   Neck:      Vascular: No carotid bruit.   Cardiovascular:      Rate and Rhythm: Normal rate and regular rhythm.      Pulses: Normal pulses.      Heart sounds: Normal heart sounds. No murmur heard.     No gallop.    Pulmonary:      Effort: Pulmonary effort is normal. No respiratory distress.      Breath sounds: Normal breath sounds. No wheezing, rhonchi or rales.   Chest:      Chest wall: No tenderness.   Abdominal:      General: Abdomen is flat. Bowel sounds are normal. There is no distension.      Tenderness: There is no abdominal tenderness.   Genitourinary:     Comments: Scrotal swelling +  Penile swelling +  Musculoskeletal:         General: Swelling and tenderness present.      Cervical back: No rigidity.      Right lower leg: Edema present.      Left lower leg: No edema.      Comments: Right lower extremity edema, with thickening of skin changes consistent with lymphedema   Ankle cuffing +  Stemmer sign +   Skin:     Capillary Refill: Capillary refill takes less than 2 seconds.   Neurological:      General: No focal deficit present.      Mental Status: He is alert and oriented to person, place, and time. Mental status is at baseline.      Cranial Nerves: No cranial nerve deficit.      Motor: No weakness.   Psychiatric:         Mood and Affect: Mood normal.         Behavior: Behavior normal.             Significant Labs: All pertinent labs within the past 24 hours have been reviewed.    Significant Imaging: I have reviewed all pertinent imaging results/findings within the past 24 hours.    Assessment/Plan:      * Swelling of right lower extremity  - History and physical examination including ankle cup sign, Stammer sign, thickening of skin consistent with right lower extremity lymphedema   - Received torsemide on 3/1 and Bumex 0.5 mg on 3/2/24.   - Limit sodium intake to 2000 mg daily.  Limit volume intake to 1.5 L daily.  Elevate legs when resting.   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  - Will  discuss CT findings with vascular surgery.      DALIA (acute kidney injury)  Patient with acute kidney injury/acute renal failure likely due to acute tubular necrosis. DALIA is currently stable. Baseline creatinine 1.2 - Labs reviewed- Renal function/electrolytes with Estimated Creatinine Clearance: 42.9 mL/min (A) (based on SCr of 1.8 mg/dL (H)). according to latest data. Monitor urine output and serial BMP and adjust therapy as needed. Avoid nephrotoxins and renally dose meds for GFR listed above.  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   Urology consulted    Moderate persistent asthma, uncomplicated  - Stable  - Continue fluticasone-vilanterol in place of home mometasone-formoterol. Albuterol prn.       Cellulitis of right lower limb  Persistent. 3rd hospital admission. Improved on vancomycin and ceftriaxone during last hospitalization. Prescribed amoxicillin-clavulanate and doxycycline and it worsened. Appreciate Infectious Disease. Giving ceftriaxone, daptomycin. DC linezolid on 3/3/24.   Abx switched to doxycycline      Elevated serum creatinine  - Nephrology consult appreciated, recommended lab and imaging evaluation to determine etiology, type of DALIA      Cellulitis          VTE Risk Mitigation (From admission, onward)           Ordered     enoxaparin injection 40 mg  Daily         02/27/24 1703                    Discharge Planning   CONSTANTINO: 3/7/2024     Code Status: Full Code   Is the patient medically ready for discharge?: No    Reason for patient still in hospital (select all that apply): Treatment and Consult recommendations  Discharge Plan A: Home, Home with family   Discharge Delays: None known at this time              Chandra Cheema MD  Department of Hospital Medicine   [facility] Surg

## 2024-03-06 NOTE — ASSESSMENT & PLAN NOTE
Persistent. 3rd hospital admission. Improved on vancomycin and ceftriaxone during last hospitalization. Prescribed amoxicillin-clavulanate and doxycycline and it worsened. Appreciate Infectious Disease. Giving ceftriaxone, daptomycin. DC linezolid on 3/3/24.   Abx switched to doxycycline

## 2024-03-06 NOTE — SUBJECTIVE & OBJECTIVE
Interval History: NAEON. Afebrile. 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 Cr again worsening. Persistent RLE and scrotal swelling.     Review of Systems   Constitutional:  Negative for chills, diaphoresis, fatigue and fever.   HENT:  Negative for congestion, ear pain, sore throat, tinnitus and trouble swallowing.    Eyes:  Negative for photophobia, discharge and visual disturbance.   Respiratory:  Negative for apnea, cough, choking, chest tightness and shortness of breath.    Cardiovascular:  Negative for chest pain, palpitations and leg swelling.   Gastrointestinal:  Negative for abdominal distention, abdominal pain, constipation, diarrhea, nausea and vomiting.   Endocrine: Negative for polydipsia, polyphagia and polyuria.   Genitourinary:  Positive for penile swelling and scrotal swelling. Negative for difficulty urinating and dysuria.   Musculoskeletal:  Positive for joint swelling. Negative for arthralgias, back pain and gait problem.        Right leg swelling   Skin:  Negative for color change and rash.   Neurological:  Negative for dizziness, syncope, speech difficulty, weakness, light-headedness and headaches.   Psychiatric/Behavioral:  Negative for agitation, confusion, dysphoric mood, hallucinations and sleep disturbance. The patient is not nervous/anxious.      Objective:     Vital Signs (Most Recent):  Temp: 99.5 °F (37.5 °C) (03/06/24 1048)  Pulse: 87 (03/06/24 1048)  Resp: 18 (03/06/24 1048)  BP: (!) 149/74 (03/06/24 1048)  SpO2: 97 % (03/06/24 1048) Vital Signs (24h Range):  Temp:  [98.4 °F (36.9 °C)-99.6 °F (37.6 °C)] 99.5 °F (37.5 °C)  Pulse:  [77-93] 87  Resp:  [18-19] 18  SpO2:  [96 %-98 %] 97 %  BP: (136-163)/(74-87) 149/74     Weight: 83.5 kg (184 lb 1.4 oz)  Body mass index is 28.83 kg/m².    Intake/Output Summary (Last 24 hours) at 3/6/2024 1356  Last data filed at 3/5/2024 1800  Gross per  24 hour   Intake 300 ml   Output --   Net 300 ml         Physical Exam  Constitutional:       General: He is not in acute distress.     Appearance: Normal appearance. He is normal weight.   HENT:      Head: Normocephalic and atraumatic.      Nose: No congestion.      Mouth/Throat:      Mouth: Mucous membranes are moist.   Eyes:      Extraocular Movements: Extraocular movements intact.      Conjunctiva/sclera: Conjunctivae normal.      Pupils: Pupils are equal, round, and reactive to light.   Neck:      Vascular: No carotid bruit.   Cardiovascular:      Rate and Rhythm: Normal rate and regular rhythm.      Pulses: Normal pulses.      Heart sounds: Normal heart sounds. No murmur heard.     No gallop.   Pulmonary:      Effort: Pulmonary effort is normal. No respiratory distress.      Breath sounds: Normal breath sounds. No wheezing, rhonchi or rales.   Chest:      Chest wall: No tenderness.   Abdominal:      General: Abdomen is flat. Bowel sounds are normal. There is no distension.      Tenderness: There is no abdominal tenderness.   Genitourinary:     Comments: Scrotal swelling +  Penile swelling +  Musculoskeletal:         General: Swelling and tenderness present.      Cervical back: No rigidity.      Right lower leg: Edema present.      Left lower leg: No edema.      Comments: Right lower extremity edema, with thickening of skin changes consistent with lymphedema   Ankle cuffing +  Stemmer sign +   Skin:     Capillary Refill: Capillary refill takes less than 2 seconds.   Neurological:      General: No focal deficit present.      Mental Status: He is alert and oriented to person, place, and time. Mental status is at baseline.      Cranial Nerves: No cranial nerve deficit.      Motor: No weakness.   Psychiatric:         Mood and Affect: Mood normal.         Behavior: Behavior normal.             Significant Labs: All pertinent labs within the past 24 hours have been reviewed.    Significant Imaging: I have reviewed  all pertinent imaging results/findings within the past 24 hours.

## 2024-03-06 NOTE — SUBJECTIVE & OBJECTIVE
Interval History:   No acute events.   Afebrile and WBC WNL.  Leg edema and redness essentially unchanged.  CT A/P done  The patient denies any recent fever, chills, or sweats.      Review of Systems   Constitutional:  Negative for chills, diaphoresis and fever.   Eyes:  Negative for redness.   Respiratory:  Negative for shortness of breath.    Cardiovascular:  Positive for leg swelling (RLE). Negative for chest pain.   Gastrointestinal:  Negative for abdominal pain, diarrhea, nausea and vomiting.   Genitourinary:  Positive for penile swelling and scrotal swelling. Negative for dysuria and hematuria.     Objective:     Vital Signs (Most Recent):  Temp: 99.2 °F (37.3 °C) (03/06/24 1529)  Pulse: 91 (03/06/24 1529)  Resp: 18 (03/06/24 1529)  BP: (!) 152/77 (03/06/24 1529)  SpO2: 96 % (03/06/24 1529) Vital Signs (24h Range):  Temp:  [99.1 °F (37.3 °C)-99.6 °F (37.6 °C)] 99.2 °F (37.3 °C)  Pulse:  [87-93] 91  Resp:  [18-19] 18  SpO2:  [96 %-98 %] 96 %  BP: (136-153)/(74-85) 152/77     Weight: 83.5 kg (184 lb 1.4 oz)  Body mass index is 28.83 kg/m².    Estimated Creatinine Clearance: 42.9 mL/min (A) (based on SCr of 1.8 mg/dL (H)).     Physical Exam  Constitutional:       General: He is not in acute distress.     Appearance: Normal appearance. He is well-developed. He is not ill-appearing, toxic-appearing or diaphoretic.   HENT:      Head: Normocephalic and atraumatic.   Cardiovascular:      Rate and Rhythm: Normal rate and regular rhythm.      Heart sounds: Normal heart sounds. No murmur heard.     No friction rub. No gallop.   Pulmonary:      Effort: Pulmonary effort is normal. No respiratory distress.      Breath sounds: Normal breath sounds. No wheezing or rales.   Abdominal:      General: Bowel sounds are normal. There is no distension.      Palpations: Abdomen is soft. There is no mass.      Tenderness: There is no abdominal tenderness. There is no guarding or rebound.   Musculoskeletal:      Right lower leg: Edema  "(with slight rubor and warmth) present.      Left lower leg: No edema.   Skin:     General: Skin is warm and dry.   Neurological:      Mental Status: He is alert and oriented to person, place, and time.   Psychiatric:         Behavior: Behavior normal.                Significant Labs: Blood Culture:   Recent Labs   Lab 02/16/24  1843 02/16/24  1919   LABBLOO No growth after 5 days. No growth after 5 days.       CBC:   Recent Labs   Lab 03/05/24  0359   WBC 8.76   HGB 10.4*   HCT 31.0*          CMP:   Recent Labs   Lab 03/05/24  0359 03/06/24  1013    136   K 4.3 4.1    104   CO2 22* 23   GLU 87 99   BUN 16 14   CREATININE 1.5* 1.8*   CALCIUM 9.3 9.1   ANIONGAP 10 9       Wound Culture: No results for input(s): "LABAERO" in the last 4320 hours.  All pertinent labs within the past 24 hours have been reviewed.    Significant Imaging: I have reviewed all pertinent imaging results/findings within the past 24 hours.  CT Abdomen Pelvis With IV Contrast NO Oral Contrast [[id number]] (Abnormal) Resulted: 03/06/24 0934   Order Status: Completed Updated: 03/06/24 0936   Narrative:     EXAMINATION:  CT ABDOMEN PELVIS WITH IV CONTRAST    CLINICAL HISTORY:  CT venogram to eval for R iliac vein stenosis;    TECHNIQUE:  Low dose axial images, sagittal and coronal reformations were obtained from the lung bases to the pubic symphysis following the IV administration of 100 mL of Omnipaque 350 intravenous contrast. Oral contrast was not administered.    COMPARISON:  CT pelvis and thigh 02/27/2024    FINDINGS:  Lungs: 1.4 cm nodular density favored to be atelectasis/scarring within the right middle lobe (series 2, image 23).  0.8 cm nodular density again possibly atelectasis/scarring within the left upper lobe (series 2, image 30).  No consolidation or pleural effusion in the visualized lung bases.    Heart: Normal size. Mild coronary atherosclerosis.    Liver: Normal size.  Focal hypodensity near the falciform " ligament likely representing focal fat.  4 mm hypodense lesion in the right hepatic lobe too small to characterize.  .    Gallbladder: Poorly distended but without calcified gallstones.  No pericholecystic inflammatory changes.    Bile ducts: No intrahepatic or extrahepatic biliary ductal dilatation.    Spleen: Normal size. No focal abnormality    Pancreas: 8 mm hypodense lesion in the body of the pancreas (series 2, image 61).  Additional 1.4 cm hypodensity abutting the pancreatic head series 2, image 74.  No peripancreatic fat stranding.    Adrenals: Some nodular thickening of the left adrenal.    Renal/Ureters: The kidneys are normal in size . Few hypodense lesions within the left kidney likely simple cysts.  Mild hydroureteronephrosis of the right kidney with impaired excretion of contrast compared to the left kidney.  Soft tissue encasement and occlusion of the right ureter at the level of S2. The urinary bladder is unremarkable.    Reproductive: Surgical clips within the prostate.  Diffuse scrotal edema.    Stomach/Bowel: Stomach is unremarkable.  Small bowel is normal caliber without obstruction or inflammation. Appendix is not well visualized.  Large bowel is normal caliber without obstruction or inflammation.    Peritoneum: No free fluid. No intraperitoneal free air.    Lymph Nodes: Prominent periaortic lymph nodes with the largest measuring 1 cm in short axis at the level of the iliac bifurcation (see series 2, image 117).  Prominent inguinal nodes including 1.2 cm left inguinal node (series 2, image 178).    Vasculature: Abdominal aorta tapers normally.  Minimal atherosclerosis of the abdominal aorta and its branches. Portal vasculature, SMV, and splenic vein are patent. There is significant compression of the right external iliac vein extending to the common iliac.  Soft tissue encases the vessels and ureter in the right hemipelvis.  Questionable filling defect on the delayed images in the region of the  right common iliac series 3, image 136.    Bones: Age-appropriate degenerative changes of the spine.  No acute fractures or osseous destructive lesions.    Soft Tissues: Diffuse soft tissue edema throughout the right lower extremity and lower abdominal wall.  Soft tissue thickening along the right psoas muscle the level of the sacrum encasing the ureter and iliac vessels as above.   Impression:       Extensive soft tissue edema throughout the right lower extremity, scrotum, and lower abdomen.  Mild hydroureteronephrosis of the right kidney with impaired excretion of contrast compared to left kidney.  There is soft tissue encasement of the right ureter and iliac arteries and veins with compression of the right ureter and right iliac vein in the right hemipelvis.  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  Further evaluation suggested.    Questionable filling defect right common iliac vein and may be a small thrombus.  Significant compression as above.    1.4 cm and 8 mm hypodense lesions in the pancreas.  Follow-up EUS or MRCP when clinically appropriate.    1.4 cm nodular density and 0.8 cm nodular density in the right and left lungs as above.  While this may be atelectasis or fibrosis dedicated CT chest when clinically appropriate.    Additional findings above    This report was flagged in Epic as abnormal.    Electronically signed by resident: Harsha Fajardo  Date: 03/06/2024  Time: 07:50    Electronically signed by: Sigifredo Samaniego MD  Date: 03/06/2024  Time: 09:34       Procedure Component Value Units Date/Time   US Retroperitoneal Complete [[id number]] Resulted: 03/04/24 0055   Order Status: Completed Updated: 03/04/24 0058   Narrative:     EXAMINATION:  US RETROPERITONEAL COMPLETE    CLINICAL HISTORY:  DALIA;    TECHNIQUE:  Ultrasound of the kidneys and urinary bladder was performed including color flow and Doppler evaluation of the kidneys.    COMPARISON:  None.    FINDINGS:  Right  kidney: The right kidney measures 11.2 cm. No cortical thinning. No loss of corticomedullary distinction. Resistive index measures 0.61.  Multiple simple appearing renal cysts, with the largest measuring up to 2.1 cm in size.  No mass. No renal stone. No hydronephrosis.    Left kidney: The left kidney measures 11.0 cm. No cortical thinning. No loss of corticomedullary distinction. Resistive index measures 0.69.  Multiple simple appearing renal cysts, with the largest measuring up to 2.3 cm in size.  No mass. No renal stone. No hydronephrosis.    The bladder is partially distended at the time of scanning and has an unremarkable appearance.   Impression:       No hydronephrosis.    Bilateral simple appearing renal cysts.      Electronically signed by: Tee Silverman MD  Date: 03/04/2024  Time: 00:55   US Scrotum And Testicles [[id number]] Resulted: 02/29/24 0816   Order Status: Completed Updated: 02/29/24 0819   Narrative:     EXAMINATION:  US SCROTUM AND TESTICLES    CLINICAL HISTORY:  scrotal swelling;    TECHNIQUE:  Sonography of the scrotum and testes.    COMPARISON:  None.    FINDINGS:  Right Testicle:    *Size: 4.2 x 2.9 x 2.9 cm  *Appearance: Normal.  *Flow: Normal arterial and venous flow  *Epididymis: Normal.  *Hydrocele: None.  *Varicocele: None.  .    Left Testicle:    *Size: 4.6 x 3.0 x 2.7 cm  *Appearance: Normal.  *Flow: Normal arterial and venous flow  *Epididymis: Normal.  *Hydrocele: None.  *Varicocele: None.  .    Other findings: Diffuse thickening throughout the scrotal wall with increased perfusion which may be seen with cellulitis or edema.  No discrete fluid collection.   Impression:       Diffuse scrotal wall thickening with increased perfusion which may be seen with cellulitis or edema.  No evidence of fluid collection.  No intratesticular abnormality.      Electronically signed by: Pastor Taveras MD  Date: 02/29/2024  Time: 08:16   CT Leg (Tibia-Fibula) Without Contrast Right [[id number]]  Resulted: 02/27/24 1613   Order Status: Completed Updated: 02/27/24 1616   Narrative:     EXAMINATION:  CT PELVIS WITHOUT CONTRAST; CT THIGH WITHOUT CONTRAST RIGHT; CT LEG (TIBIA-FIBULA) WITHOUT CONTRAST RIGHT    CLINICAL HISTORY:  Soft tissue infection suspected;    TECHNIQUE:  CT of pelvis, right thigh, and right leg performed without contrast.  Coronal and sagittal reformats provided.    COMPARISON:  CT dated 02/18/2024    FINDINGS:  There is severe soft tissue induration throughout the right thigh and leg as well as the anterior pelvis.  There is skin thickening with subcutaneous, superficial fascial, and deep fascial edema.  There is skin thickening and subcutaneous edema of the leg.  There is no soft tissue gas.  There is no evidence for confluent fluid collection, noting somewhat limited noncontrast assessment.    Note made of scrotal edema.    No pelvic ascites.  Mild retroperitoneal/extraperitoneal edema noted along the sidewalls.  Metallic bodies within the prostate, presumably fiducial markers.    No fracture.  No lytic or blastic lesion.  Degenerative changes are seen in the lower lumbar spine.  There is a small knee effusion.   Impression:       Extensive soft tissue induration throughout the right thigh and leg as well as anterior pelvis.  Deep fascial edema noted at the level of the thigh.  No evidence for soft tissue gas.  Diagnostic considerations include infectious process such as cellulitis, myositis, and fasciitis.  Alternatively, appearance may be seen with inflammatory process, venous/lymphatic obstruction, or drug/allergic reaction.      Electronically signed by: Javier Rutherford MD  Date: 02/27/2024  Time: 16:13   CT Thigh Without Contrast Right [[id number]] Resulted: 02/27/24 1613   Order Status: Completed Updated: 02/27/24 1616   Narrative:     EXAMINATION:  CT PELVIS WITHOUT CONTRAST; CT THIGH WITHOUT CONTRAST RIGHT; CT LEG (TIBIA-FIBULA) WITHOUT CONTRAST RIGHT    CLINICAL HISTORY:  Soft  tissue infection suspected;    TECHNIQUE:  CT of pelvis, right thigh, and right leg performed without contrast.  Coronal and sagittal reformats provided.    COMPARISON:  CT dated 02/18/2024    FINDINGS:  There is severe soft tissue induration throughout the right thigh and leg as well as the anterior pelvis.  There is skin thickening with subcutaneous, superficial fascial, and deep fascial edema.  There is skin thickening and subcutaneous edema of the leg.  There is no soft tissue gas.  There is no evidence for confluent fluid collection, noting somewhat limited noncontrast assessment.    Note made of scrotal edema.    No pelvic ascites.  Mild retroperitoneal/extraperitoneal edema noted along the sidewalls.  Metallic bodies within the prostate, presumably fiducial markers.    No fracture.  No lytic or blastic lesion.  Degenerative changes are seen in the lower lumbar spine.  There is a small knee effusion.   Impression:       Extensive soft tissue induration throughout the right thigh and leg as well as anterior pelvis.  Deep fascial edema noted at the level of the thigh.  No evidence for soft tissue gas.  Diagnostic considerations include infectious process such as cellulitis, myositis, and fasciitis.  Alternatively, appearance may be seen with inflammatory process, venous/lymphatic obstruction, or drug/allergic reaction.      Electronically signed by: Javier Rutherford MD  Date: 02/27/2024  Time: 16:13   CT Pelvis Without Contrast [[id number]] Resulted: 02/27/24 1613   Order Status: Completed Updated: 02/27/24 1616   Narrative:     EXAMINATION:  CT PELVIS WITHOUT CONTRAST; CT THIGH WITHOUT CONTRAST RIGHT; CT LEG (TIBIA-FIBULA) WITHOUT CONTRAST RIGHT    CLINICAL HISTORY:  Soft tissue infection suspected;    TECHNIQUE:  CT of pelvis, right thigh, and right leg performed without contrast.  Coronal and sagittal reformats provided.    COMPARISON:  CT dated 02/18/2024    FINDINGS:  There is severe soft tissue induration  throughout the right thigh and leg as well as the anterior pelvis.  There is skin thickening with subcutaneous, superficial fascial, and deep fascial edema.  There is skin thickening and subcutaneous edema of the leg.  There is no soft tissue gas.  There is no evidence for confluent fluid collection, noting somewhat limited noncontrast assessment.    Note made of scrotal edema.    No pelvic ascites.  Mild retroperitoneal/extraperitoneal edema noted along the sidewalls.  Metallic bodies within the prostate, presumably fiducial markers.    No fracture.  No lytic or blastic lesion.  Degenerative changes are seen in the lower lumbar spine.  There is a small knee effusion.   Impression:       Extensive soft tissue induration throughout the right thigh and leg as well as anterior pelvis.  Deep fascial edema noted at the level of the thigh.  No evidence for soft tissue gas.  Diagnostic considerations include infectious process such as cellulitis, myositis, and fasciitis.  Alternatively, appearance may be seen with inflammatory process, venous/lymphatic obstruction, or drug/allergic reaction.      Electronically signed by: Javier Rutherford MD  Date: 02/27/2024  Time: 16:13     Imaging History    2024    Date Procedure Name Study Review Link PACS Link Status Accession Number Location   03/03/24 10:57 PM US Retroperitoneal Complete Study Review  Images Final [id number] [facility]   02/29/24 07:58 AM US Scrotum And Testicles Study Review  Images Final [id number] [facility]   02/27/24 03:55 PM CT Thigh Without Contrast Right Study Review  Images Final [id number] [facility]   02/27/24 03:55 PM CT Leg (Tibia-Fibula) Without Contrast Right Study Review  Images Final [id number] [facility]   02/27/24 03:54 PM CT Pelvis Without Contrast Study Review  Images Final [id number] [facility]   02/20/24 11:45 AM US Lower Extrem Arteries Right with JOSE CARLOS (xpd) Study Review  Images Final [id number]   02/18/24 02:02 PM CT Leg (Tibia-Fibula) Wtih Contrast Right Study Review   Images Final [id number] [facility]   02/18/24 02:02 PM CT Thigh With Contrast Right Study Review  Images Final [id number] [facility]   02/16/24 08:06 PM US Lower Extremity Veins Right Study Review  Images Final [id number] [facility]   02/16/24 12:00 AM CARDIAC MONITORING STRIPS Study Review  Final

## 2024-03-06 NOTE — PROGRESS NOTES
"[facility] Surg  Infectious Disease  Progress Note    Patient Name: Bradley Collins  MRN: [id number]  Admission Date: 2/27/2024  Length of Stay: 8 days  Attending Physician: Chandra Cheema MD  Primary Care Provider: Administration, [facility]    Isolation Status: No active isolations  Assessment/Plan:      ID  Cellulitis of right lower limb  63M with asthma, allergic rhinitis (on Dupixent, bi-weekly injection), IBS, h/o prostate cancer (prior tx w/ radiation 2020), and recent h/o persistent RLE cellulitis for the past month; which seems to have begun 24h following routine dupixent injections to R lower side/back. Pt has had multiple recent hospital admissions, but RLE cellulitis failing to resolve with abx tx outpt.     Pt now [facility] [place] [facility] for worsening RLE redness, swelling, induration of entire L foot/leg/thigh, with spread to groin/perineum and lower pelvis/abdomen over last week. Also with concerns for scrotal swelling, involvement.     CT of Right lower leg/thigh/pelvis: showed severe soft tissue induration throughout right leg, pelvis. With edema to deep fascia, with scrotal edema, and retroperitoneal/extraperitoneal edema along the sidewalls. No abscess or tissue gas seen. Scrotal US with cellulitis, edema. Without fluid collection. US of lower right extremity without evidence of DVT on 2/16, though noting a "slow flow". US of lower right arteries noting no significant stenosis. Normal ABIs.     ID consulted for abx recs of progressive RLE cellulitis on oral abx outpt.     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 Pt was transitioned to daptomycin, linezolid (toxin production inhibitor), and ceftriaxone. Urology and general surgery following. Urology without concern for tiffany's, though recommending scrotal US. General surgery noting no evidence of fourniers. No surgical/urological recommendations regarding persistent swelling.     3/4/24:  RLE essentially unchanged despite abx.  Vasc sx " following and rec CT venogram RLE - agree as vascular cause favored over infection.  Stable without systemic sign of infection.  3/5/24: Afebrile and WBC WNL. Creatinine decreasing.  No new changes.  Stable non septic  3/6/24:  Stable.  CT A/P with cf retroperitoneal fibrosis compression R ureter with mild hydronephrosis and R iliac artery and vein.    Recommendations / Plan:  Stop doxycycline 100mg po bid as non infectious process at play  Rec urology consult for hydronephrosis and rheumatology and heme/onc consult for RP fibrosis  Continue scrotal elevation, and judicious leg elevation.   Plan reviewed with ID staff. ID will sign off                  Anticipated Disposition: tbd    Thank you for your consult. I will sign off. Please contact us if you have any additional questions.    EZEKIEL Silverio  Infectious Disease  [facility] - Med Surg    Subjective:     Principal Problem:Swelling of right lower extremity    HPI: 63M with asthma, allergic rhinitis (on Dupixent, bi-weekly injection), IBS, h/o prostate cancer (prior tx w/ radiation 2020), and recent h/o persistent RLE cellulitis requiring multiple recent hospital admissions, failing to resolve with abx tx.     [facility] RLE [facility]ul[facility]; which apparently began 1-2 days following routine bi-weekly injection of Dupixent to Left thigh. Ct-leg unremarkable for deeper infection then. Given cephalexin, but went to [facility] (02/16) for worsening RLE cellulitis, noted to have significant DALIA (Cr 2.0; baseline 1.1?). Given zosyn, de-escalated to CTX inpatient. Erythema improved but edema persisted. He had induration of the thigh. CT showed diffuse cutaneous thickening and edema with prominent right inguinal lymph nodes. IV-CTX switched to oral augmentin and doxycycline on 2/19/2024. Redness and Edema improved; but induration persisted. He was discharged home on doxy / augmentin for 14d, HERNANDO 03/01.     Pt seen for f/u at  [facility] 02/27; worsening redness/ swelling with spread up to his abdomen. He had edema and erythema of the entire right lower extremity, right groin, and right lower abdomen. It was tender and warm.  Thus, pt re-admitted to [facility]) for worsening RLE cellulitis and ongoing DALIA (Cr 1.8).     Pt arrived AF, VSS, HDS, wbc nml, started on empiric Iv-zosyn / vanc.    Non-contrast CT showed soft tissue induration throughout the right anterior pelvis, thigh,and leg, skin thickening and edema of the subcutaneous tissue, superficial fascia, and deep fascia, scrotal edema, and retroperitoneal/extraperitoneal edema along the sidewalls.  ID consulted for abx recs of progressive RLE cellulitis on oral abx.           Interval History:   No acute events.   Afebrile and WBC WNL.  Leg edema and redness essentially unchanged.  CT A/P done  The patient denies any recent fever, chills, or sweats.      Review of Systems   Constitutional:  Negative for chills, diaphoresis and fever.   Eyes:  Negative for redness.   Respiratory:  Negative for shortness of breath.    Cardiovascular:  Positive for leg swelling (RLE). Negative for chest pain.   Gastrointestinal:  Negative for abdominal pain, diarrhea, nausea and vomiting.   Genitourinary:  Positive for penile swelling and scrotal swelling. Negative for dysuria and hematuria.     Objective:     Vital Signs (Most Recent):  Temp: 99.2 °F (37.3 °C) (03/06/24 1529)  Pulse: 91 (03/06/24 1529)  Resp: 18 (03/06/24 1529)  BP: (!) 152/77 (03/06/24 1529)  SpO2: 96 % (03/06/24 1529) Vital Signs (24h Range):  Temp:  [99.1 °F (37.3 °C)-99.6 °F (37.6 °C)] 99.2 °F (37.3 °C)  Pulse:  [87-93] 91  Resp:  [18-19] 18  SpO2:  [96 %-98 %] 96 %  BP: (136-153)/(74-85) 152/77     Weight: 83.5 kg (184 lb 1.4 oz)  Body mass index is 28.83 kg/m².    Estimated Creatinine Clearance: 42.9 mL/min (A) (based on SCr of 1.8 mg/dL (H)).     Physical Exam  Constitutional:       General: He is not in acute distress.    "  Appearance: Normal appearance. He is well-developed. He is not ill-appearing, toxic-appearing or diaphoretic.   HENT:      Head: Normocephalic and atraumatic.   Cardiovascular:      Rate and Rhythm: Normal rate and regular rhythm.      Heart sounds: Normal heart sounds. No murmur heard.     No friction rub. No gallop.   Pulmonary:      Effort: Pulmonary effort is normal. No respiratory distress.      Breath sounds: Normal breath sounds. No wheezing or rales.   Abdominal:      General: Bowel sounds are normal. There is no distension.      Palpations: Abdomen is soft. There is no mass.      Tenderness: There is no abdominal tenderness. There is no guarding or rebound.   Musculoskeletal:      Right lower leg: Edema (with slight rubor and warmth) present.      Left lower leg: No edema.   Skin:     General: Skin is warm and dry.   Neurological:      Mental Status: He is alert and oriented to person, place, and time.   Psychiatric:         Behavior: Behavior normal.                Significant Labs: Blood Culture:   Recent Labs   Lab 02/16/24  1843 02/16/24  1919   LABBLOO No growth after 5 days. No growth after 5 days.       CBC:   Recent Labs   Lab 03/05/24  0359   WBC 8.76   HGB 10.4*   HCT 31.0*          CMP:   Recent Labs   Lab 03/05/24  0359 03/06/24  1013    136   K 4.3 4.1    104   CO2 22* 23   GLU 87 99   BUN 16 14   CREATININE 1.5* 1.8*   CALCIUM 9.3 9.1   ANIONGAP 10 9       Wound Culture: No results for input(s): "LABAERO" in the last 4320 hours.  All pertinent labs within the past 24 hours have been reviewed.    Significant Imaging: I have reviewed all pertinent imaging results/findings within the past 24 hours.  CT Abdomen Pelvis With IV Contrast NO Oral Contrast [[id number]] (Abnormal) Resulted: 03/06/24 0934   Order Status: Completed Updated: 03/06/24 0936   Narrative:     EXAMINATION:  CT ABDOMEN PELVIS WITH IV CONTRAST    CLINICAL HISTORY:  CT venogram to eval for R iliac vein " stenosis;    TECHNIQUE:  Low dose axial images, sagittal and coronal reformations were obtained from the lung bases to the pubic symphysis following the IV administration of 100 mL of Omnipaque 350 intravenous contrast. Oral contrast was not administered.    COMPARISON:  CT pelvis and thigh 02/27/2024    FINDINGS:  Lungs: 1.4 cm nodular density favored to be atelectasis/scarring within the right middle lobe (series 2, image 23).  0.8 cm nodular density again possibly atelectasis/scarring within the left upper lobe (series 2, image 30).  No consolidation or pleural effusion in the visualized lung bases.    Heart: Normal size. Mild coronary atherosclerosis.    Liver: Normal size.  Focal hypodensity near the falciform ligament likely representing focal fat.  4 mm hypodense lesion in the right hepatic lobe too small to characterize.  .    Gallbladder: Poorly distended but without calcified gallstones.  No pericholecystic inflammatory changes.    Bile ducts: No intrahepatic or extrahepatic biliary ductal dilatation.    Spleen: Normal size. No focal abnormality    Pancreas: 8 mm hypodense lesion in the body of the pancreas (series 2, image 61).  Additional 1.4 cm hypodensity abutting the pancreatic head series 2, image 74.  No peripancreatic fat stranding.    Adrenals: Some nodular thickening of the left adrenal.    Renal/Ureters: The kidneys are normal in size . Few hypodense lesions within the left kidney likely simple cysts.  Mild hydroureteronephrosis of the right kidney with impaired excretion of contrast compared to the left kidney.  Soft tissue encasement and occlusion of the right ureter at the level of S2. The urinary bladder is unremarkable.    Reproductive: Surgical clips within the prostate.  Diffuse scrotal edema.    Stomach/Bowel: Stomach is unremarkable.  Small bowel is normal caliber without obstruction or inflammation. Appendix is not well visualized.  Large bowel is normal caliber without obstruction or  inflammation.    Peritoneum: No free fluid. No intraperitoneal free air.    Lymph Nodes: Prominent periaortic lymph nodes with the largest measuring 1 cm in short axis at the level of the iliac bifurcation (see series 2, image 117).  Prominent inguinal nodes including 1.2 cm left inguinal node (series 2, image 178).    Vasculature: Abdominal aorta tapers normally.  Minimal atherosclerosis of the abdominal aorta and its branches. Portal vasculature, SMV, and splenic vein are patent. There is significant compression of the right external iliac vein extending to the common iliac.  Soft tissue encases the vessels and ureter in the right hemipelvis.  Questionable filling defect on the delayed images in the region of the right common iliac series 3, image 136.    Bones: Age-appropriate degenerative changes of the spine.  No acute fractures or osseous destructive lesions.    Soft Tissues: Diffuse soft tissue edema throughout the right lower extremity and lower abdominal wall.  Soft tissue thickening along the right psoas muscle the level of the sacrum encasing the ureter and iliac vessels as above.   Impression:       Extensive soft tissue edema throughout the right lower extremity, scrotum, and lower abdomen.  Mild hydroureteronephrosis of the right kidney with impaired excretion of contrast compared to left kidney.  There is soft tissue encasement of the right ureter and iliac arteries and veins with compression of the right ureter and right iliac vein in the right hemipelvis.  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  Further evaluation suggested.    Questionable filling defect right common iliac vein and may be a small thrombus.  Significant compression as above.    1.4 cm and 8 mm hypodense lesions in the pancreas.  Follow-up EUS or MRCP when clinically appropriate.    1.4 cm nodular density and 0.8 cm nodular density in the right and left lungs as above.  While this may be atelectasis or  fibrosis dedicated CT chest when clinically appropriate.    Additional findings above    This report was flagged in Epic as abnormal.    Electronically signed by resident: Harsha Fajardo  Date: 03/06/2024  Time: 07:50    Electronically signed by: Sigifredo Samaniego MD  Date: 03/06/2024  Time: 09:34       Procedure Component Value Units Date/Time   US Retroperitoneal Complete [[id number]] Resulted: 03/04/24 0055   Order Status: Completed Updated: 03/04/24 0058   Narrative:     EXAMINATION:  US RETROPERITONEAL COMPLETE    CLINICAL HISTORY:  DALIA;    TECHNIQUE:  Ultrasound of the kidneys and urinary bladder was performed including color flow and Doppler evaluation of the kidneys.    COMPARISON:  None.    FINDINGS:  Right kidney: The right kidney measures 11.2 cm. No cortical thinning. No loss of corticomedullary distinction. Resistive index measures 0.61.  Multiple simple appearing renal cysts, with the largest measuring up to 2.1 cm in size.  No mass. No renal stone. No hydronephrosis.    Left kidney: The left kidney measures 11.0 cm. No cortical thinning. No loss of corticomedullary distinction. Resistive index measures 0.69.  Multiple simple appearing renal cysts, with the largest measuring up to 2.3 cm in size.  No mass. No renal stone. No hydronephrosis.    The bladder is partially distended at the time of scanning and has an unremarkable appearance.   Impression:       No hydronephrosis.    Bilateral simple appearing renal cysts.      Electronically signed by: Tee Silverman MD  Date: 03/04/2024  Time: 00:55   US Scrotum And Testicles [[id number]] Resulted: 02/29/24 0816   Order Status: Completed Updated: 02/29/24 0819   Narrative:     EXAMINATION:  US SCROTUM AND TESTICLES    CLINICAL HISTORY:  scrotal swelling;    TECHNIQUE:  Sonography of the scrotum and testes.    COMPARISON:  None.    FINDINGS:  Right Testicle:    *Size: 4.2 x 2.9 x 2.9 cm  *Appearance: Normal.  *Flow: Normal arterial and venous  flow  *Epididymis: Normal.  *Hydrocele: None.  *Varicocele: None.  .    Left Testicle:    *Size: 4.6 x 3.0 x 2.7 cm  *Appearance: Normal.  *Flow: Normal arterial and venous flow  *Epididymis: Normal.  *Hydrocele: None.  *Varicocele: None.  .    Other findings: Diffuse thickening throughout the scrotal wall with increased perfusion which may be seen with cellulitis or edema.  No discrete fluid collection.   Impression:       Diffuse scrotal wall thickening with increased perfusion which may be seen with cellulitis or edema.  No evidence of fluid collection.  No intratesticular abnormality.      Electronically signed by: Pastor Taveras MD  Date: 02/29/2024  Time: 08:16   CT Leg (Tibia-Fibula) Without Contrast Right [[id number]] Resulted: 02/27/24 1613   Order Status: Completed Updated: 02/27/24 1616   Narrative:     EXAMINATION:  CT PELVIS WITHOUT CONTRAST; CT THIGH WITHOUT CONTRAST RIGHT; CT LEG (TIBIA-FIBULA) WITHOUT CONTRAST RIGHT    CLINICAL HISTORY:  Soft tissue infection suspected;    TECHNIQUE:  CT of pelvis, right thigh, and right leg performed without contrast.  Coronal and sagittal reformats provided.    COMPARISON:  CT dated 02/18/2024    FINDINGS:  There is severe soft tissue induration throughout the right thigh and leg as well as the anterior pelvis.  There is skin thickening with subcutaneous, superficial fascial, and deep fascial edema.  There is skin thickening and subcutaneous edema of the leg.  There is no soft tissue gas.  There is no evidence for confluent fluid collection, noting somewhat limited noncontrast assessment.    Note made of scrotal edema.    No pelvic ascites.  Mild retroperitoneal/extraperitoneal edema noted along the sidewalls.  Metallic bodies within the prostate, presumably fiducial markers.    No fracture.  No lytic or blastic lesion.  Degenerative changes are seen in the lower lumbar spine.  There is a small knee effusion.   Impression:       Extensive soft tissue induration  throughout the right thigh and leg as well as anterior pelvis.  Deep fascial edema noted at the level of the thigh.  No evidence for soft tissue gas.  Diagnostic considerations include infectious process such as cellulitis, myositis, and fasciitis.  Alternatively, appearance may be seen with inflammatory process, venous/lymphatic obstruction, or drug/allergic reaction.      Electronically signed by: Javier Rutherford MD  Date: 02/27/2024  Time: 16:13   CT Thigh Without Contrast Right [[id number]] Resulted: 02/27/24 1613   Order Status: Completed Updated: 02/27/24 1616   Narrative:     EXAMINATION:  CT PELVIS WITHOUT CONTRAST; CT THIGH WITHOUT CONTRAST RIGHT; CT LEG (TIBIA-FIBULA) WITHOUT CONTRAST RIGHT    CLINICAL HISTORY:  Soft tissue infection suspected;    TECHNIQUE:  CT of pelvis, right thigh, and right leg performed without contrast.  Coronal and sagittal reformats provided.    COMPARISON:  CT dated 02/18/2024    FINDINGS:  There is severe soft tissue induration throughout the right thigh and leg as well as the anterior pelvis.  There is skin thickening with subcutaneous, superficial fascial, and deep fascial edema.  There is skin thickening and subcutaneous edema of the leg.  There is no soft tissue gas.  There is no evidence for confluent fluid collection, noting somewhat limited noncontrast assessment.    Note made of scrotal edema.    No pelvic ascites.  Mild retroperitoneal/extraperitoneal edema noted along the sidewalls.  Metallic bodies within the prostate, presumably fiducial markers.    No fracture.  No lytic or blastic lesion.  Degenerative changes are seen in the lower lumbar spine.  There is a small knee effusion.   Impression:       Extensive soft tissue induration throughout the right thigh and leg as well as anterior pelvis.  Deep fascial edema noted at the level of the thigh.  No evidence for soft tissue gas.  Diagnostic considerations include infectious process such as cellulitis, myositis, and  fasciitis.  Alternatively, appearance may be seen with inflammatory process, venous/lymphatic obstruction, or drug/allergic reaction.      Electronically signed by: Javier Rutherford MD  Date: 02/27/2024  Time: 16:13   CT Pelvis Without Contrast [[id number]] Resulted: 02/27/24 1613   Order Status: Completed Updated: 02/27/24 1616   Narrative:     EXAMINATION:  CT PELVIS WITHOUT CONTRAST; CT THIGH WITHOUT CONTRAST RIGHT; CT LEG (TIBIA-FIBULA) WITHOUT CONTRAST RIGHT    CLINICAL HISTORY:  Soft tissue infection suspected;    TECHNIQUE:  CT of pelvis, right thigh, and right leg performed without contrast.  Coronal and sagittal reformats provided.    COMPARISON:  CT dated 02/18/2024    FINDINGS:  There is severe soft tissue induration throughout the right thigh and leg as well as the anterior pelvis.  There is skin thickening with subcutaneous, superficial fascial, and deep fascial edema.  There is skin thickening and subcutaneous edema of the leg.  There is no soft tissue gas.  There is no evidence for confluent fluid collection, noting somewhat limited noncontrast assessment.    Note made of scrotal edema.    No pelvic ascites.  Mild retroperitoneal/extraperitoneal edema noted along the sidewalls.  Metallic bodies within the prostate, presumably fiducial markers.    No fracture.  No lytic or blastic lesion.  Degenerative changes are seen in the lower lumbar spine.  There is a small knee effusion.   Impression:       Extensive soft tissue induration throughout the right thigh and leg as well as anterior pelvis.  Deep fascial edema noted at the level of the thigh.  No evidence for soft tissue gas.  Diagnostic considerations include infectious process such as cellulitis, myositis, and fasciitis.  Alternatively, appearance may be seen with inflammatory process, venous/lymphatic obstruction, or drug/allergic reaction.      Electronically signed by: Javier Rutherford MD  Date: 02/27/2024  Time: 16:13     Imaging  History    2024    Date Procedure Name Study Review Link PACS Link Status Accession Number Location   03/03/24 10:57 PM US Retroperitoneal Complete Study Review  Images Final [id number] [facility]   02/29/24 07:58 AM US Scrotum And Testicles Study Review  Images Final [id number] [facility]   02/27/24 03:55 PM CT Thigh Without Contrast Right Study Review  Images Final [id number] [facility]   02/27/24 03:55 PM CT Leg (Tibia-Fibula) Without Contrast Right Study Review  Images Final [id number] [facility]   02/27/24 03:54 PM CT Pelvis Without Contrast Study Review  Images Final [id number] [facility]   02/20/24 11:45 AM US Lower Extrem Arteries Right with JOSE CARLOS (xpd) Study Review  Images Final [id number] [facility]   02/18/24 02:02 PM CT Leg (Tibia-Fibula) Wtih Contrast Right Study Review  Images Final [id number] [facility]   02/18/24 02:02 PM CT Thigh With Contrast Right Study Review  Images Final [id number] [facility]   02/16/24 08:06 PM US Lower Extremity Veins Right Study Review  Images Final [id number] [facility]   02/16/24 12:00 AM CARDIAC MONITORING STRIPS Study Review  Final

## 2024-03-06 NOTE — ASSESSMENT & PLAN NOTE
Patient with acute kidney injury/acute renal failure likely due to acute tubular necrosis. DALIA is currently stable. Baseline creatinine 1.2 - Labs reviewed- Renal function/electrolytes with Estimated Creatinine Clearance: 42.9 mL/min (A) (based on SCr of 1.8 mg/dL (H)). according to latest data. Monitor urine output and serial BMP and adjust therapy as needed. Avoid nephrotoxins and renally dose meds for GFR listed above.  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   Urology consulted

## 2024-03-06 NOTE — ASSESSMENT & PLAN NOTE
"63M with asthma, allergic rhinitis (on Dupixent, bi-weekly injection), IBS, h/o prostate cancer (prior tx w/ radiation 2020), and recent h/o persistent RLE cellulitis for the past month; which seems to have begun 24h following routine dupixent injections to R lower side/back. Pt has had multiple recent hospital admissions, but RLE cellulitis failing to resolve with abx tx outpt.     Pt now presents to [facility] for worsening RLE redness, swelling, induration of entire L foot/leg/thigh, with spread to groin/perineum and lower pelvis/abdomen over last week. Also with concerns for scrotal swelling, involvement.     CT of Right lower leg/thigh/pelvis: showed severe soft tissue induration throughout right leg, pelvis. With edema to deep fascia, with scrotal edema, and retroperitoneal/extraperitoneal edema along the sidewalls. No abscess or tissue gas seen. Scrotal US with cellulitis, edema. Without fluid collection. US of lower right extremity without evidence of DVT on 2/16, though noting a "slow flow". US of lower right arteries noting no significant stenosis. Normal ABIs.     ID consulted for abx recs of progressive RLE cellulitis on oral abx outpt.     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 Pt was transitioned to daptomycin, linezolid (toxin production inhibitor), and ceftriaxone. Urology and general surgery following. Urology without concern for tiffany's, though recommending scrotal US. General surgery noting no evidence of fourniers. No surgical/urological recommendations regarding persistent swelling.     3/4/24:  RLE essentially unchanged despite abx.  Vasc sx following and rec CT venogram RLE - agree as vascular cause favored over infection.  Stable without systemic sign of infection.  3/5/24: Afebrile and WBC WNL. Creatinine decreasing.  No new changes.  Stable non septic  3/6/24:  Stable.  CT A/P with cf retroperitoneal fibrosis compression R ureter with mild hydronephrosis and R iliac artery and " vein.    Recommendations / Plan:  Stop doxycycline 100mg po bid as non infectious process at play  Rec urology consult for hydronephrosis and rheumatology and heme/onc consult for RP fibrosis  Continue scrotal elevation, and judicious leg elevation.   Plan reviewed with ID staff. ID will sign off

## 2024-03-06 NOTE — ASSESSMENT & PLAN NOTE
Acquired occlusion of iliac vein   Has been going on for the past month. Received multiple courses of antibiotics for cellulitis. IR will perform intervention on iliac vein occlusion.

## 2024-03-06 NOTE — SUBJECTIVE & OBJECTIVE
Past Medical History:   Diagnosis Date    Allergy     Asthma     Cellulitis     Elevated PSA     Prostate cancer        Past Surgical History:   Procedure Laterality Date    ENDOSCOPIC ULTRASOUND OF UPPER GASTROINTESTINAL TRACT N/A 7/25/2023    Procedure: ULTRASOUND, UPPER GI TRACT, ENDOSCOPIC;  Surgeon: Yoseph Li MD;  Location: [facility] ([address] [place] [address]);  Service: Endoscopy;  Laterality: N/A;  instr [facility] referral    SINUS SURGERY Bilateral 2001       Review of patient's allergies indicates:   Allergen Reactions    Aspirin Shortness Of Breath       Family History    Family history is unknown by patient.         Tobacco Use    Smoking status: Never    Smokeless tobacco: Never   Substance and Sexual Activity    Alcohol use: Yes     Alcohol/week: 1.0 standard drink of alcohol     Types: 1 Cans of beer per week     Comment: week    Drug use: Never    Sexual activity: Not on file       Review of Systems   Constitutional:  Negative for chills, fatigue and fever.   HENT: Negative.     Respiratory: Negative.     Cardiovascular: Negative.    Gastrointestinal:  Positive for abdominal pain. Negative for constipation, nausea and vomiting.   Genitourinary:  Positive for penile swelling and scrotal swelling. Negative for dysuria, flank pain, hematuria and testicular pain.   Musculoskeletal:  Negative for arthralgias and back pain.   Skin:  Positive for color change and pallor.   Neurological: Negative.  Negative for seizures, speech difficulty and headaches.   Psychiatric/Behavioral:  Negative for agitation and confusion.        Objective:     Temp:  [98.4 °F (36.9 °C)-99.6 °F (37.6 °C)] 99.2 °F (37.3 °C)  Pulse:  [77-93] 91  Resp:  [18-19] 18  SpO2:  [96 %-98 %] 96 %  BP: (136-163)/(74-87) 152/77  Weight: 83.5 kg (184 lb 1.4 oz)  Body mass index is 28.83 kg/m².           Drains       None                    Physical Exam  Constitutional:       General: He is not in acute distress.     Appearance: Normal appearance.   HENT:  "     Head: Normocephalic.   Cardiovascular:      Rate and Rhythm: Normal rate.   Pulmonary:      Effort: Pulmonary effort is normal.   Abdominal:      General: There is no distension.      Tenderness: There is no abdominal tenderness. There is no right CVA tenderness or left CVA tenderness.   Genitourinary:     Comments: Uncircumcised penis with severe swelling and scrotal swelling, unable to visualize glans or meatus. Unable to palpable testicles within scrotum 2/2 swelling.  Warmth, erythema and induration of suprapubic region but no crepitus or overlying skin changes.  Musculoskeletal:      Comments: Warmth induration and swelling extending from R gluteal region to ankle.    Skin:     General: Skin is warm.   Neurological:      General: No focal deficit present.      Mental Status: He is alert and oriented to person, place, and time.          Significant Labs:    BMP:  Recent Labs   Lab 03/04/24 0411 03/05/24  0359 03/06/24  1013    136 136   K 4.0 4.3 4.1    104 104   CO2 24 22* 23   BUN 16 16 14   CREATININE 1.6* 1.5* 1.8*   CALCIUM 8.8 9.3 9.1       CBC:  Recent Labs   Lab 03/03/24  0628 03/04/24  0411 03/05/24  0359   WBC 9.19 8.83 8.76   HGB 10.1* 10.4* 10.4*   HCT 29.7* 31.2* 31.0*    406 353       Blood Culture: No results for input(s): "LABBLOO" in the last 168 hours.  Urine Culture: No results for input(s): "LABURIN" in the last 168 hours.  Urine Studies: No results for input(s): "COLORU", "APPEARANCEUA", "PHUR", "SPECGRAV", "PROTEINUA", "GLUCUA", "KETONESU", "BILIRUBINUA", "OCCULTUA", "NITRITE", "UROBILINOGEN", "LEUKOCYTESUR", "RBCUA", "WBCUA", "BACTERIA", "SQUAMEPITHEL", "HYALINECASTS" in the last 168 hours.    Invalid input(s): "WRIGHTSUR"    Significant Imaging:  CT: I have reviewed all results within the past 24 hours and my personal findings are:  as per HPI.    "

## 2024-03-06 NOTE — ASSESSMENT & PLAN NOTE
Ongoing for the past month. Nephrology consulted.     Rhofade Counseling: Rhofade is a topical medication which can decrease superficial blood flow where applied. Side effects are uncommon and include stinging, redness and allergic reactions.

## 2024-03-06 NOTE — PLAN OF CARE
Problem: Impaired Wound Healing  Goal: Optimal Wound Healing  Outcome: Ongoing, Progressing  Intervention: Promote Wound Healing  Flowsheets (Taken 3/6/2024 0516)  Activity Management: Ambulated -L4     Problem: Impaired Wound Healing  Goal: Optimal Wound Healing  Outcome: Ongoing, Progressing  Intervention: Promote Wound Healing  Flowsheets (Taken 3/6/2024 0516)  Activity Management: Ambulated -L4     Problem: Fluid and Electrolyte Imbalance (Acute Kidney Injury/Impairment)  Goal: Fluid and Electrolyte Balance  Outcome: Ongoing, Progressing  Intervention: Monitor and Manage Fluid and Electrolyte Balance  Flowsheets (Taken 3/6/2024 0516)  Fluid/Electrolyte Management: electrolyte-binding therapy initiated     Problem: Fluid and Electrolyte Imbalance (Acute Kidney Injury/Impairment)  Goal: Fluid and Electrolyte Balance  Outcome: Ongoing, Progressing  Intervention: Monitor and Manage Fluid and Electrolyte Balance  Flowsheets (Taken 3/6/2024 0516)  Fluid/Electrolyte Management: electrolyte-binding therapy initiated     Problem: Fluid and Electrolyte Imbalance (Acute Kidney Injury/Impairment)  Goal: Fluid and Electrolyte Balance  Intervention: Monitor and Manage Fluid and Electrolyte Balance  Flowsheets (Taken 3/6/2024 0516)  Fluid/Electrolyte Management: electrolyte-binding therapy initiated

## 2024-03-06 NOTE — ASSESSMENT & PLAN NOTE
- History and physical examination including ankle cup sign, Stammer sign, thickening of skin consistent with right lower extremity lymphedema   - Received torsemide on 3/1 and Bumex 0.5 mg on 3/2/24.   - Limit sodium intake to 2000 mg daily.  Limit volume intake to 1.5 L daily.  Elevate legs when resting.   - Vascular surgery consulted  - Compression stockings and leg elevation  - CT venogram suggestive of retroperitoneal fibrosis with compession of the right ureter and iliac arteries/veins with mild hydroureteronephrosis.   - Will discuss CT findings with vascular surgery.

## 2024-03-07 PROBLEM — L03.90 CELLULITIS: Status: RESOLVED | Noted: 2024-02-16 | Resolved: 2024-03-07

## 2024-03-07 PROBLEM — L03.115 CELLULITIS OF RIGHT LOWER LIMB: Status: RESOLVED | Noted: 2024-02-16 | Resolved: 2024-03-07

## 2024-03-07 PROBLEM — I82.429: Status: ACTIVE | Noted: 2024-03-07

## 2024-03-07 LAB
C3 SERPL-MCNC: 169 MG/DL (ref 50–180)
C4 SERPL-MCNC: 20 MG/DL (ref 11–44)
CK SERPL-CCNC: 110 U/L (ref 20–200)
CREAT UR-MCNC: 83 MG/DL (ref 23–375)
CRP SERPL-MCNC: 135.9 MG/L (ref 0–8.2)
ERYTHROCYTE [SEDIMENTATION RATE] IN BLOOD BY PHOTOMETRIC METHOD: 67 MM/HR (ref 0–23)
IGA SERPL-MCNC: 138 MG/DL (ref 40–350)
IGG SERPL-MCNC: 1101 MG/DL (ref 650–1600)
IGM SERPL-MCNC: 47 MG/DL (ref 50–300)
PROT UR-MCNC: 18 MG/DL (ref 0–15)
PROT/CREAT UR: 0.22 MG/G{CREAT} (ref 0–0.2)

## 2024-03-07 PROCEDURE — 84165 PROTEIN E-PHORESIS SERUM: CPT

## 2024-03-07 PROCEDURE — 86036 ANCA SCREEN EACH ANTIBODY: CPT | Mod: 59

## 2024-03-07 PROCEDURE — 11000001 HC ACUTE MED/SURG PRIVATE ROOM

## 2024-03-07 PROCEDURE — 86705 HEP B CORE ANTIBODY IGM: CPT | Performed by: STUDENT IN AN ORGANIZED HEALTH CARE EDUCATION/TRAINING PROGRAM

## 2024-03-07 PROCEDURE — 25000003 PHARM REV CODE 250: Performed by: HOSPITALIST

## 2024-03-07 PROCEDURE — 82550 ASSAY OF CK (CPK): CPT | Performed by: STUDENT IN AN ORGANIZED HEALTH CARE EDUCATION/TRAINING PROGRAM

## 2024-03-07 PROCEDURE — 82595 ASSAY OF CRYOGLOBULIN: CPT

## 2024-03-07 PROCEDURE — 86140 C-REACTIVE PROTEIN: CPT

## 2024-03-07 PROCEDURE — 86787 VARICELLA-ZOSTER ANTIBODY: CPT | Performed by: STUDENT IN AN ORGANIZED HEALTH CARE EDUCATION/TRAINING PROGRAM

## 2024-03-07 PROCEDURE — 87389 HIV-1 AG W/HIV-1&-2 AB AG IA: CPT | Performed by: STUDENT IN AN ORGANIZED HEALTH CARE EDUCATION/TRAINING PROGRAM

## 2024-03-07 PROCEDURE — 85652 RBC SED RATE AUTOMATED: CPT

## 2024-03-07 PROCEDURE — 36415 COLL VENOUS BLD VENIPUNCTURE: CPT | Mod: XB

## 2024-03-07 PROCEDURE — 84165 PROTEIN E-PHORESIS SERUM: CPT | Mod: 26,,, | Performed by: PATHOLOGY

## 2024-03-07 PROCEDURE — 99223 1ST HOSP IP/OBS HIGH 75: CPT | Mod: ,,, | Performed by: PHYSICIAN ASSISTANT

## 2024-03-07 PROCEDURE — 94640 AIRWAY INHALATION TREATMENT: CPT

## 2024-03-07 PROCEDURE — 86803 HEPATITIS C AB TEST: CPT | Performed by: STUDENT IN AN ORGANIZED HEALTH CARE EDUCATION/TRAINING PROGRAM

## 2024-03-07 PROCEDURE — 83516 IMMUNOASSAY NONANTIBODY: CPT

## 2024-03-07 PROCEDURE — 82787 IGG 1 2 3 OR 4 EACH: CPT

## 2024-03-07 PROCEDURE — 86160 COMPLEMENT ANTIGEN: CPT

## 2024-03-07 PROCEDURE — 86038 ANTINUCLEAR ANTIBODIES: CPT

## 2024-03-07 PROCEDURE — 86790 VIRUS ANTIBODY NOS: CPT | Performed by: STUDENT IN AN ORGANIZED HEALTH CARE EDUCATION/TRAINING PROGRAM

## 2024-03-07 PROCEDURE — 87340 HEPATITIS B SURFACE AG IA: CPT | Performed by: STUDENT IN AN ORGANIZED HEALTH CARE EDUCATION/TRAINING PROGRAM

## 2024-03-07 PROCEDURE — 94761 N-INVAS EAR/PLS OXIMETRY MLT: CPT

## 2024-03-07 PROCEDURE — 82785 ASSAY OF IGE: CPT

## 2024-03-07 PROCEDURE — 82784 ASSAY IGA/IGD/IGG/IGM EACH: CPT | Mod: 59

## 2024-03-07 PROCEDURE — 0034U TPMT NUDT15 GENES: CPT | Performed by: STUDENT IN AN ORGANIZED HEALTH CARE EDUCATION/TRAINING PROGRAM

## 2024-03-07 PROCEDURE — 36415 COLL VENOUS BLD VENIPUNCTURE: CPT | Mod: XB | Performed by: STUDENT IN AN ORGANIZED HEALTH CARE EDUCATION/TRAINING PROGRAM

## 2024-03-07 PROCEDURE — 82570 ASSAY OF URINE CREATININE: CPT

## 2024-03-07 PROCEDURE — 99223 1ST HOSP IP/OBS HIGH 75: CPT | Mod: ,,, | Performed by: INTERNAL MEDICINE

## 2024-03-07 PROCEDURE — 86160 COMPLEMENT ANTIGEN: CPT | Mod: 59

## 2024-03-07 PROCEDURE — 99223 1ST HOSP IP/OBS HIGH 75: CPT | Mod: ,,, | Performed by: HOSPITALIST

## 2024-03-07 PROCEDURE — 86592 SYPHILIS TEST NON-TREP QUAL: CPT | Performed by: STUDENT IN AN ORGANIZED HEALTH CARE EDUCATION/TRAINING PROGRAM

## 2024-03-07 PROCEDURE — 86682 HELMINTH ANTIBODY: CPT | Performed by: STUDENT IN AN ORGANIZED HEALTH CARE EDUCATION/TRAINING PROGRAM

## 2024-03-07 PROCEDURE — 86706 HEP B SURFACE ANTIBODY: CPT | Performed by: STUDENT IN AN ORGANIZED HEALTH CARE EDUCATION/TRAINING PROGRAM

## 2024-03-07 PROCEDURE — 83516 IMMUNOASSAY NONANTIBODY: CPT | Mod: 59

## 2024-03-07 PROCEDURE — 25000003 PHARM REV CODE 250: Performed by: INTERNAL MEDICINE

## 2024-03-07 PROCEDURE — 36415 COLL VENOUS BLD VENIPUNCTURE: CPT | Performed by: STUDENT IN AN ORGANIZED HEALTH CARE EDUCATION/TRAINING PROGRAM

## 2024-03-07 PROCEDURE — 86480 TB TEST CELL IMMUN MEASURE: CPT | Performed by: STUDENT IN AN ORGANIZED HEALTH CARE EDUCATION/TRAINING PROGRAM

## 2024-03-07 RX ADMIN — HYDROCODONE BITARTRATE AND ACETAMINOPHEN 1 TABLET: 5; 325 TABLET ORAL at 09:03

## 2024-03-07 RX ADMIN — AMLODIPINE BESYLATE 5 MG: 5 TABLET ORAL at 08:03

## 2024-03-07 RX ADMIN — PANTOPRAZOLE SODIUM 40 MG: 40 TABLET, DELAYED RELEASE ORAL at 08:03

## 2024-03-07 RX ADMIN — FLUTICASONE FUROATE AND VILANTEROL TRIFENATATE 1 PUFF: 100; 25 POWDER RESPIRATORY (INHALATION) at 10:03

## 2024-03-07 RX ADMIN — Medication 1 CAPSULE: at 09:03

## 2024-03-07 RX ADMIN — Medication 6 MG: at 09:03

## 2024-03-07 RX ADMIN — Medication 1 CAPSULE: at 08:03

## 2024-03-07 RX ADMIN — HYDROCODONE BITARTRATE AND ACETAMINOPHEN 1 TABLET: 5; 325 TABLET ORAL at 12:03

## 2024-03-07 RX ADMIN — TIOTROPIUM BROMIDE INHALATION SPRAY 2 PUFF: 3.12 SPRAY, METERED RESPIRATORY (INHALATION) at 10:03

## 2024-03-07 NOTE — CONSULTS
[facility] Surg  Rheumatology  Consult Note    Patient Name: Bradley Collins  MRN: [id number]  Admission Date: 2/27/2024  Hospital Length of Stay: 9 days  Code Status: Full Code   Attending Provider: Sagar Cox MD  Primary Care Physician: Reed Mcmullen  Principal Problem:Swelling of right lower extremity    Consults  Subjective:     HPI: Mr. Collins is a 63 YOM with asthma, allergic rhinitis, HLD, history of prostate cancer treated with radiation re-admitted for right lower extremity cellulitis. He was initially hospitalized at the [facility] on 2/12/2024 for the same diagnosis at which time he underwent a CT with contrast which was WNL, he was prescribed cephalexin, and discharged home. He was then hospitalized at [facility] from 2/16/2024 to 2/20/2024 for worsening cellulitis. Creatinine was elevated at 2.0 mg/dL (from 1.1 a year ago). He was started on piperacillin-tazobactam and vancomycin which was transitioned to ceftriaxone. Erythema improved but edema persisted. He had induration of the thigh. CT on that admission showed diffuse cutaneous thickening and edema with prominent right inguinal lymph nodes. Antibiotics were switched to oral amoxicillin-clavulanate and doxycycline on 2/19/2024. Edema improved, induration persisted, and he was discharged home.    He then followed up outpatient at [facility] Medicine clinic on 2/27/2024. He had 4 days left of PO antibiotics. Swelling had worsened and spread up to his abdomen. He had edema and erythema of the entire right lower extremity, right groin, and right lower abdomen. He was advised to return to the emergency department to resume intravenous antibiotic treatment. Labs showed creatinine to still be elevated (1.7 mg/dL). Non-contrast CT showed soft tissue induration throughout the right anterior pelvis, thigh,and leg, skin thickening and edema of the subcutaneous tissue, superficial fascia, and  "deep fascia, scrotal edema, and retroperitoneal/extraperitoneal edema along the sidewalls. Zosyn/vanc initiated and patient admitted to [facility] for the current admission.     ID consulted and abx changed to ceftriaxone, daptomycin, and linezolid. General Surgery consulted with no plans for surgical intervention. Urology consulted recommending rheum consultation, heme/onc consultation, and IR consultation for RP lymph node biopsy. His right lower extremity, right pelvis, penile, and scrotal swelling decreased after a dose of torsemide, suggesting potential lymphedema. Hospital course complicated by DALIA exacerbated by diuresis, nephrology consulted. CT venogram suggestive of retroperitoneal fibrosis with compession of the right ureter and iliac arteries/veins with mild hydroureteronephrosis as well as lesions noted on pancreas and lungs. Rheumatology consulted for "retroperitoneal fibrosis."    Upon my assessment, the patient reports that his swelling first started in his right foot but then slowly started to spread up his right leg and into his groin. It initially was not painful but now has progressed to become so. He has never had scrotal pain. He has never had anything like this happen to him in the past. He he has never seen a rheumatologist and has no personal history of autoimmune disorders. He has never taken any immunosuppressive medications. He has no family history of autoimmune conditions and the only family medical history he is aware of is afib in his mother. His surgical history includes sinus procedures 2/2 polyps, chronic sinusitis, and deviated septum. 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 [facility]. He reports history of prostate cancer which was treated with 6 weeks of radiation. Sessions lasted for 10 min/day, M-F, December 2020-Jan 2021. He has PSA screening every 6 months all of which have been normal. His most recent colonoscopy " was in March of 2023 with two tubular adenomas removed. Rest of rheumatological ROS as listed below.      Fevers (-), Weight loss (-), Fatigue (-), Morning stiffness (-), Arthralgia (-), Headaches (+ sinus headaches), Vision changes/loss of vision (-), Jaw claudication (-), Hx of eye inflammation (-), Photophobia (+, allergies), Dry eyes (+, allergies), Dry mouth (-), Rash (-), Photosensitivity (-), Alopecia (+, aging), Mucosal ulcers (-), Raynaud's phenomenon (-), SQ nodules (-), Sense of tightening of skin of hands/face/torso (+ - occasionally when cold, he will have stiffness in the joints of his right hand), Hx pleurisy (-), Pleuritic chest pain (-), Lung fibrosis (-), Hemoptysis (-), DVT/PE (-), Chest pains (-), Hx Pericarditis (-), Abdominal pain (-), Nausea (-), Vomiting (-), Diarrhea (-), Constipation (-), Melena (-), Bloody diarrhea/UC/Crohn's (-), Dysphagia (-), GERD/Reflux (+, since starting inhalers in his 20s), Hematuria (-), Proteinuria (-), Renal failure (+, current DALIA), Focal weakness (-), Trouble combing hair or getting out of chairs (-), History low WBC (-), low platelets (-), anemia (-), Genital ulcers (-), Numbness/tingling (-)    Past Medical History:   Diagnosis Date    Allergy     Asthma     Cellulitis     Elevated PSA     Prostate cancer        Past Surgical History:   Procedure Laterality Date    ENDOSCOPIC ULTRASOUND OF UPPER GASTROINTESTINAL TRACT N/A 7/25/2023    Procedure: ULTRASOUND, UPPER GI TRACT, ENDOSCOPIC;  Surgeon: Yoseph Li MD;  Location: [facility] ([address] [place] [address]);  Service: Endoscopy;  Laterality: N/A;  instr [place] referral    SINUS SURGERY Bilateral 2001         There is no immunization history on file for this patient.    Review of patient's allergies indicates:   Allergen Reactions    Aspirin Shortness Of Breath     Current Facility-Administered Medications   Medication Frequency    acetaminophen tablet 650 mg Q6H PRN    albuterol inhaler 2 puff Q4H PRN    amLODIPine  tablet 5 mg Daily    bisacodyL EC tablet 5 mg Daily PRN    calcium carbonate 200 mg calcium (500 mg) chewable tablet 500 mg TID PRN    fluticasone furoate-vilanteroL 100-25 mcg/dose diskus inhaler 1 puff Daily    HYDROcodone-acetaminophen 5-325 mg per tablet 1 tablet Q6H PRN    Lactobacillus rhamnosus GG capsule 1 capsule BID    melatonin tablet 6 mg Nightly PRN    ondansetron injection 4 mg Q6H PRN    pantoprazole EC tablet 40 mg Daily    tiotropium bromide 2.5 mcg/actuation inhaler 2 puff Daily     Family History    Family history is unknown by patient.       Tobacco Use    Smoking status: Never    Smokeless tobacco: Never   Substance and Sexual Activity    Alcohol use: Yes     Alcohol/week: 1.0 standard drink of alcohol     Types: 1 Cans of beer per week     Comment: week    Drug use: Never    Sexual activity: Not on file     Review of Systems   Reason unable to perform ROS: See HPI.     Objective:     Vital Signs (Most Recent):  Temp: 98.1 °F (36.7 °C) (03/07/24 1033)  Pulse: 86 (03/07/24 1033)  Resp: 18 (03/07/24 1033)  BP: 139/68 (03/07/24 1033)  SpO2: 97 % (03/07/24 1033) Vital Signs (24h Range):  Temp:  [96.8 °F (36 °C)-99.5 °F (37.5 °C)] 98.1 °F (36.7 °C)  Pulse:  [83-97] 86  Resp:  [17-18] 18  SpO2:  [93 %-98 %] 97 %  BP: (119-153)/(68-77) 139/68     Weight: 83.5 kg (184 lb 1.4 oz) (02/29/24 0034)  Body mass index is 28.83 kg/m².  Body surface area is 1.99 meters squared.    No intake or output data in the 24 hours ending 03/07/24 1042      Physical Exam   Constitutional: He is oriented to person, place, and time. normal appearance. No distress.   HENT:   Head: Normocephalic and atraumatic.   Nose: Nose normal.   Mouth/Throat: Mucous membranes are moist.   Cardiovascular: Normal rate, regular rhythm and normal heart sounds.   Pulmonary/Chest: Effort normal and breath sounds normal. No respiratory distress.   Abdominal: Soft. He exhibits no distension. There is no abdominal tenderness.   Musculoskeletal:     "     General: Swelling present.      Cervical back: Neck supple.      Right lower leg: Edema present.      Comments: Erythema/edema with desquamation noted to entire right leg and groin.   Neurological: He is alert and oriented to person, place, and time.   Psychiatric: His behavior is normal. Mood normal.   Nursing note and vitals reviewed.       Significant Labs:  CBC: No results for input(s): "WBC", "HGB", "HCT", "PLT" in the last 24 hours.  CMP: No results for input(s): "GLU", "CALCIUM", "ALBUMIN", "PROT", "NA", "K", "CO2", "CL", "BUN", "CREATININE", "ALKPHOS", "ALT", "AST", "BILITOT" in the last 24 hours.    Significant Imaging:  Imaging results within the past 24 hours have been reviewed.  Assessment/Plan:     Orthopedic  * Swelling of right lower extremity  63 YOM with asthma, allergic rhinitis, HLD, irritable bowel syndrome, history of prostate cancer treated with radiation re-admitted for right lower extremity cellulitis. CT venogram suggestive of retroperitoneal fibrosis with compession of the right ureter and iliac arteries/veins with mild hydroureteronephrosis as well as lesions noted on pancreas and lungs. ID, urology, heme/onc, IR consulted. Rheumatology consulted for "retroperitoneal fibrosis." CRPs during this admission 34(3/3), 63 (2/28). Presentation concerning for obstructive disease 2/2 malignancy vs radiation associated fibrosis vs infectious vs IgG4. Some concern exists for polyarteritis nodosa due to scrotal involvement, however this is lower on the differential due to no history of scrotal pain nor other systemic symptoms.    Recommendations:  - Immunoglobulins, IgG subclasses, IgE, C3/C4, SPEP, ERNESTO, ESR/CRP, ANCA, MPO, PR3, cryo, UA, UPCr, TPMT  - Pre DMARD labs  - Follow up IR and heme/onc consultations/plans        Thank you for your consult. I will follow-up with patient. Please contact us if you have any additional questions.    Jamaica Roman MD  Rheumatology  [facility] " Surg    Patient seen and examined with resident and fellow.  All elements of history, physical exam and medical decision making independently confirmed by me.  Very pleasant 63-year-old male seen in consultation for possible retroperitoneal fibrosis compressing the right ureter and iliac arteries/veins seen on CT scan.  Concern for possible IgG4 disease raise.  Patient with history of prostate cancer treated with radiation admitted with persistent swelling and redness of the right leg that begin at the foot and then extended up the leg and now he has significant scrotal swelling as well.  Patient denies any scrotal pain.  No previous fever or weight loss.  No rashes.  Exam significant for peeling of the skin on the right leg, swelling of the right leg diffusely and peeling of the skin of the right leg with erythema.  Differential diagnosis includes obstruction due to possible malignancy or secondary to radiation history, infection, IgG4 and polyarteritis nodosa.  Doubt polyarteritis nodosa since scrotal involvement only is that of swelling and no pain.  Will evaluate with labs as above and follow with you.  See note for details.

## 2024-03-07 NOTE — CONSULTS
Consult order received and pt evaluated with attending. Full note to follow soon.    Bita Correa MD  PGY-4, Rheumatology

## 2024-03-07 NOTE — SUBJECTIVE & OBJECTIVE
Oncology Treatment Plan:   [No matching plan found]    Medications:  Continuous Infusions:  Scheduled Meds:   amLODIPine  5 mg Oral Daily    fluticasone furoate-vilanteroL  1 puff Inhalation Daily    Lactobacillus rhamnosus GG  1 capsule Oral BID    pantoprazole  40 mg Oral Daily    tiotropium bromide  2 puff Inhalation Daily     PRN Meds:acetaminophen, albuterol, bisacodyL, calcium carbonate, HYDROcodone-acetaminophen, melatonin, ondansetron     Review of patient's allergies indicates:   Allergen Reactions    Aspirin Shortness Of Breath        Past Medical History:   Diagnosis Date    Allergy     Asthma     Cellulitis     Cellulitis of right lower limb 02/16/2024    Elevated PSA     Prostate cancer      Past Surgical History:   Procedure Laterality Date    ENDOSCOPIC ULTRASOUND OF UPPER GASTROINTESTINAL TRACT N/A 7/25/2023    Procedure: ULTRASOUND, UPPER GI TRACT, ENDOSCOPIC;  Surgeon: Yoseph Li MD;  Location: [address]);  Service: Endoscopy;  Laterality: N/A;  instr [place] referral    SINUS SURGERY Bilateral 2001     Family History    Family history is unknown by patient.       Tobacco Use    Smoking status: Never    Smokeless tobacco: Never   Substance and Sexual Activity    Alcohol use: Yes     Alcohol/week: 1.0 standard drink of alcohol     Types: 1 Cans of beer per week     Comment: week    Drug use: Never    Sexual activity: Not on file       Review of Systems   Constitutional: Negative.    Respiratory: Negative.     Cardiovascular: Negative.    Gastrointestinal: Negative.    Genitourinary: Negative.      Objective:     Vital Signs (Most Recent):  Temp: 98.2 °F (36.8 °C) (03/07/24 1508)  Pulse: 84 (03/07/24 1508)  Resp: 18 (03/07/24 1508)  BP: (!) 142/77 (03/07/24 1508)  SpO2: 97 % (03/07/24 1508) Vital Signs (24h Range):  Temp:  [96.8 °F (36 °C)-99 °F (37.2 °C)] 98.2 °F (36.8 °C)  Pulse:  [83-97] 84  Resp:  [17-18] 18  SpO2:  [93 %-98 %] 97 %  BP: (119-153)/(68-77) 142/77     Weight: 83.5 kg  "(184 lb 1.4 oz)  Body mass index is 28.83 kg/m².  Body surface area is 1.99 meters squared.    No intake or output data in the 24 hours ending 03/07/24 1640     Physical Exam  Vitals reviewed.   Constitutional:       General: He is not in acute distress.     Appearance: He is not toxic-appearing.   HENT:      Head: Normocephalic and atraumatic.   Cardiovascular:      Rate and Rhythm: Normal rate.      Pulses: Normal pulses.   Pulmonary:      Effort: No respiratory distress.   Abdominal:      General: Abdomen is flat. There is no distension.   Neurological:      Mental Status: He is oriented to person, place, and time. Mental status is at baseline.          Significant Labs:   CBC: No results for input(s): "WBC", "HGB", "HCT", "PLT" in the last 48 hours. and CMP:   Recent Labs   Lab 03/06/24  1013      K 4.1      CO2 23   GLU 99   BUN 14   CREATININE 1.8*   CALCIUM 9.1   ANIONGAP 9       Diagnostic Results:  I have reviewed all pertinent imaging results/findings within the past 24 hours.  "

## 2024-03-07 NOTE — SUBJECTIVE & OBJECTIVE
Interval History: No longer on antibiotics. Plan for intervention on iliac vein occlusion tomorrow.    Review of Systems   Constitutional:  Negative for chills and fever.   Cardiovascular:  Positive for leg swelling.   Skin:  Positive for color change. Negative for wound.   Neurological:  Negative for seizures and syncope.     Objective:     Vital Signs (Most Recent):  Temp: 98.1 °F (36.7 °C) (03/07/24 1033)  Pulse: 86 (03/07/24 1033)  Resp: 18 (03/07/24 1033)  BP: 139/68 (03/07/24 1033)  SpO2: 97 % (03/07/24 1033) Vital Signs (24h Range):  Temp:  [96.8 °F (36 °C)-99.2 °F (37.3 °C)] 98.1 °F (36.7 °C)  Pulse:  [83-97] 86  Resp:  [17-18] 18  SpO2:  [93 %-98 %] 97 %  BP: (119-153)/(68-77) 139/68     Weight: 83.5 kg (184 lb 1.4 oz)  Body mass index is 28.83 kg/m².  No intake or output data in the 24 hours ending 03/07/24 1426        Physical Exam  Vitals and nursing note reviewed.   Constitutional:       General: He is not in acute distress.     Appearance: He is well-developed. He is not toxic-appearing or diaphoretic.   Pulmonary:      Effort: Pulmonary effort is normal. No respiratory distress.   Genitourinary:     Penis: Swelling present. No discharge.       Testes:         Right: Swelling present.         Left: Swelling present.   Musculoskeletal:         General: No deformity or signs of injury.      Right lower leg: Edema present.      Left lower leg: No edema.   Skin:     Findings: Erythema present.   Neurological:      Mental Status: He is alert and oriented to person, place, and time. Mental status is at baseline.      Motor: No seizure activity.   Psychiatric:         Attention and Perception: Attention normal.         Mood and Affect: Mood and affect normal.         Behavior: Behavior is cooperative.             Significant Labs: All pertinent labs within the past 24 hours have been reviewed.    Significant Imaging: I have reviewed all pertinent imaging results/findings within the past 24 hours.  US  Retroperitoneal Complete 3/04/24: FINDINGS:   Right kidney: The right kidney measures 11.2 cm. No cortical thinning. No loss of corticomedullary distinction. Resistive index measures 0.61.  Multiple simple appearing renal cysts, with the largest measuring up to 2.1 cm in size.  No mass. No renal stone. No hydronephrosis.   Left kidney: The left kidney measures 11.0 cm. No cortical thinning. No loss of corticomedullary distinction. Resistive index measures 0.69.  Multiple simple appearing renal cysts, with the largest measuring up to 2.3 cm in size.  No mass. No renal stone. No hydronephrosis.   The bladder is partially distended at the time of scanning and has an unremarkable appearance.   Impression:  No hydronephrosis.   Bilateral simple appearing renal cysts.   CT Abdomen Pelvis With IV Contrast NO Oral Contrast 3/06/24: FINDINGS:   Lungs: 1.4 cm nodular density favored to be atelectasis/scarring within the right middle lobe (series 2, image 23).  0.8 cm nodular density again possibly atelectasis/scarring within the left upper lobe (series 2, image 30).  No consolidation or pleural effusion in the visualized lung bases.   Heart: Normal size. Mild coronary atherosclerosis.   Liver: Normal size.  Focal hypodensity near the falciform ligament likely representing focal fat.  4 mm hypodense lesion in the right hepatic lobe too small to characterize.  .  Gallbladder: Poorly distended but without calcified gallstones.  No pericholecystic inflammatory changes.   Bile ducts: No intrahepatic or extrahepatic biliary ductal dilatation.   Spleen: Normal size. No focal abnormality   Pancreas: 8 mm hypodense lesion in the body of the pancreas (series 2, image 61).  Additional 1.4 cm hypodensity abutting the pancreatic head series 2, image 74.  No peripancreatic fat stranding.   Adrenals: Some nodular thickening of the left adrenal.   Renal/Ureters: The kidneys are normal in size . Few hypodense lesions within the left kidney  likely simple cysts.  Mild hydroureteronephrosis of the right kidney with impaired excretion of contrast compared to the left kidney.  Soft tissue encasement and occlusion of the right ureter at the level of S2. The urinary bladder is unremarkable.   Reproductive: Surgical clips within the prostate.  Diffuse scrotal edema.   Stomach/Bowel: Stomach is unremarkable.  Small bowel is normal caliber without obstruction or inflammation. Appendix is not well visualized.  Large bowel is normal caliber without obstruction or inflammation.   Peritoneum: No free fluid. No intraperitoneal free air.   Lymph Nodes: Prominent periaortic lymph nodes with the largest measuring 1 cm in short axis at the level of the iliac bifurcation (see series 2, image 117).  Prominent inguinal nodes including 1.2 cm left inguinal node (series 2, image 178).   Vasculature: Abdominal aorta tapers normally.  Minimal atherosclerosis of the abdominal aorta and its branches. Portal vasculature, SMV, and splenic vein are patent. There is significant compression of the right external iliac vein extending to the common iliac.  Soft tissue encases the vessels and ureter in the right hemipelvis.  Questionable filling defect on the delayed images in the region of the right common iliac series 3, image 136.   Bones: Age-appropriate degenerative changes of the spine.  No acute fractures or osseous destructive lesions.   Soft Tissues: Diffuse soft tissue edema throughout the right lower extremity and lower abdominal wall.  Soft tissue thickening along the right psoas muscle the level of the sacrum encasing the ureter and iliac vessels as above.   Impression:  Extensive soft tissue edema throughout the right lower extremity, scrotum, and lower abdomen.  Mild hydroureteronephrosis of the right kidney with impaired excretion of contrast compared to left kidney.  There is soft tissue encasement of the right ureter and iliac arteries and veins with compression of the  right ureter and right iliac vein in the right hemipelvis.  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  Further evaluation suggested.   Questionable filling defect right common iliac vein and may be a small thrombus.  Significant compression as above.   1.4 cm and 8 mm hypodense lesions in the pancreas.  Follow-up EUS or MRCP when clinically appropriate.   1.4 cm nodular density and 0.8 cm nodular density in the right and left lungs as above.  While this may be atelectasis or fibrosis dedicated CT chest when clinically appropriate.   Additional findings above

## 2024-03-07 NOTE — ASSESSMENT & PLAN NOTE
63M with worsening cellulitis refractory to multiple courses of oral antibiotics.  Urology consulted for bilateral hydronephrosis, scrotal/penile swelling and DALIA.    - Recommend IR consult for inguinal node biopsy for pathologic diagnosis  - Antibiotics per primary  - No indication for indwelling lee catheter unless PVR bladder scan elevated  - Patient would be high likelihood of stent failure due to RP Fibrosis  - please reach out to urology if worsening of  exam or if any concerns for development of Fanny's gangrene   - rest of care per primary

## 2024-03-07 NOTE — HPI
Mr. Collins is a 63 YOM with asthma, allergic rhinitis, HLD, history of prostate cancer treated with radiation re-admitted for right lower extremity cellulitis. He was initially hospitalized at the [facility] on 2/12/2024 for the same diagnosis at which time he underwent a CT with contrast which was WNL, he was prescribed cephalexin, and discharged home. He was then hospitalized at [facility] from 2/16/2024 to 2/20/2024 for worsening cellulitis. Creatinine was elevated at 2.0 mg/dL (from 1.1 a year ago). He was started on piperacillin-tazobactam and vancomycin which was transitioned to ceftriaxone. Erythema improved but edema persisted. He had induration of the thigh. CT on that admission showed diffuse cutaneous thickening and edema with prominent right inguinal lymph nodes. Antibiotics were switched to oral amoxicillin-clavulanate and doxycycline on 2/19/2024. Edema improved, induration persisted, and he was discharged home.    He then followed up outpatient at [facility] on 2/27/2024. He had 4 days left of PO antibiotics. Swelling had worsened and spread up to his abdomen. He had edema and erythema of the entire right lower extremity, right groin, and right lower abdomen. He was advised to return to the emergency department to resume intravenous antibiotic treatment. Labs showed creatinine to still be elevated (1.7 mg/dL). Non-contrast CT showed soft tissue induration throughout the right anterior pelvis, thigh,and leg, skin thickening and edema of the subcutaneous tissue, superficial fascia, and deep fascia, scrotal edema, and retroperitoneal/extraperitoneal edema along the sidewalls. Zosyn/vanc initiated and patient admitted to [facility] for the current admission.     ID consulted and abx changed to ceftriaxone, daptomycin, and linezolid. General Surgery consulted with no plans for surgical intervention. Urology consulted recommending rheum  "consultation, heme/onc consultation, and IR consultation for RP lymph node biopsy. His right lower extremity, right pelvis, penile, and scrotal swelling decreased after a dose of torsemide, suggesting potential lymphedema. Hospital course complicated by DALIA exacerbated by diuresis, nephrology consulted. CT venogram suggestive of retroperitoneal fibrosis with compession of the right ureter and iliac arteries/veins with mild hydroureteronephrosis as well as lesions noted on pancreas and lungs. Rheumatology consulted for "retroperitoneal fibrosis."    Upon my assessment, the patient reports that his swelling first started in his right foot but then slowly started to spread up his right leg and into his groin. It initially was not painful but now has progressed to become so. He has never had scrotal pain. He has never had anything like this happen to him in the past. He he has never seen a rheumatologist and has no personal history of autoimmune disorders. He has never taken any immunosuppressive medications. He has no family history of autoimmune conditions and the only family medical history he is aware of is afib in his mother. His surgical history includes sinus procedures 2/2 polyps, chronic sinusitis, and deviated septum. 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 [facility]. He reports history of prostate cancer which was treated with 6 weeks of radiation. Sessions lasted for 10 min/day, M-F, December 2020-Jan 2021. He has PSA screening every 6 months all of which have been normal. His most recent colonoscopy was in March of 2023 with two tubular adenomas removed. Rest of rheumatological ROS as listed below.      Fevers (-), Weight loss (-), Fatigue (-), Morning stiffness (-), Arthralgia (-), Headaches (+ sinus headaches), Vision changes/loss of vision (-), Jaw claudication (-), Hx of eye inflammation (-), Photophobia (+, allergies), Dry eyes (+, " allergies), Dry mouth (-), Rash (-), Photosensitivity (-), Alopecia (+, aging), Mucosal ulcers (-), Raynaud's phenomenon (-), SQ nodules (-), Sense of tightening of skin of hands/face/torso (+ - occasionally when cold, he will have stiffness in the joints of his right hand), Hx pleurisy (-), Pleuritic chest pain (-), Lung fibrosis (-), Hemoptysis (-), DVT/PE (-), Chest pains (-), Hx Pericarditis (-), Abdominal pain (-), Nausea (-), Vomiting (-), Diarrhea (-), Constipation (-), Melena (-), Bloody diarrhea/UC/Crohn's (-), Dysphagia (-), GERD/Reflux (+, since starting inhalers in his 20s), Hematuria (-), Proteinuria (-), Renal failure (+, current DALIA), Focal weakness (-), Trouble combing hair or getting out of chairs (-), History low WBC (-), low platelets (-), anemia (-), Genital ulcers (-), Numbness/tingling (-)

## 2024-03-07 NOTE — PROGRESS NOTES
[facility]  Urology  Progress Note    Patient Name: Bradley Collins  MRN: [id number]  Admission Date: 2/27/2024  Hospital Length of Stay: 9 days  Code Status: Full Code   Attending Provider: Chandra Cheema MD   Primary Care Physician: [facility]    Subjective:     HPI:  63M with PMH of asthma, [place] 4+4 prostate cancer treated with XRT and ADT in 2020, presents with progressively worsening soft tissue swelling.  Urology re consulted for hydronephrosis, previously seen for hydronephrosis.    Patient reports edema and swelling that began in his R leg one month ago and has slowly progressed to the right gluteal region and now involves the penis/scrotum.  Of note [facility] and was discharged on oral antibiotics but swelling has continued to progress.  He denies fevers but does report subjective chills.  He denies difficulty urinating and has voided multiple times today.  He denies hematuria.  Bladder scan PVR last week was 20 ml.     On assessment, he is AFVSS. Cr 1.8 from 1.5, which has been stable for two months. Previously normal baseline.  UA 2/27 negative for tested components.    CT 3/5/24: bilateral hydronephrosis to level of partially distended bladder, thickened bladder wall. Difficult to assess distal right ureter. Bilateral inguinal and retroperitoneal nodes. Severe edema of scrotum and penis consistent with physical exam.    Previous CT pelvis and thigh shows extensive soft tissue edema and induration through the R leg and groin.  No air and no organized fluid collection.      Interval History: NAOE, AFVSS, reports some mild right back pain this AM, otherwise resting comfortably in bed      Objective:     Temp:  [96.8 °F (36 °C)-99.6 °F (37.6 °C)] 96.8 °F (36 °C)  Pulse:  [83-97] 83  Resp:  [18-19] 18  SpO2:  [93 %-98 %] 93 %  BP: (119-153)/(71-77) 119/71     Body mass index is 28.83 kg/m².           Drains       None                     Physical Exam  Constitutional:       General: He is not in acute distress.     Appearance: Normal appearance.   HENT:      Head: Normocephalic.   Cardiovascular:      Rate and Rhythm: Normal rate.   Pulmonary:      Effort: Pulmonary effort is normal.   Abdominal:      General: There is no distension.      Tenderness: There is no abdominal tenderness. There is no right CVA tenderness or left CVA tenderness.   Genitourinary:     Comments: Uncircumcised penis with severe swelling and scrotal swelling, unable to visualize glans or meatus. Unable to palpable testicles within scrotum 2/2 swelling.  Warmth, erythema and induration of suprapubic region but no crepitus or overlying skin changes.  Musculoskeletal:      Comments: Warmth induration and swelling extending from R gluteal region to ankle.    Skin:     General: Skin is warm.   Neurological:      General: No focal deficit present.      Mental Status: He is alert and oriented to person, place, and time.           Significant Labs:    BMP:  Recent Labs   Lab 03/04/24  0411 03/05/24  0359 03/06/24  1013    136 136   K 4.0 4.3 4.1    104 104   CO2 24 22* 23   BUN 16 16 14   CREATININE 1.6* 1.5* 1.8*   CALCIUM 8.8 9.3 9.1       CBC:   Recent Labs   Lab 03/03/24  0628 03/04/24  0411 03/05/24  0359   WBC 9.19 8.83 8.76   HGB 10.1* 10.4* 10.4*   HCT 29.7* 31.2* 31.0*    406 353       All pertinent labs results from the past 24 hours have been reviewed.    Significant Imaging:  All pertinent imaging results/findings from the past 24 hours have been reviewed.                  Assessment/Plan:     Cellulitis of right lower limb  63M with worsening cellulitis refractory to multiple courses of oral antibiotics.  Urology consulted for bilateral hydronephrosis, scrotal/penile swelling and DALIA.    - Recommend IR consult for inguinal node biopsy for pathologic diagnosis  - Antibiotics per primary  - No indication for indwelling lee catheter unless PVR bladder  scan elevated  - Patient would be high likelihood of stent failure due to RP Fibrosis  - please reach out to urology if worsening of  exam or if any concerns for development of Fanny's gangrene   - rest of care per primary       Urology will continue to peripherally follow, but please reach out if any questions or concerns. Please reach out when results of IR biopsy are ready.     VTE Risk Mitigation (From admission, onward)           Ordered     enoxaparin injection 40 mg  Daily         02/27/24 1710                    Khoa Zimmerman MD  Urology  [facility]

## 2024-03-07 NOTE — PROGRESS NOTES
[facility] Medicine  Progress Note    Patient Name: Bradley Collins  MRN: [id number]  Patient Class: IP- Inpatient   Admission Date: 2/27/2024  Length of Stay: 9 days  Attending Physician: Sagar Cox MD  Primary Care Provider: [facility]        Subjective:     Principal Problem:Swelling of right lower extremity        HPI:  Bradley Collins is a 63 year old Black man with asthma, allergic rhinitis, hyperlipidemia, irritable bowel syndrome with diarrhea, history of prostate cancer treated with radiation, history of sinus surgery in 2001. He lives in [facility]. He works for [email or web address]. His primary care clinic is the [facility].    He was hospitalized at the [facility] on 2/12/2024 for right lower extremity cellulitis. He had a CT with contrast done and was told that it did not show anything. He was prescribed cephalexin.   He was hospitalized at [facility] from 2/16/2024 to 2/20/2024 for worsening cellulitis. Creatinine was elevated at 2.0 mg/dL (from 1.1 a year ago). He was initially put on piperacillin-tazobactam and vancomycin. Piperacillin-tazobactam was changed to ceftriaxone. Erythema improved but edema persisted. He had induration of the thigh. CT showed diffuse cutaneous thickening and edema with prominent right inguinal lymph nodes. Antibiotics were switched to oral amoxicillin-clavulanate and doxycycline on 2/19/2024. Edema improved. Induration persisted. He was discharged home.   He followed up at [facility] clinic on 2/27/2024. He had 4 days left of the antibiotics. Swelling had worsened and spread up to his abdomen. He had edema and erythema of the entire right lower extremity, right groin, and right lower abdomen. It was tender and warm. He was advised to return to the emergency department to resume intravenous antibiotic treatment. Labs showed creatinine to  still be elevated (1.7 mg/dL, from 1.8 on 2/19/2024). Non-contrast CT showed soft tissue induration throughout the right anterior pelvis, thigh,and leg, skin thickening and edema of the subcutaneous tissue, superficial fascia, and deep fascia, scrotal edema, and retroperitoneal/extraperitoneal edema along the sidewalls. He was given piperacillin-tazobactam and vancomycin. He was admitted to Hospital Medicine Team W.     Overview/Hospital Course:  He was put on vancomycin. Infectious Disease was consulted for recommendations changed vancomycin to ceftriaxone, daptomycin, and linezolid. General Surgery was consulted to evaluate and recommended no surgical intervention to help reduce swelling. Urology was consulted to evaluate his scrotal swelling and found no issues they had to manage. His right lower extremity, right pelvis, penile, and scrotal swelling decreased after a dose of torsemide on 3/1/2024. Given his history and physical examination, the diagnosis of lymphedema was made. CT showed right iliac vein filling defect likely due to thrombus. Nephrology was consulted to evaluate his persistent but stable acute kidney injury. Urology recommended inguinal lymph node biopsy by Interventional Radiology. Interventional Radiology suspected his inguinal lymphadenopathy to be reactive and recommended treating the cellulitis and getting a biopsy in a month if the lymph nodes are still enlarged, but said that he the right iliac vein occlusion could be contributing to the swelling.     Interval History: No longer on antibiotics. Plan for intervention on iliac vein occlusion tomorrow.    Review of Systems   Constitutional:  Negative for chills and fever.   Cardiovascular:  Positive for leg swelling.   Skin:  Positive for color change. Negative for wound.   Neurological:  Negative for seizures and syncope.     Objective:     Vital Signs (Most Recent):  Temp: 98.1 °F (36.7 °C) (03/07/24 1033)  Pulse: 86 (03/07/24 1033)  Resp:  18 (03/07/24 1033)  BP: 139/68 (03/07/24 1033)  SpO2: 97 % (03/07/24 1033) Vital Signs (24h Range):  Temp:  [96.8 °F (36 °C)-99.2 °F (37.3 °C)] 98.1 °F (36.7 °C)  Pulse:  [83-97] 86  Resp:  [17-18] 18  SpO2:  [93 %-98 %] 97 %  BP: (119-153)/(68-77) 139/68     Weight: 83.5 kg (184 lb 1.4 oz)  Body mass index is 28.83 kg/m².  No intake or output data in the 24 hours ending 03/07/24 1426        Physical Exam  Vitals and nursing note reviewed.   Constitutional:       General: He is not in acute distress.     Appearance: He is well-developed. He is not toxic-appearing or diaphoretic.   Pulmonary:      Effort: Pulmonary effort is normal. No respiratory distress.   Genitourinary:     Penis: Swelling present. No discharge.       Testes:         Right: Swelling present.         Left: Swelling present.   Musculoskeletal:         General: No deformity or signs of injury.      Right lower leg: Edema present.      Left lower leg: No edema.   Skin:     Findings: Erythema present.   Neurological:      Mental Status: He is alert and oriented to person, place, and time. Mental status is at baseline.      Motor: No seizure activity.   Psychiatric:         Attention and Perception: Attention normal.         Mood and Affect: Mood and affect normal.         Behavior: Behavior is cooperative.             Significant Labs: All pertinent labs within the past 24 hours have been reviewed.    Significant Imaging: I have reviewed all pertinent imaging results/findings within the past 24 hours.  US Retroperitoneal Complete 3/04/24: FINDINGS:   Right kidney: The right kidney measures 11.2 cm. No cortical thinning. No loss of corticomedullary distinction. Resistive index measures 0.61.  Multiple simple appearing renal cysts, with the largest measuring up to 2.1 cm in size.  No mass. No renal stone. No hydronephrosis.   Left kidney: The left kidney measures 11.0 cm. No cortical thinning. No loss of corticomedullary distinction. Resistive index  measures 0.69.  Multiple simple appearing renal cysts, with the largest measuring up to 2.3 cm in size.  No mass. No renal stone. No hydronephrosis.   The bladder is partially distended at the time of scanning and has an unremarkable appearance.   Impression:  No hydronephrosis.   Bilateral simple appearing renal cysts.   CT Abdomen Pelvis With IV Contrast NO Oral Contrast 3/06/24: FINDINGS:   Lungs: 1.4 cm nodular density favored to be atelectasis/scarring within the right middle lobe (series 2, image 23).  0.8 cm nodular density again possibly atelectasis/scarring within the left upper lobe (series 2, image 30).  No consolidation or pleural effusion in the visualized lung bases.   Heart: Normal size. Mild coronary atherosclerosis.   Liver: Normal size.  Focal hypodensity near the falciform ligament likely representing focal fat.  4 mm hypodense lesion in the right hepatic lobe too small to characterize.  .  Gallbladder: Poorly distended but without calcified gallstones.  No pericholecystic inflammatory changes.   Bile ducts: No intrahepatic or extrahepatic biliary ductal dilatation.   Spleen: Normal size. No focal abnormality   Pancreas: 8 mm hypodense lesion in the body of the pancreas (series 2, image 61).  Additional 1.4 cm hypodensity abutting the pancreatic head series 2, image 74.  No peripancreatic fat stranding.   Adrenals: Some nodular thickening of the left adrenal.   Renal/Ureters: The kidneys are normal in size . Few hypodense lesions within the left kidney likely simple cysts.  Mild hydroureteronephrosis of the right kidney with impaired excretion of contrast compared to the left kidney.  Soft tissue encasement and occlusion of the right ureter at the level of S2. The urinary bladder is unremarkable.   Reproductive: Surgical clips within the prostate.  Diffuse scrotal edema.   Stomach/Bowel: Stomach is unremarkable.  Small bowel is normal caliber without obstruction or inflammation. Appendix is not  well visualized.  Large bowel is normal caliber without obstruction or inflammation.   Peritoneum: No free fluid. No intraperitoneal free air.   Lymph Nodes: Prominent periaortic lymph nodes with the largest measuring 1 cm in short axis at the level of the iliac bifurcation (see series 2, image 117).  Prominent inguinal nodes including 1.2 cm left inguinal node (series 2, image 178).   Vasculature: Abdominal aorta tapers normally.  Minimal atherosclerosis of the abdominal aorta and its branches. Portal vasculature, SMV, and splenic vein are patent. There is significant compression of the right external iliac vein extending to the common iliac.  Soft tissue encases the vessels and ureter in the right hemipelvis.  Questionable filling defect on the delayed images in the region of the right common iliac series 3, image 136.   Bones: Age-appropriate degenerative changes of the spine.  No acute fractures or osseous destructive lesions.   Soft Tissues: Diffuse soft tissue edema throughout the right lower extremity and lower abdominal wall.  Soft tissue thickening along the right psoas muscle the level of the sacrum encasing the ureter and iliac vessels as above.   Impression:  Extensive soft tissue edema throughout the right lower extremity, scrotum, and lower abdomen.  Mild hydroureteronephrosis of the right kidney with impaired excretion of contrast compared to left kidney.  There is soft tissue encasement of the right ureter and iliac arteries and veins with compression of the right ureter and right iliac vein in the right hemipelvis.  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  Further evaluation suggested.   Questionable filling defect right common iliac vein and may be a small thrombus.  Significant compression as above.   1.4 cm and 8 mm hypodense lesions in the pancreas.  Follow-up EUS or MRCP when clinically appropriate.   1.4 cm nodular density and 0.8 cm nodular density in the right and  left lungs as above.  While this may be atelectasis or fibrosis dedicated CT chest when clinically appropriate.   Additional findings above     Assessment/Plan:      * Swelling of right lower extremity  Acquired occlusion of iliac vein   Has been going on for the past month. Received multiple courses of antibiotics for cellulitis. IR will perform intervention on iliac vein occlusion.      DALIA (acute kidney injury)  See elevated serum creatinine.    Moderate persistent asthma, uncomplicated  Giving fluticasone-vilanterol in place of home mometasone-formoterol. Albuterol prn.       Elevated serum creatinine  Ongoing for the past month. Nephrology consulted.        VTE Risk Mitigation (From admission, onward)      None            Discharge Planning   CONSTANTINO: 3/8/2024     Code Status: Full Code   Is the patient medically ready for discharge?: No    Reason for patient still in hospital (select all that apply): Treatment  Discharge Plan A: Home, Home with family   Discharge Delays: None known at this time              Sagar Cox MD  Department of Hospital Medicine   [facility] S[facility]

## 2024-03-07 NOTE — CONSULTS
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 Requires LN biopsy.    Inpatient consult to Interventional Radiology  Consult performed by: Shea Patel PA-C  Consult ordered by: Sagar Cox MD      Inpatient consult to Interventional Radiology  Consult performed by: Shea Patel PA-C  Consult ordered by: Chandra Cheema MD        SUBJECTIVE:     Chief Complaint: RLE swelling    History of Present Illness:  Bradley Collins is a 63 y.o. male with a PMHx of asthma, prostate cancer, h/o persistent RLE cellulitis for the past month who was admitted on 2/27/24 for worsening RLE cellulitis despite outpt abx. Pt diagnosed with RLQ lymphedema and started on IV abx, diuresis, Na restriction and compression stockings. Hospital course also notable for DALIA and urology consult for retroperitoneal fibrosis with compession of the right ureter and iliac arteries/veins with mild hydroureteronephrosis. Interventional Radiology has been consulted for  inguinal lymph node biopsy  for possible malignant workup per urology recs. Pt has had recent imaging including a  CT a/p  on 3/5/24 which revealed "Extensive soft tissue edema throughout the right lower extremity, scrotum, and lower abdomen, Mild hydroureteronephrosis of the right kidney with impaired excretion of contrast compared to left kidney. There is soft tissue encasement of the right ureter and iliac arteries and veins with compression of the right ureter and right iliac vein in the right hemipelvis. Together these findings are suggestive of retroperitoneal fibrosis" and "Prominent inguinal nodes including 1.2 cm left inguinal node". CT also shows a focal occlusion of the R iliac vein. Pt continues to report RLE swelling, pain, redness, and increased warmth which has not significantly improved since admission. The pt is hemodynamically stable and " afebrile.     Scheduled Meds:   amLODIPine  5 mg Oral Daily    fluticasone furoate-vilanteroL  1 puff Inhalation Daily    Lactobacillus rhamnosus GG  1 capsule Oral BID    pantoprazole  40 mg Oral Daily    tiotropium bromide  2 puff Inhalation Daily     Continuous Infusions:  PRN Meds:acetaminophen, albuterol, bisacodyL, calcium carbonate, HYDROcodone-acetaminophen, melatonin, ondansetron    Review of patient's allergies indicates:   Allergen Reactions    Aspirin Shortness Of Breath       Past Medical History:   Diagnosis Date    Allergy     Asthma     Cellulitis     Elevated PSA     Prostate cancer      Past Surgical History:   Procedure Laterality Date    ENDOSCOPIC ULTRASOUND OF UPPER GASTROINTESTINAL TRACT N/A 7/25/2023    Procedure: ULTRASOUND, UPPER GI TRACT, ENDOSCOPIC;  Surgeon: Yoseph Li MD;  Location: [facility] ([address] [place] [address]);  Service: Endoscopy;  Laterality: N/A;  instr [place]-va referral    SINUS SURGERY Bilateral 2001     Family History   Family history unknown: Yes     Social History     Tobacco Use    Smoking status: Never    Smokeless tobacco: Never   Substance Use Topics    Alcohol use: Yes     Alcohol/week: 1.0 standard drink of alcohol     Types: 1 Cans of beer per week     Comment: week    Drug use: Never       OBJECTIVE:     Vital Signs (Most Recent)  Temp: 98.1 °F (36.7 °C) (03/07/24 1033)  Pulse: 86 (03/07/24 1033)  Resp: 18 (03/07/24 1033)  BP: 139/68 (03/07/24 1033)  SpO2: 97 % (03/07/24 1033)    Physical Exam:  Physical Exam  Vitals and nursing note reviewed.   Constitutional:       General: He is not in acute distress.     Appearance: He is not ill-appearing.   HENT:      Head: Normocephalic and atraumatic.   Eyes:      Extraocular Movements: Extraocular movements intact.      Conjunctiva/sclera: Conjunctivae normal.      Pupils: Pupils are equal, round, and reactive to light.   Cardiovascular:      Rate and Rhythm: Normal rate.   Pulmonary:      Effort: Pulmonary effort is normal. No  respiratory distress.   Abdominal:      General: Abdomen is flat.   Musculoskeletal:         General: Normal range of motion.      Right lower leg: Edema (redness, swelling, increased warmth, induration) present.      Comments: Strength and sensation in tact RLE   Skin:     General: Skin is warm and dry.   Neurological:      General: No focal deficit present.      Mental Status: He is alert and oriented to person, place, and time.   Psychiatric:         Mood and Affect: Mood normal.         Behavior: Behavior normal.         Thought Content: Thought content normal.         Judgment: Judgment normal.         Laboratory  I have reviewed all pertinent lab results within the past 24 hours.  BMP:   Recent Labs   Lab 03/06/24  1013   GLU 99      K 4.1      CO2 23   BUN 14   CREATININE 1.8*   CALCIUM 9.1       ASA/Mallampati  ASA: 2  Mallampati: 2    Imaging:  Recent imaging studies including  CT a/p  on 3/5/24 which was independently reviewed by Darryl Gonsalez MD.    EXAMINATION:  CT ABDOMEN PELVIS WITH IV CONTRAST     CLINICAL HISTORY:  CT venogram to eval for R iliac vein stenosis;     TECHNIQUE:  Low dose axial images, sagittal and coronal reformations were obtained from the lung bases to the pubic symphysis following the IV administration of 100 mL of Omnipaque 350 intravenous contrast. Oral contrast was not administered.     COMPARISON:  CT pelvis and thigh 02/27/2024     FINDINGS:  Lungs: 1.4 cm nodular density favored to be atelectasis/scarring within the right middle lobe (series 2, image 23).  0.8 cm nodular density again possibly atelectasis/scarring within the left upper lobe (series 2, image 30).  No consolidation or pleural effusion in the visualized lung bases.     Heart: Normal size. Mild coronary atherosclerosis.     Liver: Normal size.  Focal hypodensity near the falciform ligament likely representing focal fat.  4 mm hypodense lesion in the right hepatic lobe too small to characterize.  .      Gallbladder: Poorly distended but without calcified gallstones.  No pericholecystic inflammatory changes.     Bile ducts: No intrahepatic or extrahepatic biliary ductal dilatation.     Spleen: Normal size. No focal abnormality     Pancreas: 8 mm hypodense lesion in the body of the pancreas (series 2, image 61).  Additional 1.4 cm hypodensity abutting the pancreatic head series 2, image 74.  No peripancreatic fat stranding.     Adrenals: Some nodular thickening of the left adrenal.     Renal/Ureters: The kidneys are normal in size . Few hypodense lesions within the left kidney likely simple cysts.  Mild hydroureteronephrosis of the right kidney with impaired excretion of contrast compared to the left kidney.  Soft tissue encasement and occlusion of the right ureter at the level of S2. The urinary bladder is unremarkable.     Reproductive: Surgical clips within the prostate.  Diffuse scrotal edema.     Stomach/Bowel: Stomach is unremarkable.  Small bowel is normal caliber without obstruction or inflammation. Appendix is not well visualized.  Large bowel is normal caliber without obstruction or inflammation.     Peritoneum: No free fluid. No intraperitoneal free air.     Lymph Nodes: Prominent periaortic lymph nodes with the largest measuring 1 cm in short axis at the level of the iliac bifurcation (see series 2, image 117).  Prominent inguinal nodes including 1.2 cm left inguinal node (series 2, image 178).     Vasculature: Abdominal aorta tapers normally.  Minimal atherosclerosis of the abdominal aorta and its branches. Portal vasculature, SMV, and splenic vein are patent. There is significant compression of the right external iliac vein extending to the common iliac.  Soft tissue encases the vessels and ureter in the right hemipelvis.  Questionable filling defect on the delayed images in the region of the right common iliac series 3, image 136.     Bones: Age-appropriate degenerative changes of the spine.  No  acute fractures or osseous destructive lesions.     Soft Tissues: Diffuse soft tissue edema throughout the right lower extremity and lower abdominal wall.  Soft tissue thickening along the right psoas muscle the level of the sacrum encasing the ureter and iliac vessels as above.     Impression:     Extensive soft tissue edema throughout the right lower extremity, scrotum, and lower abdomen.  Mild hydroureteronephrosis of the right kidney with impaired excretion of contrast compared to left kidney.  There is soft tissue encasement of the right ureter and iliac arteries and veins with compression of the right ureter and right iliac vein in the right hemipelvis.  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  Further evaluation suggested.     Questionable filling defect right common iliac vein and may be a small thrombus.  Significant compression as above.     1.4 cm and 8 mm hypodense lesions in the pancreas.  Follow-up EUS or MRCP when clinically appropriate.     1.4 cm nodular density and 0.8 cm nodular density in the right and left lungs as above.  While this may be atelectasis or fibrosis dedicated CT chest when clinically appropriate.     Additional findings above     This report was flagged in Epic as abnormal.     Electronically signed by resident: Harsha Fajardo  Date:                                            03/06/2024  Time:                                           07:50     Electronically signed by: Sigifredo Samaniego MD  Date:                                            03/06/2024  Time:                                           09:34      ASSESSMENT/PLAN:     Assessment:  63 y.o. male with a PMHx of asthma, prostate cancer, h/o persistent RLE cellulitis for the past month who has been referred to IR for  inguinal lymph node biopsy  for malignancy workup. Case reviewed with Dr. Gonsalez. Suspect mildly enlarged inguinal lymph nodes are reactive from surrounding lymphedema and  cellulitis, low suspicion for malignancy. However, pt does have a focal occlusion of his R iliac vein which is likely contributing to his persistent RLE edema, and he would benefit from venogram with iliac recanalization and stent placement (including possible bilateral kissing stents). The procedure was discussed in great detail with the patient including thorough explanations of the potential risks and benefits of  venogram with iliac recanalization and stent placement . Risks include sepsis, severe infection, hemorrhage, damage to surrounding organs, stent migration, loss of stent patency, PE, contrast allergy and death. The patient is a candidate for venogram with iliac recanalization and stent placement under moderate sedation. Plan discussed with ordering physician and pt who verbalized understanding and would like to proceed.    Plan:  Will proceed with venogram with iliac recanalization and stent placement under moderate sedation on 3/8/24.   Please keep pt NPO starting at midnight on 3/8/24.   Anticoagulation history reviewed.   Coagulation labs reviewed.  Thank you for the consult. Please contact with questions via [employer] secure chat or spectra link    Shea Patel PA-C  Interventional Radiology  Spectra: [id number]

## 2024-03-07 NOTE — HPI
Bradley is a 63-year-old male past medical history of [place] 4 + 4 prostate cancer treated with RT +ADT in 2020, CKD 3, hyperlipidemia, asthma, who presents with progressive worsening lower soft tissue swelling and concern for cellulitis.  Of note, patient has been hospitalized on 2 occasions in the past 1 month due to concern for lower extremity cellulitis.  He was treated with antibiotics on board occasions with minimal tonight improvement requiring presentation to this hospital.     Labs reviewed showed stable anemia with hemoglobin ranging between 10.4 to 12.5.  Other counts within normal limit.  CMP with stable CKD with creatinine baseline of 1.8-2.1, otherwise normal.     CT abdomen and pelvis on 03/05 showed extensive soft tissue edema throughout the right LE, scrotum and lower abdomen.  Mild hydroureteronephrosis of the right kidney with improved excretion of contrast compared to left kidney.  There was soft tissue encasement of the right ureter and iliac arteries and veins with compression of the right ureter and right iliac vein in the right hemipelvis.  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  Questionable filling defect in the right common iliac vein suggestive of small thrombus.  A 1.4 cm and 8 mm hypodense lesions in the pancreas.  1.4 cm nodular density on 0.8 cm nodular density in the right and left lungs as above.  Prominent para-aortic lymph nodes with the largest measuring 1 cm in short axis at the level of the iliac bifurcation and prominent inguinal nodes including 1.2 cm left inguinal node.     Testicular/scrotal ultrasound revealed diffuse scrotal wall thickening with increased perfusion which may be seen with cellulitis/edema no evidence of fluid collection or intratesticular abnormality.    We have been consulted for retroperitoneal fibrosis with LAD and lung/pancreas lesions.

## 2024-03-07 NOTE — CONSULTS
[facility] Surg  Hematology/Oncology  Consult Note    Patient Name: Bradley Collins  MRN: [id number]  Admission Date: 2/27/2024  Hospital Length of Stay: 9 days  Code Status: Full Code   Attending Provider: Sagar Cox MD  Consulting Provider: Best Castro MD  Primary Care Physician: Reed Mcmullen  Principal Problem:Swelling of right lower extremity    Inpatient consult to Hematology/Oncology  Consult performed by: Best Castro MD  Consult ordered by: Chandra Cheema MD        Subjective:     HPI:  Bradley is a 63-year-old male past medical history of Marsha 4 + 4 prostate cancer treated with RT +ADT in 2020, CKD 3, hyperlipidemia, asthma, who presents with progressive worsening lower soft tissue swelling and concern for cellulitis.  Of note, patient has been hospitalized on 2 occasions in the past 1 month due to concern for lower extremity cellulitis.  He was treated with antibiotics on board occasions with minimal tonight improvement requiring presentation to this hospital.     Labs reviewed showed stable anemia with hemoglobin ranging between 10.4 to 12.5.  Other counts within normal limit.  CMP with stable CKD with creatinine baseline of 1.8-2.1, otherwise normal.     CT abdomen and pelvis on 03/05 showed extensive soft tissue edema throughout the right LE, scrotum and lower abdomen.  Mild hydroureteronephrosis of the right kidney with improved excretion of contrast compared to left kidney.  There was soft tissue encasement of the right ureter and iliac arteries and veins with compression of the right ureter and right iliac vein in the right hemipelvis.  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  Questionable filling defect in the right common iliac vein suggestive of small thrombus.  A 1.4 cm and 8 mm hypodense lesions in the pancreas.  1.4 cm nodular density on 0.8 cm nodular density in the right and left lungs as above.  Prominent para-aortic lymph nodes  with the largest measuring 1 cm in short axis at the level of the iliac bifurcation and prominent inguinal nodes including 1.2 cm left inguinal node.     Testicular/scrotal ultrasound revealed diffuse scrotal wall thickening with increased perfusion which may be seen with cellulitis/edema no evidence of fluid collection or intratesticular abnormality.    We have been consulted for retroperitoneal fibrosis with LAD and lung/pancreas lesions.       Oncology Treatment Plan:   [No matching plan found]    Medications:  Continuous Infusions:  Scheduled Meds:   amLODIPine  5 mg Oral Daily    fluticasone furoate-vilanteroL  1 puff Inhalation Daily    Lactobacillus rhamnosus GG  1 capsule Oral BID    pantoprazole  40 mg Oral Daily    tiotropium bromide  2 puff Inhalation Daily     PRN Meds:acetaminophen, albuterol, bisacodyL, calcium carbonate, HYDROcodone-acetaminophen, melatonin, ondansetron     Review of patient's allergies indicates:   Allergen Reactions    Aspirin Shortness Of Breath        Past Medical History:   Diagnosis Date    Allergy     Asthma     Cellulitis     Cellulitis of right lower limb 02/16/2024    Elevated PSA     Prostate cancer      Past Surgical History:   Procedure Laterality Date    ENDOSCOPIC ULTRASOUND OF UPPER GASTROINTESTINAL TRACT N/A 7/25/2023    Procedure: ULTRASOUND, UPPER GI TRACT, ENDOSCOPIC;  Surgeon: Yoseph Li MD;  Location: [address]);  Service: Endoscopy;  Laterality: N/A;  instr [place]-[facility] referral    SINUS SURGERY Bilateral 2001     Family History    Family history is unknown by patient.       Tobacco Use    Smoking status: Never    Smokeless tobacco: Never   Substance and Sexual Activity    Alcohol use: Yes     Alcohol/week: 1.0 standard drink of alcohol     Types: 1 Cans of beer per week     Comment: week    Drug use: Never    Sexual activity: Not on file       Review of Systems   Constitutional: Negative.    Respiratory: Negative.     Cardiovascular: Negative.   "  Gastrointestinal: Negative.    Genitourinary: Negative.      Objective:     Vital Signs (Most Recent):  Temp: 98.2 °F (36.8 °C) (03/07/24 1508)  Pulse: 84 (03/07/24 1508)  Resp: 18 (03/07/24 1508)  BP: (!) 142/77 (03/07/24 1508)  SpO2: 97 % (03/07/24 1508) Vital Signs (24h Range):  Temp:  [96.8 °F (36 °C)-99 °F (37.2 °C)] 98.2 °F (36.8 °C)  Pulse:  [83-97] 84  Resp:  [17-18] 18  SpO2:  [93 %-98 %] 97 %  BP: (119-153)/(68-77) 142/77     Weight: 83.5 kg (184 lb 1.4 oz)  Body mass index is 28.83 kg/m².  Body surface area is 1.99 meters squared.    No intake or output data in the 24 hours ending 03/07/24 1640     Physical Exam  Vitals reviewed.   Constitutional:       General: He is not in acute distress.     Appearance: He is not toxic-appearing.   HENT:      Head: Normocephalic and atraumatic.   Cardiovascular:      Rate and Rhythm: Normal rate.      Pulses: Normal pulses.   Pulmonary:      Effort: No respiratory distress.   Abdominal:      General: Abdomen is flat. There is no distension.   Neurological:      Mental Status: He is oriented to person, place, and time. Mental status is at baseline.          Significant Labs:   CBC: No results for input(s): "WBC", "HGB", "HCT", "PLT" in the last 48 hours. and CMP:   Recent Labs   Lab 03/06/24  1013      K 4.1      CO2 23   GLU 99   BUN 14   CREATININE 1.8*   CALCIUM 9.1   ANIONGAP 9       Diagnostic Results:  I have reviewed all pertinent imaging results/findings within the past 24 hours.  Assessment/Plan:       Pulmonary nodules (1 4 cm and 0 8 cm nodular density)  Pancreatic lesions (1.4 cm and 8 mm hypodense lesions)  Concern for common iliac vein thrombus  Retroperitoneal fibrosis  LAD     - recommend obtaining CT chest - given presence of lung nodules on CT A/P. Might reveal a targetable lesion.  - recommend checking tumor markers CEA, CA 19 9  - consider AES for possible EUS  - if initial evaluation unrevealing, consider left inguinal lymph node " biopsy    Thank you for your consult. I will follow-up with patient. Please contact us if you have any additional questions.    Best Castro MD  Hematology/Oncology  [facility] Surg

## 2024-03-07 NOTE — NURSING
Notified Dr. Cheema via secure chat that the patient's left foot is slightly more edematous this evening.

## 2024-03-07 NOTE — SUBJECTIVE & OBJECTIVE
Interval History: DEVIKA GARCIAS, reports some mild right flank pain this AM, otherwise resting comfortably in bed      Objective:     Temp:  [96.8 °F (36 °C)-99.6 °F (37.6 °C)] 96.8 °F (36 °C)  Pulse:  [83-97] 83  Resp:  [18-19] 18  SpO2:  [93 %-98 %] 93 %  BP: (119-153)/(71-77) 119/71     Body mass index is 28.83 kg/m².           Drains       None                    Physical Exam  Constitutional:       General: He is not in acute distress.     Appearance: Normal appearance.   HENT:      Head: Normocephalic.   Cardiovascular:      Rate and Rhythm: Normal rate.   Pulmonary:      Effort: Pulmonary effort is normal.   Abdominal:      General: There is no distension.      Tenderness: There is no abdominal tenderness. There is no right CVA tenderness or left CVA tenderness.   Genitourinary:     Comments: Uncircumcised penis with severe swelling and scrotal swelling, unable to visualize glans or meatus. Unable to palpable testicles within scrotum 2/2 swelling.  Warmth, erythema and induration of suprapubic region but no crepitus or overlying skin changes.  Musculoskeletal:      Comments: Warmth induration and swelling extending from R gluteal region to ankle.    Skin:     General: Skin is warm.   Neurological:      General: No focal deficit present.      Mental Status: He is alert and oriented to person, place, and time.           Significant Labs:    BMP:  Recent Labs   Lab 03/04/24  0411 03/05/24  0359 03/06/24  1013    136 136   K 4.0 4.3 4.1    104 104   CO2 24 22* 23   BUN 16 16 14   CREATININE 1.6* 1.5* 1.8*   CALCIUM 8.8 9.3 9.1       CBC:   Recent Labs   Lab 03/03/24  0628 03/04/24  0411 03/05/24  0359   WBC 9.19 8.83 8.76   HGB 10.1* 10.4* 10.4*   HCT 29.7* 31.2* 31.0*    406 353       All pertinent labs results from the past 24 hours have been reviewed.    Significant Imaging:  All pertinent imaging results/findings from the past 24 hours have been reviewed.

## 2024-03-07 NOTE — SUBJECTIVE & OBJECTIVE
Past Medical History:   Diagnosis Date    Allergy     Asthma     Cellulitis     Elevated PSA     Prostate cancer        Past Surgical History:   Procedure Laterality Date    ENDOSCOPIC ULTRASOUND OF UPPER GASTROINTESTINAL TRACT N/A 7/25/2023    Procedure: ULTRASOUND, UPPER GI TRACT, ENDOSCOPIC;  Surgeon: Yoseph Li MD;  Location: [facility] ([address] [place] [address]);  Service: Endoscopy;  Laterality: N/A;  instr [facility] referral    SINUS SURGERY Bilateral 2001         There is no immunization history on file for this patient.    Review of patient's allergies indicates:   Allergen Reactions    Aspirin Shortness Of Breath     Current Facility-Administered Medications   Medication Frequency    acetaminophen tablet 650 mg Q6H PRN    albuterol inhaler 2 puff Q4H PRN    amLODIPine tablet 5 mg Daily    bisacodyL EC tablet 5 mg Daily PRN    calcium carbonate 200 mg calcium (500 mg) chewable tablet 500 mg TID PRN    fluticasone furoate-vilanteroL 100-25 mcg/dose diskus inhaler 1 puff Daily    HYDROcodone-acetaminophen 5-325 mg per tablet 1 tablet Q6H PRN    Lactobacillus rhamnosus GG capsule 1 capsule BID    melatonin tablet 6 mg Nightly PRN    ondansetron injection 4 mg Q6H PRN    pantoprazole EC tablet 40 mg Daily    tiotropium bromide 2.5 mcg/actuation inhaler 2 puff Daily     Family History    Family history is unknown by patient.       Tobacco Use    Smoking status: Never    Smokeless tobacco: Never   Substance and Sexual Activity    Alcohol use: Yes     Alcohol/week: 1.0 standard drink of alcohol     Types: 1 Cans of beer per week     Comment: week    Drug use: Never    Sexual activity: Not on file     Review of Systems   Reason unable to perform ROS: See HPI.     Objective:     Vital Signs (Most Recent):  Temp: 98.1 °F (36.7 °C) (03/07/24 1033)  Pulse: 86 (03/07/24 1033)  Resp: 18 (03/07/24 1033)  BP: 139/68 (03/07/24 1033)  SpO2: 97 % (03/07/24 1033) Vital Signs (24h Range):  Temp:  [96.8 °F (36 °C)-99.5 °F (37.5 °C)] 98.1 °F  "(36.7 °C)  Pulse:  [83-97] 86  Resp:  [17-18] 18  SpO2:  [93 %-98 %] 97 %  BP: (119-153)/(68-77) 139/68     Weight: 83.5 kg (184 lb 1.4 oz) (02/29/24 0034)  Body mass index is 28.83 kg/m².  Body surface area is 1.99 meters squared.    No intake or output data in the 24 hours ending 03/07/24 1042      Physical Exam   Constitutional: He is oriented to person, place, and time. normal appearance. No distress.   HENT:   Head: Normocephalic and atraumatic.   Nose: Nose normal.   Mouth/Throat: Mucous membranes are moist.   Cardiovascular: Normal rate, regular rhythm and normal heart sounds.   Pulmonary/Chest: Effort normal and breath sounds normal. No respiratory distress.   Abdominal: Soft. He exhibits no distension. There is no abdominal tenderness.   Musculoskeletal:         General: Swelling present.      Cervical back: Neck supple.      Right lower leg: Edema present.      Comments: Erythema/edema with desquamation noted to entire right leg and groin.   Neurological: He is alert and oriented to person, place, and time.   Psychiatric: His behavior is normal. Mood normal.   Nursing note and vitals reviewed.       Significant Labs:  CBC: No results for input(s): "WBC", "HGB", "HCT", "PLT" in the last 24 hours.  CMP: No results for input(s): "GLU", "CALCIUM", "ALBUMIN", "PROT", "NA", "K", "CO2", "CL", "BUN", "CREATININE", "ALKPHOS", "ALT", "AST", "BILITOT" in the last 24 hours.    Significant Imaging:  Imaging results within the past 24 hours have been reviewed.  "

## 2024-03-07 NOTE — ASSESSMENT & PLAN NOTE
"63 YOM with asthma, allergic rhinitis, HLD, irritable bowel syndrome, history of prostate cancer treated with radiation re-admitted for right lower extremity cellulitis. CT venogram suggestive of retroperitoneal fibrosis with compession of the right ureter and iliac arteries/veins with mild hydroureteronephrosis as well as lesions noted on pancreas and lungs. ID, urology, heme/onc, IR consulted. Rheumatology consulted for "retroperitoneal fibrosis." CRPs during this admission 35(3/3), 64 (2/28). Presentation concerning for obstructive disease 2/2 malignancy vs radiation associated fibrosis vs infectious vs IgG4. Some concern exists for polyarteritis nodosa due to scrotal involvement, however this is lower on the differential due to no history of scrotal pain nor other systemic symptoms.    Recommendations:  - Immunoglobulins, IgG subclasses, IgE, C3/C4, SPEP, ERNESTO, ESR/CRP, ANCA, MPO, PR3, cryo, UA, UPCr, TPMT  - Pre DMARD labs  - Follow up IR and heme/onc consultations/plans  "

## 2024-03-08 LAB
ALBUMIN SERPL ELPH-MCNC: 3.12 G/DL (ref 3.35–5.55)
ALPHA1 GLOB SERPL ELPH-MCNC: 0.48 G/DL (ref 0.17–0.41)
ALPHA2 GLOB SERPL ELPH-MCNC: 1.09 G/DL (ref 0.43–0.99)
ANA SER QL IF: NORMAL
B-GLOBULIN SERPL ELPH-MCNC: 0.77 G/DL (ref 0.5–1.1)
GAMMA GLOB SERPL ELPH-MCNC: 0.94 G/DL (ref 0.67–1.58)
HAV IGG SER QL IA: NORMAL
HBV CORE IGM SERPL QL IA: NORMAL
HBV SURFACE AB SER-ACNC: 83.93 MIU/ML
HBV SURFACE AB SER-ACNC: REACTIVE M[IU]/ML
HBV SURFACE AG SERPL QL IA: NORMAL
HBV SURFACE AG SERPL QL IA: NORMAL
HCV AB SERPL QL IA: NORMAL
HIV 1+2 AB+HIV1 P24 AG SERPL QL IA: NORMAL
IGE SERPL-ACNC: 148 IU/ML (ref 0–100)
INR PPP: 1.1 (ref 0.8–1.2)
PROT SERPL-MCNC: 6.4 G/DL (ref 6–8.4)
PROTHROMBIN TIME: 11.4 SEC (ref 9–12.5)
RPR SER QL: NORMAL
VARICELLA INTERPRETATION: POSITIVE
VARICELLA ZOSTER IGG: 3218 AU/ML

## 2024-03-08 PROCEDURE — 25000003 PHARM REV CODE 250: Performed by: INTERNAL MEDICINE

## 2024-03-08 PROCEDURE — B54BZZ3 ULTRASONOGRAPHY OF RIGHT LOWER EXTREMITY VEINS, INTRAVASCULAR: ICD-10-PCS | Performed by: RADIOLOGY

## 2024-03-08 PROCEDURE — 63600175 PHARM REV CODE 636 W HCPCS: Performed by: RADIOLOGY

## 2024-03-08 PROCEDURE — 25000003 PHARM REV CODE 250: Performed by: RADIOLOGY

## 2024-03-08 PROCEDURE — 06HF3DZ INSERTION OF INTRALUMINAL DEVICE INTO RIGHT EXTERNAL ILIAC VEIN, PERCUTANEOUS APPROACH: ICD-10-PCS | Performed by: RADIOLOGY

## 2024-03-08 PROCEDURE — 85610 PROTHROMBIN TIME: CPT | Performed by: PHYSICIAN ASSISTANT

## 2024-03-08 PROCEDURE — 63600175 PHARM REV CODE 636 W HCPCS: Performed by: INTERNAL MEDICINE

## 2024-03-08 PROCEDURE — 21400001 HC TELEMETRY ROOM

## 2024-03-08 PROCEDURE — 06HG3DZ INSERTION OF INTRALUMINAL DEVICE INTO LEFT EXTERNAL ILIAC VEIN, PERCUTANEOUS APPROACH: ICD-10-PCS | Performed by: RADIOLOGY

## 2024-03-08 PROCEDURE — 99900035 HC TECH TIME PER 15 MIN (STAT)

## 2024-03-08 PROCEDURE — 25000003 PHARM REV CODE 250: Performed by: HOSPITALIST

## 2024-03-08 PROCEDURE — 25500020 PHARM REV CODE 255: Performed by: HOSPITALIST

## 2024-03-08 PROCEDURE — 25000242 PHARM REV CODE 250 ALT 637 W/ HCPCS: Performed by: HOSPITALIST

## 2024-03-08 PROCEDURE — 36415 COLL VENOUS BLD VENIPUNCTURE: CPT | Performed by: PHYSICIAN ASSISTANT

## 2024-03-08 RX ORDER — LIDOCAINE HYDROCHLORIDE 10 MG/ML
INJECTION INFILTRATION; PERINEURAL
Status: COMPLETED | OUTPATIENT
Start: 2024-03-08 | End: 2024-03-08

## 2024-03-08 RX ORDER — MIDAZOLAM HYDROCHLORIDE 1 MG/ML
INJECTION INTRAMUSCULAR; INTRAVENOUS
Status: COMPLETED | OUTPATIENT
Start: 2024-03-08 | End: 2024-03-08

## 2024-03-08 RX ORDER — HEPARIN SODIUM 1000 [USP'U]/ML
INJECTION, SOLUTION INTRAVENOUS; SUBCUTANEOUS
Status: COMPLETED | OUTPATIENT
Start: 2024-03-08 | End: 2024-03-08

## 2024-03-08 RX ORDER — HYDROMORPHONE HYDROCHLORIDE 1 MG/ML
1 INJECTION, SOLUTION INTRAMUSCULAR; INTRAVENOUS; SUBCUTANEOUS ONCE
Status: COMPLETED | OUTPATIENT
Start: 2024-03-08 | End: 2024-03-08

## 2024-03-08 RX ORDER — FENTANYL CITRATE 50 UG/ML
INJECTION, SOLUTION INTRAMUSCULAR; INTRAVENOUS
Status: COMPLETED | OUTPATIENT
Start: 2024-03-08 | End: 2024-03-08

## 2024-03-08 RX ORDER — HYDROMORPHONE HYDROCHLORIDE 1 MG/ML
INJECTION, SOLUTION INTRAMUSCULAR; INTRAVENOUS; SUBCUTANEOUS
Status: DISPENSED
Start: 2024-03-08 | End: 2024-03-09

## 2024-03-08 RX ADMIN — TIOTROPIUM BROMIDE INHALATION SPRAY 2 PUFF: 3.12 SPRAY, METERED RESPIRATORY (INHALATION) at 08:03

## 2024-03-08 RX ADMIN — IOHEXOL 75 ML: 300 INJECTION, SOLUTION INTRAVENOUS at 01:03

## 2024-03-08 RX ADMIN — MIDAZOLAM HYDROCHLORIDE 1 MG: 2 INJECTION, SOLUTION INTRAMUSCULAR; INTRAVENOUS at 12:03

## 2024-03-08 RX ADMIN — FENTANYL CITRATE 50 MCG: 0.05 INJECTION, SOLUTION INTRAMUSCULAR; INTRAVENOUS at 11:03

## 2024-03-08 RX ADMIN — HYDROCODONE BITARTRATE AND ACETAMINOPHEN 1 TABLET: 5; 325 TABLET ORAL at 06:03

## 2024-03-08 RX ADMIN — PANTOPRAZOLE SODIUM 40 MG: 40 TABLET, DELAYED RELEASE ORAL at 08:03

## 2024-03-08 RX ADMIN — Medication 1 CAPSULE: at 08:03

## 2024-03-08 RX ADMIN — MIDAZOLAM HYDROCHLORIDE 1 MG: 2 INJECTION, SOLUTION INTRAMUSCULAR; INTRAVENOUS at 11:03

## 2024-03-08 RX ADMIN — FENTANYL CITRATE 50 MCG: 0.05 INJECTION, SOLUTION INTRAMUSCULAR; INTRAVENOUS at 12:03

## 2024-03-08 RX ADMIN — HEPARIN SODIUM 4000 UNITS: 1000 INJECTION, SOLUTION INTRAVENOUS; SUBCUTANEOUS at 11:03

## 2024-03-08 RX ADMIN — HYDROCODONE BITARTRATE AND ACETAMINOPHEN 1 TABLET: 5; 325 TABLET ORAL at 07:03

## 2024-03-08 RX ADMIN — HEPARIN SODIUM 1000 UNITS: 1000 INJECTION, SOLUTION INTRAVENOUS; SUBCUTANEOUS at 12:03

## 2024-03-08 RX ADMIN — AMLODIPINE BESYLATE 5 MG: 5 TABLET ORAL at 08:03

## 2024-03-08 RX ADMIN — MIDAZOLAM HYDROCHLORIDE 0.5 MG: 2 INJECTION, SOLUTION INTRAMUSCULAR; INTRAVENOUS at 12:03

## 2024-03-08 RX ADMIN — HYDROMORPHONE HYDROCHLORIDE 1 MG: 1 INJECTION, SOLUTION INTRAMUSCULAR; INTRAVENOUS; SUBCUTANEOUS at 01:03

## 2024-03-08 RX ADMIN — FLUTICASONE FUROATE AND VILANTEROL TRIFENATATE 1 PUFF: 100; 25 POWDER RESPIRATORY (INHALATION) at 08:03

## 2024-03-08 RX ADMIN — LIDOCAINE HYDROCHLORIDE 5 ML: 10 INJECTION, SOLUTION INFILTRATION; PERINEURAL at 11:03

## 2024-03-08 RX ADMIN — FENTANYL CITRATE 25 MCG: 0.05 INJECTION, SOLUTION INTRAMUSCULAR; INTRAVENOUS at 12:03

## 2024-03-08 NOTE — PROCEDURES
VIR procedure note        Pre Op Diagnosis:  Iliac vein obstruction    Post Op Diagnosis: Same     Procedure:  Pelvic venography, angioplasty and stent     Procedure performed by:  Yaakov Trejo MD / SANDOVAL Gonsalez MD     Written Informed Consent Obtained: Yes  Specimen Removed: No  Estimated Blood Loss: Minimal     Findings:      General anesthesia     Pelvic venography & IVUS showed severe compression of both external and to lesser degree common iliac veins.     Successful recanalization of iliac veins with placement of kissing Venovo stents in the external iliac to IVC. Right side one 16 x 120 mm stent. Left side 16 X 120 mm and 16 x 40 mm overlapping stents.        Plan:     - Loading dose plavix 300 mg today then 75 mg daily for 2 months.  - Eliquis 5 mg BID.   - VIR clinic visit in 2-4 weeks.   - CTV AP at 6 and 12 months.

## 2024-03-08 NOTE — PLAN OF CARE
Problem: Fluid and Electrolyte Imbalance (Acute Kidney Injury/Impairment)  Goal: Fluid and Electrolyte Balance  Outcome: Ongoing, Progressing  Intervention: Monitor and Manage Fluid and Electrolyte Balance  Flowsheets (Taken 3/8/2024 0801)  Fluid/Electrolyte Management: electrolyte-binding therapy initiated     Problem: Fluid and Electrolyte Imbalance (Acute Kidney Injury/Impairment)  Goal: Fluid and Electrolyte Balance  Intervention: Monitor and Manage Fluid and Electrolyte Balance  Flowsheets (Taken 3/8/2024 0801)  Fluid/Electrolyte Management: electrolyte-binding therapy initiated     Problem: Pain Acute  Goal: Acceptable Pain Control and Functional Ability  Outcome: Ongoing, Progressing

## 2024-03-08 NOTE — PROGRESS NOTES
[facility] Medicine  Progress Note    Patient Name: Bradley Collins  MRN: [id number]  Patient Class: IP- Inpatient   Admission Date: 2/27/2024  Length of Stay: 10 days  Attending Physician: Sagar Cox MD  Primary Care Provider: [facility]        Subjective:     Principal Problem:Swelling of right lower extremity        HPI:  Bradley Collins is a 63 year old Black man with asthma, allergic rhinitis, hyperlipidemia, irritable bowel syndrome with diarrhea, history of prostate cancer treated with radiation, history of sinus surgery in 2001. He lives in [place]. He works for [employer]. His primary care clinic is the [facility] clinic.    He was hospitalized at the [facility] on 2/12/2024 for right lower extremity cellulitis. He had a CT with contrast done and was told that it did not show anything. He was prescribed cephalexin.   He was hospitalized at [facility] from 2/16/2024 to 2/20/2024 for worsening cellulitis. Creatinine was elevated at 2.0 mg/dL (from 1.1 a year ago). He was initially put on piperacillin-tazobactam and vancomycin. Piperacillin-tazobactam was changed to ceftriaxone. Erythema improved but edema persisted. He had induration of the thigh. CT showed diffuse cutaneous thickening and edema with prominent right inguinal lymph nodes. Antibiotics were switched to oral amoxicillin-clavulanate and doxycycline on 2/19/2024. Edema improved. Induration persisted. He was discharged home.   He followed up at [facility] clinic on 2/27/2024. He had 4 days left of the antibiotics. Swelling had worsened and spread up to his abdomen. He had edema and erythema of the entire right lower extremity, right groin, and right lower abdomen. It was tender and warm. He was advised to return to the emergency department to resume intravenous antibiotic treatment. Labs showed creatinine to  still be elevated (1.7 mg/dL, from 1.8 on 2/19/2024). Non-contrast CT showed soft tissue induration throughout the right anterior pelvis, thigh,and leg, skin thickening and edema of the subcutaneous tissue, superficial fascia, and deep fascia, scrotal edema, and retroperitoneal/extraperitoneal edema along the sidewalls. He was given piperacillin-tazobactam and vancomycin. He was admitted to Hospital Medicine Team W.     Overview/Hospital Course:  He was put on vancomycin. Infectious Disease was consulted for recommendations changed vancomycin to ceftriaxone, daptomycin, and linezolid. General Surgery was consulted to evaluate and recommended no surgical intervention to help reduce swelling. Urology was consulted to evaluate his scrotal swelling and found no issues they had to manage. His right lower extremity, right pelvis, penile, and scrotal swelling decreased after a dose of torsemide on 3/1/2024. Given his history and physical examination, the diagnosis of lymphedema was made. CT showed right iliac vein filling defect likely due to thrombus. Nephrology was consulted to evaluate his persistent but stable acute kidney injury. Urology recommended inguinal lymph node biopsy by Interventional Radiology. Interventional Radiology suspected his inguinal lymphadenopathy to be reactive and recommended treating the cellulitis and getting a biopsy in a month if the lymph nodes are still enlarged, but said that he the right iliac vein occlusion could be contributing to the swelling. Hematology-Oncology recommended consulting General Surgery to consider inguinal lymph node biopsy, but General Surgery also felt that his lymphadenopathy was reactive and recommended no biopsy at this time.     Interval History: Seen by General Surgery, who agreed with IR about not biopsying the lymph node. IR will perform venogram today.    Review of Systems   Constitutional:  Negative for chills and fever.   Cardiovascular:  Positive for leg  swelling.   Skin:  Positive for color change. Negative for wound.   Neurological:  Negative for seizures and syncope.     Objective:     Vital Signs (Most Recent):  Temp: 98.6 °F (37 °C) (03/08/24 0540)  Pulse: 80 (03/08/24 0849)  Resp: 16 (03/08/24 0849)  BP: 130/74 (03/08/24 0540)  SpO2: 99 % (03/08/24 0849) Vital Signs (24h Range):  Temp:  [98.1 °F (36.7 °C)-98.9 °F (37.2 °C)] 98.6 °F (37 °C)  Pulse:  [80-93] 80  Resp:  [16-18] 16  SpO2:  [93 %-99 %] 99 %  BP: (130-155)/(68-77) 130/74     Weight: 83.5 kg (184 lb 1.4 oz)  Body mass index is 28.83 kg/m².    Intake/Output Summary (Last 24 hours) at 3/8/2024 0935  Last data filed at 3/7/2024 1755  Gross per 24 hour   Intake --   Output 150 ml   Net -150 ml           Physical Exam  Vitals and nursing note reviewed.   Constitutional:       General: He is not in acute distress.     Appearance: He is well-developed. He is not toxic-appearing or diaphoretic.   Pulmonary:      Effort: Pulmonary effort is normal. No respiratory distress.   Genitourinary:     Penis: Swelling present. No discharge.       Testes:         Right: Swelling present.         Left: Swelling present.   Musculoskeletal:         General: No deformity or signs of injury.      Right lower leg: Edema present.      Left lower leg: No edema.   Skin:     Findings: Erythema present.   Neurological:      Mental Status: He is alert and oriented to person, place, and time. Mental status is at baseline.      Motor: No seizure activity.   Psychiatric:         Attention and Perception: Attention normal.         Mood and Affect: Mood and affect normal.         Behavior: Behavior is cooperative.             Significant Labs: All pertinent labs within the past 24 hours have been reviewed.    Significant Imaging: I have reviewed all pertinent imaging results/findings within the past 24 hours.    Assessment/Plan:      * Swelling of right lower extremity  Acquired occlusion of iliac vein   Has been going on for the past  month. Received multiple courses of antibiotics for cellulitis. IR will perform intervention on iliac vein occlusion.      DALIA (acute kidney injury)  See elevated serum creatinine.    Moderate persistent asthma, uncomplicated  Giving fluticasone-vilanterol in place of home mometasone-formoterol. Albuterol prn.       Elevated serum creatinine  Ongoing for the past month. Nephrology consulted.        VTE Risk Mitigation (From admission, onward)      None            Discharge Planning   CONSTANTINO: 3/9/2024     Code Status: Full Code   Is the patient medically ready for discharge?: No    Reason for patient still in hospital (select all that apply): Treatment  Discharge Plan A: Home, Home with family   Discharge Delays: None known at this time              Sagar Cox MD  Department of Hospital Medicine   [facility]

## 2024-03-08 NOTE — NURSING
Pt arrived to 189 for venogram and right iliac vein stent placement. Pt oriented to unit and staff. Plan of care reviewed with patient, patient verbalizes understanding. Comfort measures utilized. Pt safely transferred from stretcher to procedural table. Fall risk reviewed with patient, fall risk interventions maintained. Safety strap applied, positioner pillows utilized to minimize pressure points. Blankets applied. Pt prepped and draped utilizing standard sterile technique. Patient placed on continuous monitoring, as required by sedation policy. Timeouts completed utilizing standard universal time-out, per department and facility policy. RN to remain at bedside, continuous monitoring maintained. Pt resting comfortably. Denies pain/discomfort. Will continue to monitor. See flow sheets for monitoring, medication administration, and updates.

## 2024-03-08 NOTE — CONSULTS
[facility]  General Surgery  Consult Note    Inpatient consult to General Surgery  Consult performed by: Demario Simons MD  Consult ordered by: Sagar Cox MD        Subjective:     Chief Complaint/Reason for Admission: swelling of right lower extremity    History of Present Illness: Mr. Collins is a 64yo male with asthma, HLD, IBS, prostate cancer (s/p radiation) who presents for evaluation of his right lower extremity swelling.    He was recently hospitalized earlier in February for cellulitis of his RLE.  Patient reports the swelling has been present for a month now.  He is able to ambulate and does not have significant pain.  Denies any fevers or chills.  No nocturnal night sweats.  Infectious disease believes this process is noninfectious and patient has attempted multiple different antibiotic regiments.  CRP is elevated at this time to 136.  CT imaging obtained on 3/5 shows extensive soft tissue edema with soft tissue encasement of the right ureter and iliac arteries and veins suggestive of retroperitoneal fibrosis.  Right common iliac vein thrombus.  With multiple inguinal lymph nodes seen in the area of inflammatory changes.  IR was consulted who believes the lymph nodes are reactive based on imaging.  Given his right iliac vein thrombosis they plan to perform a venogram with iliac recanalization and stent placement.  General surgery was consulted for inguinal lymph node excisional biopsy.    No current facility-administered medications on file prior to encounter.     Current Outpatient Medications on File Prior to Encounter   Medication Sig    albuterol (ACCUNEB) 1.25 mg/3 mL Nebu Take 1.25 mg by nebulization every 6 (six) hours as needed. Rescue    budesonide-formoterol 160-4.5 mcg (SYMBICORT) 160-4.5 mcg/actuation HFAA Inhale 2 puffs into the lungs every 12 (twelve) hours. Controller    calcium carbonate (OS-CAROLYN) 500 mg calcium (1,250 mg) tablet     dupilumab 300 mg/2 mL PnIj INJECT 300MG  SUBCUTANEOUSLY EVERY TWO WEEKS    fluticasone propionate 93 mcg/actuation AerB 2 sprays by Nasal route 2 (two) times a day.    mometasone-formoterol (DULERA) 200-5 mcg/actuation inhaler Inhale 2 puffs into the lungs 2 (two) times daily.    montelukast (SINGULAIR) 5 MG chewable tablet Take 5 mg by mouth.    nasal exhalation resistanc.dev (NASAL EXHALATION RESISTANCE DV NASL) by Nasal route.    omeprazole 20 mg TbEC Take 20 mg by mouth once daily.    pantoprazole (PROTONIX) 40 MG tablet Take 40 mg by mouth.    rosuvastatin 10 mg CpSP     tadalafiL (CIALIS) 20 MG Tab 20 mg.    tiotropium (SPIRIVA) 18 mcg inhalation capsule Inhale 18 mcg into the lungs once daily. Controller    vitamin D (VITAMIN D3) 1000 units Tab Take 1,000 Units by mouth once daily.       Review of patient's allergies indicates:   Allergen Reactions    Aspirin Shortness Of Breath       Past Medical History:   Diagnosis Date    Allergy     Asthma     Cellulitis     Cellulitis of right lower limb 02/16/2024    Elevated PSA     Prostate cancer      Past Surgical History:   Procedure Laterality Date    ENDOSCOPIC ULTRASOUND OF UPPER GASTROINTESTINAL TRACT N/A 7/25/2023    Procedure: ULTRASOUND, UPPER GI TRACT, ENDOSCOPIC;  Surgeon: Yoseph Li MD;  Location: [address]);  Service: Endoscopy;  Laterality: N/A;  [facility] referral    SINUS SURGERY Bilateral 2001     Family History    Family history is unknown by patient.       Tobacco Use    Smoking status: Never    Smokeless tobacco: Never   Substance and Sexual Activity    Alcohol use: Yes     Alcohol/week: 1.0 standard drink of alcohol     Types: 1 Cans of beer per week     Comment: week    Drug use: Never    Sexual activity: Not on file     Review of Systems   Constitutional:  Negative for chills and fever.   HENT: Negative.     Respiratory:  Negative for chest tightness and shortness of breath.    Cardiovascular:  Negative for chest pain.   Gastrointestinal:  Negative for abdominal  pain, constipation, diarrhea, nausea and vomiting.   Genitourinary:  Negative for dysuria.   Musculoskeletal:  Negative for gait problem.   Skin: Negative.    Neurological:  Negative for weakness and numbness.   Psychiatric/Behavioral:  Negative for confusion.      Objective:     Vital Signs (Most Recent):  Temp: 98.9 °F (37.2 °C) (03/07/24 2016)  Pulse: 88 (03/07/24 2016)  Resp: 18 (03/07/24 2128)  BP: (!) 155/70 (03/07/24 2016)  SpO2: 99 % (03/07/24 2016) Vital Signs (24h Range):  Temp:  [96.8 °F (36 °C)-98.9 °F (37.2 °C)] 98.9 °F (37.2 °C)  Pulse:  [83-97] 88  Resp:  [17-18] 18  SpO2:  [93 %-99 %] 99 %  BP: (119-155)/(68-77) 155/70     Weight: 83.5 kg (184 lb 1.4 oz)  Body mass index is 28.83 kg/m².      Intake/Output Summary (Last 24 hours) at 3/7/2024 2342  Last data filed at 3/7/2024 1755  Gross per 24 hour   Intake --   Output 150 ml   Net -150 ml       Physical Exam  Constitutional:       General: He is not in acute distress.     Appearance: Normal appearance.   HENT:      Head: Normocephalic and atraumatic.   Eyes:      Extraocular Movements: Extraocular movements intact.   Cardiovascular:      Rate and Rhythm: Normal rate and regular rhythm.   Pulmonary:      Effort: Pulmonary effort is normal. No respiratory distress.   Abdominal:      General: There is no distension.      Palpations: Abdomen is soft.      Tenderness: There is no abdominal tenderness.   Musculoskeletal:         General: Normal range of motion.      Cervical back: Normal range of motion.      Comments: Right thigh and hip with edema that extends towards the left side of his lower abdomen/pelvis   Skin:     General: Skin is warm and dry.   Neurological:      General: No focal deficit present.      Mental Status: He is alert and oriented to person, place, and time.   Psychiatric:         Mood and Affect: Mood normal.         Behavior: Behavior normal.       Significant Labs:  Recent Lab Results         03/07/24 2000 03/1959    03/07/24  1734   03/07/24  0509        Complement (C-3)   169           Complement (C-4)   20           CPK       110       Urine Creatinine     83.0         CRP   135.9           IgA   138  Comment: IgA Cord Blood Reference Range: <5 mg/dL.           IgG   1101  Comment: IgG Cord Blood Reference Range: 650-1600 mg/dL.           IgM   47  Comment: IgM Cord Blood Reference Range: <25 mg/dL.           Prot/Creat Ratio, Urine     0.22         Urine Protein     18         Sed Rate 67                     Significant Diagnostics:  I have reviewed all pertinent imaging results/findings within the past 24 hours.    Assessment/Plan:     Active Diagnoses:    Diagnosis Date Noted POA    PRINCIPAL PROBLEM:  Swelling of right lower extremity [M79.89] 03/02/2024 Yes    Acquired occlusion of iliac vein [I82.429] 03/07/2024 Yes    DALIA (acute kidney injury) [N17.9] 03/02/2024 Unknown    Moderate persistent asthma, uncomplicated [J45.40] 02/27/2024 Yes     Chronic    Elevated serum creatinine [R79.89] 02/16/2024 Yes      Problems Resolved During this Admission:    Diagnosis Date Noted Date Resolved POA    Cellulitis of right lower limb [L03.115] 02/16/2024 03/07/2024 Yes    Cellulitis [L03.90] 02/16/2024 03/07/2024 Yes     Mr. Collins is a 64yo male who presents with right leg swelling.  He has had this swelling for around a month and has not seen significant improvement with abx.  IR plan to intervene for his right iliac vein occlusion.      Recommendations:  - Lymph nodes are likely reactive from his inflammatory process.  Would not recommend surgical lymph node biopsy as they are likely secondary to his  inflammatory process  - No surgical intervention at this time  - Rest of care per primary team    Thank you for your consult. I will sign off. Please contact us if you have any additional questions.    Demario Simons MD  General Surgery  [facility]

## 2024-03-08 NOTE — PLAN OF CARE
03/08/24 1226   Discharge Reassessment   Assessment Type Discharge Planning Reassessment   Did the patient's condition or plan change since previous assessment? Yes   Discharge Plan discussed with: Patient   Communicated CONSTANTINO with patient/caregiver Date not available/Unable to determine   Discharge Plan A Home;Home with family   Discharge Plan B Home;Home with family   Why the patient remains in the hospital Requires continued medical care   Post-Acute Status   Coverage [facility] FEDERAL BASIC   Discharge Delays None known at this time     Pt not medically ready for d/c.     Discharge Plan A and Plan B have been determined by review of patient's clinical status, future medical and therapeutic needs, and coverage/benefits for post-acute care in coordination with multidisciplinary team members.    WALI Figueroa   Case Management Dept-[facility]  [phone]

## 2024-03-08 NOTE — SUBJECTIVE & OBJECTIVE
Interval History: Seen by General Surgery, who agreed with IR about not biopsying the lymph node. IR will perform venogram today.    Review of Systems   Constitutional:  Negative for chills and fever.   Cardiovascular:  Positive for leg swelling.   Skin:  Positive for color change. Negative for wound.   Neurological:  Negative for seizures and syncope.     Objective:     Vital Signs (Most Recent):  Temp: 98.6 °F (37 °C) (03/08/24 0540)  Pulse: 80 (03/08/24 0849)  Resp: 16 (03/08/24 0849)  BP: 130/74 (03/08/24 0540)  SpO2: 99 % (03/08/24 0849) Vital Signs (24h Range):  Temp:  [98.1 °F (36.7 °C)-98.9 °F (37.2 °C)] 98.6 °F (37 °C)  Pulse:  [80-93] 80  Resp:  [16-18] 16  SpO2:  [93 %-99 %] 99 %  BP: (130-155)/(68-77) 130/74     Weight: 83.5 kg (184 lb 1.4 oz)  Body mass index is 28.83 kg/m².    Intake/Output Summary (Last 24 hours) at 3/8/2024 0935  Last data filed at 3/7/2024 1755  Gross per 24 hour   Intake --   Output 150 ml   Net -150 ml           Physical Exam  Vitals and nursing note reviewed.   Constitutional:       General: He is not in acute distress.     Appearance: He is well-developed. He is not toxic-appearing or diaphoretic.   Pulmonary:      Effort: Pulmonary effort is normal. No respiratory distress.   Genitourinary:     Penis: Swelling present. No discharge.       Testes:         Right: Swelling present.         Left: Swelling present.   Musculoskeletal:         General: No deformity or signs of injury.      Right lower leg: Edema present.      Left lower leg: No edema.   Skin:     Findings: Erythema present.   Neurological:      Mental Status: He is alert and oriented to person, place, and time. Mental status is at baseline.      Motor: No seizure activity.   Psychiatric:         Attention and Perception: Attention normal.         Mood and Affect: Mood and affect normal.         Behavior: Behavior is cooperative.             Significant Labs: All pertinent labs within the past 24 hours have been  reviewed.    Significant Imaging: I have reviewed all pertinent imaging results/findings within the past 24 hours.

## 2024-03-08 NOTE — SEDATION DOCUMENTATION
Procedure complete. Pt tolerated well. Recovery for 2hours. Hemostasis 1240. Flat for 2 hours. VSS. Site CDI. Pt transferred to mpu bay7 and report to be given bedside. Report called to floor RN.

## 2024-03-08 NOTE — PLAN OF CARE
Problem: Fluid and Electrolyte Imbalance (Acute Kidney Injury/Impairment)  Goal: Fluid and Electrolyte Balance  Outcome: Ongoing, Progressing  Intervention: Monitor and Manage Fluid and Electrolyte Balance  Flowsheets (Taken 3/8/2024 0801)  Fluid/Electrolyte Management: electrolyte-binding therapy initiated     Problem: Fluid and Electrolyte Imbalance (Acute Kidney Injury/Impairment)  Goal: Fluid and Electrolyte Balance  Outcome: Ongoing, Progressing  Intervention: Monitor and Manage Fluid and Electrolyte Balance  Flowsheets (Taken 3/8/2024 0801)  Fluid/Electrolyte Management: electrolyte-binding therapy initiated     Problem: Fluid and Electrolyte Imbalance (Acute Kidney Injury/Impairment)  Goal: Fluid and Electrolyte Balance  Intervention: Monitor and Manage Fluid and Electrolyte Balance  Flowsheets (Taken 3/8/2024 0801)  Fluid/Electrolyte Management: electrolyte-binding therapy initiated     Problem: Pain Acute  Goal: Acceptable Pain Control and Functional Ability  Outcome: Ongoing, Progressing

## 2024-03-08 NOTE — PLAN OF CARE
Pt arrived to MPU room 7 for RECOVERY, no acute distress noted. Dressings CDI, report received from DAIN Dominguez.

## 2024-03-08 NOTE — PLAN OF CARE
63 YOM with asthma, allergic rhinitis, HLD, irritable bowel syndrome, history of prostate cancer s/p radiation who was re-admitted for right lower extremity cellulitis. Presentation concerning for obstructive disease with differential including IgG4, malignancy, infection, radiation fibrosis, Hodgkin's lymphoma, EGPA. Patient has history of eosinophilia, 25-30%. Risk factors for EGPA include eosinophilia, leukotriene history, lymphadenopathy, allergies, nasal polyps, and now DALIA. However, his skin manifestations are not typical of those associated with EGPA. Increased IgE is also associated with hodgkin's lymphoma.      Labs:  UPCr 0.22  ESR 67  .9  IgE 148   IgM 47 low      Recommendations:  - Please obtain CBC with Diff to assess for eosinophils in light of elevated IgE  - High concern for IgG4 with retroperitoneal fibrosis. Cannot make a diagnosis without a tissue sample. Strongly recommend lymph node biopsy for IgG4 in addition to assessing for eosinophilic lymphadenopathy that can be associated with EGPA.  - Any tissue that is biopsied, please stain for Igg4  - Follow up Heme onc recommendations for eosinophilia   - Follow up remaining lab results  - Rheumatology will continue to follow along      Jamaica Roman MD  PGY-1  Rheumatology

## 2024-03-09 VITALS
DIASTOLIC BLOOD PRESSURE: 92 MMHG | HEART RATE: 93 BPM | BODY MASS INDEX: 28.89 KG/M2 | RESPIRATION RATE: 18 BRPM | OXYGEN SATURATION: 92 % | SYSTOLIC BLOOD PRESSURE: 178 MMHG | TEMPERATURE: 98 F | HEIGHT: 67 IN | WEIGHT: 184.06 LBS

## 2024-03-09 PROBLEM — I15.9 SECONDARY HYPERTENSION: Status: ACTIVE | Noted: 2024-03-09

## 2024-03-09 LAB
GAMMA INTERFERON BACKGROUND BLD IA-ACNC: 0.02 IU/ML
M TB IFN-G CD4+ BCKGRND COR BLD-ACNC: 0 IU/ML
M TB IFN-G CD4+ BCKGRND COR BLD-ACNC: 0.01 IU/ML
MITOGEN IGNF BCKGRD COR BLD-ACNC: 2.19 IU/ML
PROTEINASE3 IGG SER-ACNC: <0.2 U
TB GOLD PLUS: NEGATIVE

## 2024-03-09 PROCEDURE — 25000003 PHARM REV CODE 250: Performed by: INTERNAL MEDICINE

## 2024-03-09 PROCEDURE — 25000003 PHARM REV CODE 250: Performed by: HOSPITALIST

## 2024-03-09 PROCEDURE — 25000242 PHARM REV CODE 250 ALT 637 W/ HCPCS: Performed by: HOSPITALIST

## 2024-03-09 RX ORDER — CLOPIDOGREL BISULFATE 75 MG/1
300 TABLET ORAL ONCE
Status: COMPLETED | OUTPATIENT
Start: 2024-03-09 | End: 2024-03-09

## 2024-03-09 RX ORDER — AMLODIPINE BESYLATE 10 MG/1
10 TABLET ORAL DAILY
Status: DISCONTINUED | OUTPATIENT
Start: 2024-03-10 | End: 2024-03-09 | Stop reason: HOSPADM

## 2024-03-09 RX ORDER — AMLODIPINE BESYLATE 10 MG/1
10 TABLET ORAL DAILY
Qty: 30 TABLET | Refills: 2 | Status: SHIPPED | OUTPATIENT
Start: 2024-03-10

## 2024-03-09 RX ORDER — CLOPIDOGREL BISULFATE 75 MG/1
75 TABLET ORAL DAILY
Qty: 60 TABLET | Refills: 0 | Status: SHIPPED | OUTPATIENT
Start: 2024-03-10 | End: 2024-05-15

## 2024-03-09 RX ORDER — CLOPIDOGREL BISULFATE 75 MG/1
75 TABLET ORAL DAILY
Status: DISCONTINUED | OUTPATIENT
Start: 2024-03-10 | End: 2024-03-09 | Stop reason: HOSPADM

## 2024-03-09 RX ADMIN — CLOPIDOGREL BISULFATE 300 MG: 75 TABLET ORAL at 09:03

## 2024-03-09 RX ADMIN — AMLODIPINE BESYLATE 5 MG: 5 TABLET ORAL at 06:03

## 2024-03-09 RX ADMIN — Medication 1 CAPSULE: at 09:03

## 2024-03-09 RX ADMIN — APIXABAN 5 MG: 5 TABLET, FILM COATED ORAL at 09:03

## 2024-03-09 RX ADMIN — HYDROCODONE BITARTRATE AND ACETAMINOPHEN 1 TABLET: 5; 325 TABLET ORAL at 01:03

## 2024-03-09 RX ADMIN — TIOTROPIUM BROMIDE INHALATION SPRAY 2 PUFF: 3.12 SPRAY, METERED RESPIRATORY (INHALATION) at 09:03

## 2024-03-09 RX ADMIN — PANTOPRAZOLE SODIUM 40 MG: 40 TABLET, DELAYED RELEASE ORAL at 09:03

## 2024-03-09 RX ADMIN — FLUTICASONE FUROATE AND VILANTEROL TRIFENATATE 1 PUFF: 100; 25 POWDER RESPIRATORY (INHALATION) at 09:03

## 2024-03-09 NOTE — DISCHARGE INSTRUCTIONS
Take clopidrogel (Plavix) for 2 months.  Take apixaban (Eliquis) for 3 months, unless told different by Interventional Radiology when you follow up with them.  Follow up with Interventional Radiology in 2 to 4 weeks.   Interventional Radiology plans CT scans at 6 months and 12 months from now.

## 2024-03-09 NOTE — PLAN OF CARE
Problem: Pain Acute  Goal: Acceptable Pain Control and Functional Ability  Outcome: Ongoing, Progressing  Intervention: Prevent or Manage Pain  Flowsheets (Taken 3/9/2024 0326)  Bowel Elimination Promotion: privacy promoted  Medication Review/Management: medications reviewed     Problem: Pain Acute  Goal: Acceptable Pain Control and Functional Ability  Outcome: Ongoing, Progressing  Intervention: Prevent or Manage Pain  Flowsheets (Taken 3/9/2024 0326)  Bowel Elimination Promotion: privacy promoted  Medication Review/Management: medications reviewed     Problem: Pain Acute  Goal: Acceptable Pain Control and Functional Ability  Intervention: Prevent or Manage Pain  Flowsheets (Taken 3/9/2024 0326)  Bowel Elimination Promotion: privacy promoted  Medication Review/Management: medications reviewed

## 2024-03-09 NOTE — NURSING
Pt has high b/p 189/96 Denied pain Pt's asymptomatic Pt did have procedure done yesterday recanalization of iliac veins with placement of kissing Venovo stents in the external iliac to IVC.Notified omc team Md made aware via secure chat.

## 2024-03-09 NOTE — PLAN OF CARE
Pt went down for Pelvic Venography, angioplasty and stent procedure. Successful recanalization  of iliac veins and two stents placed, one on the left and one on the right. Otherwise, pt is doing okay, no other major events, pain control and monitoring for site for bleeds.   Problem: Adult Inpatient Plan of Care  Goal: Plan of Care Review  Outcome: Ongoing, Progressing  Flowsheets (Taken 3/8/2024 1811)  Plan of Care Reviewed With:   patient   spouse  Goal: Patient-Specific Goal (Individualized)  Outcome: Ongoing, Progressing  Goal: Absence of Hospital-Acquired Illness or Injury  Outcome: Ongoing, Progressing  Intervention: Identify and Manage Fall Risk  Flowsheets (Taken 3/8/2024 1811)  Safety Promotion/Fall Prevention:   assistive device/personal item within reach   bed alarm refused   Fall Risk reviewed with patient/family   medications reviewed   side rails raised x 2   toileting scheduled  Intervention: Prevent Skin Injury  Flowsheets (Taken 3/8/2024 1811)  Body Position: position changed independently  Skin Protection:   adhesive use limited   skin-to-device areas padded   skin-to-skin areas padded   transparent dressing maintained   skin sealant/moisture barrier applied   tubing/devices free from skin contact  Intervention: Prevent and Manage VTE (Venous Thromboembolism) Risk  Flowsheets (Taken 3/8/2024 1811)  Activity Management:   Ambulated -L4   Ambulated to bathroom - L4   Ambulated in room - L4   Standing - L3   Step forward and back - L3  VTE Prevention/Management:   ambulation promoted   bleeding precations maintained   bleeding risk assessed   bleeding risk factor(s) identified, provider notified   dorsiflexion/plantar flexion performed   ROM (active) performed   ROM (passive) performed  Range of Motion:   active ROM (range of motion) encouraged   ROM (range of motion) performed  Goal: Optimal Comfort and Wellbeing  Outcome: Ongoing, Progressing  Intervention: Monitor Pain and Promote  Comfort  Flowsheets (Taken 3/8/2024 1811)  Pain Management Interventions:   care clustered   medication offered   pain management plan reviewed with patient/caregiver   quiet environment facilitated   relaxation techniques promoted  Intervention: Provide Person-Centered Care  Flowsheets (Taken 3/8/2024 1811)  Trust Relationship/Rapport:   care explained   questions answered   questions encouraged   thoughts/feelings acknowledged  Goal: Readiness for Transition of Care  Outcome: Ongoing, Progressing  Intervention: Mutually Develop Transition Plan  Flowsheets (Taken 3/8/2024 1811)  Equipment Currently Used at Home: none  Transportation Anticipated: family or friend will provide  Communicated CONSTANTINO with patient/caregiver: Yes  Do you expect to return to your current living situation?: Yes  Do you have help at home or someone to help you manage your care at home?: Yes  Readmission within 30 days?: Yes  Do you currently have service(s) that help you manage your care at home?: No  Is the pt/caregiver preference to resume services with current agency: No     Problem: Impaired Wound Healing  Goal: Optimal Wound Healing  Outcome: Ongoing, Progressing  Intervention: Promote Wound Healing  Flowsheets (Taken 3/8/2024 1811)  Oral Nutrition Promotion:   physical activity promoted   medicated   rest periods promoted   safe use of adaptive equipment encouraged  Sleep/Rest Enhancement:   regular sleep/rest pattern promoted   relaxation techniques promoted   noise level reduced  Activity Management:   Ambulated -L4   Ambulated to bathroom - L4   Ambulated in room - L4   Standing - L3   Step forward and back - L3  Pain Management Interventions:   care clustered   medication offered   pain management plan reviewed with patient/caregiver   quiet environment facilitated   relaxation techniques promoted     Problem: Pain Acute  Goal: Acceptable Pain Control and Functional Ability  Outcome: Ongoing, Progressing  Intervention: Develop Pain  Management Plan  Flowsheets (Taken 3/8/2024 1811)  Pain Management Interventions:   care clustered   medication offered   pain management plan reviewed with patient/caregiver   quiet environment facilitated   relaxation techniques promoted  Intervention: Prevent or Manage Pain  Flowsheets (Taken 3/8/2024 1811)  Sleep/Rest Enhancement:   regular sleep/rest pattern promoted   relaxation techniques promoted   noise level reduced  Sensory Stimulation Regulation:   quiet environment promoted   television on  Bowel Elimination Promotion: ambulation promoted  Medication Review/Management:   medications reviewed   high-risk medications identified  Intervention: Optimize Psychosocial Wellbeing  Flowsheets (Taken 3/8/2024 1811)  Supportive Measures:   relaxation techniques promoted   self-care encouraged   self-reflection promoted   positive reinforcement provided   verbalization of feelings encouraged  Diversional Activities:   television   smartphone     Problem: Fall Injury Risk  Goal: Absence of Fall and Fall-Related Injury  Outcome: Ongoing, Progressing  Intervention: Identify and Manage Contributors  Flowsheets (Taken 3/8/2024 1811)  Self-Care Promotion:   independence encouraged   BADL personal objects within reach   BADL personal routines maintained   safe use of adaptive equipment encouraged  Medication Review/Management:   medications reviewed   high-risk medications identified  Intervention: Promote Injury-Free Environment  Flowsheets (Taken 3/8/2024 1811)  Safety Promotion/Fall Prevention:   assistive device/personal item within reach   bed alarm refused   Fall Risk reviewed with patient/family   medications reviewed   side rails raised x 2   toileting scheduled

## 2024-03-09 NOTE — DISCHARGE SUMMARY
[facility]  Discharge Summary      Patient Name: Bradley Collins  MRN: [id number]  ANTHONY: [id number]  Patient Class: IP- Inpatient  Admission Date: 2/27/2024  Hospital Length of Stay: 11 days  Discharge Date and Time: 3/9/2024  6:34 PM  Attending Physician: Sagar Cox MD   Discharging Provider: Sagar Cox MD  Primary Care Provider: Administration, [facility] Medicine Team: [facility] Sagar Cox MD  Primary Care Team: [facility]    HPI:   Bradley Collins is a 63 year old Black man with asthma, allergic rhinitis, hyperlipidemia, irritable bowel syndrome with diarrhea, history of prostate cancer treated with radiation, history of sinus surgery in 2001. He lives in [place]. He works for [employer]. His primary care clinic is the [facility] clinic.    He was hospitalized at the [facility] on 2/12/2024 for right lower extremity cellulitis. He had a CT with contrast done and was told that it did not show anything. He was prescribed cephalexin.   He was hospitalized at [facility] from 2/16/2024 to 2/20/2024 for worsening cellulitis. Creatinine was elevated at 2.0 mg/dL (from 1.1 a year ago). He was initially put on piperacillin-tazobactam and vancomycin. Piperacillin-tazobactam was changed to ceftriaxone. Erythema improved but edema persisted. He had induration of the thigh. CT showed diffuse cutaneous thickening and edema with prominent right inguinal lymph nodes. Antibiotics were switched to oral amoxicillin-clavulanate and doxycycline on 2/19/2024. Edema improved. Induration persisted. He was discharged home.   He followed up at [facility] on 2/27/2024. He had 4 days left of the antibiotics. Swelling had worsened and spread up to his abdomen. He had edema and erythema of the entire right lower extremity, right groin, and right lower abdomen. It was  tender and warm. He was advised to return to the emergency department to resume intravenous antibiotic treatment. Labs showed creatinine to still be elevated (1.7 mg/dL, from 1.8 on 2/19/2024). Non-contrast CT showed soft tissue induration throughout the right anterior pelvis, thigh,and leg, skin thickening and edema of the subcutaneous tissue, superficial fascia, and deep fascia, scrotal edema, and retroperitoneal/extraperitoneal edema along the sidewalls. He was given piperacillin-tazobactam and vancomycin. He was admitted to Hospital Medicine Team W.     Hospital Course:   He was put on vancomycin. Infectious Disease was consulted for recommendations changed vancomycin to ceftriaxone, daptomycin, and linezolid. General Surgery was consulted to evaluate and recommended no surgical intervention to help reduce swelling. Urology was consulted to evaluate his scrotal swelling and found no issues they had to manage. His right lower extremity, right pelvis, penile, and scrotal swelling decreased after a dose of torsemide on 3/1/2024. Given his history and physical examination, the diagnosis of lymphedema was made. CT showed right iliac vein filling defect likely due to thrombus. Nephrology was consulted to evaluate his persistent but stable acute kidney injury. Amlodipine was started for elevated blood pressures. He never had hypertension before and it was likely secondary to his recent persistent acute kidney injury. Rheumatology was also consulted. Urology recommended inguinal lymph node biopsy by Interventional Radiology. Interventional Radiology suspected his inguinal lymphadenopathy to be reactive and recommended treating the cellulitis and getting a biopsy in a month if the lymph nodes are still enlarged, but said that he the right iliac vein occlusion could be contributing to the swelling. Hematology-Oncology recommended consulting General Surgery to consider inguinal lymph node biopsy, but General Surgery also  felt that his lymphadenopathy was reactive and recommended no biopsy at this time. Interventional Radiology performed pelvic venography on 3/8/2024, finding severe compression of both external and to lesser degree common iliac veins. Recanalization of iliac veins was done with placement of kissing Venovo stents in the external iliac to inferior vena cava. Interventional Radiology recommended clopidrogel daily for 2 months and apixaban twice daily. They recommended he follow up in their clinic in 2 to 4 weeks and get CT scans in 6 months and 12 months. He noticed improvement of swelling after the procedure. He was prescribed clopidrogel for 2 months, apixaban, and amlodipine. He will follow up with his primary care physician to assess continued need for hypertension medication and follow up in Interventional Radiology clinic. His renal function remained stable during this hospitalization and can be followed up outpatient.      Goals of Care Treatment Preferences:  Code Status: Full Code      Consults:   Consults (From admission, onward)          Status Ordering Provider     Inpatient consult to General Surgery  Once        Provider:  (Not yet assigned)    Completed CHI DALALIN A     Inpatient consult to Interventional Radiology  Once        Provider:  (Not yet assigned)    Completed ABACHI MEIERIN A     Inpatient consult to Interventional Radiology  Once        Provider:  (Not yet assigned)    Completed KEISHA AGUSTIN C.     Inpatient consult to Rheumatology  Once        Provider:  (Not yet assigned)    Completed KEISHA AGUSTIN C.     Inpatient consult to Hematology/Oncology  Once        Provider:  (Not yet assigned)    Completed KEISHA AGUSTIN C.     Inpatient consult to Urology  Once        Provider:  (Not yet assigned)    Completed ALEKSANDR AGUSTINRY C.     Inpatient consult to Vascular Surgery  Once        Provider:  (Not yet assigned)    Completed KEISHA AGUSTIN C.     Inpatient consult to Nephrology  Once         Provider:  Alexys Alford MD    Completed DARYN CARLOS     Inpatient consult to General Surgery  Once        Provider:  (Not yet assigned)    Completed LORENA DALAL A     Inpatient consult to Urology  Once        Provider:  (Not yet assigned)    Completed LORENA DALAL A     Inpatient consult to Infectious Diseases  Once        Provider:  (Not yet assigned)    Completed LORENA DALAL          Final Active Diagnoses:    Diagnosis Date Noted POA    PRINCIPAL PROBLEM:  Swelling of right lower extremity [M79.89] 03/02/2024 Yes    Secondary hypertension [I15.9] 03/09/2024 No    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    Contact information:  [address] [facility] [address] [phone]               [facility] B[facility]al [facility] Follow up in 2 week(s).    Specialty: Interventional Radiology  Contact information:  [address]  [facility] [address] [phone]  Additional information:  Clinic [place] - 6th Floor   Please park in [address] and use Clinic elevator                         Patient Instructions:      Ambulatory referral/consult to Interventional Radiology   Standing Status: Future   Referral Priority: Routine Referral Type: Consultation   Referral Reason: Specialty Services Required   Requested Specialty: Interventional Radiology   Number of Visits Requested: 1     Diet  Adult Regular     Notify your health care provider if you experience any of the following:  persistent dizziness, light-headedness, or visual disturbances     Notify your health care provider if you experience any of the following:  increased confusion or weakness     Notify your health care provider if you experience any of the following:  difficulty breathing or increased cough     Notify your health care provider if you experience any of the following:  redness, tenderness, or signs of infection (pain, swelling, redness, odor or green/yellow discharge around incision site)     Activity as tolerated       Significant Diagnostic Studies:   CT Pelvis Without Contrast CT Thigh Without Contrast Right CT Leg (Tibia-Fibula) Without Contrast Right 2/27/24: FINDINGS:   There is severe soft tissue induration throughout the right thigh and leg as well as the anterior pelvis.  There is skin thickening with subcutaneous, superficial fascial, and deep fascial edema.  There is skin thickening and subcutaneous edema of the leg.  There is no soft tissue gas.  There is no evidence for confluent fluid collection, noting somewhat limited noncontrast assessment.   Note made of scrotal edema.   No pelvic ascites.  Mild retroperitoneal/extraperitoneal edema noted along the sidewalls.  Metallic bodies within the prostate, presumably fiducial markers.   No fracture.  No lytic or blastic lesion.  Degenerative changes are seen in the lower lumbar spine.  There is a small knee effusion.   Impression:  Extensive soft tissue induration throughout the right thigh and leg as well as anterior pelvis.  Deep fascial edema noted at the level of the thigh.  No evidence for soft tissue gas.  Diagnostic considerations include infectious process such as cellulitis, myositis, and fasciitis.  Alternatively, appearance may be seen with inflammatory process, venous/lymphatic obstruction, or drug/allergic reaction.   US Retroperitoneal Complete 3/04/24:  FINDINGS:   Right kidney: The right kidney measures 11.2 cm. No cortical thinning. No loss of corticomedullary distinction. Resistive index measures 0.61.  Multiple simple appearing renal cysts, with the largest measuring up to 2.1 cm in size.  No mass. No renal stone. No hydronephrosis.   Left kidney: The left kidney measures 11.0 cm. No cortical thinning. No loss of corticomedullary distinction. Resistive index measures 0.69.  Multiple simple appearing renal cysts, with the largest measuring up to 2.3 cm in size.  No mass. No renal stone. No hydronephrosis.   The bladder is partially distended at the time of scanning and has an unremarkable appearance.   Impression:  No hydronephrosis.   Bilateral simple appearing renal cysts.   CT Abdomen Pelvis With IV Contrast NO Oral Contrast 3/06/24: FINDINGS:   Lungs: 1.4 cm nodular density favored to be atelectasis/scarring within the right middle lobe (series 2, image 23).  0.8 cm nodular density again possibly atelectasis/scarring within the left upper lobe (series 2, image 30).  No consolidation or pleural effusion in the visualized lung bases.   Heart: Normal size. Mild coronary atherosclerosis.   Liver: Normal size.  Focal hypodensity near the falciform ligament likely representing focal fat.  4 mm hypodense lesion in the right hepatic lobe too small to characterize.  .  Gallbladder: Poorly distended but without calcified gallstones.  No pericholecystic inflammatory changes.   Bile ducts: No intrahepatic or extrahepatic biliary ductal dilatation.   Spleen: Normal size. No focal abnormality   Pancreas: 8 mm hypodense lesion in the body of the pancreas (series 2, image 61).  Additional 1.4 cm hypodensity abutting the pancreatic head series 2, image 74.  No peripancreatic fat stranding.   Adrenals: Some nodular thickening of the left adrenal.   Renal/Ureters: The kidneys are normal in size . Few hypodense lesions within the left kidney likely simple cysts.  Mild  hydroureteronephrosis of the right kidney with impaired excretion of contrast compared to the left kidney.  Soft tissue encasement and occlusion of the right ureter at the level of S2. The urinary bladder is unremarkable.   Reproductive: Surgical clips within the prostate.  Diffuse scrotal edema.   Stomach/Bowel: Stomach is unremarkable.  Small bowel is normal caliber without obstruction or inflammation. Appendix is not well visualized.  Large bowel is normal caliber without obstruction or inflammation.   Peritoneum: No free fluid. No intraperitoneal free air.   Lymph Nodes: Prominent periaortic lymph nodes with the largest measuring 1 cm in short axis at the level of the iliac bifurcation (see series 2, image 117).  Prominent inguinal nodes including 1.2 cm left inguinal node (series 2, image 178).   Vasculature: Abdominal aorta tapers normally.  Minimal atherosclerosis of the abdominal aorta and its branches. Portal vasculature, SMV, and splenic vein are patent. There is significant compression of the right external iliac vein extending to the common iliac.  Soft tissue encases the vessels and ureter in the right hemipelvis.  Questionable filling defect on the delayed images in the region of the right common iliac series 3, image 136.   Bones: Age-appropriate degenerative changes of the spine.  No acute fractures or osseous destructive lesions.   Soft Tissues: Diffuse soft tissue edema throughout the right lower extremity and lower abdominal wall.  Soft tissue thickening along the right psoas muscle the level of the sacrum encasing the ureter and iliac vessels as above.   Impression:  Extensive soft tissue edema throughout the right lower extremity, scrotum, and lower abdomen.  Mild hydroureteronephrosis of the right kidney with impaired excretion of contrast compared to left kidney.  There is soft tissue encasement of the right ureter and iliac arteries and veins with compression of the right ureter and right  iliac vein in the right hemipelvis.  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  Further evaluation suggested.   Questionable filling defect right common iliac vein and may be a small thrombus.  Significant compression as above.   1.4 cm and 8 mm hypodense lesions in the pancreas.  Follow-up EUS or MRCP when clinically appropriate.   1.4 cm nodular density and 0.8 cm nodular density in the right and left lungs as above.  While this may be atelectasis or fibrosis dedicated CT chest when clinically appropriate.   Additional findings above   IR Venogram Lower Extremity Right 3/08/24: Impression:  Pelvic venography & IVUS showed severe compression of both external and to lesser degree common iliac veins.  Successful recanalization of iliac veins with placement of kissing Venovo stents in the external iliac to IVC. Right side one 16 x 120 mm stent. Left side 16 X 120 mm and 16 x 40 mm overlapping stents.  Plan:   - Loading dose plavix 300 mg today then 75 mg daily for 2 months. - Eliquis 5 mg BID.  - VIR clinic visit in 2-4 weeks.  - CTV AP at 6 and 12 months.    Pending Diagnostic Studies:       Procedure Component Value Units Date/Time    Anti-neutrophilic cytoplasmic antibody [[id number]] Collected: 03/1960    Order Status: Sent Lab Status: In process Updated: 03/07/24 2140    Specimen: Blood     Cryoglobulin [[id number]] Collected: 03/1960    Order Status: Sent Lab Status: In process Updated: 03/07/24 2140    Specimen: Blood     IgG 1, 2, 3, and 4 [[id number]] Collected: 03/1960    Order Status: Sent Lab Status: In process Updated: 03/07/24 2140    Specimen: Blood     Myeloperoxidase Antibody (MPO) [[id number]] Collected: 03/07/24 2000    Order Status: Sent Lab Status: In process Updated: 03/07/24 2140    Specimen: Blood     Strongyloides IgG Antibodies [[id number]] Collected: 03/1960    Order Status: Sent Lab Status: In process Updated: 03/07/24 2140    Specimen:  Blood     Thiopurine Methyltrans (TPMT) Genotyping [[id number]] Collected: 03/07/24 2117    Order Status: Sent Lab Status: In process Updated: 03/07/24 2140    Specimen: Blood            Medications:  Reconciled Home Medications:      Medication List        START taking these medications      amLODIPine 10 MG tablet  Commonly known as: NORVASC  Take 1 tablet (10 mg total) by mouth once daily.  Start taking on: March 10, 2024     apixaban 5 mg Tab  Commonly known as: ELIQUIS  Take 1 tablet (5 mg total) by mouth 2 (two) times daily.     clopidogreL 75 mg tablet  Commonly known as: PLAVIX  Take 1 tablet (75 mg total) by mouth once daily. For 2 months.  Start taking on: March 10, 2024            CONTINUE taking these medications      albuterol 1.25 mg/3 mL Nebu  Commonly known as: ACCUNEB  Take 1.25 mg by nebulization every 6 (six) hours as needed. Rescue     budesonide-formoterol 160-4.5 mcg 160-4.5 mcg/actuation Hfaa  Commonly known as: SYMBICORT  Inhale 2 puffs into the lungs every 12 (twelve) hours. Controller     calcium carbonate 500 mg calcium (1,250 mg) tablet  Commonly known as: OS-CAROLYN     dupilumab 300 mg/2 mL Pnij  INJECT 300MG SUBCUTANEOUSLY EVERY TWO WEEKS     fluticasone propionate 93 mcg/actuation Aerb  2 sprays by Nasal route 2 (two) times a day.     mometasone-formoterol 200-5 mcg/actuation inhaler  Commonly known as: DULERA  Inhale 2 puffs into the lungs 2 (two) times daily.     montelukast 5 MG chewable tablet  Commonly known as: SINGULAIR  Take 5 mg by mouth.     NASAL EXHALATION RESISTANCE DV NASL  by Nasal route.     omeprazole 20 mg Tbec  Take 20 mg by mouth once daily.     pantoprazole 40 MG tablet  Commonly known as: PROTONIX  Take 40 mg by mouth.     rosuvastatin 10 mg Cpsp     tadalafiL 20 MG Tab  Commonly known as: CIALIS  20 mg.     tiotropium 18 mcg inhalation capsule  Commonly known as: SPIRIVA  Inhale 18 mcg into the lungs once daily. Controller     vitamin D 1000 units Tab  Commonly  known as: VITAMIN D3  Take 1,000 Units by mouth once daily.            STOP taking these medications      amoxicillin-clavulanate 875-125mg 875-125 mg per tablet  Commonly known as: AUGMENTIN     doxycycline 100 MG capsule  Commonly known as: MONODOX              Indwelling Lines/Drains at time of discharge: None    Time spent on the discharge of patient: 35 minutes         Sagar Cox MD  Department of Hospital Medicine  [facility]

## 2024-03-10 NOTE — NURSING
Discharge paperwork printed out, explained, peripheral IV removed, telemetry discontinued and pt received bedside medications. Pt refused wheelchair escort, will walk out himself once ride arrives. Pt has left the unit with belongings and medications on foot.

## 2024-03-11 ENCOUNTER — OFFICE VISIT (OUTPATIENT)
Dept: VASCULAR SURGERY | Facility: CLINIC | Age: 64
End: 2024-03-11
Payer: COMMERCIAL

## 2024-03-11 VITALS — DIASTOLIC BLOOD PRESSURE: 76 MMHG | SYSTOLIC BLOOD PRESSURE: 150 MMHG | HEART RATE: 94 BPM

## 2024-03-11 DIAGNOSIS — I82.421: Primary | ICD-10-CM

## 2024-03-11 LAB
ANCA AB TITR SER IF: NORMAL TITER
IGG1 SER-MCNC: 613 MG/DL (ref 382–929)
IGG2 SER-MCNC: 333 MG/DL (ref 242–700)
IGG3 SER-MCNC: 56 MG/DL (ref 22–176)
IGG4 SER-MCNC: 41 MG/DL (ref 4–86)
MYELOPEROXIDASE AB SER-ACNC: <0.2 U/ML
P-ANCA TITR SER IF: NORMAL TITER
PATHOLOGIST INTERPRETATION SPE: NORMAL
STRONGYLOIDES ANTIBODY IGG: NEGATIVE

## 2024-03-11 PROCEDURE — 1159F MED LIST DOCD IN RCRD: CPT | Mod: CPTII,S$GLB,, | Performed by: SURGERY

## 2024-03-11 PROCEDURE — 3077F SYST BP >= 140 MM HG: CPT | Mod: CPTII,S$GLB,, | Performed by: SURGERY

## 2024-03-11 PROCEDURE — 1111F DSCHRG MED/CURRENT MED MERGE: CPT | Mod: CPTII,S$GLB,, | Performed by: SURGERY

## 2024-03-11 PROCEDURE — 3078F DIAST BP <80 MM HG: CPT | Mod: CPTII,S$GLB,, | Performed by: SURGERY

## 2024-03-11 PROCEDURE — 99212 OFFICE O/P EST SF 10 MIN: CPT | Mod: S$GLB,,, | Performed by: SURGERY

## 2024-03-11 NOTE — PROGRESS NOTES
Des Wahl MD, RPVI                                 [facility]                           [facility]                             03/11/2024    HPI:  Bradley Collins is a 63 y.o. male with   Patient Active Problem List   Diagnosis    Prostate cancer    Elevated serum creatinine    Abnormal weight loss    Allergic rhinitis    Aspirin-exacerbated respiratory disease (AERD)    Chronic maxillary sinusitis    Chronic rhinitis    Spiritual or Islam counseling    Cyst of pancreas    Dry eye syndrome of bilateral lacrimal glands    Dyspepsia    Dysphonia    Hyperlipidemia    Irritable bowel syndrome with diarrhea    Elevated blood-pressure reading without diagnosis of hypertension    History of colon polyps    Moderate persistent asthma, uncomplicated    Swelling of right lower extremity    DALIA (acute kidney injury)    Acquired occlusion of iliac vein    Secondary hypertension    being managed by PCP and specialists who is here today for evaluation of BLE edema.  Patient states location is BLE occurring for weeks to months.  Associated signs and symptoms include pain.  Quality is sharp and severity is 5/10.  Symptoms began months ago.  Alleviating factors include elevation.  Worsening factors include dependency.  Patient has been wearing compression stockings for greater than 3 months.  Symptoms do limit patient's functional status and daily activities.  + DVT history.  + venous interventions.  no low sodium diet.  no excessive water intake.    Migraine with aura: no  PFO/ASD/right to left shunt: no  Pregnant: no  Breastfeeding: no    no MI  no Stroke  Tobacco use: denies    Past Medical History:   Diagnosis Date    Allergy     Asthma     Cellulitis     Cellulitis of right lower limb 02/16/2024    Elevated PSA     Prostate cancer     Secondary hypertension 3/9/2024     Past Surgical History:   Procedure Laterality Date    ENDOSCOPIC ULTRASOUND OF UPPER GASTROINTESTINAL TRACT N/A  7/25/2023    Procedure: ULTRASOUND, UPPER GI TRACT, ENDOSCOPIC;  Surgeon: Yoseph Li MD;  Location: [facility] ([address] [place] [address]);  Service: Endoscopy;  Laterality: N/A;  instr portal-va referral    SINUS SURGERY Bilateral 2001     Family History   Family history unknown: Yes     Social History     Socioeconomic History    Marital status: Single   Tobacco Use    Smoking status: Never    Smokeless tobacco: Never   Substance and Sexual Activity    Alcohol use: Yes     Alcohol/week: 1.0 standard drink of alcohol     Types: 1 Cans of beer per week     Comment: week    Drug use: Never     Social Determinants of Health     Financial Resource Strain: Low Risk  (2/29/2024)    Overall Financial Resource Strain (CARDIA)     Difficulty of Paying Living Expenses: Not hard at all   Food Insecurity: No Food Insecurity (2/29/2024)    Hunger Vital Sign     Worried About Running Out of Food in the Last Year: Never true     Ran Out of Food in the Last Year: Never true   Transportation Needs: No Transportation Needs (2/29/2024)    PRAPARE - Transportation     Lack of Transportation (Medical): No     Lack of Transportation (Non-Medical): No   Physical Activity: Insufficiently Active (2/29/2024)    Exercise Vital Sign     Days of Exercise per Week: 3 days     Minutes of Exercise per Session: 40 min   Stress: No Stress Concern Present (2/29/2024)    [place] [employer]ational [employer] - Occupational Stress Questionnaire     Feeling of Stress : Not at all   Social Connections: Moderately Integrated (2/29/2024)    Social Connection and Isolation Panel [NHANES]     Frequency of Communication with Friends and Family: More than three times a week     Frequency of Social Gatherings with Friends and Family: More than three times a week     Attends Mandaen Services: More than 4 times per year     Active Member of Clubs or Organizations: No     Attends Club or Organization Meetings: Never     Marital Status: Living with partner   Housing  Stability: Low Risk  (2/29/2024)    Housing Stability Vital Sign     Unable to Pay for Housing in the Last Year: No     Number of Places Lived in the Last Year: 1     Unstable Housing in the Last Year: No       Current Outpatient Medications:     albuterol (ACCUNEB) 1.25 mg/3 mL Nebu, Take 1.25 mg by nebulization every 6 (six) hours as needed. Rescue, Disp: , Rfl:     amLODIPine (NORVASC) 10 MG tablet, Take 1 tablet (10 mg total) by mouth once daily., Disp: 30 tablet, Rfl: 2    apixaban (ELIQUIS) 5 mg Tab, Take 1 tablet (5 mg total) by mouth 2 (two) times daily., Disp: 60 tablet, Rfl: 2    budesonide-formoterol 160-4.5 mcg (SYMBICORT) 160-4.5 mcg/actuation HFAA, Inhale 2 puffs into the lungs every 12 (twelve) hours. Controller, Disp: , Rfl:     calcium carbonate (OS-CAROLYN) 500 mg calcium (1,250 mg) tablet, , Disp: , Rfl:     clopidogreL (PLAVIX) 75 mg tablet, Take 1 tablet (75 mg total) by mouth once daily. For 2 months., Disp: 60 tablet, Rfl: 0    dupilumab 300 mg/2 mL PnIj, INJECT 300MG SUBCUTANEOUSLY EVERY TWO WEEKS, Disp: , Rfl:     fluticasone propionate 93 mcg/actuation AerB, 2 sprays by Nasal route 2 (two) times a day., Disp: , Rfl:     mometasone-formoterol (DULERA) 200-5 mcg/actuation inhaler, Inhale 2 puffs into the lungs 2 (two) times daily., Disp: , Rfl:     montelukast (SINGULAIR) 5 MG chewable tablet, Take 5 mg by mouth., Disp: , Rfl:     nasal exhalation resistanc.dev (NASAL EXHALATION RESISTANCE DV NASL), by Nasal route., Disp: , Rfl:     omeprazole 20 mg TbEC, Take 20 mg by mouth once daily., Disp: , Rfl:     pantoprazole (PROTONIX) 40 MG tablet, Take 40 mg by mouth., Disp: , Rfl:     rosuvastatin 10 mg CpSP, , Disp: , Rfl:     tadalafiL (CIALIS) 20 MG Tab, 20 mg., Disp: , Rfl:     tiotropium (SPIRIVA) 18 mcg inhalation capsule, Inhale 18 mcg into the lungs once daily. Controller, Disp: , Rfl:     vitamin D (VITAMIN D3) 1000 units Tab, Take 1,000 Units by mouth once daily., Disp: , Rfl:     REVIEW OF  "SYSTEMS:  General: No fevers or chills; ENT: No sore throat; Allergy and Immunology: no persistent infections; Hematological and Lymphatic: No history of bleeding or easy bruising; Endocrine: negative; Respiratory: no cough, shortness of breath, or wheezing; Cardiovascular: no chest pain or dyspnea on exertion; Gastrointestinal: no abdominal pain/back, change in bowel habits, or bloody stools; Genito-Urinary: no dysuria, trouble voiding, or hematuria; Musculoskeletal: edema; Neurological: no TIA or stroke symptoms; Psychiatric: no nervousness, anxiety or depression.    PHYSICAL EXAM:      Pulse: 94         General appearance:  Alert, well-appearing, and in no distress.  Oriented to person, place, and time                    Neurological: Normal speech, no focal findings noted; CN II - XII grossly intact. RLE with sensation to light touch, LLE with sensation to light touch.            Musculoskeletal: Digits/nail without cyanosis/clubbing.  Strength 5/5 BLE.                    Neck: Supple, no significant adenopathy                  Chest:  No wheezes, symmetric air entry. No use of accessory muscles               Cardiac: Normal rate and regular rhythm            Abdomen: Soft, nontender, nondistended      Extremities:   2+ R DP pulse, 2+ L DP pulse      2+ RLE edema, 1+ LLE edema    Skin:  RLE no ulcer; LLE no ulcer      RLE no spider veins, LLE no spider veins      RLE no varicose veins, LLE no varicose veins    CEAP 3/3    VCSS 5    LAB RESULTS:  No results found for: "CBC"  Lab Results   Component Value Date    LABPROT 11.4 03/08/2024    INR 1.1 03/08/2024     Lab Results   Component Value Date     03/06/2024    K 4.1 03/06/2024     03/06/2024    CO2 23 03/06/2024    GLU 99 03/06/2024    BUN 14 03/06/2024    CREATININE 1.8 (H) 03/06/2024    CALCIUM 9.1 03/06/2024    ANIONGAP 9 03/06/2024    EGFRNONAA 58.9 (A) 09/14/2020     Lab Results   Component Value Date    WBC 8.76 03/05/2024    RBC 3.31 (L) " "03/05/2024    HGB 10.4 (L) 03/05/2024    HCT 31.0 (L) 03/05/2024    MCV 94 03/05/2024    MCH 31.4 (H) 03/05/2024    MCHC 33.5 03/05/2024    RDW 12.1 03/05/2024     03/05/2024    MPV 8.1 (L) 03/05/2024    GRAN 5.4 02/27/2024    GRAN 51.9 02/27/2024    LYMPH 1.1 02/27/2024    LYMPH 10.7 (L) 02/27/2024    MONO 1.1 (H) 02/27/2024    MONO 10.1 02/27/2024    EOS 2.6 (H) 02/27/2024    BASO 0.20 02/27/2024    EOSINOPHIL 25.0 (H) 02/27/2024    BASOPHIL 1.9 02/27/2024    DIFFMETHOD Automated 02/27/2024     .No results found for: "HGBA1C"    IMAGING:  All pertinent imaging has been reviewed and interpreted independently.    Venous US 3/2024 Impression:  Color flow evaluation of the right lower extremity demonstrates no evidence of venous thrombosis in the deep or superficial veins, and no reflux.  Color flow evaluation of the left lower extremity demonstrates no evidence of venous thrombosis in the deep or superficial veins, and no reflux.      IMP/PLAN:  63 y.o. male with   Patient Active Problem List   Diagnosis    Prostate cancer    Elevated serum creatinine    Abnormal weight loss    Allergic rhinitis    Aspirin-exacerbated respiratory disease (AERD)    Chronic maxillary sinusitis    Chronic rhinitis    Spiritual or Latter-day counseling    Cyst of pancreas    Dry eye syndrome of bilateral lacrimal glands    Dyspepsia    Dysphonia    Hyperlipidemia    Irritable bowel syndrome with diarrhea    Elevated blood-pressure reading without diagnosis of hypertension    History of colon polyps    Moderate persistent asthma, uncomplicated    Swelling of right lower extremity    DALIA (acute kidney injury)    Acquired occlusion of iliac vein    Secondary hypertension    being managed by PCP and specialists who is here today for evaluation of BLE edema s/p kissing iliac stents by IR 3/2024 on Plavix and Eliquis.    -rec f/u with IR and continue surveillance of iliac vein stents  -recommend compression with Rx stockings, elevation, " dietary changes associated with water and sodium intake discussed at length with patient  -Exercise   -RTC prn    I spent 12 minutes evaluating this patient and greater than 50% of the time was spent counseling, coordinator care and discussing the plan of care.  All questions were answered and patient stated understanding with agreement with the above treatment plan.    Des Wahl MD [facility]  Vascular and Endovascular Surgery

## 2024-03-11 NOTE — PLAN OF CARE
[facility]  Discharge Final Note    Primary Care Provider: Benedict, Reed    Expected Discharge Date: 3/9/2024    Pt discharged home with no needs.      Discharge Plan A and Plan B have been determined by review of patient's clinical status, future medical and therapeutic needs, and coverage/benefits for post-acute care in coordination with multidisciplinary team members.      Final Discharge Note (most recent)       Final Note - 03/11/24 0744          Final Note    Assessment Type Final Discharge Note     Anticipated Discharge Disposition Home or Self Care        Post-Acute Status    Post-Acute Authorization Other     Coverage Blue Cross Blue Shield     Other Status No Post-Acute Service Needs     Discharge Delays None known at this time                     Important Message from Medicare             Contact Info       [facility]   Relationship: PCP - General    [address] [facility] [address]   Phone: [phone]       Next Steps: Follow up in 2 week(s)    [facility]   Specialty: Interventional Radiology    [address]  [facility] [address]   Phone: [phone]       Next Steps: Follow up in 2 week(s)          Celeste Wakefield LMSW  Part-Time-  [facility]  Ext. [phone]

## 2024-03-12 LAB
NUDT15 GENOTYPE: NORMAL
NUDT15 PHENOTYPE: NORMAL
TPMT ADDITIONAL INFORMATION: NORMAL
TPMT DISCLAIMER: NORMAL
TPMT GENOTYPE RESULT: NORMAL
TPMT INTERPRETATION: NORMAL
TPMT METHOD: NORMAL
TPMT PHENOTYPE: NORMAL
TPMT REVIEWED BY: NORMAL

## 2024-03-13 DIAGNOSIS — I82.421: Primary | ICD-10-CM

## 2024-03-14 ENCOUNTER — OFFICE VISIT (OUTPATIENT)
Dept: UROLOGY | Facility: CLINIC | Age: 64
End: 2024-03-14
Payer: COMMERCIAL

## 2024-03-14 VITALS
DIASTOLIC BLOOD PRESSURE: 74 MMHG | BODY MASS INDEX: 25.31 KG/M2 | WEIGHT: 161.25 LBS | SYSTOLIC BLOOD PRESSURE: 142 MMHG | HEIGHT: 67 IN | HEART RATE: 84 BPM

## 2024-03-14 DIAGNOSIS — R19.09 GROIN SWELLING: ICD-10-CM

## 2024-03-14 DIAGNOSIS — N17.9 AKI (ACUTE KIDNEY INJURY): Primary | ICD-10-CM

## 2024-03-14 PROCEDURE — 99999 PR PBB SHADOW E&M-EST. PATIENT-LVL IV: CPT | Mod: PBBFAC,,,

## 2024-03-14 PROCEDURE — 99215 OFFICE O/P EST HI 40 MIN: CPT | Mod: S$GLB,,,

## 2024-03-14 PROCEDURE — 3078F DIAST BP <80 MM HG: CPT | Mod: CPTII,S$GLB,,

## 2024-03-14 PROCEDURE — 1111F DSCHRG MED/CURRENT MED MERGE: CPT | Mod: CPTII,S$GLB,,

## 2024-03-14 PROCEDURE — 3008F BODY MASS INDEX DOCD: CPT | Mod: CPTII,S$GLB,,

## 2024-03-14 PROCEDURE — 1160F RVW MEDS BY RX/DR IN RCRD: CPT | Mod: CPTII,S$GLB,,

## 2024-03-14 PROCEDURE — 1159F MED LIST DOCD IN RCRD: CPT | Mod: CPTII,S$GLB,,

## 2024-03-14 PROCEDURE — 3077F SYST BP >= 140 MM HG: CPT | Mod: CPTII,S$GLB,,

## 2024-03-14 NOTE — PROGRESS NOTES
CHIEF COMPLAINT:  Penis and testicle swelling       HISTORY OF PRESENTING ILLINESS:  Bradley Collins is a 63 y.o. male new to urology clinic. He was seen inpatient by urology as a consult for DALIA and groin swelling. Presents today for groin swelling follow up. Patient states swelling has decreased greatly. He is s/p pelvic venography angioplasty and stent placement with Dr. Montenegro in IR 3/8/2024. No urinary complaints today.    He was inpatient He was hospitalized at the [facility] on 2/12/2024 for right lower extremity cellulitis. He had a CT with contrast done and was told that it did not show anything. He was prescribed cephalexin.He was hospitalized at [facility] from 2/16/2024 to 2/20/2024 for worsening cellulitis. Creatinine was elevated at 2.0 mg/dL (from 1.1 a year ago). He was initially put on piperacillin-tazobactam and vancomycin. Piperacillin-tazobactam was changed to ceftriaxone. Erythema improved but edema persisted. He had induration of the thigh. CT showed diffuse cutaneous thickening and edema with prominent right inguinal lymph nodes. Antibiotics were switched to oral amoxicillin-clavulanate and doxycycline on 2/19/2024. Edema improved. Induration persisted. He was discharged home.    He followed up at [facility] on 2/27/2024. He had 4 days left of the antibiotics. Swelling had worsened and spread up to his abdomen. He had edema and erythema of the entire right lower extremity, right groin, and right lower abdomen. It was tender and warm. He was advised to return to the emergency department to resume intravenous antibiotic treatment. Labs showed creatinine to still be elevated (1.7 mg/dL, from 1.8 on 2/19/2024). Non-contrast CT showed soft tissue induration throughout the right anterior pelvis, thigh,and leg, skin thickening and edema of the subcutaneous tissue, superficial fascia, and deep fascia, scrotal edema,  and retroperitoneal/extraperitoneal edema along the sidewalls. He was given piperacillin-tazobactam and vancomycin. He was admitted to Hospital Medicine Team W.     PMHx includes asthma, allergic rhinitis, hyperlipidemia, irritable bowel syndrome with diarrhea, history of prostate cancer treated with radiation, history of sinus surgery in 2001. He lives in [facility]. He works for [employer]. [facility]         REVIEW OF SYSTEMS:  Review of Systems   Constitutional:  Negative for chills and fever.   HENT:  Negative for congestion and sore throat.    Respiratory:  Negative for cough and shortness of breath.    Cardiovascular:  Negative for chest pain and palpitations.   Gastrointestinal:  Negative for nausea and vomiting.   Genitourinary:  Negative for dysuria, flank pain, frequency, hematuria and urgency.   Neurological:  Negative for dizziness and headaches.         PATIENT HISTORY:    Past Medical History:   Diagnosis Date    Allergy     Asthma     Cellulitis     Cellulitis of right lower limb 02/16/2024    Elevated PSA     Prostate cancer     Secondary hypertension 3/9/2024       Past Surgical History:   Procedure Laterality Date    ENDOSCOPIC ULTRASOUND OF UPPER GASTROINTESTINAL TRACT N/A 7/25/2023    Procedure: ULTRASOUND, UPPER GI TRACT, ENDOSCOPIC;  Surgeon: Yoseph Li MD;  Location: [address]);  Service: Endoscopy;  Laterality: N/A;  instr portal-va referral    SINUS SURGERY Bilateral 2001       Family History   Family history unknown: Yes       Social History     Socioeconomic History    Marital status: Single   Tobacco Use    Smoking status: Never    Smokeless tobacco: Never   Substance and Sexual Activity    Alcohol use: Yes     Alcohol/week: 1.0 standard drink of alcohol     Types: 1 Cans of beer per week     Comment: week    Drug use: Never     Social Determinants of Health     Financial Resource Strain: Low Risk   (2/29/2024)    Overall Financial Resource Strain (CARDIA)     Difficulty of Paying Living Expenses: Not hard at all   Food Insecurity: No Food Insecurity (2/29/2024)    Hunger Vital Sign     Worried About Running Out of Food in the Last Year: Never true     Ran Out of Food in the Last Year: Never true   Transportation Needs: No Transportation Needs (2/29/2024)    PRAPARE - Transportation     Lack of Transportation (Medical): No     Lack of Transportation (Non-Medical): No   Physical Activity: Insufficiently Active (2/29/2024)    Exercise Vital Sign     Days of Exercise per Week: 3 days     Minutes of Exercise per Session: 40 min   Stress: No Stress Concern Present (2/29/2024)    [place] of Occupational Health - Occupational Stress Questionnaire     Feeling of Stress : Not at all   Social Connections: Moderately Integrated (2/29/2024)    Social Connection and Isolation Panel [NHANES]     Frequency of Communication with Friends and Family: More than three times a week     Frequency of Social Gatherings with Friends and Family: More than three times a week     Attends Sabianist Services: More than 4 times per year     Active Member of Clubs or Organizations: No     Attends Club or Organization Meetings: Never     Marital Status: Living with partner   Housing Stability: Low Risk  (2/29/2024)    Housing Stability Vital Sign     Unable to Pay for Housing in the Last Year: No     Number of Places Lived in the Last Year: 1     Unstable Housing in the Last Year: No       Allergies:  Aspirin    Medications:    Current Outpatient Medications:     albuterol (ACCUNEB) 1.25 mg/3 mL Nebu, Take 1.25 mg by nebulization every 6 (six) hours as needed. Rescue, Disp: , Rfl:     amLODIPine (NORVASC) 10 MG tablet, Take 1 tablet (10 mg total) by mouth once daily., Disp: 30 tablet, Rfl: 2    apixaban (ELIQUIS) 5 mg Tab, Take 1 tablet (5 mg total) by mouth 2 (two) times daily., Disp: 60 tablet, Rfl: 2    budesonide-formoterol  160-4.5 mcg (SYMBICORT) 160-4.5 mcg/actuation HFAA, Inhale 2 puffs into the lungs every 12 (twelve) hours. Controller, Disp: , Rfl:     calcium carbonate (OS-CAROLYN) 500 mg calcium (1,250 mg) tablet, , Disp: , Rfl:     clopidogreL (PLAVIX) 75 mg tablet, Take 1 tablet (75 mg total) by mouth once daily. For 2 months., Disp: 60 tablet, Rfl: 0    dupilumab 300 mg/2 mL PnIj, INJECT 300MG SUBCUTANEOUSLY EVERY TWO WEEKS, Disp: , Rfl:     fluticasone propionate 93 mcg/actuation AerB, 2 sprays by Nasal route 2 (two) times a day., Disp: , Rfl:     mometasone-formoterol (DULERA) 200-5 mcg/actuation inhaler, Inhale 2 puffs into the lungs 2 (two) times daily., Disp: , Rfl:     montelukast (SINGULAIR) 5 MG chewable tablet, Take 5 mg by mouth., Disp: , Rfl:     nasal exhalation resistanc.dev (NASAL EXHALATION RESISTANCE DV NASL), by Nasal route., Disp: , Rfl:     omeprazole 20 mg TbEC, Take 20 mg by mouth once daily., Disp: , Rfl:     pantoprazole (PROTONIX) 40 MG tablet, Take 40 mg by mouth., Disp: , Rfl:     rosuvastatin 10 mg CpSP, , Disp: , Rfl:     tadalafiL (CIALIS) 20 MG Tab, 20 mg., Disp: , Rfl:     tiotropium (SPIRIVA) 18 mcg inhalation capsule, Inhale 18 mcg into the lungs once daily. Controller, Disp: , Rfl:     vitamin D (VITAMIN D3) 1000 units Tab, Take 1,000 Units by mouth once daily., Disp: , Rfl:     PHYSICAL EXAMINATION:  Physical Exam  Constitutional:       Appearance: Normal appearance.   HENT:      Head: Normocephalic and atraumatic.      Right Ear: External ear normal.      Left Ear: External ear normal.      Nose: Nose normal.      Mouth/Throat:      Mouth: Mucous membranes are moist.   Pulmonary:      Effort: Pulmonary effort is normal. No respiratory distress.   Genitourinary:     Penis: Normal and uncircumcised.       Comments: Swelling improved, structures palpated within scrotum. Suture is clean dry intact.  Skin:     General: Skin is warm and dry.   Neurological:      General: No focal deficit present.       Mental Status: He is alert and oriented to person, place, and time.   Psychiatric:         Mood and Affect: Mood normal.         Behavior: Behavior normal.           Lab Results   Component Value Date    CREATININE 1.8 (H) 03/06/2024    EGFRNORACEVR 41.5 (A) 03/06/2024             IMPRESSION:    Encounter Diagnoses   Name Primary?    DALIA (acute kidney injury) Yes    Groin swelling          Assessment:       1. DALIA (acute kidney injury)    2. Groin swelling        Plan:   - Renal US and CMP in 1 week. Will call with results and follow up.    RTC TBD labs and imaging     I spent 45 minutes with the patient of which more than half was spent in direct consultation with the patient in regards to our treatment and plan.  We addressed the office findings and recent labs.   Education and recommendations of today's plan of care including home remedies and needed follow up with PCP.   We discussed the chief complaint; reviewed the LUTS and the possible contributory factors.   Reassurance no infection.

## 2024-03-18 LAB — CRYOGLOB SER QL: NORMAL

## 2024-03-21 ENCOUNTER — PATIENT MESSAGE (OUTPATIENT)
Dept: UROLOGY | Facility: CLINIC | Age: 64
End: 2024-03-21
Payer: COMMERCIAL

## 2024-03-21 ENCOUNTER — HOSPITAL ENCOUNTER (OUTPATIENT)
Dept: RADIOLOGY | Facility: OTHER | Age: 64
Discharge: HOME | End: 2024-03-21
Payer: COMMERCIAL

## 2024-03-21 DIAGNOSIS — R19.09 GROIN SWELLING: ICD-10-CM

## 2024-03-21 DIAGNOSIS — N17.9 AKI (ACUTE KIDNEY INJURY): ICD-10-CM

## 2024-03-21 PROCEDURE — 76770 US EXAM ABDO BACK WALL COMP: CPT | Mod: TC

## 2024-03-21 PROCEDURE — 76770 US EXAM ABDO BACK WALL COMP: CPT | Mod: 26,,, | Performed by: RADIOLOGY

## 2024-03-22 ENCOUNTER — TELEPHONE (OUTPATIENT)
Dept: UROLOGY | Facility: CLINIC | Age: 64
End: 2024-03-22
Payer: COMMERCIAL

## 2024-03-22 NOTE — TELEPHONE ENCOUNTER
----- Message from Elena Alexander NP sent at 3/21/2024  3:45 PM CDT -----  Regarding: follow up  Please schedule Mr. Collins to see me next week in office for UA and PVR. Please book in an extended slot in the event he needs to be taught CIC. Thanks.

## 2024-03-25 ENCOUNTER — OFFICE VISIT (OUTPATIENT)
Dept: UROLOGY | Facility: CLINIC | Age: 64
End: 2024-03-25
Payer: COMMERCIAL

## 2024-03-25 VITALS
BODY MASS INDEX: 25.1 KG/M2 | SYSTOLIC BLOOD PRESSURE: 132 MMHG | DIASTOLIC BLOOD PRESSURE: 73 MMHG | HEIGHT: 67 IN | HEART RATE: 85 BPM | WEIGHT: 159.94 LBS

## 2024-03-25 DIAGNOSIS — N13.30 BILATERAL HYDRONEPHROSIS: Primary | ICD-10-CM

## 2024-03-25 DIAGNOSIS — N13.30 HYDRONEPHROSIS, UNSPECIFIED HYDRONEPHROSIS TYPE: ICD-10-CM

## 2024-03-25 DIAGNOSIS — N17.9 AKI (ACUTE KIDNEY INJURY): ICD-10-CM

## 2024-03-25 DIAGNOSIS — N32.89 BLADDER WALL THICKENING: ICD-10-CM

## 2024-03-25 LAB
BILIRUB SERPL-MCNC: NORMAL MG/DL
BLOOD URINE, POC: NORMAL
CLARITY, POC UA: CLEAR
COLOR, POC UA: YELLOW
GLUCOSE UR QL STRIP: NORMAL
KETONES UR QL STRIP: NORMAL
LEUKOCYTE ESTERASE URINE, POC: NORMAL
NITRITE, POC UA: NORMAL
PH, POC UA: 5
POC RESIDUAL URINE VOLUME: 92 ML (ref 0–100)
PROTEIN, POC: NORMAL
SPECIFIC GRAVITY, POC UA: 1.01
UROBILINOGEN, POC UA: NORMAL

## 2024-03-25 PROCEDURE — 99999 PR PBB SHADOW E&M-EST. PATIENT-LVL IV: CPT | Mod: PBBFAC,,,

## 2024-03-25 PROCEDURE — 1160F RVW MEDS BY RX/DR IN RCRD: CPT | Mod: CPTII,S$GLB,,

## 2024-03-25 PROCEDURE — 3078F DIAST BP <80 MM HG: CPT | Mod: CPTII,S$GLB,,

## 2024-03-25 PROCEDURE — 81002 URINALYSIS NONAUTO W/O SCOPE: CPT | Mod: S$GLB,,,

## 2024-03-25 PROCEDURE — 1159F MED LIST DOCD IN RCRD: CPT | Mod: CPTII,S$GLB,,

## 2024-03-25 PROCEDURE — 51798 US URINE CAPACITY MEASURE: CPT | Mod: S$GLB,,,

## 2024-03-25 PROCEDURE — 3008F BODY MASS INDEX DOCD: CPT | Mod: CPTII,S$GLB,,

## 2024-03-25 PROCEDURE — 1111F DSCHRG MED/CURRENT MED MERGE: CPT | Mod: CPTII,S$GLB,,

## 2024-03-25 PROCEDURE — 87086 URINE CULTURE/COLONY COUNT: CPT

## 2024-03-25 PROCEDURE — 99214 OFFICE O/P EST MOD 30 MIN: CPT | Mod: S$GLB,,,

## 2024-03-25 PROCEDURE — 3075F SYST BP GE 130 - 139MM HG: CPT | Mod: CPTII,S$GLB,,

## 2024-03-25 NOTE — PROGRESS NOTES
CHIEF COMPLAINT:  Bilateral hydronephrosis       HISTORY OF PRESENTING ILLINESS:  Bradley Collins is a 63 y.o. male new to urology clinic. He was seen inpatient by urology as a consult for DALIA and groin swelling. Seen in clinic with me 3/14/24 for hospital follow up. Renal US ordered at that time due to R hydroureteronephrosis on CT 3/5/24. US revealed new mild bilateral hydronephrosis and bladder wall thickening. Tx for prostate cancer with radiation November 2020 - January 2021.     Patient states swelling has decreased greatly. He is s/p pelvic venography angioplasty and stent placement with Dr. Montenegro in IR 3/8/2024. No urinary complaints today.     He was inpatient He was hospitalized at the [facility] on 2/12/2024 for right lower extremity cellulitis. He had a CT with contrast done and was told that it did not show anything. He was prescribed cephalexin.He was hospitalized at [facility] from 2/16/2024 to 2/20/2024 for worsening cellulitis. Creatinine was elevated at 2.0 mg/dL (from 1.1 a year ago). He was initially put on piperacillin-tazobactam and vancomycin. Piperacillin-tazobactam was changed to ceftriaxone. Erythema improved but edema persisted. He had induration of the thigh. CT showed diffuse cutaneous thickening and edema with prominent right inguinal lymph nodes. Antibiotics were switched to oral amoxicillin-clavulanate and doxycycline on 2/19/2024. Edema improved. Induration persisted. He was discharged home.     He followed up at [facility] clinic on 2/27/2024. He had 4 days left of the antibiotics. Swelling had worsened and spread up to his abdomen. He had edema and erythema of the entire right lower extremity, right groin, and right lower abdomen. It was tender and warm. He was advised to return to the emergency department to resume intravenous antibiotic treatment. Labs showed creatinine to still be elevated (1.7 mg/dL,  from 1.8 on 2/19/2024). Non-contrast CT showed soft tissue induration throughout the right anterior pelvis, thigh,and leg, skin thickening and edema of the subcutaneous tissue, superficial fascia, and deep fascia, scrotal edema, and retroperitoneal/extraperitoneal edema along the sidewalls. He was given piperacillin-tazobactam and vancomycin. He was admitted to Hospital Medicine Team W.      PMHx includes asthma, allergic rhinitis, hyperlipidemia, irritable bowel syndrome with diarrhea, history of prostate cancer treated with radiation, history of sinus surgery in 2001. He lives in [facility]. He works for [employer]. His primary care clinic is the [facility] clinic.         REVIEW OF SYSTEMS:  Review of Systems   Constitutional:  Negative for chills and fever.   HENT:  Negative for congestion and sore throat.    Respiratory:  Negative for cough and shortness of breath.    Cardiovascular:  Negative for chest pain and palpitations.   Gastrointestinal:  Negative for nausea and vomiting.   Genitourinary:  Negative for dysuria, flank pain, frequency, hematuria and urgency.   Neurological:  Negative for dizziness and headaches.         PATIENT HISTORY:    Past Medical History:   Diagnosis Date    Allergy     Asthma     Cellulitis     Cellulitis of right lower limb 02/16/2024    Elevated PSA     Prostate cancer     Secondary hypertension 3/9/2024       Past Surgical History:   Procedure Laterality Date    ENDOSCOPIC ULTRASOUND OF UPPER GASTROINTESTINAL TRACT N/A 7/25/2023    Procedure: ULTRASOUND, UPPER GI TRACT, ENDOSCOPIC;  Surgeon: Yoseph Li MD;  Location: [address]);  Service: Endoscopy;  Laterality: N/A;  instr portal-va referral    SINUS SURGERY Bilateral 2001       Family History   Family history unknown: Yes       Social History     Socioeconomic History    Marital status: Single   Tobacco Use    Smoking status: Never    Smokeless tobacco: Never   Substance and Sexual  Activity    Alcohol use: Yes     Alcohol/week: 1.0 standard drink of alcohol     Types: 1 Cans of beer per week     Comment: week    Drug use: Never     Social Determinants of Health     Financial Resource Strain: Low Risk  (2/29/2024)    Overall Financial Resource Strain (CARDIA)     Difficulty of Paying Living Expenses: Not hard at all   Food Insecurity: No Food Insecurity (2/29/2024)    Hunger Vital Sign     Worried About Running Out of Food in the Last Year: Never true     Ran Out of Food in the Last Year: Never true   Transportation Needs: No Transportation Needs (2/29/2024)    PRAPARE - Transportation     Lack of Transportation (Medical): No     Lack of Transportation (Non-Medical): No   Physical Activity: Insufficiently Active (2/29/2024)    Exercise Vital Sign     Days of Exercise per Week: 3 days     Minutes of Exercise per Session: 40 min   Stress: No Stress Concern Present (2/29/2024)    [employer] [place] [employer] - Occupational Stress Questionnaire     Feeling of Stress : Not at all   Social Connections: Moderately Integrated (2/29/2024)    Social Connection and Isolation Panel [NHANES]     Frequency of Communication with Friends and Family: More than three times a week     Frequency of Social Gatherings with Friends and Family: More than three times a week     Attends Jehov[employer] witness Services: More than 4 times per year     Active Member of Clubs or Organizations: No     Attends Club or Organization Meetings: Never     Marital Status: Living with partner   Housing Stability: Low Risk  (2/29/2024)    Housing Stability Vital Sign     Unable to Pay for Housing in the Last Year: No     Number of Places Lived in the Last Year: 1     Unstable Housing in the Last Year: No       Allergies:  Aspirin    Medications:    Current Outpatient Medications:     albuterol (ACCUNEB) 1.25 mg/3 mL Nebu, Take 1.25 mg by nebulization every 6 (six) hours as needed. Rescue, Disp: , Rfl:     amLODIPine (NORVASC) 10 MG  tablet, Take 1 tablet (10 mg total) by mouth once daily., Disp: 30 tablet, Rfl: 2    apixaban (ELIQUIS) 5 mg Tab, Take 1 tablet (5 mg total) by mouth 2 (two) times daily., Disp: 60 tablet, Rfl: 2    budesonide-formoterol 160-4.5 mcg (SYMBICORT) 160-4.5 mcg/actuation HFAA, Inhale 2 puffs into the lungs every 12 (twelve) hours. Controller, Disp: , Rfl:     calcium carbonate (OS-CAROLYN) 500 mg calcium (1,250 mg) tablet, , Disp: , Rfl:     clopidogreL (PLAVIX) 75 mg tablet, Take 1 tablet (75 mg total) by mouth once daily. For 2 months., Disp: 60 tablet, Rfl: 0    dupilumab 300 mg/2 mL PnIj, INJECT 300MG SUBCUTANEOUSLY EVERY TWO WEEKS, Disp: , Rfl:     fluticasone propionate 93 mcg/actuation AerB, 2 sprays by Nasal route 2 (two) times a day., Disp: , Rfl:     mometasone-formoterol (DULERA) 200-5 mcg/actuation inhaler, Inhale 2 puffs into the lungs 2 (two) times daily., Disp: , Rfl:     montelukast (SINGULAIR) 5 MG chewable tablet, Take 5 mg by mouth., Disp: , Rfl:     nasal exhalation resistanc.dev (NASAL EXHALATION RESISTANCE DV NASL), by Nasal route., Disp: , Rfl:     omeprazole 20 mg TbEC, Take 20 mg by mouth once daily., Disp: , Rfl:     pantoprazole (PROTONIX) 40 MG tablet, Take 40 mg by mouth., Disp: , Rfl:     rosuvastatin 10 mg CpSP, , Disp: , Rfl:     tadalafiL (CIALIS) 20 MG Tab, 20 mg., Disp: , Rfl:     tiotropium (SPIRIVA) 18 mcg inhalation capsule, Inhale 18 mcg into the lungs once daily. Controller, Disp: , Rfl:     vitamin D (VITAMIN D3) 1000 units Tab, Take 1,000 Units by mouth once daily., Disp: , Rfl:     PHYSICAL EXAMINATION:  Physical Exam  Constitutional:       Appearance: Normal appearance.   HENT:      Head: Normocephalic and atraumatic.      Right Ear: External ear normal.      Left Ear: External ear normal.      Nose: Nose normal.      Mouth/Throat:      Mouth: Mucous membranes are moist.   Pulmonary:      Effort: Pulmonary effort is normal. No respiratory distress.   Skin:     General: Skin is warm  and dry.   Neurological:      General: No focal deficit present.      Mental Status: He is alert and oriented to person, place, and time.   Psychiatric:         Mood and Affect: Mood normal.         Behavior: Behavior normal.           LABS:  UA WNL      Lab Results   Component Value Date    CREATININE 1.7 (H) 03/21/2024    EGFRNORACEVR 44 (A) 03/21/2024         IMPRESSION:    Encounter Diagnoses   Name Primary?    Bilateral hydronephrosis Yes    Hydronephrosis, unspecified hydronephrosis type     Bladder wall thickening          Assessment:       1. Bilateral hydronephrosis    2. Hydronephrosis, unspecified hydronephrosis type    3. Bladder wall thickening        Plan:   - Recommended lee catheter to be place today in clinic. Discussed rationale. Patient declined at this time. Patient verbalized understanding of potentially worsening hydronephrosis and worsening of renal function. Declined CIC.   - Order placed for renal Lasix scan. Rationale provided.  - Discussed initiation of Flomax to help with complete bladder emptying, patient declined.   -  left with Nanette in nuclear medicine for scheduling of renal scan.  - Referral place to nephrology for DALIA.    RTC dependent on scheduling of Lasix renal scan    I spent 30 minutes with the patient of which more than half was spent in direct consultation with the patient in regards to our treatment and plan.  We addressed the office findings and recent labs; any need to go ER today.   Education and recommendations of today's plan of care including home remedies and needed follow up with PCP.   We discussed the chief complaints; reviewed the LUTS and the possible contributory factors.

## 2024-03-26 ENCOUNTER — OFFICE VISIT (OUTPATIENT)
Dept: INTERVENTIONAL RADIOLOGY/VASCULAR | Facility: CLINIC | Age: 64
End: 2024-03-26
Payer: COMMERCIAL

## 2024-03-26 VITALS
BODY MASS INDEX: 25.6 KG/M2 | SYSTOLIC BLOOD PRESSURE: 139 MMHG | HEIGHT: 67 IN | HEART RATE: 95 BPM | DIASTOLIC BLOOD PRESSURE: 73 MMHG | WEIGHT: 163.13 LBS

## 2024-03-26 DIAGNOSIS — I82.429: Primary | ICD-10-CM

## 2024-03-26 LAB — BACTERIA UR CULT: NO GROWTH

## 2024-03-26 PROCEDURE — 1111F DSCHRG MED/CURRENT MED MERGE: CPT | Mod: CPTII,S$GLB,, | Performed by: FAMILY MEDICINE

## 2024-03-26 PROCEDURE — 3008F BODY MASS INDEX DOCD: CPT | Mod: CPTII,S$GLB,, | Performed by: FAMILY MEDICINE

## 2024-03-26 PROCEDURE — 1160F RVW MEDS BY RX/DR IN RCRD: CPT | Mod: CPTII,S$GLB,, | Performed by: FAMILY MEDICINE

## 2024-03-26 PROCEDURE — 3078F DIAST BP <80 MM HG: CPT | Mod: CPTII,S$GLB,, | Performed by: FAMILY MEDICINE

## 2024-03-26 PROCEDURE — 99999 PR PBB SHADOW E&M-EST. PATIENT-LVL IV: CPT | Mod: PBBFAC,,, | Performed by: FAMILY MEDICINE

## 2024-03-26 PROCEDURE — 99213 OFFICE O/P EST LOW 20 MIN: CPT | Mod: S$GLB,,, | Performed by: FAMILY MEDICINE

## 2024-03-26 PROCEDURE — 1159F MED LIST DOCD IN RCRD: CPT | Mod: CPTII,S$GLB,, | Performed by: FAMILY MEDICINE

## 2024-03-26 PROCEDURE — 3075F SYST BP GE 130 - 139MM HG: CPT | Mod: CPTII,S$GLB,, | Performed by: FAMILY MEDICINE

## 2024-03-26 NOTE — PROGRESS NOTES
"Subjective     Patient ID: Bradley Collins is a 63 y.o. male.    Chief Complaint: Hospital Follow Up    Patient here for follow up of edema and erythema of the entire right lower extremity, right groin, and right lower abdomen. He was admitted for workup on 2/27/2024. CT scan obtained on 3/5/2024 noted "There is significant compression of the right external iliac vein extending to the common iliac.  Soft tissue encases the vessels and ureter in the right hemipelvis.  Questionable filling defect on the delayed images in the region of the right common iliac series 3, image 136." Patient underwent successful recanalization of iliac veins with placement of kissing Venovo stents in the external iliac to IVC on 3/8/2024. Today he reports significant improvement in his edema, but not complete resolution. He endorses some mild edema to the right lower extremity. He is wearing his compression stocking. He endorses adherence to plavix and eliquis. He asks about exercise limitations and tells me he randomly experiences pins and needles sensation behind the right knee and in the medial anterior thigh.       Review of Systems   Constitutional:  Negative for activity change, appetite change, chills, fatigue and fever.   Cardiovascular:  Positive for leg swelling.   Gastrointestinal:  Negative for abdominal distention.   Musculoskeletal:  Negative for leg pain.          Objective     Physical Exam  Constitutional:       General: He is not in acute distress.     Appearance: He is well-developed. He is not diaphoretic.   HENT:      Head: Normocephalic and atraumatic.   Pulmonary:      Effort: Pulmonary effort is normal. No respiratory distress.   Musculoskeletal:      Right lower leg: No tenderness. Edema (trace - 1+) present.      Left lower leg: No tenderness. No edema.   Neurological:      Mental Status: He is alert and oriented to person, place, and time.   Psychiatric:         Behavior: Behavior normal.         Thought Content: " "Thought content normal.         Judgment: Judgment normal.            Assessment and Plan     1. Acquired occlusion of iliac vein  -     CT Abdomen Pelvis With IV Contrast Routine Oral Contrast; Future; Expected date: 03/26/2024        Reassured patient he does not have any restrictions regarding exercise. Sutures removed without difficulty. Patient tolerated well. I will discuss the "pins and needle" sensation with Dr. Trejo and Dr. Gonsalez and call patient back. Plan of care is to obtain CT venogram 6 months post recanalization. Encouraged to continue with compression stocking and adherence with plavix and eliquis. Patient verbalized understanding and agreement. Clinic phone number provided. CT venogram scheduled for 9/9/2024.         "

## 2024-04-01 ENCOUNTER — TELEPHONE (OUTPATIENT)
Dept: INTERVENTIONAL RADIOLOGY/VASCULAR | Facility: HOSPITAL | Age: 64
End: 2024-04-01
Payer: COMMERCIAL

## 2024-04-01 ENCOUNTER — TELEPHONE (OUTPATIENT)
Dept: NEPHROLOGY | Facility: CLINIC | Age: 64
End: 2024-04-01
Payer: COMMERCIAL

## 2024-04-01 NOTE — TELEPHONE ENCOUNTER
"Discussed case with Dr. Gonsalez. Called patient and reassured "pins and needles" sensation unlikely related to procedure. Advised if worsens or bothers patient he should f/u with PCP. Encouraged patient to keep appointment for CT in September. No further questions/concerns at this time.  "

## 2024-04-01 NOTE — TELEPHONE ENCOUNTER
----- Message from Kaden Chambers sent at 4/1/2024  3:34 PM CDT -----  Type: General Call Back     Name of Caller:pt   Reason pt would like an call back concerning kidneys  Would the patient rather a call back or a response via [facility]? Call   Best Call Back Number: [phone]  Additional Information:

## 2024-04-03 ENCOUNTER — OFFICE VISIT (OUTPATIENT)
Dept: NEPHROLOGY | Facility: CLINIC | Age: 64
End: 2024-04-03
Payer: COMMERCIAL

## 2024-04-03 ENCOUNTER — LAB VISIT (OUTPATIENT)
Dept: LAB | Facility: HOSPITAL | Age: 64
End: 2024-04-03
Payer: COMMERCIAL

## 2024-04-03 VITALS
HEART RATE: 81 BPM | SYSTOLIC BLOOD PRESSURE: 133 MMHG | OXYGEN SATURATION: 98 % | BODY MASS INDEX: 25.74 KG/M2 | DIASTOLIC BLOOD PRESSURE: 78 MMHG | WEIGHT: 164 LBS | HEIGHT: 67 IN

## 2024-04-03 DIAGNOSIS — N17.9 AKI (ACUTE KIDNEY INJURY): ICD-10-CM

## 2024-04-03 DIAGNOSIS — N13.30 HYDRONEPHROSIS, UNSPECIFIED HYDRONEPHROSIS TYPE: ICD-10-CM

## 2024-04-03 DIAGNOSIS — I10 HYPERTENSION, UNSPECIFIED TYPE: ICD-10-CM

## 2024-04-03 DIAGNOSIS — N17.9 AKI (ACUTE KIDNEY INJURY): Primary | ICD-10-CM

## 2024-04-03 LAB
ALBUMIN SERPL BCP-MCNC: 3.8 G/DL (ref 3.5–5.2)
ANION GAP SERPL CALC-SCNC: 6 MMOL/L (ref 8–16)
BUN SERPL-MCNC: 19 MG/DL (ref 8–23)
CALCIUM SERPL-MCNC: 9.9 MG/DL (ref 8.7–10.5)
CHLORIDE SERPL-SCNC: 107 MMOL/L (ref 95–110)
CO2 SERPL-SCNC: 28 MMOL/L (ref 23–29)
CREAT SERPL-MCNC: 1.7 MG/DL (ref 0.5–1.4)
EST. GFR  (NO RACE VARIABLE): 44.5 ML/MIN/1.73 M^2
GLUCOSE SERPL-MCNC: 94 MG/DL (ref 70–110)
PHOSPHATE SERPL-MCNC: 2.9 MG/DL (ref 2.7–4.5)
POTASSIUM SERPL-SCNC: 4 MMOL/L (ref 3.5–5.1)
SODIUM SERPL-SCNC: 141 MMOL/L (ref 136–145)

## 2024-04-03 PROCEDURE — 1159F MED LIST DOCD IN RCRD: CPT | Mod: CPTII,S$GLB,, | Performed by: NURSE PRACTITIONER

## 2024-04-03 PROCEDURE — 1111F DSCHRG MED/CURRENT MED MERGE: CPT | Mod: CPTII,S$GLB,, | Performed by: NURSE PRACTITIONER

## 2024-04-03 PROCEDURE — 80069 RENAL FUNCTION PANEL: CPT | Performed by: NURSE PRACTITIONER

## 2024-04-03 PROCEDURE — 3008F BODY MASS INDEX DOCD: CPT | Mod: CPTII,S$GLB,, | Performed by: NURSE PRACTITIONER

## 2024-04-03 PROCEDURE — 3075F SYST BP GE 130 - 139MM HG: CPT | Mod: CPTII,S$GLB,, | Performed by: NURSE PRACTITIONER

## 2024-04-03 PROCEDURE — 3066F NEPHROPATHY DOC TX: CPT | Mod: CPTII,S$GLB,, | Performed by: NURSE PRACTITIONER

## 2024-04-03 PROCEDURE — 1160F RVW MEDS BY RX/DR IN RCRD: CPT | Mod: CPTII,S$GLB,, | Performed by: NURSE PRACTITIONER

## 2024-04-03 PROCEDURE — 99999 PR PBB SHADOW E&M-EST. PATIENT-LVL V: CPT | Mod: PBBFAC,,, | Performed by: NURSE PRACTITIONER

## 2024-04-03 PROCEDURE — 3078F DIAST BP <80 MM HG: CPT | Mod: CPTII,S$GLB,, | Performed by: NURSE PRACTITIONER

## 2024-04-03 PROCEDURE — 36415 COLL VENOUS BLD VENIPUNCTURE: CPT | Performed by: NURSE PRACTITIONER

## 2024-04-03 PROCEDURE — 99214 OFFICE O/P EST MOD 30 MIN: CPT | Mod: S$GLB,,, | Performed by: NURSE PRACTITIONER

## 2024-04-03 NOTE — PROGRESS NOTES
Subjective:       Patient ID: Bradley Collins is a 63 y.o. AA male who presents for new evaluation of of renal dysfunction.      HPI     Patient is new to me. New to clinic.  Prior pertinent chart reviewed since this is patient's first appointment with me. Presents with wife.    Patient presents for new evaluation of renal dysfunction.  Baseline creatinine of 1.1-1.3 mg/dL. One year passed between lab results available in EMR.  Recent DALIA peaked at 2.1 --> 1.5 --> 1.7-1.8 mg/dL    [facility]. Discharged on cephalexin.  Admitted to [facility] 2/16-2/20/24. He was on Zosyn, which was changed to ceftriaxone, and vancomycin. Discharged on PO augmentin and doxycycline. Still with induration to upper leg.  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 Also had DALIA c sCr 1.7. Given Zosyn and Vancomycin, which was changed to ceftriaxone, daptomycin, and linezolid. Diagnosed with lymphedema causing swelling to RLE, right pelvis, penis, scrotum. CT found right iliac vein filling defect due to thrombus; had iliac vein recanalaziation, which caused improvmeent in swelling.  Nephrology consulted for DALIA. Amlodipine started for HTN (new) thought to be 2/2 DALIA. Nephrology also said sCr may be at new baseline, though the differential did include AIN. Nephrology signed off after sCr improved with recommendation for outpt follow up. sCr elevated (1.5-->1.8) the day after nephrology signed off.    Seen by urology 3/25/24 for hydronephrosis. He declined lee catheter that was recommended as well as starting Flomax. Plans for lasix renal scan.    Significant other medical problems include asthma, prostate cancer    Significant family hx includes: mother and brother with kidney disease, stage 3    Last renal US: March 21, 2024: New mild bilateral hydronephrosis noting preserved urine jets within the urinary bladder. Also with bladder wall thickening.  Increased renal  "cortical echogenicity.  3/5/24 - mild right hydroureteronephrosis noted on CT abdomen  3/3/24 - no hydronephrosis on US    Review of Systems   Respiratory:  Negative for shortness of breath.    Cardiovascular:  Positive for leg swelling (right leg).   Gastrointestinal:  Negative for diarrhea, nausea and vomiting.   Genitourinary:  Negative for difficulty urinating, dysuria, flank pain, frequency, hematuria and urgency.        Feels like he empties bladder completely       Objective:       Blood pressure 133/78, pulse 81, height 5' 7" (1.702 m), weight 74.4 kg (164 lb 0.4 oz), SpO2 98 %.  Physical Exam  Vitals reviewed.   Constitutional:       General: He is not in acute distress.     Appearance: He is well-developed.   Eyes:      Conjunctiva/sclera: Conjunctivae normal.   Cardiovascular:      Rate and Rhythm: Normal rate and regular rhythm.      Heart sounds: Normal heart sounds. No murmur heard.     No friction rub. No gallop.   Pulmonary:      Effort: Pulmonary effort is normal. No respiratory distress.      Breath sounds: Normal breath sounds.   Abdominal:      Tenderness: There is no right CVA tenderness or left CVA tenderness.   Musculoskeletal:      Cervical back: Neck supple.      Right lower leg: Edema (trace) present.      Left lower leg: No edema.   Skin:     General: Skin is warm and dry.      Findings: No lesion or rash.   Neurological:      Mental Status: He is alert and oriented to person, place, and time.   Psychiatric:         Mood and Affect: Mood normal.         Behavior: Behavior normal.         Thought Content: Thought content normal.         Judgment: Judgment normal.           Lab Results   Component Value Date    CREATININE 1.7 (H) 03/21/2024    URICACID 6.3 03/03/2024     Prot/Creat Ratio, Urine   Date Value Ref Range Status   03/07/2024 0.22 (H) 0.00 - 0.20 Final     Lab Results   Component Value Date     (L) 03/21/2024    K 4.4 03/21/2024    CO2 26 03/21/2024     03/21/2024 " "    Lab Results   Component Value Date    CALCIUM 9.4 03/21/2024     Lab Results   Component Value Date    HGB 10.4 (L) 03/05/2024    WBC 8.76 03/05/2024    HCT 31.0 (L) 03/05/2024      Lab Results   Component Value Date     03/05/2024    BUN 14 03/21/2024     No results found for: "LDLCALC"      Assessment:       1. DALIA (acute kidney injury)    2. Hydronephrosis, unspecified hydronephrosis type    3. Hypertension, unspecified type        Plan:   DALIA - unclear baseline sCr due to lack of available labs. Suspect obstructive nephropathy with new hydronephrosis. Already seeing urology, but he declined lee and CIC and flomax at the time.  - Repeat labs. Planning for lasix scan. If sCr has risen further, recommend lee, which he is more agreeable to now.    UPCR Mild proteinuria in setting of DALIA.   Acid-base WNL   Secondary hyperparathyroidism Defer   Anemia Hgb at goal   DM N/a   Lipid Management Defer   ESRD planning Mother-in-law was on dialysis.      HTN - WNL on amlodipine 10 mg    All questions patient had were answered.  Asked if further questions. None. F/u in clinic in 3 mos with labs and urine prior to next visit or sooner if needed.  ER for emergency concerns.    Summary of Plan:  [facility])  RFP today  RTC in 3 mos    "

## 2024-04-03 NOTE — Clinical Note
Rodri Parker, I'm repeating labs today. He is more open to a lee now. Let's see what his labs show.  Thanks, Carolyn

## 2024-04-04 ENCOUNTER — PATIENT MESSAGE (OUTPATIENT)
Dept: NEPHROLOGY | Facility: CLINIC | Age: 64
End: 2024-04-04
Payer: COMMERCIAL

## 2024-04-10 ENCOUNTER — PATIENT MESSAGE (OUTPATIENT)
Dept: UROLOGY | Facility: CLINIC | Age: 64
End: 2024-04-10
Payer: COMMERCIAL

## 2024-04-10 ENCOUNTER — HOSPITAL ENCOUNTER (OUTPATIENT)
Dept: RADIOLOGY | Facility: HOSPITAL | Age: 64
Discharge: HOME | End: 2024-04-10
Payer: COMMERCIAL

## 2024-04-10 DIAGNOSIS — N13.9 OBSTRUCTIVE UROPATHY: ICD-10-CM

## 2024-04-10 DIAGNOSIS — N13.30 HYDRONEPHROSIS, UNSPECIFIED HYDRONEPHROSIS TYPE: ICD-10-CM

## 2024-04-10 DIAGNOSIS — N13.30 BILATERAL HYDRONEPHROSIS: Primary | ICD-10-CM

## 2024-04-10 PROCEDURE — A9562 TC99M MERTIATIDE: HCPCS

## 2024-04-10 PROCEDURE — 63600175 PHARM REV CODE 636 W HCPCS

## 2024-04-10 PROCEDURE — 78708 K FLOW/FUNCT IMAGE W/DRUG: CPT | Mod: 26,,, | Performed by: STUDENT IN AN ORGANIZED HEALTH CARE EDUCATION/TRAINING PROGRAM

## 2024-04-10 PROCEDURE — 78708 K FLOW/FUNCT IMAGE W/DRUG: CPT | Mod: TC

## 2024-04-10 RX ORDER — FUROSEMIDE 10 MG/ML
40 INJECTION INTRAMUSCULAR; INTRAVENOUS ONCE
Status: COMPLETED | OUTPATIENT
Start: 2024-04-10 | End: 2024-04-10

## 2024-04-10 RX ORDER — BETIATIDE 1 MG/1
5 INJECTION, POWDER, LYOPHILIZED, FOR SOLUTION INTRAVENOUS
Status: COMPLETED | OUTPATIENT
Start: 2024-04-10 | End: 2024-04-10

## 2024-04-10 RX ADMIN — TECHNESCAN TC 99M MERTIATIDE 5.25 MILLICURIE: 1 INJECTION, POWDER, LYOPHILIZED, FOR SOLUTION INTRAVENOUS at 10:04

## 2024-04-10 RX ADMIN — FUROSEMIDE 40 MG: 10 INJECTION, SOLUTION INTRAVENOUS at 11:04

## 2024-04-16 ENCOUNTER — OFFICE VISIT (OUTPATIENT)
Dept: UROLOGY | Facility: CLINIC | Age: 64
End: 2024-04-16
Payer: COMMERCIAL

## 2024-04-16 ENCOUNTER — LAB VISIT (OUTPATIENT)
Dept: LAB | Facility: HOSPITAL | Age: 64
End: 2024-04-16
Attending: UROLOGY
Payer: COMMERCIAL

## 2024-04-16 VITALS
SYSTOLIC BLOOD PRESSURE: 143 MMHG | HEART RATE: 79 BPM | WEIGHT: 156.5 LBS | DIASTOLIC BLOOD PRESSURE: 85 MMHG | BODY MASS INDEX: 24.56 KG/M2 | HEIGHT: 67 IN

## 2024-04-16 DIAGNOSIS — N13.30 BILATERAL HYDRONEPHROSIS: ICD-10-CM

## 2024-04-16 DIAGNOSIS — N13.9 OBSTRUCTIVE UROPATHY: ICD-10-CM

## 2024-04-16 DIAGNOSIS — N13.30 HYDRONEPHROSIS, UNSPECIFIED HYDRONEPHROSIS TYPE: Primary | ICD-10-CM

## 2024-04-16 LAB — COMPLEXED PSA SERPL-MCNC: 0.45 NG/ML (ref 0–4)

## 2024-04-16 PROCEDURE — 3079F DIAST BP 80-89 MM HG: CPT | Mod: CPTII,S$GLB,, | Performed by: UROLOGY

## 2024-04-16 PROCEDURE — 1159F MED LIST DOCD IN RCRD: CPT | Mod: CPTII,S$GLB,, | Performed by: UROLOGY

## 2024-04-16 PROCEDURE — 3077F SYST BP >= 140 MM HG: CPT | Mod: CPTII,S$GLB,, | Performed by: UROLOGY

## 2024-04-16 PROCEDURE — 1160F RVW MEDS BY RX/DR IN RCRD: CPT | Mod: CPTII,S$GLB,, | Performed by: UROLOGY

## 2024-04-16 PROCEDURE — 84153 ASSAY OF PSA TOTAL: CPT | Performed by: UROLOGY

## 2024-04-16 PROCEDURE — 3066F NEPHROPATHY DOC TX: CPT | Mod: CPTII,S$GLB,, | Performed by: UROLOGY

## 2024-04-16 PROCEDURE — 99214 OFFICE O/P EST MOD 30 MIN: CPT | Mod: S$GLB,,, | Performed by: UROLOGY

## 2024-04-16 PROCEDURE — 3008F BODY MASS INDEX DOCD: CPT | Mod: CPTII,S$GLB,, | Performed by: UROLOGY

## 2024-04-16 PROCEDURE — 36415 COLL VENOUS BLD VENIPUNCTURE: CPT | Performed by: UROLOGY

## 2024-04-16 PROCEDURE — 99999 PR PBB SHADOW E&M-EST. PATIENT-LVL IV: CPT | Mod: PBBFAC,,, | Performed by: UROLOGY

## 2024-04-16 RX ORDER — AZELASTINE 1 MG/ML
SPRAY, METERED NASAL
COMMUNITY
Start: 2024-03-20

## 2024-04-16 RX ORDER — CETIRIZINE HYDROCHLORIDE 10 MG/1
10 TABLET ORAL
COMMUNITY
Start: 2024-03-20

## 2024-04-16 NOTE — PROGRESS NOTES
"  Subjective:       Patient ID: Bradley Collins is a 63 y.o. male.    Chief Complaint:  prostate cancer, bilateral hydronephrosis.    History of Present Illness  HPI  Patient is a 63 y.o. male who is established to our clinic and was initially referred by Elena Alexander NP for evaluation of bilateral hydronephrosis.     Of note, the patient has a h/o prostate cancer.  [facility]  Patient underwent an MRI of the prostate due to an elevated psa.  [facility]A [facility] 5.  MRI done in 2/2020 showed a PIRADS 4 and PIRADS 3 lesion, questionable MAYRA or capsular deformity.  Underwent a fusion biopsy===patient had high volume cancer with Marsha 4+4 CaP.  Was treated  with EBRT with Dr. Lazo in [id number].      Patient has good erections and minimal LUTs currently.  No prior abdominal surgeries.     CT scan of the abdomen and pelvis with contrast from 03/05/2024 was independently reviewed today and reveals delayed right nephrogram with mild right hydronephrosis down to the level the distal ureter, per official report there is encasement of the distal ureter in soft tissue mass.  Mag 3 renal scan from 04/10/2020 was independently reviewed today and reveals 85% left differential renal function in 15% right renal function.  Concerns for right-sided obstruction although difficult to assess given the relative lack of contrast uptake.      No results found for: "PSA", "PSADIAG", "PSATOTAL", "PSAFREE"        Review of Systems  Review of Systems  All other systems reviewed and negative except pertinent positives noted in HPI.       Objective:     Physical Exam  Constitutional:       General: He is not in acute distress.     Appearance: He is well-developed.   HENT:      Head: Normocephalic and atraumatic.   Eyes:      General: No scleral icterus.  Neck:      Trachea: No tracheal deviation.   Pulmonary:      Effort: Pulmonary effort is normal. No respiratory distress.   Neurological:      Mental " Status: He is alert and oriented to person, place, and time.   Psychiatric:         Behavior: Behavior normal.         Thought Content: Thought content normal.         Judgment: Judgment normal.         Lab Review  Lab Results   Component Value Date    COLORU Yellow 03/25/2024    SPECGRAV 1.015 03/25/2024    PHUR 5 03/25/2024    WBCUR Neg 03/25/2024    NITRITE Neg 03/25/2024    GLUCOSEUR Neg 03/25/2024    KETONESU Neg 03/25/2024    UROBILINOGEN Normal 03/25/2024    RBCUR Neg 03/25/2024         Assessment:        1. Bilateral hydronephrosis    2. Obstructive uropathy            Plan:     Bilateral hydronephrosis  -     Ambulatory referral/consult to Urology    Obstructive uropathy  -     Ambulatory referral/consult to Urology      --imaging reviewed as per HPI.     -A post void residual bladder scan was performed immediately after voiding. The patient's PVR was noted to be 7cc.  -urine dip:  Completely negative for any sign of infection  -psa today.   -plan for cystoscopy, bilateral RPG, possible right ureteroscopy, right ureteral stent placement.     -will likely reassess renal function after stent placement.  Plan for ureteroscopy if any concern for intraluminal filling defects however, I suspect his hydronephrosis is related to potentially his prostate cancer or his radiation therapy.

## 2024-04-16 NOTE — H&P (VIEW-ONLY)
"  Subjective:       Patient ID: Bradley Collins is a 63 y.o. male.    Chief Complaint:  prostate cancer, bilateral hydronephrosis.    History of Present Illness  HPI  Patient is a 63 y.o. male who is established to our clinic and was initially referred by Elena Alexander NP for evaluation of bilateral hydronephrosis.     Of note, the patient has a h/o prostate cancer.  [facility]  Patient underwent an MRI of the prostate due to an elevated psa.  [facility] 5.  MRI done in 2/2020 showed a PIRADS 4 and PIRADS 3 lesion, questionable MAYRA or capsular deformity.  Underwent a fusion biopsy===patient had high volume cancer with Marsha 4+4 CaP.  Was treated  with EBRT with Dr. Lazo in [id number].      Patient has good erections and minimal LUTs currently.  No prior abdominal surgeries.     CT scan of the abdomen and pelvis with contrast from 03/05/2024 was independently reviewed today and reveals delayed right nephrogram with mild right hydronephrosis down to the level the distal ureter, per official report there is encasement of the distal ureter in soft tissue mass.  Mag 3 renal scan from 04/10/2020 was independently reviewed today and reveals 85% left differential renal function in 15% right renal function.  Concerns for right-sided obstruction although difficult to assess given the relative lack of contrast uptake.      No results found for: "PSA", "PSADIAG", "PSATOTAL", "PSAFREE"        Review of Systems  Review of Systems  All other systems reviewed and negative except pertinent positives noted in HPI.       Objective:     Physical Exam  Constitutional:       General: He is not in acute distress.     Appearance: He is well-developed.   HENT:      Head: Normocephalic and atraumatic.   Eyes:      General: No scleral icterus.  Neck:      Trachea: No tracheal deviation.   Pulmonary:      Effort: Pulmonary effort is normal. No respiratory distress.   Neurological:      Mental " Status: He is alert and oriented to person, place, and time.   Psychiatric:         Behavior: Behavior normal.         Thought Content: Thought content normal.         Judgment: Judgment normal.         Lab Review  Lab Results   Component Value Date    COLORU Yellow 03/25/2024    SPECGRAV 1.015 03/25/2024    PHUR 5 03/25/2024    WBCUR Neg 03/25/2024    NITRITE Neg 03/25/2024    GLUCOSEUR Neg 03/25/2024    KETONESU Neg 03/25/2024    UROBILINOGEN Normal 03/25/2024    RBCUR Neg 03/25/2024         Assessment:        1. Bilateral hydronephrosis    2. Obstructive uropathy            Plan:     Bilateral hydronephrosis  -     Ambulatory referral/consult to Urology    Obstructive uropathy  -     Ambulatory referral/consult to Urology      --imaging reviewed as per HPI.     -A post void residual bladder scan was performed immediately after voiding. The patient's PVR was noted to be 7cc.  -urine dip:  Completely negative for any sign of infection  -psa today.   -plan for cystoscopy, bilateral RPG, possible right ureteroscopy, right ureteral stent placement.     -will likely reassess renal function after stent placement.  Plan for ureteroscopy if any concern for intraluminal filling defects however, I suspect his hydronephrosis is related to potentially his prostate cancer or his radiation therapy.

## 2024-04-18 NOTE — ANESTHESIA PAT ROS NOTE
04/18/2024  Bradley Collins is a 63 y.o., male.      Pre-op Assessment          Review of Systems           Anesthesia Assessment: Preoperative EQUATION    Planned Procedure: Procedure(s) (LRB):  CYSTOSCOPY (N/A)  URETEROSCOPY (Right)  CYSTOSCOPY, WITH RETROGRADE PYELOGRAM AND URETERAL STENT INSERTION (Right)  Requested Anesthesia Type:General  Surgeon: Darryl Greenfield MD  Service: Urology  Known or anticipated Date of Surgery:5/2/2024    Surgeon notes: reviewed    Electronic QUestionnaire Assessment completed via nurse interview with patient.        Triage considerations:     The patient has no apparent active cardiac condition (No unstable coronary Syndrome such as severe unstable angina or recent [<1 month] myocardial infarction, decompensated CHF, severe valvular   disease or significant arrhythmia)    Previous anesthesia records:GETA and No problems  07/25/23 Endoscopic ultrasound upper GI tract, gen anes, ASA 3    Last PCP note: outside [facility]   Subspecialty notes: Urology    Other important co-morbidities: HLD, HTN, and bilateral hydronephrosis, prostate cancer, obstructive uropathy, DALIA, asthma, chronic AR, chronic maxillary sinusitis, osteopenia, vit d deficiency, solitary pulm nodule, cellulitis, pancreatic cyst, rt iliac vein thrombosis/occlusion, h/o DVT, h/o sinus surgery       Tests already available:  Available tests,  within 1 month , within 3 months , within [facility] .   04/16/24 PSA  03/21/24 CMP  03/05/24 CBC            Instructions     Optimization:  Anesthesia Preop Clinic Assessment Indicated    Medical Opinion Indicated       Sub-specialist consult indicated: TBD       Plan:    Testing:  EKG   Pre-anesthesia  visit       Visit focus: to be determined, clearance     Consultation:IM Perioperative Hospitalist       Navigation: Tests Scheduled.              Consults scheduled.              Results will be tracked by Preop Clinic.             04/18/24 Messaged Elena Wallace NP (IR) regarding preop inst for plavix and eliquis             No tests, anesthesia preop clinic visit, or consult required.

## 2024-04-19 ENCOUNTER — TELEPHONE (OUTPATIENT)
Dept: PREADMISSION TESTING | Facility: HOSPITAL | Age: 64
End: 2024-04-19
Payer: COMMERCIAL

## 2024-04-19 NOTE — ASSESSMENT & PLAN NOTE
Albuterol, Symbicort  Albuterol- uses prior to exercise- PAtient states he uses rescue inhaler before exercise only about 3-4 times per week    Triggers asthma- Allergens in air, pollen, ragweed, dust mites

## 2024-04-19 NOTE — ASSESSMENT & PLAN NOTE
Reports antibiotics for cellulitis adversely affected his kidney function    BUN 19 CR 1.7 GFR 44.5

## 2024-04-19 NOTE — TELEPHONE ENCOUNTER
----- Message from Juanjose Oglesby RN sent at 4/18/2024  3:09 PM CDT -----  05/02 surgery [facility]    Please schedule pt for EKG and NP or Sami/POC. Thanks.

## 2024-04-19 NOTE — ASSESSMENT & PLAN NOTE
Current /71   today.    Taking:  Norvasc,   Patient reports home BP readings of:      Lifestyle changes to reduce systolic BP:  exercise 30 minutes per day,  5 days per week or 150 minutes weekly; sodium reduction and avoidance of high salt foods such as processed meats, frozen meals and  fast foods.   Keeping a healthy weight/BMI can help with better BP control    BP acceptable for surgery. I recommend monitoring BP during perioperative period as uncontrolled pain can elevate blood pressure.

## 2024-04-23 ENCOUNTER — TELEPHONE (OUTPATIENT)
Dept: INTERNAL MEDICINE | Facility: CLINIC | Age: 64
End: 2024-04-23
Payer: COMMERCIAL

## 2024-04-24 ENCOUNTER — HOSPITAL ENCOUNTER (OUTPATIENT)
Dept: CARDIOLOGY | Facility: CLINIC | Age: 64
Discharge: HOME | End: 2024-04-24
Payer: COMMERCIAL

## 2024-04-24 ENCOUNTER — OFFICE VISIT (OUTPATIENT)
Dept: INTERNAL MEDICINE | Facility: CLINIC | Age: 64
End: 2024-04-24
Payer: COMMERCIAL

## 2024-04-24 VITALS
OXYGEN SATURATION: 97 % | DIASTOLIC BLOOD PRESSURE: 71 MMHG | BODY MASS INDEX: 25.26 KG/M2 | WEIGHT: 160.94 LBS | HEIGHT: 67 IN | HEART RATE: 80 BPM | SYSTOLIC BLOOD PRESSURE: 132 MMHG | TEMPERATURE: 98 F

## 2024-04-24 DIAGNOSIS — I15.9 SECONDARY HYPERTENSION: Primary | ICD-10-CM

## 2024-04-24 DIAGNOSIS — Z01.818 PREOP TESTING: ICD-10-CM

## 2024-04-24 DIAGNOSIS — K86.2 CYST OF PANCREAS: ICD-10-CM

## 2024-04-24 DIAGNOSIS — Z79.01 ANTICOAGULANT LONG-TERM USE: ICD-10-CM

## 2024-04-24 DIAGNOSIS — H04.123 DRY EYE SYNDROME OF BILATERAL LACRIMAL GLANDS: ICD-10-CM

## 2024-04-24 DIAGNOSIS — N17.9 AKI (ACUTE KIDNEY INJURY): ICD-10-CM

## 2024-04-24 DIAGNOSIS — J32.0 CHRONIC MAXILLARY SINUSITIS: ICD-10-CM

## 2024-04-24 DIAGNOSIS — I82.421: ICD-10-CM

## 2024-04-24 DIAGNOSIS — J45.909 ASTHMA, UNSPECIFIED ASTHMA SEVERITY, UNSPECIFIED WHETHER COMPLICATED, UNSPECIFIED WHETHER PERSISTENT: ICD-10-CM

## 2024-04-24 DIAGNOSIS — M79.89 SWELLING OF RIGHT LOWER EXTREMITY: ICD-10-CM

## 2024-04-24 DIAGNOSIS — I15.9 SECONDARY HYPERTENSION: ICD-10-CM

## 2024-04-24 DIAGNOSIS — C61 PROSTATE CANCER: ICD-10-CM

## 2024-04-24 DIAGNOSIS — J45.40 MODERATE PERSISTENT ASTHMA, UNCOMPLICATED: Chronic | ICD-10-CM

## 2024-04-24 DIAGNOSIS — R49.0 DYSPHONIA: ICD-10-CM

## 2024-04-24 PROBLEM — K58.0 IRRITABLE BOWEL SYNDROME WITH DIARRHEA: Chronic | Status: RESOLVED | Noted: 2024-02-27 | Resolved: 2024-04-24

## 2024-04-24 PROBLEM — R63.4 ABNORMAL WEIGHT LOSS: Status: RESOLVED | Noted: 2022-07-11 | Resolved: 2024-04-24

## 2024-04-24 PROBLEM — Z71.81 SPIRITUAL OR RELIGIOUS COUNSELING: Status: RESOLVED | Noted: 2024-02-27 | Resolved: 2024-04-24

## 2024-04-24 PROBLEM — R03.0 ELEVATED BLOOD-PRESSURE READING WITHOUT DIAGNOSIS OF HYPERTENSION: Status: RESOLVED | Noted: 2024-02-27 | Resolved: 2024-04-24

## 2024-04-24 PROBLEM — R10.13 DYSPEPSIA: Status: RESOLVED | Noted: 2022-07-11 | Resolved: 2024-04-24

## 2024-04-24 LAB
OHS QRS DURATION: 80 MS
OHS QTC CALCULATION: 408 MS

## 2024-04-24 PROCEDURE — 3075F SYST BP GE 130 - 139MM HG: CPT | Mod: CPTII,S$GLB,, | Performed by: NURSE PRACTITIONER

## 2024-04-24 PROCEDURE — 1160F RVW MEDS BY RX/DR IN RCRD: CPT | Mod: CPTII,S$GLB,, | Performed by: NURSE PRACTITIONER

## 2024-04-24 PROCEDURE — 3008F BODY MASS INDEX DOCD: CPT | Mod: CPTII,S$GLB,, | Performed by: NURSE PRACTITIONER

## 2024-04-24 PROCEDURE — 93010 ELECTROCARDIOGRAM REPORT: CPT | Mod: S$GLB,,, | Performed by: INTERNAL MEDICINE

## 2024-04-24 PROCEDURE — 3078F DIAST BP <80 MM HG: CPT | Mod: CPTII,S$GLB,, | Performed by: NURSE PRACTITIONER

## 2024-04-24 PROCEDURE — 99215 OFFICE O/P EST HI 40 MIN: CPT | Mod: S$GLB,,, | Performed by: NURSE PRACTITIONER

## 2024-04-24 PROCEDURE — 93005 ELECTROCARDIOGRAM TRACING: CPT | Mod: S$GLB,,, | Performed by: ANESTHESIOLOGY

## 2024-04-24 PROCEDURE — 1159F MED LIST DOCD IN RCRD: CPT | Mod: CPTII,S$GLB,, | Performed by: NURSE PRACTITIONER

## 2024-04-24 PROCEDURE — 99999 PR PBB SHADOW E&M-EST. PATIENT-LVL V: CPT | Mod: PBBFAC,,, | Performed by: NURSE PRACTITIONER

## 2024-04-24 PROCEDURE — 3066F NEPHROPATHY DOC TX: CPT | Mod: CPTII,S$GLB,, | Performed by: NURSE PRACTITIONER

## 2024-04-24 NOTE — ASSESSMENT & PLAN NOTE
Albuterol- uses prior to exercise    3-4 times per week    Triggers asthma- Allergens in air, pollen, ragweed, dust mites

## 2024-04-24 NOTE — DISCHARGE INSTRUCTIONS
Post Cystoscopy Instructions  Do not strain to have a bowel movement  No strenuous exercise x 7 days    You can expect:  Have pain when you void from your stent if you have a stent in place  See blood in your urine if you have a stent in place    Call the doctor if:  Temperature is greater than 101F  Persistent vomiting and inability to keep food down  Inability to void if you do not have a catheter

## 2024-04-24 NOTE — PROGRESS NOTES
"[facility]  Progress Note    Patient Name: Bradley Collins  MRN: [id number]  Date of Evaluation- 04/24/2024  PCP- Administration, [facility] cases for Bradley Collins [[id number]]       Case ID Status Date Time Huseyin Procedure Provider Location    [id number] [facility] 5/2/2024  8:00 AM 75 CYSTOSCOPY Darryl Greenfield MD [[id number]] [facility] OR 1ST FLR            HPI:  This is a 63 y.o. male  who presents today for a preoperative evaluation in preparation for   Surgery is indicated for hydronephrosis     .   Patient is new to me.    The history has been obtained by speaking with the patient and reviewing the electronic medical record and/or outside health information. Significant health conditions for the perioperative period are discussed below in assessment and plan.     Patient reports current health status to be Good.  Denies any new symptoms before surgery.       Subjective/ Objective:     Chief Complaint: Preoperative evaulation, perioperative medical management, and complication reduction plan.     Functional Capacity:  Able to climb a flight of stairs without CP SOB or Syncope.  Able to meet 4 METs      Anesthesia issues: patient had "lungs collapse" during polypectomy-     Difficulty mouth opening:n    Steroid use in the last 12 months:  n    Dental Issues:n    Family anesthesia difficulty: None     Family Hx of Thrombosis mother had blood clots from Afib    Past Medical History:   Diagnosis Date    Abnormal weight loss 07/11/2022    Formatting of this note might be different from the original.   Added automatically from request for surgery [id number]      Allergy     Asthma     Blood clot in vein     iliac vein    Cellulitis     Cellulitis of right lower limb 02/16/2024    Dyspepsia 07/11/2022    Formatting of this note might be different from the original.   Added automatically from request for surgery [id number]      Dysphonia 02/27/2024    Elevated PSA     Hyperlipidemia     Irritable bowel syndrome with " "diarrhea 02/27/2024    Prostate cancer     Secondary hypertension 03/09/2024    Spiritual or Baptism counseling 02/27/2024     Past Surgical History:   Procedure Laterality Date    ENDOSCOPIC ULTRASOUND OF UPPER GASTROINTESTINAL TRACT N/A 7/25/2023    Procedure: ULTRASOUND, UPPER GI TRACT, ENDOSCOPIC;  Surgeon: Yoseph Li MD;  Location: [facility] ([address] [place] [address]);  Service: Endoscopy;  Laterality: N/A;  instr [place] referral    SINUS SURGERY Bilateral 2001       Review of Systems   Constitutional:  Negative for chills, fatigue, fever and unexpected weight change.   HENT:  Negative for trouble swallowing and voice change.    Eyes:  Negative for photophobia and visual disturbance.        No acute visual changes   Respiratory:  Negative for cough, shortness of breath and wheezing.         STOP bang  Score  Low risk REN  High risk REN   Cardiovascular:  Negative for chest pain, palpitations and leg swelling.   Gastrointestinal:  Negative for abdominal pain, blood in stool, constipation, diarrhea, nausea and vomiting.        No FLD, Hepatitis, Cirrhosis  No BRB or black tarry stool   Genitourinary:  Negative for difficulty urinating, dysuria, frequency, hematuria and urgency.        Nocturia 1   Musculoskeletal:  Negative for arthralgias, gait problem, myalgias, neck pain and neck stiffness.   Neurological:  Positive for headaches. Negative for dizziness, tremors, seizures, syncope, weakness, light-headedness and numbness.   Psychiatric/Behavioral:  Negative for agitation, behavioral problems, confusion, decreased concentration, dysphoric mood, hallucinations, self-injury, sleep disturbance and suicidal ideas. The patient is not nervous/anxious and is not hyperactive.       VITALS  Visit Vitals  /71 (BP Location: Left arm, Patient Position: Sitting)   Pulse 80   Temp 97.8 °F (36.6 °C) (Oral)   Ht 5' 7" (1.702 m)   Wt 73 kg (160 lb 15 oz)   SpO2 97%   BMI 25.21 kg/m²          Physical Exam     Significant " Labs:  Lab Results   Component Value Date    WBC 8.76 03/05/2024    HGB 10.4 (L) 03/05/2024    HCT 31.0 (L) 03/05/2024     03/05/2024    ALT 51 (H) 03/21/2024    AST 34 03/21/2024     04/03/2024    K 4.0 04/03/2024     04/03/2024    CREATININE 1.7 (H) 04/03/2024    BUN 19 04/03/2024    CO2 28 04/03/2024    INR 1.1 03/08/2024       Diagnostic Studies: No relevant studies.    EKG:   No results found for this or any previous visit.    2D ECHO:  TTE:  No results found for this or any previous visit.    HILDA:  No results found for this or any previous visit.     Imaging     Active Cardiac Conditions: None      Revised Cardiac Risk Index   High -Risk Surgery  Intraperitoneal; Intrathoracic; suprainguinal vascular Yes- + 1 No- 0   History of Ischemic Heart Disease   (Hx of MI/positive exercise test/current chest pain due to ischemia/use of nitrate therapy/EKG with pathological Q waves) Yes- + 1 No- 0   History of CHF  (Pulmonary edema/bilateral rales or S3 gallop/PND/CXR showing pulmonary vascular redistribution) Yes- + 1 No- 0   History of CVA   (Prior stroke or TIA) Yes- + 1 No- 0   Pre-operative treatment with insulin Yes- + 1 No- 0   Pre-operative creatinine > 2mg/dl Yes- + 1 No- 0   Total:      Risk Status:  Estimated risk of cardiac complications after non-cardiac surgery using the Revised Cardiac Risk Index for Preoperative risk is 3.9 %      ARISCAT (Canet) risk index: Low: 1.6% risk of post-op pulmonary complications.    American Society of Anesthesiologists Physical Status classification (ASA): 3         No further cardiac workup needed prior to surgery.        Preoperative cardiac risk assessment-  The patient does not have any active cardiac conditions . Revised cardiac risk index predictors- ---.Functional capacity is more than 4 Mets. He will be undergoing a Urological procedure that carries a Moderate Risk risk     The estimated risk of the rate of adverse cardiac outcomes  3.9    No  further cardiac work up is indicated prior to proceeding with the surgery          American Society of Anesthesiologists Physical status classification ( ASA ) class: 3     Postoperative pulmonary complication risk assessment: 1.6     ARISCAT ( Canet) risk index- risk class -  Low, if duration of surgery is under 3 hours, intermediate, if duration of surgery is over 3 hours        Assessment/Plan:     Secondary hypertension  Current /71   today.    Taking:  Norvasc,   Patient reports home BP readings of:      Lifestyle changes to reduce systolic BP:  exercise 30 minutes per day,  5 days per week or 150 minutes weekly; sodium reduction and avoidance of high salt foods such as processed meats, frozen meals and  fast foods.   Keeping a healthy weight/BMI can help with better BP control    BP acceptable for surgery. I recommend monitoring BP during perioperative period as uncontrolled pain can elevate blood pressure.         Moderate persistent asthma, uncomplicated  Albuterol, Symbicort  Albuterol- uses prior to exercise- PAtient states he uses rescue inhaler before exercise only about 3-4 times per week    Triggers asthma- Allergens in air, pollen, ragweed, dust mites    DALIA (acute kidney injury)  Reports antibiotics for cellulitis adversely affected his kidney function    BUN 19 CR 1.7 GFR 44.5    Cyst of pancreas  [facility]  Annual surveillance    Dry eye syndrome of bilateral lacrimal glands  reWETTING DROPS DAILY    Dysphonia  resolved    Chronic maxillary sinusitis  Nasal inhaler, fluticasone, Dupixent    Iliac vein thrombosis, right  Started around January 2024  Was first diagnosed with cellulitis  Treated with several rounds of antibiotics po and IV  Later found to have right iliac vein occlusion    3/8/24 Venography to evaluate for stenosis  thrombosis  or occlusion      Angiography was performed and demonstrated severe stenosis extending from the external iliac vein and to a lesser  extent common iliac vein. Stens were placed in both vessels     Venous stent placement     Venous stent location: Right external iliac vein to IVC     Venous stent: 16 x 120 mm Venovo stent     Post-stenting venoplasty: 14 mm balloon     Venous stent placement     Venous stent location: Left external iliac vein to IVC     Venous stent: 16 x 120 and 16 x 14 mm Venovo stents     Post-stenting venoplasty: 14 mm balloon     Post-stenting venography: Successful bilateral iliac recanalization with patent stents noted      Prostate cancer  Prostate cancer treated with radiation  [facility]    Swelling of right lower extremity  Still has some swelling since stenting; no pain    Asthma  Albuterol- uses prior to exercise    3-4 times per week    Triggers asthma- Allergens in air, pollen, ragweed, dust mites    Anticoagulant long-term use  Currently on Plavix and Eliquis  D/W Dr. Greenfield and pt OK to stay on anticoagulation for surgery      Preventive perioperative care    Thromboembolic prophylaxis:  His risk factors for thrombosis include surgical procedure, age, and reduced mobility.I suggest  thromboembolic prophylaxis ( mechanical/pharmacological, weighing the risk benefits of pharmacological agent use considering jayne procedural bleeding )  during the perioperative period.I suggested being active in the post operative period.      Postoperative pulmonary complication prophylaxis-Risk factors for post operative pulmonary complications include ASA class >2- I suggest incentive spirometry use, early ambulation, and pain control so as to avoid diaphragmatic splinting  Brush teeth twice per day, oral rinses, sleep with the head of the bed up 30 degrees     Renal complication prophylaxis-Risk factors for renal complications include pre-existing renal disease . I suggest keeping him well hydrated and avoidance/ minimizing the use of  NSAID's,IGNACIO 2 Inhibitors ,IV contrast if possible in the perioperative period.I suggested  drinking 2 litre's of water a day      Surgical site Infection Prophylaxis-I  suggest appropriate antibiotic for Prophylaxis against Surgical site infections Shower with Hibiclens in the night before surgery and the morning of surgery       In view of urological procedure the patient  is at risk of postoperative urinary retention.  I suggest avoidance / minimizing the of  Benzodiazepines,Anticholinergic medication,antihistamines ( Benadryl) , if possible in the perioperative period. I suggest using the minimum possible use of opioids for the minimum period of time in the perioperative period. Benadryl avoidance suggested      This visit was focused on Preoperative evaluation, Perioperative Medical management, complication reduction plans. I suggest that the patient follows up with primary care or relevant sub specialists for ongoing health care.    I appreciate the opportunity to be involved in this patients care. Please feel free to contact me if there were any questions about this consultation.    Patient is optimized    Currently on Plavix and Eliquis  D/W Dr. Greenfield via preop nurse and pt OK to stay on anticoagulation for surgery    I spent a total of 49 minutes on the day of the visit.This includes face to face time and non-face to face time preparing to see the patient (eg, review of tests), obtaining and/or reviewing separately obtained history, documenting clinical information in the electronic or other health record, independently interpreting results and communicating results to the patient/family/caregiver, or care coordinator.      Tad London NP  Perioperative Medicine  [facility]   Pager [phone]

## 2024-04-24 NOTE — HPI
This is a 63 y.o. male  who presents today for a preoperative evaluation in preparation for   Surgery is indicated for hydronephrosis     .   Patient is new to me.    The history has been obtained by speaking with the patient and reviewing the electronic medical record and/or outside health information. Significant health conditions for the perioperative period are discussed below in assessment and plan.     Patient reports current health status to be Good.  Denies any new symptoms before surgery.

## 2024-04-24 NOTE — ASSESSMENT & PLAN NOTE
Started around January 2024  Was first diagnosed with cellulitis  Treated with several rounds of antibiotics po and IV  Later found to have right iliac vein occlusion    3/8/24 Venography to evaluate for stenosis  thrombosis  or occlusion      Angiography was performed and demonstrated severe stenosis extending from the external iliac vein and to a lesser extent common iliac vein. Stens were placed in both vessels     Venous stent placement     Venous stent location: Right external iliac vein to IVC     Venous stent: 16 x 120 mm Venovo stent     Post-stenting venoplasty: 14 mm balloon     Venous stent placement     Venous stent location: Left external iliac vein to IVC     Venous stent: 16 x 120 and 16 x 14 mm Venovo stents     Post-stenting venoplasty: 14 mm balloon     Post-stenting venography: Successful bilateral iliac recanalization with patent stents noted

## 2024-04-24 NOTE — Clinical Note
As discussed previously with the nurse navigator, Juanjose Oglesby,  I wanted to confirm with you that the patient is currently on Plavix and Eliquis.  You stated it was OK to stay on anticoagulation for surgery.  He is optimized for surgery, RL

## 2024-04-24 NOTE — OUTPATIENT SUBJECTIVE & OBJECTIVE
"Outpatient Subjective & Objective      Chief Complaint: Preoperative evaulation, perioperative medical management, and complication reduction plan.     Functional Capacity:  Able to climb a flight of stairs without CP SOB or Syncope.  Able to meet 4 METs      Anesthesia issues: patient had "lungs collapse" during polypectomy-     Difficulty mouth opening:n    Steroid use in the last 12 months:  n    Dental Issues:n    Family anesthesia difficulty: None     Family Hx of Thrombosis mother had blood clots from Afib    Past Medical History:   Diagnosis Date    Abnormal weight loss 07/11/2022    Formatting of this note might be different from the original.   Added automatically from request for surgery [id number]      Allergy     Asthma     Blood clot in vein     iliac vein    Cellulitis     Cellulitis of right lower limb 02/16/2024    Dyspepsia 07/11/2022    Formatting of this note might be different from the original.   Added automatically from request for surgery [id number]      Dysphonia 02/27/2024    Elevated PSA     Hyperlipidemia     Irritable bowel syndrome with diarrhea 02/27/2024    Prostate cancer     Secondary hypertension 03/09/2024    Spiritual or Mandaeism counseling 02/27/2024     Past Surgical History:   Procedure Laterality Date    ENDOSCOPIC ULTRASOUND OF UPPER GASTROINTESTINAL TRACT N/A 7/25/2023    Procedure: ULTRASOUND, UPPER GI TRACT, ENDOSCOPIC;  Surgeon: Yoseph Li MD;  Location: [address]);  Service: Endoscopy;  Laterality: N/A;  instr portal-va referral    SINUS SURGERY Bilateral 2001       Review of Systems   Constitutional:  Negative for chills, fatigue, fever and unexpected weight change.   HENT:  Negative for trouble swallowing and voice change.    Eyes:  Negative for photophobia and visual disturbance.        No acute visual changes   Respiratory:  Negative for cough, shortness of breath and wheezing.         STOP bang  Score  Low risk REN  High risk REN   Cardiovascular:  Negative " "for chest pain, palpitations and leg swelling.   Gastrointestinal:  Negative for abdominal pain, blood in stool, constipation, diarrhea, nausea and vomiting.        No FLD, Hepatitis, Cirrhosis  No BRB or black tarry stool   Genitourinary:  Negative for difficulty urinating, dysuria, frequency, hematuria and urgency.        Nocturia 1   Musculoskeletal:  Negative for arthralgias, gait problem, myalgias, neck pain and neck stiffness.   Neurological:  Positive for headaches. Negative for dizziness, tremors, seizures, syncope, weakness, light-headedness and numbness.   Psychiatric/Behavioral:  Negative for agitation, behavioral problems, confusion, decreased concentration, dysphoric mood, hallucinations, self-injury, sleep disturbance and suicidal ideas. The patient is not nervous/anxious and is not hyperactive.       VITALS  Visit Vitals  /71 (BP Location: Left arm, Patient Position: Sitting)   Pulse 80   Temp 97.8 °F (36.6 °C) (Oral)   Ht 5' 7" (1.702 m)   Wt 73 kg (160 lb 15 oz)   SpO2 97%   BMI 25.21 kg/m²          Physical Exam     Significant Labs:  Lab Results   Component Value Date    WBC 8.76 03/05/2024    HGB 10.4 (L) 03/05/2024    HCT 31.0 (L) 03/05/2024     03/05/2024    ALT 51 (H) 03/21/2024    AST 34 03/21/2024     04/03/2024    K 4.0 04/03/2024     04/03/2024    CREATININE 1.7 (H) 04/03/2024    BUN 19 04/03/2024    CO2 28 04/03/2024    INR 1.1 03/08/2024       Diagnostic Studies: No relevant studies.    EKG:   No results found for this or any previous visit.    2D ECHO:  TTE:  No results found for this or any previous visit.    HILDA:  No results found for this or any previous visit.     Imaging     Active Cardiac Conditions: None      Revised Cardiac Risk Index   High -Risk Surgery  Intraperitoneal; Intrathoracic; suprainguinal vascular Yes- + 1 No- 0   History of Ischemic Heart Disease   (Hx of MI/positive exercise test/current chest pain due to ischemia/use of nitrate " therapy/EKG with pathological Q waves) Yes- + 1 No- 0   History of CHF  (Pulmonary edema/bilateral rales or S3 gallop/PND/CXR showing pulmonary vascular redistribution) Yes- + 1 No- 0   History of CVA   (Prior stroke or TIA) Yes- + 1 No- 0   Pre-operative treatment with insulin Yes- + 1 No- 0   Pre-operative creatinine > 2mg/dl Yes- + 1 No- 0   Total:      Risk Status:  Estimated risk of cardiac complications after non-cardiac surgery using the Revised Cardiac Risk Index for Preoperative risk is 3.9 %      ARISCAT (Canet) risk index: Low: 1.6% risk of post-op pulmonary complications.    American Society of Anesthesiologists Physical Status classification (ASA): 3         No further cardiac workup needed prior to surgery.    Outpatient Subjective & Objective

## 2024-05-01 ENCOUNTER — TELEPHONE (OUTPATIENT)
Dept: UROLOGY | Facility: CLINIC | Age: 64
End: 2024-05-01
Payer: COMMERCIAL

## 2024-05-01 NOTE — TELEPHONE ENCOUNTER
Instructions for Day of Surgery at [facility]      Report To:    [facility], 1st floor of the hospital    Arrival time: 6am    Please note:     If you are allergic to any medication please let your care team know the day of surgery.  If you have ever had adverse reaction to anesthesia please let your care team know the day of surgery   Please arrange transportation from the hospital, you will not be allowed to drive yourself home from surgery since you have been under sedation or anesthesia   Notify your doctor or care team of any diabetic medications that you take as these may need to be held or adjusted prior to surgery     Before Surgery     You should stop taking any blood thinners for up to 7 days depending on the blood thinner, please let care team know what you are taking so you can be directed when to stop certain medications.    You should hold any aspirin containing products for 7 days    Stop taking any herbal supplements 14 days prior to surgery     Night Before Surgery     Nothing to eat or drink after midnight the night before your surgery  Take medications as instructed the morning of surgery  No alcoholic beverages 24 hours prior to surgery

## 2024-05-02 ENCOUNTER — ANESTHESIA (OUTPATIENT)
Dept: SURGERY | Facility: HOSPITAL | Age: 64
End: 2024-05-02
Payer: COMMERCIAL

## 2024-05-02 ENCOUNTER — ANESTHESIA EVENT (OUTPATIENT)
Dept: SURGERY | Facility: HOSPITAL | Age: 64
End: 2024-05-02
Payer: COMMERCIAL

## 2024-05-02 ENCOUNTER — HOSPITAL ENCOUNTER (OUTPATIENT)
Facility: HOSPITAL | Age: 64
Discharge: HOME | End: 2024-05-02
Attending: UROLOGY | Admitting: UROLOGY
Payer: COMMERCIAL

## 2024-05-02 VITALS
HEIGHT: 67 IN | DIASTOLIC BLOOD PRESSURE: 72 MMHG | HEART RATE: 69 BPM | SYSTOLIC BLOOD PRESSURE: 122 MMHG | BODY MASS INDEX: 25.11 KG/M2 | OXYGEN SATURATION: 100 % | RESPIRATION RATE: 20 BRPM | TEMPERATURE: 98 F | WEIGHT: 160 LBS

## 2024-05-02 DIAGNOSIS — N13.30 HYDRONEPHROSIS, UNSPECIFIED HYDRONEPHROSIS TYPE: ICD-10-CM

## 2024-05-02 DIAGNOSIS — I15.9 SECONDARY HYPERTENSION: ICD-10-CM

## 2024-05-02 DIAGNOSIS — Z01.818 PREOP TESTING: Primary | ICD-10-CM

## 2024-05-02 PROCEDURE — 25500020 PHARM REV CODE 255: Performed by: UROLOGY

## 2024-05-02 PROCEDURE — 25000003 PHARM REV CODE 250: Performed by: NURSE ANESTHETIST, CERTIFIED REGISTERED

## 2024-05-02 PROCEDURE — 74420 UROGRAPHY RTRGR +-KUB: CPT | Mod: 26,,, | Performed by: UROLOGY

## 2024-05-02 PROCEDURE — D9220A PRA ANESTHESIA: Mod: ANES,,, | Performed by: ANESTHESIOLOGY

## 2024-05-02 PROCEDURE — 51600 INJECTION FOR BLADDER X-RAY: CPT | Mod: 51,,, | Performed by: UROLOGY

## 2024-05-02 PROCEDURE — 37000008 HC ANESTHESIA 1ST 15 MINUTES: Performed by: UROLOGY

## 2024-05-02 PROCEDURE — 63600175 PHARM REV CODE 636 W HCPCS

## 2024-05-02 PROCEDURE — C1758 CATHETER, URETERAL: HCPCS | Performed by: UROLOGY

## 2024-05-02 PROCEDURE — 71000044 HC DOSC ROUTINE RECOVERY FIRST HOUR: Performed by: UROLOGY

## 2024-05-02 PROCEDURE — C1769 GUIDE WIRE: HCPCS | Performed by: UROLOGY

## 2024-05-02 PROCEDURE — 37000009 HC ANESTHESIA EA ADD 15 MINS: Performed by: UROLOGY

## 2024-05-02 PROCEDURE — C2617 STENT, NON-COR, TEM W/O DEL: HCPCS | Performed by: UROLOGY

## 2024-05-02 PROCEDURE — 36000706: Performed by: UROLOGY

## 2024-05-02 PROCEDURE — 71000016 HC POSTOP RECOV ADDL HR: Performed by: UROLOGY

## 2024-05-02 PROCEDURE — 63600175 PHARM REV CODE 636 W HCPCS: Performed by: NURSE ANESTHETIST, CERTIFIED REGISTERED

## 2024-05-02 PROCEDURE — 52332 CYSTOSCOPY AND TREATMENT: CPT | Mod: RT,,, | Performed by: UROLOGY

## 2024-05-02 PROCEDURE — 25000003 PHARM REV CODE 250

## 2024-05-02 PROCEDURE — 36000707: Performed by: UROLOGY

## 2024-05-02 PROCEDURE — 71000015 HC POSTOP RECOV 1ST HR: Performed by: UROLOGY

## 2024-05-02 PROCEDURE — D9220A PRA ANESTHESIA: Mod: CRNA,,, | Performed by: NURSE ANESTHETIST, CERTIFIED REGISTERED

## 2024-05-02 DEVICE — STENT URETERAL UNIV 6FR 24CM: Type: IMPLANTABLE DEVICE | Site: URETER | Status: FUNCTIONAL

## 2024-05-02 RX ORDER — DEXAMETHASONE SODIUM PHOSPHATE 4 MG/ML
INJECTION, SOLUTION INTRA-ARTICULAR; INTRALESIONAL; INTRAMUSCULAR; INTRAVENOUS; SOFT TISSUE
Status: DISCONTINUED | OUTPATIENT
Start: 2024-05-02 | End: 2024-05-02

## 2024-05-02 RX ORDER — FENTANYL CITRATE 50 UG/ML
INJECTION, SOLUTION INTRAMUSCULAR; INTRAVENOUS
Status: DISCONTINUED | OUTPATIENT
Start: 2024-05-02 | End: 2024-05-02

## 2024-05-02 RX ORDER — LIDOCAINE HYDROCHLORIDE 20 MG/ML
INJECTION INTRAVENOUS
Status: DISCONTINUED | OUTPATIENT
Start: 2024-05-02 | End: 2024-05-02

## 2024-05-02 RX ORDER — PHENAZOPYRIDINE HYDROCHLORIDE 200 MG/1
200 TABLET, FILM COATED ORAL 3 TIMES DAILY PRN
Qty: 42 TABLET | Refills: 0 | Status: SHIPPED | OUTPATIENT
Start: 2024-05-02 | End: 2024-05-16

## 2024-05-02 RX ORDER — MIDAZOLAM HYDROCHLORIDE 1 MG/ML
INJECTION INTRAMUSCULAR; INTRAVENOUS
Status: DISCONTINUED | OUTPATIENT
Start: 2024-05-02 | End: 2024-05-02

## 2024-05-02 RX ORDER — PROPOFOL 10 MG/ML
VIAL (ML) INTRAVENOUS
Status: DISCONTINUED | OUTPATIENT
Start: 2024-05-02 | End: 2024-05-02

## 2024-05-02 RX ORDER — OXYBUTYNIN CHLORIDE 5 MG/1
5 TABLET ORAL 3 TIMES DAILY PRN
Qty: 42 TABLET | Refills: 0 | Status: SHIPPED | OUTPATIENT
Start: 2024-05-02 | End: 2024-05-16

## 2024-05-02 RX ORDER — ONDANSETRON HYDROCHLORIDE 2 MG/ML
INJECTION, SOLUTION INTRAVENOUS
Status: DISCONTINUED | OUTPATIENT
Start: 2024-05-02 | End: 2024-05-02

## 2024-05-02 RX ADMIN — FENTANYL CITRATE 25 MCG: 50 INJECTION, SOLUTION INTRAMUSCULAR; INTRAVENOUS at 08:05

## 2024-05-02 RX ADMIN — DEXAMETHASONE SODIUM PHOSPHATE 4 MG: 4 INJECTION, SOLUTION INTRAMUSCULAR; INTRAVENOUS at 08:05

## 2024-05-02 RX ADMIN — PROPOFOL 40 MG: 10 INJECTION, EMULSION INTRAVENOUS at 08:05

## 2024-05-02 RX ADMIN — MIDAZOLAM HYDROCHLORIDE 2 MG: 2 INJECTION, SOLUTION INTRAMUSCULAR; INTRAVENOUS at 08:05

## 2024-05-02 RX ADMIN — CEFAZOLIN 2 G: 2 INJECTION, POWDER, FOR SOLUTION INTRAMUSCULAR; INTRAVENOUS at 08:05

## 2024-05-02 RX ADMIN — PROPOFOL 30 MG: 10 INJECTION, EMULSION INTRAVENOUS at 08:05

## 2024-05-02 RX ADMIN — LIDOCAINE HYDROCHLORIDE 80 MG: 20 INJECTION INTRAVENOUS at 08:05

## 2024-05-02 RX ADMIN — SODIUM CHLORIDE: 9 INJECTION, SOLUTION INTRAVENOUS at 08:05

## 2024-05-02 RX ADMIN — ONDANSETRON 4 MG: 2 INJECTION INTRAMUSCULAR; INTRAVENOUS at 08:05

## 2024-05-02 NOTE — PLAN OF CARE
Pt discharged per orders. AAOx'a 3. VSS. No s/s of acute distress. Resp even and unlabored. No complaints of pain verbalized. IV removed prior to discharge. Medication delivered at bedside.Reviewed discharge instructions, follow up care/appointments with patient and signficant other [place].Patient and spouse verbalized understanding of discharge and follow up care/appointments. Discharged with all personal belongings.

## 2024-05-02 NOTE — OP NOTE
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    Anticoagulant long-term use 04/24/2024    Secondary hypertension 03/09/2024    Acquired occlusion of iliac vein 03/07/2024    Swelling of right lower extremity 03/02/2024    DALIA (acute kidney injury) 03/02/2024    Allergic rhinitis 02/27/2024    Asthma 02/27/2024    Chronic maxillary sinusitis 02/27/2024    Chronic rhinitis 02/27/2024    Cyst of pancreas 02/27/2024    Dry eye syndrome of bilateral lacrimal glands 02/27/2024    Hyperlipidemia 02/27/2024    Moderate persistent asthma, uncomplicated 02/27/2024    Elevated serum creatinine 02/16/2024    History of colon polyps 01/27/2023    Prostate cancer 10/05/2020       Post-Op Diagnosis: same     Procedure(s) Performed:   1.  Cystoscopy with right retrograde pyelogram  2.  Right ureteral stent placement   3.  Cystogram   4.  Fluoroscopy < 1 hour  5.  Intra-operative interpretation of radiographic images    Specimen(s): none    Staff Surgeon: Darryl Greenfield MD     Assistant Surgeon: Pretty Blackmon MD    Anesthesia: Monitored Local Anesthesia with Sedation    Indications: Bradley Collins is a 63 y.o. male with R hydronephrosis and 15% differential function on the right.     Findings:   R RPG showed hydronephrosis to the level of the distal ureter.  There was a distal R ureteral stricture, approximately 4-5 cm in length.    R ureteral stent placed in standard fashion.  Cystogram with 200 mL bladder capacity.      Estimated Blood Loss: min    Drains: 6 Fr x 24 cm R ureteral stent     Procedure in Detail:  After risks, benefits and possible complications of cystoscopy were explained, the patient elected to undergo the procedure and informed consent was obtained. All questions were answered in the ajyne-operative area. The patient was transferred to the cystoscopy suite and placed  in the supine position.  SCDs were applied and working.  MAC anesthesia was administered.  Once adequately sedated, the patient was placed in the dorsal lithotomy position and prepped and draped in the usual sterile fashion.  Time out was performed, jayne-procedural antibiotics were confirmed.     A rigid cystoscope in a 22 Fr sheath was introduced into the bladder per urethra. This passed easily. The entire urethra was visualized which showed no masses or strictures.  The right and left ureteral orifices were identified in the normal anatomic position.  Formal cystoscopy was performed which revealed no masses or lesions suspicious for malignancy, no trabeculations, no bladder stones and no bladder diverticula.     The right UO was identified and cannulated with a motion wire which was advanced to the level of the renal pelvis. A 5 Fr open-ended ureteral catheter was advanced over the wire just past the UO where resistance was met.  The wire was then removed and a RPG was performed which showed the above findings.      The motion wire was then replaced.  We then passed a 6 Fr x 24 cm JJ ureteral stent without strings over the wire to the level of the renal pelvis under direct vision as well as flouroscopy. The guide wire was removed.  A 180 degree coil was observed in the renal pelvis as well as the bladder using fluoroscopy.  A 180 degree coil was also seen using direct visualization in the bladder.     An 18 Fr lee catheter was placed in the bladder and used to perform a cystogram under gravity drainage. His bladder capacity was approximately 200 mL.      The bladder was drained, and the patient was removed from lithotomy.     The patient tolerated the procedure well and was transferred to recovery in stable condition.    Disposition:  The patient will follow up with Dr. Greenfield in 1 week with a BMP and MAG3 scan prior.    Pretty Blackmon MD    I have reviewed the operative note performed by Dr. Blackmon, and I concur with  her/his documentation of Bradley Collins. I was present for the critical or key portions of the procedure.

## 2024-05-02 NOTE — INTERVAL H&P NOTE
The patient has been examined and the H&P has been reviewed:    I concur with the findings and no changes have occurred since H&P was written.    Surgery risks, benefits and alternative options discussed and understood by patient/family.    OR today for cysto, RPG, R ureteral stent placement.  Urine dip negative for all components.  On Plavix/elliquis.          There are no hospital problems to display for this patient.

## 2024-05-02 NOTE — TRANSFER OF CARE
"Anesthesia Transfer of Care Note    Patient: Bradley Collins    Procedure(s) Performed: Procedure(s) (LRB):  CYSTOSCOPY (N/A)  PYELOGRAM, RETROGRADE (Right)  INSERTION, STENT, URETER (Right)  CYSTOGRAM    Patient location: PACU    Anesthesia Type: general    Transport from OR: Transported from OR on 6-10 L/min O2 by face mask with adequate spontaneous ventilation    Post pain: adequate analgesia    Post assessment: no apparent anesthetic complications and tolerated procedure well    Post vital signs: stable    Level of consciousness: awake and alert    Nausea/Vomiting: no nausea/vomiting    Complications: none    Transfer of care protocol was followed    Last vitals: Visit Vitals  BP 99/62 (BP Location: Left arm, Patient Position: Lying)   Pulse 60   Temp 36.4 °C (97.5 °F) (Temporal)   Resp (!) 185   Ht 5' 7" (1.702 m)   Wt 72.6 kg (160 lb)   SpO2 100%   BMI 25.06 kg/m²     "

## 2024-05-02 NOTE — ANESTHESIA PREPROCEDURE EVALUATION
[facility]  Anesthesia Pre-Operative Evaluation         Patient Name/: Bradley Collins, 1960  MRN: [id number]    SUBJECTIVE:     Pre-operative evaluation for Procedure(s) (LRB):  CYSTOSCOPY (N/A)  URETEROSCOPY (Right)  PYELOGRAM, RETROGRADE (Right)  INSERTION, STENT, URETER (N/A)     2024    Bradley Collins is a 63 y.o. male     Patient now presents for the above procedure(s).    ________________________________________  No results found for this or any previous visit.    ________________________________________    LDA:        Peripheral IV - Single Lumen 24 20 G Anterior;Left Hand (Active)   Site Assessment Clean;Dry;Intact 24   Extremity Assessment Distal to IV No abnormal discoloration;No redness;No swelling 24   Dressing Status Clean;Dry;Intact 24   Dressing Intervention Integrity maintained 24   Number of days: 0       Drips:   Current Facility-Administered Medications   Medication Dose Route Frequency Last Rate Last Admin       Patient Active Problem List   Diagnosis    Prostate cancer    Elevated serum creatinine    Allergic rhinitis    Asthma    Chronic maxillary sinusitis    Chronic rhinitis    Cyst of pancreas    Dry eye syndrome of bilateral lacrimal glands    Hyperlipidemia    History of colon polyps    Moderate persistent asthma, uncomplicated    Swelling of right lower extremity    DALIA (acute kidney injury)    Acquired occlusion of iliac vein    Secondary hypertension    Iliac vein thrombosis, right    Anticoagulant long-term use       Review of patient's allergies indicates:   Allergen Reactions    Aspirin Shortness Of Breath       Current Inpatient Medications:   Current Facility-Administered Medications   Medication Dose Route Frequency       No current facility-administered medications on file prior to encounter.     Current Outpatient Medications on File Prior to Encounter   Medication Sig Dispense Refill    albuterol  (ACCUNEB) 1.25 mg/3 mL Nebu Take 1.25 mg by nebulization every 6 (six) hours as needed. Rescue      amLODIPine (NORVASC) 10 MG tablet Take 1 tablet (10 mg total) by mouth once daily. 30 tablet 2    apixaban (ELIQUIS) 5 mg Tab Take 1 tablet (5 mg total) by mouth 2 (two) times daily. 60 tablet 2    azelastine (ASTELIN) 137 mcg (0.1 %) nasal spray INHALE 2 SPRAYS IN EACH NOSTRIL TWICE A DAY FOR ALLERGIES      budesonide-formoterol 160-4.5 mcg (SYMBICORT) 160-4.5 mcg/actuation HFAA Inhale 2 puffs into the lungs every 12 (twelve) hours. Controller      calcium carbonate (OS-CAROLYN) 500 mg calcium (1,250 mg) tablet       cetirizine (ZYRTEC) 10 MG tablet 10 mg.      clopidogreL (PLAVIX) 75 mg tablet Take 1 tablet (75 mg total) by mouth once daily. For 2 months. 60 tablet 0    dupilumab 300 mg/2 mL PnIj INJECT 300MG SUBCUTANEOUSLY EVERY TWO WEEKS      fluticasone propionate 93 mcg/actuation AerB 2 sprays by Nasal route 2 (two) times a day.      mometasone-formoterol (DULERA) 200-5 mcg/actuation inhaler Inhale 2 puffs into the lungs 2 (two) times daily.      nasal exhalation resistanc.dev (NASAL EXHALATION RESISTANCE DV NASL) by Nasal route.      omeprazole 20 mg TbEC Take 20 mg by mouth once daily.      rosuvastatin 10 mg CpSP       tadalafiL (CIALIS) 20 MG Tab 20 mg.      tiotropium (SPIRIVA) 18 mcg inhalation capsule Inhale 18 mcg into the lungs once daily. Controller      montelukast (SINGULAIR) 5 MG chewable tablet Take 5 mg by mouth. (Patient not taking: Reported on 4/24/2024)      pantoprazole (PROTONIX) 40 MG tablet Take 40 mg by mouth. (Patient not taking: Reported on 4/24/2024)      vitamin D (VITAMIN D3) 1000 units Tab Take 1,000 Units by mouth once daily. (Patient not taking: Reported on 4/24/2024)         Past Surgical History:   Procedure Laterality Date    ENDOSCOPIC ULTRASOUND OF UPPER GASTROINTESTINAL TRACT N/A 7/25/2023    Procedure: ULTRASOUND, UPPER GI TRACT, ENDOSCOPIC;  Surgeon: Yoseph Li MD;   Location: [facility] ([address] [place] [address]);  Service: Endoscopy;  Laterality: N/A;  instr portal-va referral    SINUS SURGERY Bilateral 2001       Social History:  Tobacco Use: Low Risk  (5/2/2024)    Patient History     Smoking Tobacco Use: Never     Smokeless Tobacco Use: Never     Passive Exposure: Not on file       Alcohol Use: Not At Risk (2/29/2024)    AUDIT-C     Frequency of Alcohol Consumption: 2-3 times a week     Average Number of Drinks: 1 or 2     Frequency of Binge Drinking: Never       OBJECTIVE:     Vital Signs Range:  BMI Readings from Last 1 Encounters:   05/02/24 25.06 kg/m²       Temp:  [37.1 °C (98.8 °F)]   Pulse:  [78]   Resp:  [18]   BP: (105)/(56)   SpO2:  [97 %]        Significant Labs:        Component Value Date/Time    WBC 8.76 03/05/2024 0359    HGB 10.4 (L) 03/05/2024 0359    HCT 31.0 (L) 03/05/2024 0359     03/05/2024 0359     04/03/2024 1402    K 4.0 04/03/2024 1402     04/03/2024 1402    CO2 28 04/03/2024 1402    GLU 94 04/03/2024 1402    BUN 19 04/03/2024 1402    CREATININE 1.7 (H) 04/03/2024 1402    PHOS 2.9 04/03/2024 1402    CALCIUM 9.9 04/03/2024 1402    ALBUMIN 3.8 04/03/2024 1402    PROT 7.3 03/21/2024 1241    ALKPHOS 99 03/21/2024 1241    BILITOT 0.2 03/21/2024 1241    AST 34 03/21/2024 1241    ALT 51 (H) 03/21/2024 1241    INR 1.1 03/08/2024 0645        Please see Results Review for additional labs.     Diagnostic Studies: No relevant studies.    EKG:   Results for orders placed or performed during the hospital encounter of 04/24/24   EKG 12-lead    Collection Time: 04/24/24  2:16 PM   Result Value Ref Range    QRS Duration 80 ms    [facility] QTC Calculation 408 ms    Narrative    Test Reason : Z01.818,I15.9,    Vent. Rate : 074 BPM     Atrial Rate : 074 BPM     P-R Int : 164 ms          QRS Dur : 080 ms      QT Int : 368 ms       P-R-T Axes : 072 054 042 degrees     QTc Int : 408 ms    Normal sinus rhythm  Normal ECG  No previous ECGs available  Confirmed by FLASH MADDOX, Melecio  ([id number]) on 4/24/2024 4:01:21 PM    Referred By: COLLIN MARTINEZ           Confirmed By:Melecio VIDALES MD       ECHO:  See subjective, if available.      ASSESSMENT/PLAN:                                                                                                                  05/02/2024  Bradley Collins is a 63 y.o., male.      Pre-op Assessment          Review of Systems  Hematology/Oncology:                   Hematology Comments: Anticoagulant long-term use                    Cardiovascular:     Hypertension           hyperlipidemia                             Pulmonary:    Asthma                    Renal/:  Chronic Renal Disease                       Anesthesia Plan  Type of Anesthesia, risks & benefits discussed:    Anesthesia Type: Gen Natural Airway, Gen Supraglottic Airway, Gen ETT  Intra-op Monitoring Plan: Standard ASA Monitors  Induction:  IV  Informed Consent: Informed consent signed with the Patient and all parties understand the risks and agree with anesthesia plan.  All questions answered.   ASA Score: 3  Day of Surgery Review of History & Physical: H&P Update referred to the surgeon/provider.    Ready For Surgery From Anesthesia Perspective.     .

## 2024-05-02 NOTE — PROGRESS NOTES
Patient stated that his last dose of plavix and eliquis was last night. Dr. Greenfield and Dr. Alford notified.

## 2024-05-02 NOTE — PROGRESS NOTES
Also informed patient of For the first 24 to 48 hours:  Do not operate heavy or dangerous machinery.  Do not make major decisions or sign important papers. You may not be able to think clearly.  Avoid beer, wine, or mixed drinks.  Patient and s.o verbalized understanding.

## 2024-05-03 NOTE — ANESTHESIA POSTPROCEDURE EVALUATION
Anesthesia Post Evaluation    Patient: Bradley Collins    Procedure(s) Performed: Procedure(s) (LRB):  CYSTOSCOPY (N/A)  PYELOGRAM, RETROGRADE (Right)  INSERTION, STENT, URETER (Right)  CYSTOGRAM    Final Anesthesia Type: general      Patient location during evaluation: PACU  Patient participation: Yes- Able to Participate  Level of consciousness: awake and alert  Post-procedure vital signs: reviewed and stable  Pain management: adequate  Airway patency: patent    PONV status at discharge: No PONV  Anesthetic complications: no      Cardiovascular status: blood pressure returned to baseline  Respiratory status: unassisted  Hydration status: euvolemic  Follow-up not needed.              Vitals Value Taken Time   /72 05/02/24 1036   Temp 36.4 °C (97.5 °F) 05/02/24 0855   Pulse 69 05/02/24 1039   Resp 16 05/02/24 1039   SpO2 100 % 05/02/24 1039   Vitals shown include unfiled device data.      No case tracking events are documented in the log.      Pain/Rosa M Score: Rosa M Score: 10 (5/2/2024  9:30 AM)

## 2024-05-10 ENCOUNTER — HOSPITAL ENCOUNTER (OUTPATIENT)
Dept: RADIOLOGY | Facility: HOSPITAL | Age: 64
Discharge: HOME | End: 2024-05-10
Payer: COMMERCIAL

## 2024-05-10 DIAGNOSIS — N13.30 HYDRONEPHROSIS, UNSPECIFIED HYDRONEPHROSIS TYPE: ICD-10-CM

## 2024-05-10 PROCEDURE — 78708 K FLOW/FUNCT IMAGE W/DRUG: CPT | Mod: 26,,, | Performed by: STUDENT IN AN ORGANIZED HEALTH CARE EDUCATION/TRAINING PROGRAM

## 2024-05-10 PROCEDURE — A9562 TC99M MERTIATIDE: HCPCS

## 2024-05-10 PROCEDURE — 78708 K FLOW/FUNCT IMAGE W/DRUG: CPT | Mod: TC

## 2024-05-10 RX ORDER — BETIATIDE 1 MG/1
5 INJECTION, POWDER, LYOPHILIZED, FOR SOLUTION INTRAVENOUS
Status: COMPLETED | OUTPATIENT
Start: 2024-05-10 | End: 2024-05-10

## 2024-05-10 RX ORDER — FUROSEMIDE 10 MG/ML
40 INJECTION INTRAMUSCULAR; INTRAVENOUS ONCE
Status: DISCONTINUED | OUTPATIENT
Start: 2024-05-10 | End: 2024-05-18 | Stop reason: HOSPADM

## 2024-05-10 RX ADMIN — BETIATIDE 5.2 MILLICURIE: 1 INJECTION, POWDER, LYOPHILIZED, FOR SOLUTION INTRAVENOUS at 12:05

## 2024-05-13 NOTE — PROGRESS NOTES
Subjective:       Patient ID: Bradley Collins is a 63 y.o. male.    Chief Complaint:  prostate cancer, bilateral hydronephrosis.    History of Present Illness  HPI  Patient is a 63 y.o. male who is established to our clinic and was initially referred by Elena Alexander NP for evaluation of bilateral hydronephrosis.     Of note, the patient has a h/o prostate cancer.  [facility]  Patient underwent an MRI of the prostate due to an elevated psa.  [facility] 5.  MRI done in 2/2020 showed a PIRADS 4 and PIRADS 3 lesion, questionable MAYRA or capsular deformity.  Underwent a fusion biopsy===patient had high volume cancer with Marsha 4+4 CaP.  Was treated  with EBRT with Dr. Lazo in [id number].      Patient has good erections and minimal LUTs currently.  No prior abdominal surgeries.     CT scan of the abdomen and pelvis with contrast from 03/05/2024 was independently reviewed today and reveals delayed right nephrogram with mild right hydronephrosis down to the level the distal ureter, per official report there is encasement of the distal ureter in soft tissue mass.  Mag 3 renal scan from 04/10/2020 was previously reviewed  and reveals 85% left differential renal function in 15% right renal function.  Concerns for right-sided obstruction although difficult to assess given the relative lack of contrast uptake.    He underwent a cystoscopy with right ureteral stent placement on 5/2/24. BMP from 5/10/24 showed a stable serum Cr of 1.7, eGFR 44.5 which is unchanged from prior to placing the right ureteral stent.  Mag 3 renal scan from 5/10/24 was independently reviewed today and reveals differential function of 16% right and 84% left renal function.    Reports feeling well today, no stent discomfort, no n/v/f/c, no hematuria, dysuria, LUTS.   Mag 3 renal scan from 5/10/24 was independently reviewed today and reveals 16% right and 84% left differential renal function, possible  concern for obstruction of the right kidney although difficult to discern given the relative lack of radiolabeled tracer uptake and relative poor function.      Patient denies any right flank pain.        Lab Results   Component Value Date    PSADIAG 0.45 04/16/2024           Review of Systems  Review of Systems  All other systems reviewed and negative except pertinent positives noted in HPI.       Objective:     Physical Exam  Constitutional:       General: He is not in acute distress.     Appearance: He is well-developed.   HENT:      Head: Normocephalic and atraumatic.   Eyes:      General: No scleral icterus.  Neck:      Trachea: No tracheal deviation.   Pulmonary:      Effort: Pulmonary effort is normal. No respiratory distress.   Neurological:      Mental Status: He is alert and oriented to person, place, and time.   Psychiatric:         Behavior: Behavior normal.         Thought Content: Thought content normal.         Judgment: Judgment normal.         Lab Review  Lab Results   Component Value Date    COLORU Yellow 03/25/2024    SPECGRAV 1.015 03/25/2024    PHUR 5 03/25/2024    WBCUR Neg 03/25/2024    NITRITE Neg 03/25/2024    GLUCOSEUR Neg 03/25/2024    KETONESU Neg 03/25/2024    UROBILINOGEN Normal 03/25/2024    RBCUR Neg 03/25/2024         Assessment:        1. Hydronephrosis, unspecified hydronephrosis type    2. Prostate cancer              Plan:     Hydronephrosis, unspecified hydronephrosis type    Prostate cancer        --imaging reviewed as per HPI.     -A post void residual bladder scan was performed immediately after voiding. The patient's PVR was noted to be 7cc.  -psa from 4/16/24 was 0.45.    -no meaningful change in renal function after stent placement.  Mag 3 reviewed---difficult to assess obstruction in setting of lack of uptake/poor renal function.   -will obtain renal US for anatomic assessment of hydronephrosis after stent placement.   -discussed potential for right nephrostomy tube in  future if feasible based on anticoagulation requirements.  Discussed observation vs ureteral reimplant vs nephrectomy----currently, no significant function of the right kidney based on mag-3 and no change in serum Cr after stent placement suggesting relative lack of meaningful right renal function.     The patient was seen and examined with the resident physician.  All questions were answered.  The plan was discussed with the patient and I concur with the resident physician's documentation.

## 2024-05-15 ENCOUNTER — OFFICE VISIT (OUTPATIENT)
Dept: UROLOGY | Facility: CLINIC | Age: 64
End: 2024-05-15
Payer: COMMERCIAL

## 2024-05-15 VITALS
HEART RATE: 84 BPM | HEIGHT: 67 IN | WEIGHT: 160.94 LBS | SYSTOLIC BLOOD PRESSURE: 135 MMHG | BODY MASS INDEX: 25.26 KG/M2 | DIASTOLIC BLOOD PRESSURE: 72 MMHG

## 2024-05-15 DIAGNOSIS — N13.30 HYDRONEPHROSIS, UNSPECIFIED HYDRONEPHROSIS TYPE: Primary | ICD-10-CM

## 2024-05-15 DIAGNOSIS — C61 PROSTATE CANCER: ICD-10-CM

## 2024-05-15 PROCEDURE — 1159F MED LIST DOCD IN RCRD: CPT | Mod: CPTII,S$GLB,, | Performed by: UROLOGY

## 2024-05-15 PROCEDURE — 3075F SYST BP GE 130 - 139MM HG: CPT | Mod: CPTII,S$GLB,, | Performed by: UROLOGY

## 2024-05-15 PROCEDURE — 1160F RVW MEDS BY RX/DR IN RCRD: CPT | Mod: CPTII,S$GLB,, | Performed by: UROLOGY

## 2024-05-15 PROCEDURE — 3078F DIAST BP <80 MM HG: CPT | Mod: CPTII,S$GLB,, | Performed by: UROLOGY

## 2024-05-15 PROCEDURE — 99999 PR PBB SHADOW E&M-EST. PATIENT-LVL IV: CPT | Mod: PBBFAC,,, | Performed by: UROLOGY

## 2024-05-15 PROCEDURE — 3008F BODY MASS INDEX DOCD: CPT | Mod: CPTII,S$GLB,, | Performed by: UROLOGY

## 2024-05-15 PROCEDURE — 3066F NEPHROPATHY DOC TX: CPT | Mod: CPTII,S$GLB,, | Performed by: UROLOGY

## 2024-05-15 PROCEDURE — 99214 OFFICE O/P EST MOD 30 MIN: CPT | Mod: S$GLB,,, | Performed by: UROLOGY

## 2024-05-16 ENCOUNTER — HOSPITAL ENCOUNTER (OUTPATIENT)
Dept: RADIOLOGY | Facility: HOSPITAL | Age: 64
Discharge: HOME | End: 2024-05-16
Attending: UROLOGY
Payer: COMMERCIAL

## 2024-05-16 DIAGNOSIS — N13.30 HYDRONEPHROSIS, UNSPECIFIED HYDRONEPHROSIS TYPE: ICD-10-CM

## 2024-05-16 PROCEDURE — 76770 US EXAM ABDO BACK WALL COMP: CPT | Mod: TC

## 2024-05-16 PROCEDURE — 76770 US EXAM ABDO BACK WALL COMP: CPT | Mod: 26,,, | Performed by: RADIOLOGY

## 2024-05-24 NOTE — PROGRESS NOTES
Subjective:       Patient ID: Bradley Collins is a 63 y.o. male.    Chief Complaint:  prostate cancer, bilateral hydronephrosis.    History of Present Illness  HPI  Patient is a 63 y.o. male who is established to our clinic and was initially referred by Elena Alexander NP for evaluation of bilateral hydronephrosis.     Of note, the patient has a h/o prostate cancer.  [facility]  Patient underwent an MRI of the prostate due to an elevated psa.  [facility] 5.  MRI done in 2/2020 showed a PIRADS 4 and PIRADS 3 lesion, questionable MAYRA or capsular deformity.  Underwent a fusion biopsy===patient had high volume cancer with Marsha 4+4 CaP.  Was treated  with EBRT with Dr. Lazo in [id number].      Patient has good erections and minimal LUTs currently.  No prior abdominal surgeries.     CT scan of the abdomen and pelvis with contrast from 03/05/2024 was independently reviewed today and reveals delayed right nephrogram with mild right hydronephrosis down to the level the distal ureter, per official report there is encasement of the distal ureter in soft tissue mass.  Mag 3 renal scan from 04/10/2020 was previously reviewed  and reveals 85% left differential renal function in 15% right renal function.  Concerns for right-sided obstruction although difficult to assess given the relative lack of contrast uptake.    He underwent a cystoscopy with right ureteral stent placement on 5/2/24. BMP from 5/10/24 showed a stable serum Cr of 1.7, eGFR 44.5 which is unchanged from prior to placing the right ureteral stent.  Mag 3 renal scan from 5/10/24 was independently reviewed today and reveals differential function of 16% right and 84% left renal function.    Reports feeling well today, no stent discomfort, no n/v/f/c, no hematuria, dysuria, LUTS.   Mag 3 renal scan from 5/10/24 was independently reviewed today and reveals 16% right and 84% left differential renal function, possible  concern for obstruction of the right kidney although difficult to discern given the relative lack of radiolabeled tracer uptake and relative poor function.      Patient denies any right flank pain.    Returns today to discuss results of renal US.  Serum Cr was 1.7 with eGFR of 44.5 on 5/10/24.   Ultrasound of the kidneys from 5/16/24 was independently reviewed today and reveals persistent moderate right-sided hydronephrosis despite an intact right ureteral stent.         Lab Results   Component Value Date    PSADIAG 0.45 04/16/2024           Review of Systems  Review of Systems  All other systems reviewed and negative except pertinent positives noted in HPI.       Objective:     Physical Exam  Constitutional:       General: He is not in acute distress.     Appearance: He is well-developed.   HENT:      Head: Normocephalic and atraumatic.   Eyes:      General: No scleral icterus.  Neck:      Trachea: No tracheal deviation.   Pulmonary:      Effort: Pulmonary effort is normal. No respiratory distress.   Neurological:      Mental Status: He is alert and oriented to person, place, and time.   Psychiatric:         Behavior: Behavior normal.         Thought Content: Thought content normal.         Judgment: Judgment normal.         Lab Review  Lab Results   Component Value Date    COLORU Yellow 03/25/2024    SPECGRAV 1.015 03/25/2024    PHUR 5 03/25/2024    WBCUR Neg 03/25/2024    NITRITE Neg 03/25/2024    GLUCOSEUR Neg 03/25/2024    KETONESU Neg 03/25/2024    UROBILINOGEN Normal 03/25/2024    RBCUR Neg 03/25/2024         Assessment:        1. Hydronephrosis, unspecified hydronephrosis type    2. Prostate cancer    3. Stage 3b chronic kidney disease                Plan:     Hydronephrosis, unspecified hydronephrosis type    Prostate cancer    Stage 3b chronic kidney disease          --imaging reviewed as per HPI.       -psa from 4/16/24 was 0.45.    -no meaningful change in renal function after stent placement.  Mag 3  reviewed---difficult to assess obstruction in setting of lack of uptake/poor renal function.   -Renal US shows persistent hydronephrosis.   -discussed potential for right nephrostomy tube in future if feasible based on anticoagulation requirements.  Discussed observation vs ureteral reimplant vs nephrectomy----currently, no significant function of the right kidney based on mag-3 and no change in serum Cr after stent placement suggesting relative lack of meaningful right renal function.     -II explained that the most conservative measure would be to remove the stent in place a right nephrostomy tube and subsequently check differential renal function with repeat Mag 3 renal scan.  I explained at length that the parenchymal thickness and appearance seemed healthy on CT scan such that his 16% differential renal function may be an underestimation.    However, the patient is more interested in getting the stent out and pursuing observation.    He understands that there is some risk with removing the stent of further renal functional decline.  -  Plan for cystoscopy and ureteral stent removal in 1 week.

## 2024-05-28 ENCOUNTER — OFFICE VISIT (OUTPATIENT)
Dept: UROLOGY | Facility: CLINIC | Age: 64
End: 2024-05-28
Payer: COMMERCIAL

## 2024-05-28 VITALS
SYSTOLIC BLOOD PRESSURE: 129 MMHG | WEIGHT: 160.94 LBS | HEIGHT: 67 IN | BODY MASS INDEX: 25.26 KG/M2 | HEART RATE: 69 BPM | DIASTOLIC BLOOD PRESSURE: 74 MMHG

## 2024-05-28 DIAGNOSIS — N18.32 STAGE 3B CHRONIC KIDNEY DISEASE: ICD-10-CM

## 2024-05-28 DIAGNOSIS — N13.30 HYDRONEPHROSIS, UNSPECIFIED HYDRONEPHROSIS TYPE: Primary | ICD-10-CM

## 2024-05-28 DIAGNOSIS — C61 PROSTATE CANCER: ICD-10-CM

## 2024-05-28 PROCEDURE — 3066F NEPHROPATHY DOC TX: CPT | Mod: CPTII,S$GLB,, | Performed by: UROLOGY

## 2024-05-28 PROCEDURE — 99214 OFFICE O/P EST MOD 30 MIN: CPT | Mod: S$GLB,,, | Performed by: UROLOGY

## 2024-05-28 PROCEDURE — 99999 PR PBB SHADOW E&M-EST. PATIENT-LVL IV: CPT | Mod: PBBFAC,,, | Performed by: UROLOGY

## 2024-05-28 PROCEDURE — 1160F RVW MEDS BY RX/DR IN RCRD: CPT | Mod: CPTII,S$GLB,, | Performed by: UROLOGY

## 2024-05-28 PROCEDURE — 3074F SYST BP LT 130 MM HG: CPT | Mod: CPTII,S$GLB,, | Performed by: UROLOGY

## 2024-05-28 PROCEDURE — 1159F MED LIST DOCD IN RCRD: CPT | Mod: CPTII,S$GLB,, | Performed by: UROLOGY

## 2024-05-28 PROCEDURE — 3078F DIAST BP <80 MM HG: CPT | Mod: CPTII,S$GLB,, | Performed by: UROLOGY

## 2024-05-28 PROCEDURE — 3008F BODY MASS INDEX DOCD: CPT | Mod: CPTII,S$GLB,, | Performed by: UROLOGY

## 2024-06-03 PROBLEM — N17.9 AKI (ACUTE KIDNEY INJURY): Status: RESOLVED | Noted: 2024-03-02 | Resolved: 2024-06-03

## 2024-06-05 ENCOUNTER — PROCEDURE VISIT (OUTPATIENT)
Dept: UROLOGY | Facility: CLINIC | Age: 64
End: 2024-06-05
Payer: COMMERCIAL

## 2024-06-05 VITALS
RESPIRATION RATE: 20 BRPM | DIASTOLIC BLOOD PRESSURE: 62 MMHG | WEIGHT: 160.94 LBS | SYSTOLIC BLOOD PRESSURE: 125 MMHG | HEART RATE: 73 BPM | BODY MASS INDEX: 25.21 KG/M2 | TEMPERATURE: 98 F

## 2024-06-05 DIAGNOSIS — N13.30 HYDRONEPHROSIS, UNSPECIFIED HYDRONEPHROSIS TYPE: ICD-10-CM

## 2024-06-05 PROCEDURE — 52310 CYSTOSCOPY AND TREATMENT: CPT | Mod: S$GLB,,, | Performed by: UROLOGY

## 2024-06-05 RX ORDER — LIDOCAINE HYDROCHLORIDE 20 MG/ML
JELLY TOPICAL
Status: COMPLETED | OUTPATIENT
Start: 2024-06-05 | End: 2024-06-05

## 2024-06-05 RX ADMIN — LIDOCAINE HYDROCHLORIDE: 20 JELLY TOPICAL at 01:06

## 2024-06-05 NOTE — PROCEDURES
Procedure: Flexible cysto-uretheroscopy and right ureteral stent removal   Pre Procedure Diagnosis:s/p cystoscopy and right ureteral stent placement  Post Procedure Diagnosis:same   Surgeon: Darryl Greenfield MD   Anesthesia: 2% uro-jet lidocaine jelly for local analgesia   Flexible cysto-urethroscopy was performed after consent was obtained. The risks and benefits were explained.   2% lidocaine urojet was used for local analgesia.   The genitalia was prepped and draped in the sterile fashion with betadine.   The flexible scope was advanced into the urethra and into the bladder. The stent was removed without difficulty.   The patient tolerated the procedure well without complication.   They will follow up in 6 weeks with a BMP and a renal ultrasound.

## 2024-06-05 NOTE — PATIENT INSTRUCTIONS
What to Expect After a Cystoscopy with Stent Removal  For the next 24-48 hours, you may feel a mild burning when you urinate. This burning is normal and expected. Drink plenty of water to dilute the urine to help relieve the burning sensation. You may also see a small amount of blood in your urine after the procedure.    Unless you are already taking antibiotics, you may be given an antibiotic after the test to prevent infection.    Signs and Symptoms to Report  Call the [facility] at [phone] if you develop any of the following:  Fever of 101 degrees or higher  Chills or persistent bleeding  Inability to urinate    After hours or on weekends, you may reach a urology resident on call at this number: [phone].

## 2024-07-08 ENCOUNTER — TELEPHONE (OUTPATIENT)
Dept: NEPHROLOGY | Facility: CLINIC | Age: 64
End: 2024-07-08
Payer: COMMERCIAL

## 2024-07-08 NOTE — TELEPHONE ENCOUNTER
Spoke to pt stated that he will keep appt date     ----- Message from Radha Luu sent at 7/8/2024 10:45 AM CDT -----  Regarding: appt access  Contact: pt [phone]  Pt calling to reschedule office visit , pt is due to have labs drawn in the morning. Pls call

## 2024-07-09 ENCOUNTER — LAB VISIT (OUTPATIENT)
Dept: LAB | Facility: OTHER | Age: 64
End: 2024-07-09
Attending: NURSE PRACTITIONER
Payer: COMMERCIAL

## 2024-07-09 DIAGNOSIS — N17.9 AKI (ACUTE KIDNEY INJURY): ICD-10-CM

## 2024-07-09 LAB
ALBUMIN SERPL BCP-MCNC: 3.5 G/DL (ref 3.5–5.2)
ANION GAP SERPL CALC-SCNC: 8 MMOL/L (ref 8–16)
BASOPHILS # BLD AUTO: 0.11 K/UL (ref 0–0.2)
BASOPHILS NFR BLD: 1.4 % (ref 0–1.9)
BUN SERPL-MCNC: 19 MG/DL (ref 8–23)
CALCIUM SERPL-MCNC: 9.2 MG/DL (ref 8.7–10.5)
CHLORIDE SERPL-SCNC: 106 MMOL/L (ref 95–110)
CO2 SERPL-SCNC: 27 MMOL/L (ref 23–29)
CREAT SERPL-MCNC: 1.9 MG/DL (ref 0.5–1.4)
DIFFERENTIAL METHOD BLD: ABNORMAL
EOSINOPHIL # BLD AUTO: 1.1 K/UL (ref 0–0.5)
EOSINOPHIL NFR BLD: 14.5 % (ref 0–8)
ERYTHROCYTE [DISTWIDTH] IN BLOOD BY AUTOMATED COUNT: 14.1 % (ref 11.5–14.5)
EST. GFR  (NO RACE VARIABLE): 39 ML/MIN/1.73 M^2
GLUCOSE SERPL-MCNC: 97 MG/DL (ref 70–110)
HCT VFR BLD AUTO: 42.4 % (ref 40–54)
HGB BLD-MCNC: 13.8 G/DL (ref 14–18)
IMM GRANULOCYTES # BLD AUTO: 0.02 K/UL (ref 0–0.04)
IMM GRANULOCYTES NFR BLD AUTO: 0.3 % (ref 0–0.5)
LYMPHOCYTES # BLD AUTO: 1.6 K/UL (ref 1–4.8)
LYMPHOCYTES NFR BLD: 20.4 % (ref 18–48)
MCH RBC QN AUTO: 30.5 PG (ref 27–31)
MCHC RBC AUTO-ENTMCNC: 32.5 G/DL (ref 32–36)
MCV RBC AUTO: 94 FL (ref 82–98)
MONOCYTES # BLD AUTO: 0.7 K/UL (ref 0.3–1)
MONOCYTES NFR BLD: 8.6 % (ref 4–15)
NEUTROPHILS # BLD AUTO: 4.3 K/UL (ref 1.8–7.7)
NEUTROPHILS NFR BLD: 54.8 % (ref 38–73)
NRBC BLD-RTO: 0 /100 WBC
PHOSPHATE SERPL-MCNC: 2.7 MG/DL (ref 2.7–4.5)
PLATELET # BLD AUTO: 227 K/UL (ref 150–450)
PMV BLD AUTO: 9 FL (ref 9.2–12.9)
POTASSIUM SERPL-SCNC: 4.2 MMOL/L (ref 3.5–5.1)
RBC # BLD AUTO: 4.53 M/UL (ref 4.6–6.2)
SODIUM SERPL-SCNC: 141 MMOL/L (ref 136–145)
WBC # BLD AUTO: 7.8 K/UL (ref 3.9–12.7)

## 2024-07-09 PROCEDURE — 36415 COLL VENOUS BLD VENIPUNCTURE: CPT | Performed by: NURSE PRACTITIONER

## 2024-07-09 PROCEDURE — 85025 COMPLETE CBC W/AUTO DIFF WBC: CPT | Performed by: NURSE PRACTITIONER

## 2024-07-09 PROCEDURE — 80069 RENAL FUNCTION PANEL: CPT | Performed by: NURSE PRACTITIONER

## 2024-07-10 ENCOUNTER — PATIENT MESSAGE (OUTPATIENT)
Facility: CLINIC | Age: 64
End: 2024-07-10
Payer: COMMERCIAL

## 2024-07-10 ENCOUNTER — OFFICE VISIT (OUTPATIENT)
Dept: NEPHROLOGY | Facility: CLINIC | Age: 64
End: 2024-07-10
Payer: COMMERCIAL

## 2024-07-10 VITALS
HEIGHT: 67 IN | DIASTOLIC BLOOD PRESSURE: 80 MMHG | BODY MASS INDEX: 25.71 KG/M2 | SYSTOLIC BLOOD PRESSURE: 135 MMHG | OXYGEN SATURATION: 97 % | HEART RATE: 71 BPM | WEIGHT: 163.81 LBS

## 2024-07-10 DIAGNOSIS — N17.9 AKI (ACUTE KIDNEY INJURY): ICD-10-CM

## 2024-07-10 DIAGNOSIS — I10 HYPERTENSION, UNSPECIFIED TYPE: ICD-10-CM

## 2024-07-10 DIAGNOSIS — N18.32 STAGE 3B CHRONIC KIDNEY DISEASE: Primary | ICD-10-CM

## 2024-07-10 DIAGNOSIS — N13.30 HYDRONEPHROSIS, UNSPECIFIED HYDRONEPHROSIS TYPE: ICD-10-CM

## 2024-07-10 PROCEDURE — 3008F BODY MASS INDEX DOCD: CPT | Mod: CPTII,S$GLB,, | Performed by: NURSE PRACTITIONER

## 2024-07-10 PROCEDURE — 3079F DIAST BP 80-89 MM HG: CPT | Mod: CPTII,S$GLB,, | Performed by: NURSE PRACTITIONER

## 2024-07-10 PROCEDURE — 3061F NEG MICROALBUMINURIA REV: CPT | Mod: CPTII,S$GLB,, | Performed by: NURSE PRACTITIONER

## 2024-07-10 PROCEDURE — 99999 PR PBB SHADOW E&M-EST. PATIENT-LVL IV: CPT | Mod: PBBFAC,,, | Performed by: NURSE PRACTITIONER

## 2024-07-10 PROCEDURE — 1159F MED LIST DOCD IN RCRD: CPT | Mod: CPTII,S$GLB,, | Performed by: NURSE PRACTITIONER

## 2024-07-10 PROCEDURE — 3075F SYST BP GE 130 - 139MM HG: CPT | Mod: CPTII,S$GLB,, | Performed by: NURSE PRACTITIONER

## 2024-07-10 PROCEDURE — G2211 COMPLEX E/M VISIT ADD ON: HCPCS | Mod: S$GLB,,, | Performed by: NURSE PRACTITIONER

## 2024-07-10 PROCEDURE — 3066F NEPHROPATHY DOC TX: CPT | Mod: CPTII,S$GLB,, | Performed by: NURSE PRACTITIONER

## 2024-07-10 PROCEDURE — 99214 OFFICE O/P EST MOD 30 MIN: CPT | Mod: S$GLB,,, | Performed by: NURSE PRACTITIONER

## 2024-07-10 PROCEDURE — 1160F RVW MEDS BY RX/DR IN RCRD: CPT | Mod: CPTII,S$GLB,, | Performed by: NURSE PRACTITIONER

## 2024-07-10 NOTE — PROGRESS NOTES
Subjective:       Patient ID: Bradley Collins is a 63 y.o. AA male who presents for new evaluation of of renal dysfunction.      HPI     Patient is new to me. New to clinic.  Prior pertinent chart reviewed since this is patient's first appointment with me. Presents with wife.    Patient presents for new evaluation of renal dysfunction.  Baseline creatinine of 1.1-1.3 mg/dL. One year passed between lab results available in EMR.  Recent DALIA peaked at 2.1 --> 1.5 --> 1.7-1.8 mg/dL    [facility]ul[facility]. Discharged on cephalexin.  Admitted [facility] 2/16-2/20/24. He was on Zosyn, which was changed to ceftriaxone, and vancomycin. Discharged on PO augmentin and doxycycline. Still with induration to upper leg.  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 Also had DALIA c sCr 1.7. Given Zosyn and Vancomycin, which was changed to ceftriaxone, daptomycin, and linezolid. Diagnosed with lymphedema causing swelling to RLE, right pelvis, penis, scrotum. CT found right iliac vein filling defect due to thrombus; had iliac vein recanalaziation, which caused improvmeent in swelling.  Nephrology consulted for DALIA. Amlodipine started for HTN (new) thought to be 2/2 DALIA. Nephrology also said sCr may be at new baseline, though the differential did include AIN. Nephrology signed off after sCr improved with recommendation for outpt follow up. sCr elevated (1.5-->1.8) the day after nephrology signed off.    Seen by urology 3/25/24 for hydronephrosis. He declined lee catheter that was recommended as well as starting Flomax. Plans for lasix renal scan.    Significant other medical problems include asthma, prostate cancer    Significant family hx includes: mother and brother with kidney disease, stage 3    Last renal US: March 21, 2024: New mild bilateral hydronephrosis noting preserved urine jets within the urinary bladder. Also with bladder wall thickening.  Increased renal  "cortical echogenicity.  3/5/24 - mild right hydroureteronephrosis noted on CT abdomen  3/3/24 - no hydronephrosis on US    Update 7/10/24:  Presents for f/u of CKD.  New baseline appears to be 1.7-1.9 mg/dL.  Home BPs: -135  He had been out of medication. Just restarted medication after being out of it for two weeks.    Had ureteral stent placed to right kidney without change in sCr or significant change to right kidney function on mag-3 scan.    Review of Systems   Respiratory:  Negative for shortness of breath.    Cardiovascular:  Negative for leg swelling.   Gastrointestinal:  Negative for diarrhea, nausea and vomiting.   Genitourinary:  Negative for difficulty urinating, dysuria and hematuria.       Objective:       Blood pressure 135/80, pulse 71, height 5' 7" (1.702 m), weight 74.3 kg (163 lb 12.8 oz), SpO2 97%.  Physical Exam  Vitals reviewed.   Constitutional:       General: He is not in acute distress.     Appearance: He is well-developed.   Eyes:      Conjunctiva/sclera: Conjunctivae normal.   Cardiovascular:      Rate and Rhythm: Normal rate and regular rhythm.      Heart sounds: Normal heart sounds. No murmur heard.     No friction rub. No gallop.   Pulmonary:      Effort: Pulmonary effort is normal. No respiratory distress.      Breath sounds: Normal breath sounds.   Abdominal:      Tenderness: There is no right CVA tenderness or left CVA tenderness.   Musculoskeletal:      Cervical back: Neck supple.      Right lower leg: No edema.      Left lower leg: No edema.   Skin:     General: Skin is warm and dry.      Findings: No lesion or rash.   Neurological:      Mental Status: He is alert and oriented to person, place, and time.   Psychiatric:         Mood and Affect: Mood normal.         Behavior: Behavior normal.         Thought Content: Thought content normal.         Judgment: Judgment normal.           Lab Results   Component Value Date    CREATININE 1.9 (H) 07/09/2024    URICACID 6.3 " "03/03/2024     Prot/Creat Ratio, Urine   Date Value Ref Range Status   07/09/2024 0.09 0.00 - 0.20 Final   03/07/2024 0.22 (H) 0.00 - 0.20 Final     Lab Results   Component Value Date     07/09/2024    K 4.2 07/09/2024    CO2 27 07/09/2024     07/09/2024     Lab Results   Component Value Date    CALCIUM 9.2 07/09/2024    PHOS 2.7 07/09/2024     Lab Results   Component Value Date    HGB 13.8 (L) 07/09/2024    WBC 7.80 07/09/2024    HCT 42.4 07/09/2024      Lab Results   Component Value Date     07/09/2024    BUN 19 07/09/2024     No results found for: "LDLCALC"      Assessment:       1. Stage 3b chronic kidney disease    2. DALIA (acute kidney injury)    3. Hypertension, unspecified type    4. Hydronephrosis, unspecified hydronephrosis type          Plan:   CKD stage 3-  Clinically, etiology is obstructive nephropathy to right kidney; mag-3 scan shows low function of right kidney, despite stent in place.    Mag 3 renal scan from 5/10/24  reveals 16% right and 84% left differential renal function; however, it was noted that there was less uptake of radiopharmaceutical material to right kidney.  Obtain US to r/o JUSTINA. Patient's kidney is not atrophied. If JUSTINA exists, it could explain decreased eGFR and low uptake, which stent could potentially improve.    UPCR No significant proteinuria. Not on RAASi.   Acid-base WNL   Secondary hyperparathyroidism Ca, phos WNL. On vit D.   Anemia Hgb at goal   DM N/a   Lipid Management On statin.   ESRD planning Mother-in-law was on dialysis.      HTN - A little high on amlodipine 10 mg. He had been off medication for a couple weeks. Continue to monitor now that he's resumed.    All questions patient had were answered.  Asked if further questions. None. F/u in clinic in 3 mos with labs and urine prior to next visit or sooner if needed.  ER for emergency concerns.    Summary of Plan:  [facility])  JUSTINA US  RTC in 4 mos    Visit today included " increased complexity associated with  managing the longitudinal care of the patient due to the serious and/or complex managed problem(s) CKD.

## 2024-07-17 ENCOUNTER — HOSPITAL ENCOUNTER (OUTPATIENT)
Dept: RADIOLOGY | Facility: OTHER | Age: 64
Discharge: HOME | End: 2024-07-17
Attending: UROLOGY
Payer: COMMERCIAL

## 2024-07-17 ENCOUNTER — HOSPITAL ENCOUNTER (OUTPATIENT)
Dept: RADIOLOGY | Facility: OTHER | Age: 64
Discharge: HOME | End: 2024-07-17
Attending: NURSE PRACTITIONER
Payer: COMMERCIAL

## 2024-07-17 DIAGNOSIS — N18.32 STAGE 3B CHRONIC KIDNEY DISEASE: ICD-10-CM

## 2024-07-17 DIAGNOSIS — N17.9 AKI (ACUTE KIDNEY INJURY): ICD-10-CM

## 2024-07-17 DIAGNOSIS — N13.30 HYDRONEPHROSIS, UNSPECIFIED HYDRONEPHROSIS TYPE: ICD-10-CM

## 2024-07-17 PROCEDURE — 93975 VASCULAR STUDY: CPT | Mod: 26,,, | Performed by: RADIOLOGY

## 2024-07-17 PROCEDURE — 93975 VASCULAR STUDY: CPT | Mod: TC

## 2024-07-17 PROCEDURE — 76770 US EXAM ABDO BACK WALL COMP: CPT | Mod: 26,59,, | Performed by: RADIOLOGY

## 2024-07-17 PROCEDURE — 76770 US EXAM ABDO BACK WALL COMP: CPT | Mod: 59,TC

## 2024-07-18 ENCOUNTER — PATIENT MESSAGE (OUTPATIENT)
Dept: NEPHROLOGY | Facility: CLINIC | Age: 64
End: 2024-07-18
Payer: COMMERCIAL

## 2024-07-22 NOTE — PROGRESS NOTES
Subjective:       Patient ID: Bradley Collins is a 63 y.o. male.    Chief Complaint:  prostate cancer, bilateral hydronephrosis.    History of Present Illness  HPI  Patient is a 63 y.o. male who is established to our clinic and was initially referred by Elena Alexander NP for evaluation of bilateral hydronephrosis.     Of note, the patient has a h/o prostate cancer.  [facility]  Patient underwent an MRI of the prostate due to an elevated psa.  [facility] 5.  MRI done in 2/2020 showed a PIRADS 4 and PIRADS 3 lesion, questionable MAYRA or capsular deformity.  Underwent a fusion biopsy===patient had high volume cancer with Marsha 4+4 CaP.  Was treated  with EBRT with Dr. Lazo in [id number].      Patient has good erections and minimal LUTs currently.  No prior abdominal surgeries.     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     CT scan of the abdomen and pelvis with contrast from 03/05/2024 was independently reviewed today and reveals delayed right nephrogram with mild right hydronephrosis down to the level the distal ureter, per official report there is encasement of the distal ureter in soft tissue mass.  Mag 3 renal scan from 04/10/2020 was previously reviewed  and reveals 85% left differential renal function in 15% right renal function.  Concerns for right-sided obstruction although difficult to assess given the relative lack of contrast uptake.    He underwent a cystoscopy with right ureteral stent placement on 5/2/24. BMP from 5/10/24 showed a stable serum Cr of 1.7, eGFR 44.5 which is unchanged from prior to placing the right ureteral stent.  Mag 3 renal scan from 5/10/24 was independently reviewed today and reveals differential function of 16% right and 84% left renal function.    Reports feeling well today, no stent discomfort, no n/v/f/c, no hematuria, dysuria, LUTS.   Mag 3 renal scan from 5/10/24 was independently reviewed today and reveals 16%  right and 84% left differential renal function, possible concern for obstruction of the right kidney although difficult to discern given the relative lack of radiolabeled tracer uptake and relative poor function.      Patient denies any right flank pain.    Returns today to discuss results of renal US.  Serum Cr was 1.7 with eGFR of 44.5 on 5/10/24.   Ultrasound of the kidneys from 5/16/24 was independently reviewed today and reveals persistent moderate right-sided hydronephrosis despite an intact right ureteral stent.     S/p cystoscopy with stent removal on 6/5/24.  Cr on 7/9/24 was 1.9. Previously Cr was 1.7-1.8.   Denies flank pain.     Denies history of abdominal surgery.   On Eliquis.       Lab Results   Component Value Date    PSADIAG 0.45 04/16/2024           Review of Systems  Review of Systems  All other systems reviewed and negative except pertinent positives noted in HPI.       Objective:     Physical Exam  Constitutional:       General: He is not in acute distress.     Appearance: He is well-developed.   HENT:      Head: Normocephalic and atraumatic.   Eyes:      General: No scleral icterus.  Neck:      Trachea: No tracheal deviation.   Pulmonary:      Effort: Pulmonary effort is normal. No respiratory distress.   Neurological:      Mental Status: He is alert and oriented to person, place, and time.   Psychiatric:         Behavior: Behavior normal.         Thought Content: Thought content normal.         Judgment: Judgment normal.         Lab Review  Lab Results   Component Value Date    COLORU Yellow 07/09/2024    SPECGRAV 1.010 07/09/2024    PHUR 6.0 07/09/2024    WBCUR Neg 03/25/2024    NITRITE Negative 07/09/2024    GLUCOSEUR Neg 03/25/2024    KETONESU Negative 07/09/2024    UROBILINOGEN Negative 07/09/2024    RBCUR Neg 03/25/2024         Assessment:        1. Hydronephrosis, unspecified hydronephrosis type    2. Prostate cancer    3. Stage 3b chronic kidney disease                  Plan:      Hydronephrosis, unspecified hydronephrosis type  -     BASIC METABOLIC PANEL; Future; Expected date: 07/23/2024    Prostate cancer    Stage 3b chronic kidney disease      --imaging reviewed as per HPI.     -psa from 4/16/24 was 0.45.    -no meaningful change in renal function after stent placement.  Mag 3 reviewed---difficult to assess obstruction in setting of lack of uptake/poor renal function.   -Renal US shows persistent hydronephrosis.   -discussed potential for right nephrostomy tube in future if feasible based on anticoagulation requirements.  Discussed observation vs ureteral reimplant vs nephrectomy----currently, no significant function of the right kidney based on mag-3 and no change in serum Cr after stent placement suggesting relative lack of meaningful right renal function. However, his ureteral stent was removed on 6/10/24 and recent Cr elevated to 1.9 which is minimally elevated.   - Patient prefers to observe this if he can. Will schedule follow up in 6 weeks with BMP prior.     - I explained that the most conservative measure would be to place a right nephrostomy tube and subsequently check differential renal function with repeat Mag 3 renal scan.  I explained at length that the parenchymal thickness and appearance seemed healthy on CT scan such that his 16% differential renal function may be an underestimation.  - However, the patient is more interested in pursuing observation at this point.   - RTC in 6 weeks with BMP prior.

## 2024-07-23 ENCOUNTER — OFFICE VISIT (OUTPATIENT)
Dept: UROLOGY | Facility: CLINIC | Age: 64
End: 2024-07-23
Payer: MEDICARE

## 2024-07-23 ENCOUNTER — PATIENT MESSAGE (OUTPATIENT)
Facility: CLINIC | Age: 64
End: 2024-07-23
Payer: MEDICARE

## 2024-07-23 VITALS
BODY MASS INDEX: 25.71 KG/M2 | DIASTOLIC BLOOD PRESSURE: 78 MMHG | HEIGHT: 67 IN | HEART RATE: 77 BPM | WEIGHT: 163.81 LBS | SYSTOLIC BLOOD PRESSURE: 144 MMHG

## 2024-07-23 DIAGNOSIS — C61 PROSTATE CANCER: ICD-10-CM

## 2024-07-23 DIAGNOSIS — N18.32 STAGE 3B CHRONIC KIDNEY DISEASE: ICD-10-CM

## 2024-07-23 DIAGNOSIS — N13.30 HYDRONEPHROSIS, UNSPECIFIED HYDRONEPHROSIS TYPE: Primary | ICD-10-CM

## 2024-07-23 PROCEDURE — 1160F RVW MEDS BY RX/DR IN RCRD: CPT | Mod: CPTII,S$GLB,, | Performed by: UROLOGY

## 2024-07-23 PROCEDURE — 99999 PR PBB SHADOW E&M-EST. PATIENT-LVL IV: CPT | Mod: PBBFAC,,, | Performed by: UROLOGY

## 2024-07-23 PROCEDURE — 1159F MED LIST DOCD IN RCRD: CPT | Mod: CPTII,S$GLB,, | Performed by: UROLOGY

## 2024-07-23 PROCEDURE — 99214 OFFICE O/P EST MOD 30 MIN: CPT | Mod: S$GLB,,, | Performed by: UROLOGY

## 2024-07-23 PROCEDURE — 3008F BODY MASS INDEX DOCD: CPT | Mod: CPTII,S$GLB,, | Performed by: UROLOGY

## 2024-07-23 PROCEDURE — 3077F SYST BP >= 140 MM HG: CPT | Mod: CPTII,S$GLB,, | Performed by: UROLOGY

## 2024-07-23 PROCEDURE — 3061F NEG MICROALBUMINURIA REV: CPT | Mod: CPTII,S$GLB,, | Performed by: UROLOGY

## 2024-07-23 PROCEDURE — 3078F DIAST BP <80 MM HG: CPT | Mod: CPTII,S$GLB,, | Performed by: UROLOGY

## 2024-07-23 PROCEDURE — 3066F NEPHROPATHY DOC TX: CPT | Mod: CPTII,S$GLB,, | Performed by: UROLOGY

## 2024-07-23 RX ORDER — CLOPIDOGREL BISULFATE 75 MG/1
5 TABLET ORAL
COMMUNITY
Start: 2024-04-22

## 2024-07-25 ENCOUNTER — TELEPHONE (OUTPATIENT)
Dept: NEPHROLOGY | Facility: CLINIC | Age: 64
End: 2024-07-25
Payer: MEDICARE

## 2024-07-25 ENCOUNTER — CLINICAL SUPPORT (OUTPATIENT)
Facility: CLINIC | Age: 64
End: 2024-07-25
Payer: MEDICARE

## 2024-07-25 DIAGNOSIS — N18.32 STAGE 3B CHRONIC KIDNEY DISEASE: ICD-10-CM

## 2024-07-25 NOTE — PROGRESS NOTES
Bradley Collins was referred to Introduction to CKD education by BERTO Seo (collaborating MD: Jack)    The patient attended class in a virtual group setting via [facility] telehealth platform. The following material was covered. Participants were engaged via frequent questioning and open ended questions for discussion. Pt attended with audio only. Pt location is [place].     Chronic Kidney Disease Education (Lesson 1 and 2)    Lesson 1 Understanding Kidney Disease  Lesson Objectives  By the end of each session, participants will be able to:  Recognize that fear and grief are usual responses to kidney disease  State causes/risk factors for kidney disease  Explain how GFR reflects kidney function  Explain how urine albumin/protein reflects kidney damage  State two ways the kidneys help maintain health  Describe how kidney disease is progressive but can be slowed down  Topics & Points Covered  Emotional impact of diagnosis  You're not alone  Emotional aspects  Depression, grief, and fear are typical  Physical activity may help  Benefits of education: Why are you here?  There are many causes of CKD. Diabetes and hypertension are the leading causes. Family history, cardiovascular disease, recurrent UTIs, immunological disease and genetics also play a role in CKD  CKD is complicated. The more you understand, the better you'll do. (Stated directly)  Basic anatomy  Location in the body  The nephrons have filters (working units)  Normal kidney function  Maintain chemical balance  Produce hormones  Regulate blood pressure  Kidney damage  Major causes of kidney damage  High blood pressure, diabetes  Fewer functioning nephrons  Monitoring kidney function and damage  eGFR (function)  Urine albumin/UACR or Urine protein/creatinine ratio (damage)  eGFR goal: a stable level  Decline means progression  Urine albumin/UACR goal: a stable or lower level  Increase in urine albumin/protein may mean progression  Kidney disease  is often irreversible and progressive  You'll likely need the help of a kidney specialist  Overview of treatment modalities  There are things you can do that may slow progression (more on this in next session)  Take your medications  Control blood pressure  Manage diabetes  Eat less salt  Miscellaneous  Kidney disease runs in families (Encouraged testing)  Early kidney disease has no symptoms  Outcome Assessment Questions  See below    Lesson 2 Managing Your Kidney Disease  Lesson Objectives  By the end of each session, participants will be able to:  Recognize that managing blood pressure is a key part of managing kidney disease  Recognize that managing diabetes is a key part of managing kidney disease  State at least one step to eating right for kidney health  Recognize the importance of being cautious about over-the-counter medicines  Recognize that smoking can worsen kidney disease  Identify which lab values are used to keep track of diabetes, high blood pressure, and other conditions  Topics & Points Covered  There are steps you can take to keep your kidneys working  Weight management  Blood pressure management  Blood pressure goal: < 140/90 mm Hg  Medications - ACEs/ARBs, diuretics  ACEs/ARBS and risk of hyperkalemia (too much potassium in the blood, but help lower urine albumin)  Sodium reduction (<2,300 mg)  Physical activity  Diabetes management  A1C target   Diabetes medications may change because of kidney disease (often takes less medication to control glucose in the later stages of CKD)  How to treat hypoglycemia appropriately (risk of high potassium with ACEi and ARB use) glucose tablets are preferred [May need to avoid juices with high potassium levels if hyperkalemic]  Hyperglycemia  Cardiovascular disease (CVD)  Physical activity  CVD is major cause of mortality  LDL goal  Medications  Nutritional health  Choose and prepare foods with less salt and sodium  Eat the right amount and kind of  "protein  Choose foods that are healthy for your heart  Choose foods based on phosphorus and potassium content (if restricted)  Make choices that help with diabetes management  Dietitian referral/nutrition therapy is covered benefit  Medication safety  Prescription  Over-the-counter (pain relief)  Tobacco cessation  Outcome Assessment Questions  See below    Outcome assessment:  The two main causes of chronic kidney disease (CKD) are hypertension and urinary tract infections. Patient answered, "False."  2.    A low eGFR shows your kidneys are working normally. Patient answered, "False."  3.    Protein in the urine means that your kidneys are damaged. Patient answered, "True."  4.    High blood pressure can be lowered through medications and following a low salt diet. Patient answered, "True."     5.     Balancing your meals and increasing salt intake can help manage your kidney disease. Patient answered, "False."     6.     When the kidneys gets worse, you may feel worse and need more medications. Patient answered, "True."    Areas of information that primary nephrology provider should reinforce during follow-up visit includes: n/a    The content of these lessons are adapted from the Kidney Disease Education (KDE) Services benefit as defined by the Centers for Medicare & Medicaid Services.    105 minutes spent educating the patient of the above content.    "

## 2024-09-04 ENCOUNTER — LAB VISIT (OUTPATIENT)
Dept: LAB | Facility: OTHER | Age: 64
End: 2024-09-04
Attending: STUDENT IN AN ORGANIZED HEALTH CARE EDUCATION/TRAINING PROGRAM
Payer: MEDICARE

## 2024-09-04 DIAGNOSIS — N13.30 HYDRONEPHROSIS, UNSPECIFIED HYDRONEPHROSIS TYPE: ICD-10-CM

## 2024-09-04 LAB
ANION GAP SERPL CALC-SCNC: 8 MMOL/L (ref 8–16)
BUN SERPL-MCNC: 20 MG/DL (ref 8–23)
CALCIUM SERPL-MCNC: 9.4 MG/DL (ref 8.7–10.5)
CHLORIDE SERPL-SCNC: 106 MMOL/L (ref 95–110)
CO2 SERPL-SCNC: 25 MMOL/L (ref 23–29)
CREAT SERPL-MCNC: 1.7 MG/DL (ref 0.5–1.4)
EST. GFR  (NO RACE VARIABLE): 44 ML/MIN/1.73 M^2
GLUCOSE SERPL-MCNC: 91 MG/DL (ref 70–110)
POTASSIUM SERPL-SCNC: 4.3 MMOL/L (ref 3.5–5.1)
SODIUM SERPL-SCNC: 139 MMOL/L (ref 136–145)

## 2024-09-04 PROCEDURE — 80048 BASIC METABOLIC PNL TOTAL CA: CPT | Performed by: STUDENT IN AN ORGANIZED HEALTH CARE EDUCATION/TRAINING PROGRAM

## 2024-09-04 PROCEDURE — 36415 COLL VENOUS BLD VENIPUNCTURE: CPT | Performed by: STUDENT IN AN ORGANIZED HEALTH CARE EDUCATION/TRAINING PROGRAM

## 2024-09-09 ENCOUNTER — HOSPITAL ENCOUNTER (OUTPATIENT)
Dept: RADIOLOGY | Facility: HOSPITAL | Age: 64
Discharge: HOME | End: 2024-09-09
Attending: FAMILY MEDICINE
Payer: MEDICARE

## 2024-09-09 DIAGNOSIS — I82.429: ICD-10-CM

## 2024-09-09 PROCEDURE — 74177 CT ABD & PELVIS W/CONTRAST: CPT | Mod: 26,,, | Performed by: RADIOLOGY

## 2024-09-09 PROCEDURE — 74177 CT ABD & PELVIS W/CONTRAST: CPT | Mod: TC

## 2024-09-09 PROCEDURE — 25500020 PHARM REV CODE 255: Performed by: FAMILY MEDICINE

## 2024-09-09 RX ADMIN — IOHEXOL 100 ML: 350 INJECTION, SOLUTION INTRAVENOUS at 02:09

## 2024-09-10 ENCOUNTER — OFFICE VISIT (OUTPATIENT)
Dept: UROLOGY | Facility: CLINIC | Age: 64
End: 2024-09-10
Payer: MEDICARE

## 2024-09-10 DIAGNOSIS — N18.32 STAGE 3B CHRONIC KIDNEY DISEASE: ICD-10-CM

## 2024-09-10 DIAGNOSIS — N13.30 HYDRONEPHROSIS, UNSPECIFIED HYDRONEPHROSIS TYPE: Primary | ICD-10-CM

## 2024-09-10 DIAGNOSIS — C61 PROSTATE CANCER: ICD-10-CM

## 2024-09-10 PROCEDURE — 3061F NEG MICROALBUMINURIA REV: CPT | Mod: CPTII,95,, | Performed by: UROLOGY

## 2024-09-10 PROCEDURE — 1159F MED LIST DOCD IN RCRD: CPT | Mod: CPTII,95,, | Performed by: UROLOGY

## 2024-09-10 PROCEDURE — 1160F RVW MEDS BY RX/DR IN RCRD: CPT | Mod: CPTII,95,, | Performed by: UROLOGY

## 2024-09-10 PROCEDURE — 99214 OFFICE O/P EST MOD 30 MIN: CPT | Mod: 95,,, | Performed by: UROLOGY

## 2024-09-10 PROCEDURE — 3066F NEPHROPATHY DOC TX: CPT | Mod: CPTII,95,, | Performed by: UROLOGY

## 2024-09-10 NOTE — PROGRESS NOTES
Subjective:       Patient ID: Bradley Collins is a 64 y.o. male.    Chief Complaint:  prostate cancer, bilateral hydronephrosis.    History of Present Illness  HPI  The patient location is: home  The chief complaint leading to consultation is: hydronephrosis    Visit type: audiovisual    Face to Face time with patient: 10  20 minutes of total time spent on the encounter, which includes face to face time and non-face to face time preparing to see the patient (eg, review of tests), Obtaining and/or reviewing separately obtained history, Documenting clinical information in the electronic or other health record, Independently interpreting results (not separately reported) and communicating results to the patient/family/caregiver, or Care coordination (not separately reported).         Each patient to whom he or she provides medical services by telemedicine is:  (1) informed of the relationship between the physician and patient and the respective role of any other health care provider with respect to management of the patient; and (2) notified that he or she may decline to receive medical services by telemedicine and may withdraw from such care at any time.    Notes:     Patient is a 64 y.o. male who is established to our clinic and was initially referred by Elena Alexander NP for evaluation of bilateral hydronephrosis.     Of note, the patient has a h/o prostate cancer.  [facility]  Patient underwent an MRI of the prostate due to an elevated psa.  [facility] 5[facility]  MRI done in 2/2020 showed a PIRADS 4 and PIRADS 3 lesion, questionable MAYRA or capsular deformity.  Underwent a fusion biopsy===patient had high volume cancer with Neligh 4+4 CaP.  Was treated  with EBRT with Dr. Lazo in [id number].      Patient has good erections and minimal LUTs currently.  No prior abdominal surgeries.     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     CT scan of the abdomen and  pelvis with contrast from 03/05/2024 was independently reviewed today and reveals delayed right nephrogram with mild right hydronephrosis down to the level the distal ureter, per official report there is encasement of the distal ureter in soft tissue mass.  Mag 3 renal scan from 04/10/2020 was previously reviewed  and reveals 85% left differential renal function in 15% right renal function.  Concerns for right-sided obstruction although difficult to assess given the relative lack of contrast uptake.    He underwent a cystoscopy with right ureteral stent placement on 5/2/24. BMP from 5/10/24 showed a stable serum Cr of 1.7, eGFR 44.5 which is unchanged from prior to placing the right ureteral stent.  Mag 3 renal scan from 5/10/24 was independently reviewed today and reveals differential function of 16% right and 84% left renal function.      S/p cystoscopy with stent removal on 6/5/24.  Cr on 7/9/24 was 1.9. Previously Cr was 1.7-1.8. Patient denies any right flank pain.    Most recent serum Cr from 9/4/24 was back to 1.7, eGFR 44.  Denies flank pain.   CT scan of the abd/pelvis from 9/9/24 was independently reviewed today and reveals worsening right hydronephrosis and likely RPF.    Denies history of abdominal surgery.   On Eliquis.       Lab Results   Component Value Date    PSADIAG 0.45 04/16/2024           Review of Systems  Review of Systems  All other systems reviewed and negative except pertinent positives noted in HPI.       Objective:     Physical Exam  Constitutional:       General: He is not in acute distress.     Appearance: He is well-developed.   HENT:      Head: Normocephalic and atraumatic.   Eyes:      General: No scleral icterus.  Neck:      Trachea: No tracheal deviation.   Pulmonary:      Effort: Pulmonary effort is normal. No respiratory distress.   Neurological:      Mental Status: He is alert and oriented to person, place, and time.   Psychiatric:         Behavior: Behavior normal.          Thought Content: Thought content normal.         Judgment: Judgment normal.         Lab Review  Lab Results   Component Value Date    COLORU Yellow 07/09/2024    SPECGRAV 1.010 07/09/2024    PHUR 6.0 07/09/2024    WBCUR Neg 03/25/2024    NITRITE Negative 07/09/2024    GLUCOSEUR Neg 03/25/2024    KETONESU Negative 07/09/2024    UROBILINOGEN Negative 07/09/2024    RBCUR Neg 03/25/2024         Assessment:        1. Hydronephrosis, unspecified hydronephrosis type    2. Stage 3b chronic kidney disease    3. Prostate cancer                    Plan:     Hydronephrosis, unspecified hydronephrosis type  -     Basic Metabolic Panel; Future; Expected date: 03/10/2025    Stage 3b chronic kidney disease    Prostate cancer        --imaging reviewed as per HPI.     -psa from 4/16/24 was 0.45.  [facility]---states he was seen by Dr. Brunner last week.     -no meaningful change in renal function after stent placement.  Mag 3 reviewed---difficult to assess obstruction in setting of lack of uptake/poor renal function.   -Renal US shows persistent hydronephrosis. CT scan showed worsening hydronephrosis but his serum Cr improved from prior blood work to 1.7 despite the imaging findings.     -discussed potential for right nephrostomy tube in future if feasible based on anticoagulation requirements.  Discussed observation vs ureteral reimplant vs nephrectomy----currently, no significant function of the right kidney based on mag-3 and no change in serum Cr after stent placement suggesting relative lack of meaningful right renal function.   - Patient prefers to observe this if he can. Will schedule follow up in 6 months with BMP prior.

## 2024-09-12 ENCOUNTER — TELEPHONE (OUTPATIENT)
Dept: INTERVENTIONAL RADIOLOGY/VASCULAR | Facility: CLINIC | Age: 64
End: 2024-09-12
Payer: MEDICARE

## 2024-09-26 ENCOUNTER — OFFICE VISIT (OUTPATIENT)
Dept: INTERVENTIONAL RADIOLOGY/VASCULAR | Facility: CLINIC | Age: 64
End: 2024-09-26
Payer: MEDICARE

## 2024-09-26 VITALS
HEIGHT: 67 IN | WEIGHT: 165 LBS | SYSTOLIC BLOOD PRESSURE: 140 MMHG | BODY MASS INDEX: 25.9 KG/M2 | HEART RATE: 77 BPM | DIASTOLIC BLOOD PRESSURE: 80 MMHG

## 2024-09-26 DIAGNOSIS — I82.429: Primary | ICD-10-CM

## 2024-09-26 PROCEDURE — 1159F MED LIST DOCD IN RCRD: CPT | Mod: CPTII,S$GLB,, | Performed by: FAMILY MEDICINE

## 2024-09-26 PROCEDURE — 3077F SYST BP >= 140 MM HG: CPT | Mod: CPTII,S$GLB,, | Performed by: FAMILY MEDICINE

## 2024-09-26 PROCEDURE — 3079F DIAST BP 80-89 MM HG: CPT | Mod: CPTII,S$GLB,, | Performed by: FAMILY MEDICINE

## 2024-09-26 PROCEDURE — 1160F RVW MEDS BY RX/DR IN RCRD: CPT | Mod: CPTII,S$GLB,, | Performed by: FAMILY MEDICINE

## 2024-09-26 PROCEDURE — 3061F NEG MICROALBUMINURIA REV: CPT | Mod: CPTII,S$GLB,, | Performed by: FAMILY MEDICINE

## 2024-09-26 PROCEDURE — 3008F BODY MASS INDEX DOCD: CPT | Mod: CPTII,S$GLB,, | Performed by: FAMILY MEDICINE

## 2024-09-26 PROCEDURE — 99213 OFFICE O/P EST LOW 20 MIN: CPT | Mod: S$GLB,,, | Performed by: FAMILY MEDICINE

## 2024-09-26 PROCEDURE — 99999 PR PBB SHADOW E&M-EST. PATIENT-LVL IV: CPT | Mod: PBBFAC,,, | Performed by: FAMILY MEDICINE

## 2024-09-26 PROCEDURE — 3066F NEPHROPATHY DOC TX: CPT | Mod: CPTII,S$GLB,, | Performed by: FAMILY MEDICINE

## 2024-09-26 NOTE — PROGRESS NOTES
"Subjective     Patient ID: Bradley Collins is a 64 y.o. male.    Chief Complaint: Follow-up    Patient here for follow up of edema and erythema of the entire right lower extremity, right groin, and right lower abdomen. He was admitted for workup on 2/27/2024. CT scan obtained on 3/5/2024 noted "There is significant compression of the right external iliac vein extending to the common iliac.  Soft tissue encases the vessels and ureter in the right hemipelvis.  Questionable filling defect on the delayed images in the region of the right common iliac series 3, image 136."     Patient underwent successful recanalization of iliac veins with placement of kissing Venovo stents in the external iliac to IVC on 3/8/2024. He was seen for follow up on 3/26/2024. At that time he reported significant improvement in his edema, but not complete resolution. He endorsed some mild edema to the right lower extremity. He endorsed wearing his compression stocking. He endorsed adherence to plavix and eliquis. He had complaints of randomly experiencing pins and needles sensation behind the right knee and in the medial anterior thigh.     Reassured patient he did not have any restrictions regarding exercise. Sutures were removed without difficulty. Reassured "pins and needles" sensation unlikely related to procedure, continue with compression stockings, plavix, and eliquis. He underwent CT venogram on 9/9/2024. Today he reports feeling well. He denies any swelling today, but admits he has lower leg swelling intermittently. He has not been wearing his compression stocking for the past few months, but endorses adherence with Eliquis.      Review of Systems   Constitutional:  Negative for activity change, appetite change, chills, fatigue and fever.   Respiratory:  Negative for cough, shortness of breath, wheezing and stridor.    Cardiovascular:  Positive for leg swelling. Negative for chest pain and palpitations.   Gastrointestinal:  Negative for " abdominal distention, abdominal pain, constipation, diarrhea, nausea and vomiting.          Objective     Physical Exam  Constitutional:       General: He is not in acute distress.     Appearance: He is well-developed. He is not diaphoretic.   HENT:      Head: Normocephalic and atraumatic.   Pulmonary:      Effort: Pulmonary effort is normal. No respiratory distress.   Neurological:      Mental Status: He is alert and oriented to person, place, and time.   Psychiatric:         Behavior: Behavior normal.         Thought Content: Thought content normal.         Judgment: Judgment normal.       CT 9/9/2024  FINDINGS:  LUNG BASES: Partially imaged heart and pericardium are within normal limits.  Stable 10 mm nodule right middle lobe (series 2, image 17) and 7 mm nodule in the lingula (series 2, image 25).  Bibasilar dependent atelectasis.     HEPATOBILIARY: Liver is normal size.  Punctate hypodensity in the right hepatic lobe too small for characterization.  No new focal liver lesions.  Gallbladder is contracted.  No bile duct dilatation.     SPLEEN: No splenomegaly.     PANCREAS: Stable hypoattenuating 1.3 cm lesion in the pancreatic tail and smaller 1.0 cm lesion at the pancreatic head.  No duct dilatation.     ADRENALS: No adrenal nodules.     KIDNEYS/URETERS: right-sided cortical thinning with slightly delayed nephrogram and delayed excretion of contrast on delayed images.  Perinephric stranding about the right kidney.  Moderate right hydronephrosis, increased from prior, with associated moderate right hydroureter to the level of the pelvic inlet.  Unchanged mild left hydronephrosis.  No obstructing stone or mass identified.  Several bilateral subcentimeter hypodensities too small for characterization.  No solid renal masses.     BLADDER/PELVIC ORGANS: Circumferential bladder wall thickening, new from prior.  Status post prostate brachytherapy.     PERITONEUM / RETROPERITONEUM: No free air or fluid.  There is  increased soft tissue attenuation along the right hemipelvis at the pelvic inlet with tethering of the right ureter, proximal right external internal iliac arteries, and right external iliac vein and stent.     LYMPH NODES: No lymphadenopathy.     VESSELS: Mild atherosclerosis.  Portal veins are patent.  Bilateral venous stents within the common and external iliac veins.  Modest focal luminal narrowing at the distal right external iliac vein (series 2, image 142).  Both stents appear patent with suspected non-occlusive crescentic-shaped filling defect in the distal anterior aspect of the left venous stent (series 2, image 139).  Continued mild narrowing of the proximal right external and internal iliac arteries.     GI TRACT: No distention or wall thickening. Normal appendix.  Few scattered colonic diverticula.     SOFT TISSUES: Marked improvement in previously demonstrated skin thickening and subcutaneous edema at the hips and right lower extremity.     BONES: Mild degenerative changes throughout the imaged spine.  No aggressive osseous lesions.     Impression:     Status post bilateral common and external iliac vein stent placement.  Modest degree of focal narrowing at the distal right external iliac vein.     Stents appear patent with suspected intimal hyperplasia in the distal left external iliac vein.     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     Worsening right-sided hydroureteronephrosis.     Two hypoattenuating lesions in the pancreas.  Recommend reimaging with MRI MRCP at 1 year.       Assessment and Plan     1. Acquired occlusion of iliac vein  -     CT Abdomen Pelvis With IV Contrast Routine Oral Contrast; Future; Expected date: 09/26/2024        Reviewed CT scan with Dr. Gonsalez. Explained to patient there is some mild stenosis at the  bottom of the left stent, but Dr. Gonsalez does not recommend intervention at this time. Recommendation is to continue with surveillance and repeat CT venogram in 6 months. Encouraged patient to wear compression stocking and encouraged continued adherence to Eliquis. Patient asks if stents would ever need to be changed and if he would need to remain on Eliquis forever. I will message Dr. Gonsalez with these questions and call patient. Confirmed patient phone number in chart. Clinic phone number provided.

## 2024-09-30 ENCOUNTER — TELEPHONE (OUTPATIENT)
Dept: INTERVENTIONAL RADIOLOGY/VASCULAR | Facility: HOSPITAL | Age: 64
End: 2024-09-30
Payer: MEDICARE

## 2024-09-30 NOTE — TELEPHONE ENCOUNTER
Spoke to patient and notified that stents are permanent and do not need to be changed. Also notified will need lifelong anticoagulation. Discussed aspirin, but patient states he cannot take aspirin because he is an asthmatic. No further questions at this time.

## 2024-11-04 ENCOUNTER — LAB VISIT (OUTPATIENT)
Dept: LAB | Facility: OTHER | Age: 64
End: 2024-11-04
Attending: NURSE PRACTITIONER
Payer: MEDICARE

## 2024-11-04 DIAGNOSIS — N18.32 STAGE 3B CHRONIC KIDNEY DISEASE: ICD-10-CM

## 2024-11-04 LAB
25(OH)D3+25(OH)D2 SERPL-MCNC: 33 NG/ML (ref 30–96)
ALBUMIN SERPL BCP-MCNC: 3.7 G/DL (ref 3.5–5.2)
ANION GAP SERPL CALC-SCNC: 7 MMOL/L (ref 8–16)
BASOPHILS # BLD AUTO: 0.14 K/UL (ref 0–0.2)
BASOPHILS NFR BLD: 1.5 % (ref 0–1.9)
BUN SERPL-MCNC: 17 MG/DL (ref 8–23)
CALCIUM SERPL-MCNC: 9.1 MG/DL (ref 8.7–10.5)
CHLORIDE SERPL-SCNC: 107 MMOL/L (ref 95–110)
CO2 SERPL-SCNC: 26 MMOL/L (ref 23–29)
CREAT SERPL-MCNC: 1.7 MG/DL (ref 0.5–1.4)
DIFFERENTIAL METHOD BLD: ABNORMAL
EOSINOPHIL # BLD AUTO: 1.2 K/UL (ref 0–0.5)
EOSINOPHIL NFR BLD: 12.9 % (ref 0–8)
ERYTHROCYTE [DISTWIDTH] IN BLOOD BY AUTOMATED COUNT: 12.9 % (ref 11.5–14.5)
EST. GFR  (NO RACE VARIABLE): 44 ML/MIN/1.73 M^2
GLUCOSE SERPL-MCNC: 69 MG/DL (ref 70–110)
HCT VFR BLD AUTO: 41.1 % (ref 40–54)
HGB BLD-MCNC: 13.6 G/DL (ref 14–18)
IMM GRANULOCYTES # BLD AUTO: 0.03 K/UL (ref 0–0.04)
IMM GRANULOCYTES NFR BLD AUTO: 0.3 % (ref 0–0.5)
LYMPHOCYTES # BLD AUTO: 2 K/UL (ref 1–4.8)
LYMPHOCYTES NFR BLD: 21 % (ref 18–48)
MCH RBC QN AUTO: 32.1 PG (ref 27–31)
MCHC RBC AUTO-ENTMCNC: 33.1 G/DL (ref 32–36)
MCV RBC AUTO: 97 FL (ref 82–98)
MONOCYTES # BLD AUTO: 0.8 K/UL (ref 0.3–1)
MONOCYTES NFR BLD: 8.6 % (ref 4–15)
NEUTROPHILS # BLD AUTO: 5.2 K/UL (ref 1.8–7.7)
NEUTROPHILS NFR BLD: 55.7 % (ref 38–73)
NRBC BLD-RTO: 0 /100 WBC
PHOSPHATE SERPL-MCNC: 2.1 MG/DL (ref 2.7–4.5)
PLATELET # BLD AUTO: 232 K/UL (ref 150–450)
PMV BLD AUTO: 8.8 FL (ref 9.2–12.9)
POTASSIUM SERPL-SCNC: 4.4 MMOL/L (ref 3.5–5.1)
PTH-INTACT SERPL-MCNC: 101.3 PG/ML (ref 9–77)
RBC # BLD AUTO: 4.24 M/UL (ref 4.6–6.2)
SODIUM SERPL-SCNC: 140 MMOL/L (ref 136–145)
WBC # BLD AUTO: 9.38 K/UL (ref 3.9–12.7)

## 2024-11-04 PROCEDURE — 36415 COLL VENOUS BLD VENIPUNCTURE: CPT | Performed by: NURSE PRACTITIONER

## 2024-11-04 PROCEDURE — 80069 RENAL FUNCTION PANEL: CPT | Performed by: NURSE PRACTITIONER

## 2024-11-04 PROCEDURE — 83970 ASSAY OF PARATHORMONE: CPT | Performed by: NURSE PRACTITIONER

## 2024-11-04 PROCEDURE — 82306 VITAMIN D 25 HYDROXY: CPT | Performed by: NURSE PRACTITIONER

## 2024-11-04 PROCEDURE — 85025 COMPLETE CBC W/AUTO DIFF WBC: CPT | Performed by: NURSE PRACTITIONER

## 2024-11-11 ENCOUNTER — OFFICE VISIT (OUTPATIENT)
Dept: NEPHROLOGY | Facility: CLINIC | Age: 64
End: 2024-11-11
Payer: MEDICARE

## 2024-11-11 VITALS
WEIGHT: 164.69 LBS | OXYGEN SATURATION: 99 % | HEART RATE: 73 BPM | HEIGHT: 67 IN | DIASTOLIC BLOOD PRESSURE: 74 MMHG | SYSTOLIC BLOOD PRESSURE: 131 MMHG | BODY MASS INDEX: 25.85 KG/M2

## 2024-11-11 DIAGNOSIS — I10 HYPERTENSION, UNSPECIFIED TYPE: ICD-10-CM

## 2024-11-11 DIAGNOSIS — D64.9 ANEMIA, UNSPECIFIED TYPE: ICD-10-CM

## 2024-11-11 DIAGNOSIS — N18.32 STAGE 3B CHRONIC KIDNEY DISEASE: Primary | ICD-10-CM

## 2024-11-11 DIAGNOSIS — N25.81 SECONDARY HYPERPARATHYROIDISM: ICD-10-CM

## 2024-11-11 PROCEDURE — G2211 COMPLEX E/M VISIT ADD ON: HCPCS | Mod: S$GLB,,, | Performed by: NURSE PRACTITIONER

## 2024-11-11 PROCEDURE — 1101F PT FALLS ASSESS-DOCD LE1/YR: CPT | Mod: CPTII,S$GLB,, | Performed by: NURSE PRACTITIONER

## 2024-11-11 PROCEDURE — 3061F NEG MICROALBUMINURIA REV: CPT | Mod: CPTII,S$GLB,, | Performed by: NURSE PRACTITIONER

## 2024-11-11 PROCEDURE — 3066F NEPHROPATHY DOC TX: CPT | Mod: CPTII,S$GLB,, | Performed by: NURSE PRACTITIONER

## 2024-11-11 PROCEDURE — 3008F BODY MASS INDEX DOCD: CPT | Mod: CPTII,S$GLB,, | Performed by: NURSE PRACTITIONER

## 2024-11-11 PROCEDURE — 3288F FALL RISK ASSESSMENT DOCD: CPT | Mod: CPTII,S$GLB,, | Performed by: NURSE PRACTITIONER

## 2024-11-11 PROCEDURE — 99214 OFFICE O/P EST MOD 30 MIN: CPT | Mod: S$GLB,,, | Performed by: NURSE PRACTITIONER

## 2024-11-11 PROCEDURE — 1160F RVW MEDS BY RX/DR IN RCRD: CPT | Mod: CPTII,S$GLB,, | Performed by: NURSE PRACTITIONER

## 2024-11-11 PROCEDURE — 3075F SYST BP GE 130 - 139MM HG: CPT | Mod: CPTII,S$GLB,, | Performed by: NURSE PRACTITIONER

## 2024-11-11 PROCEDURE — 1159F MED LIST DOCD IN RCRD: CPT | Mod: CPTII,S$GLB,, | Performed by: NURSE PRACTITIONER

## 2024-11-11 PROCEDURE — 3078F DIAST BP <80 MM HG: CPT | Mod: CPTII,S$GLB,, | Performed by: NURSE PRACTITIONER

## 2024-11-11 PROCEDURE — 99999 PR PBB SHADOW E&M-EST. PATIENT-LVL IV: CPT | Mod: PBBFAC,,, | Performed by: NURSE PRACTITIONER

## 2024-11-11 NOTE — PROGRESS NOTES
Subjective:       Patient ID: Bradley Collins is a 64 y.o. AA male who presents for new evaluation of of renal dysfunction.      HPI     Patient is new to me. New to clinic.  Prior pertinent chart reviewed since this is patient's first appointment with me. Presents with wife.    Patient presents for new evaluation of renal dysfunction.  Baseline creatinine of 1.1-1.3 mg/dL. One year passed between lab results available in EMR.  Recent DALIA peaked at 2.1 --> 1.5 --> 1.7-1.8 mg/dL    [facility]. Discharged on cephalexin.  Ad[facility]/16-2/20/24. He was on Zosyn, which was changed to ceftriaxone, and vancomycin. Discharged on PO augmentin and doxycycline. Still with induration to upper leg.  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 Also had DALIA c sCr 1.7. Given Zosyn and Vancomycin, which was changed to ceftriaxone, daptomycin, and linezolid. Diagnosed with lymphedema causing swelling to RLE, right pelvis, penis, scrotum. CT found right iliac vein filling defect due to thrombus; had iliac vein recanalaziation, which caused improvmeent in swelling.  Nephrology consulted for DALIA. Amlodipine started for HTN (new) thought to be 2/2 DALIA. Nephrology also said sCr may be at new baseline, though the differential did include AIN. Nephrology signed off after sCr improved with recommendation for outpt follow up. sCr elevated (1.5-->1.8) the day after nephrology signed off.    Seen by urology 3/25/24 for hydronephrosis. He declined lee catheter that was recommended as well as starting Flomax. Plans for lasix renal scan.    Significant other medical problems include asthma, prostate cancer    Significant family hx includes: mother and brother with kidney disease, stage 3    Last renal US: March 21, 2024: New mild bilateral hydronephrosis noting preserved urine jets within the urinary bladder. Also with bladder wall thickening.  Increased renal  "cortical echogenicity.  3/5/24 - mild right hydroureteronephrosis noted on CT abdomen  3/3/24 - no hydronephrosis on US    Update 7/10/24:  Presents for f/u of CKD.  New baseline appears to be 1.7-1.9 mg/dL.  Home BPs: -135  He had been out of medication. Just restarted medication after being out of it for two weeks.    Had ureteral stent placed to right kidney without change in sCr or significant change to right kidney function on mag-3 scan.    Update 11/11/24:  Presents for f/u of CKD.  Baseline sCr appears to be 1.7-1.9 mg/dL.  Home BPs: does not take    Review of Systems   Respiratory:  Negative for shortness of breath.    Cardiovascular:  Negative for leg swelling.   Gastrointestinal:  Negative for diarrhea, nausea and vomiting.   Genitourinary:  Negative for difficulty urinating, dysuria and hematuria.       Objective:       Blood pressure 131/74, pulse 73, height 5' 7" (1.702 m), weight 74.7 kg (164 lb 10.9 oz), SpO2 99%.  Physical Exam  Vitals reviewed.   Constitutional:       General: He is not in acute distress.     Appearance: He is well-developed.   Eyes:      Conjunctiva/sclera: Conjunctivae normal.   Cardiovascular:      Rate and Rhythm: Normal rate.   Pulmonary:      Effort: Pulmonary effort is normal. No respiratory distress.   Abdominal:      Tenderness: There is no right CVA tenderness or left CVA tenderness.   Musculoskeletal:      Cervical back: Neck supple.      Right lower leg: No edema.      Left lower leg: No edema.   Neurological:      Mental Status: He is alert and oriented to person, place, and time.   Psychiatric:         Mood and Affect: Mood normal.         Behavior: Behavior normal.         Thought Content: Thought content normal.         Judgment: Judgment normal.           Lab Results   Component Value Date    CREATININE 1.7 (H) 11/04/2024    URICACID 6.3 03/03/2024     Prot/Creat Ratio, Urine   Date Value Ref Range Status   11/04/2024 Unable to calculate 0.00 - 0.20 Final " "  07/09/2024 0.09 0.00 - 0.20 Final   03/07/2024 0.22 (H) 0.00 - 0.20 Final     Lab Results   Component Value Date     11/04/2024    K 4.4 11/04/2024    CO2 26 11/04/2024     11/04/2024     Lab Results   Component Value Date    .3 (H) 11/04/2024    CALCIUM 9.1 11/04/2024    PHOS 2.1 (L) 11/04/2024     Lab Results   Component Value Date    HGB 13.6 (L) 11/04/2024    WBC 9.38 11/04/2024    HCT 41.1 11/04/2024      Lab Results   Component Value Date     11/04/2024    BUN 17 11/04/2024     No results found for: "LDLCALC"      Assessment:       1. Stage 3b chronic kidney disease    2. Hypertension, unspecified type    3. Secondary hyperparathyroidism    4. Anemia, unspecified type            Plan:   CKD stage 3-  Clinically, etiology is obstructive nephropathy to right kidney; mag-3 scan shows low function of right kidney, despite stent in place. Stable.    Mag 3 renal scan from 5/10/24  reveals 16% right and 84% left differential renal function; however, it was noted that there was less uptake of radiopharmaceutical material to right kidney.      Attended Intro to CKD.    UPCR No significant proteinuria. Not on RAASi.   Acid-base WNL   Secondary hyperparathyroidism Ca WNL. Phos low. On vit D. PTH slightly elevated.   Anemia Hgb at goal   DM N/a   Lipid Management On statin.   ESRD planning Mother-in-law was on dialysis.      HTN -  Okay on amlodipine 10 mg.     All questions patient had were answered.  Asked if further questions. None. F/u in clinic in 6 mos with labs and urine prior to next visit or sooner if needed.  ER for emergency concerns.    Summary of Plan:  avoid NSAID/ bactrim/ IV contrast/ gadolinium/ aminoglycoside where possible  Continue to monitor BPs at home  RTC in 6 mos (virtual)    Visit today included increased complexity associated with  managing the longitudinal care of the patient due to the serious and/or complex managed problem(s) CKD.        "

## 2025-03-10 ENCOUNTER — HOSPITAL ENCOUNTER (OUTPATIENT)
Dept: RADIOLOGY | Facility: OTHER | Age: 65
Discharge: HOME | End: 2025-03-10
Attending: FAMILY MEDICINE
Payer: MEDICARE

## 2025-03-10 DIAGNOSIS — I82.429: ICD-10-CM

## 2025-03-12 ENCOUNTER — HOSPITAL ENCOUNTER (OUTPATIENT)
Dept: RADIOLOGY | Facility: OTHER | Age: 65
Discharge: HOME | End: 2025-03-12
Attending: FAMILY MEDICINE
Payer: MEDICARE

## 2025-03-12 PROCEDURE — 74177 CT ABD & PELVIS W/CONTRAST: CPT | Mod: 26,,, | Performed by: INTERNAL MEDICINE

## 2025-03-12 PROCEDURE — 25500020 PHARM REV CODE 255: Performed by: FAMILY MEDICINE

## 2025-03-12 PROCEDURE — 74177 CT ABD & PELVIS W/CONTRAST: CPT | Mod: TC

## 2025-03-12 RX ADMIN — IOHEXOL 100 ML: 350 INJECTION, SOLUTION INTRAVENOUS at 01:03

## 2025-03-12 RX ADMIN — IOHEXOL 25 ML: 350 INJECTION, SOLUTION INTRAVENOUS at 12:03

## 2025-03-24 ENCOUNTER — OFFICE VISIT (OUTPATIENT)
Dept: UROLOGY | Facility: CLINIC | Age: 65
End: 2025-03-24
Payer: MEDICARE

## 2025-03-24 VITALS
DIASTOLIC BLOOD PRESSURE: 87 MMHG | HEART RATE: 75 BPM | BODY MASS INDEX: 26.57 KG/M2 | HEIGHT: 67 IN | WEIGHT: 169.31 LBS | SYSTOLIC BLOOD PRESSURE: 148 MMHG

## 2025-03-24 DIAGNOSIS — C61 PROSTATE CANCER: Primary | ICD-10-CM

## 2025-03-24 DIAGNOSIS — N13.30 HYDRONEPHROSIS, UNSPECIFIED HYDRONEPHROSIS TYPE: ICD-10-CM

## 2025-03-24 DIAGNOSIS — N18.32 STAGE 3B CHRONIC KIDNEY DISEASE: ICD-10-CM

## 2025-03-24 PROCEDURE — 1160F RVW MEDS BY RX/DR IN RCRD: CPT | Mod: CPTII,S$GLB,, | Performed by: UROLOGY

## 2025-03-24 PROCEDURE — 3288F FALL RISK ASSESSMENT DOCD: CPT | Mod: CPTII,S$GLB,, | Performed by: UROLOGY

## 2025-03-24 PROCEDURE — 3008F BODY MASS INDEX DOCD: CPT | Mod: CPTII,S$GLB,, | Performed by: UROLOGY

## 2025-03-24 PROCEDURE — 3079F DIAST BP 80-89 MM HG: CPT | Mod: CPTII,S$GLB,, | Performed by: UROLOGY

## 2025-03-24 PROCEDURE — 99214 OFFICE O/P EST MOD 30 MIN: CPT | Mod: S$GLB,,, | Performed by: UROLOGY

## 2025-03-24 PROCEDURE — 99999 PR PBB SHADOW E&M-EST. PATIENT-LVL IV: CPT | Mod: PBBFAC,,, | Performed by: UROLOGY

## 2025-03-24 PROCEDURE — 1126F AMNT PAIN NOTED NONE PRSNT: CPT | Mod: CPTII,S$GLB,, | Performed by: UROLOGY

## 2025-03-24 PROCEDURE — 1101F PT FALLS ASSESS-DOCD LE1/YR: CPT | Mod: CPTII,S$GLB,, | Performed by: UROLOGY

## 2025-03-24 PROCEDURE — 3077F SYST BP >= 140 MM HG: CPT | Mod: CPTII,S$GLB,, | Performed by: UROLOGY

## 2025-03-24 PROCEDURE — 1159F MED LIST DOCD IN RCRD: CPT | Mod: CPTII,S$GLB,, | Performed by: UROLOGY

## 2025-03-24 PROCEDURE — G2211 COMPLEX E/M VISIT ADD ON: HCPCS | Mod: S$GLB,,, | Performed by: UROLOGY

## 2025-03-24 RX ORDER — ATORVASTATIN CALCIUM 10 MG/1
10 TABLET, FILM COATED ORAL
COMMUNITY
Start: 2024-08-07

## 2025-03-24 NOTE — PROGRESS NOTES
Subjective:       Patient ID: Bradley Collins is a 64 y.o. male.    Chief Complaint:  prostate cancer, bilateral hydronephrosis.    History of Present Illness  HPI  Patient is a 64 y.o. male who is established to our clinic and was initially referred by Elena Alexander NP for evaluation of bilateral hydronephrosis.     History of Present Illness    Patient presents today for six-month follow up. He has a known right kidney obstruction with poor function. Previous stent placement did not result in meaningful improvement in kidney function, and currently there is no stent in place. He denies any pain or problems related to the kidney blockage. He has a history of prostate cancer treated with radiation therapy. [facility]           Of note, the patient has a h/o prostate cancer.  Most[facility] diagnosed[facility].  Patient underwent an MRI of the prostate due to an elevated psa.  [facility] 5.  MRI done in 2/2020 showed a PIRADS 4 and PIRADS 3 lesion, questionable MAYRA or capsular deformity.  Underwent a fusion biopsy===patient had high volume cancer with [place] 4+4 CaP.  Was treated  with EBRT with Dr. Lazo in [id number].      Patient has good erections and minimal LUTs currently.  No prior abdominal surgeries.     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     CT scan of the abdomen and pelvis with contrast from 03/05/2024 was independently reviewed today and reveals delayed right nephrogram with mild right hydronephrosis down to the level the distal ureter, per official report there is encasement of the distal ureter in soft tissue mass.  Mag 3 renal scan from 04/10/2020 was previously reviewed  and reveals 85% left differential renal function in 15% right renal function.  Concerns for right-sided obstruction although difficult to assess given the relative lack of contrast uptake.    He underwent a cystoscopy with right ureteral stent placement on 5/2/24.  BMP from 5/10/24 showed a stable serum Cr of 1.7, eGFR 44.5 which is unchanged from prior to placing the right ureteral stent.  Mag 3 renal scan from 5/10/24 was independently reviewed today and reveals differential function of 16% right and 84% left renal function.    Reports feeling well today, no stent discomfort, no n/v/f/c, no hematuria, dysuria, LUTS.   Mag 3 renal scan from 5/10/24 was independently reviewed today and reveals 16% right and 84% left differential renal function, possible concern for obstruction of the right kidney although difficult to discern given the relative lack of radiolabeled tracer uptake and relative poor function.      Patient denies any right flank pain.    Returns today to discuss renal function without a ureteral stent in place.     S/p cystoscopy with stent removal on 6/5/24.  Cr on 3/10/25 was 1.7. This is stable.   Denies flank pain.     Denies history of abdominal surgery.   On Eliquis.     CT scan of the abd/pelvis from 3/12/25 was independently reviewed today and reveals atrophic right kidney with hydronephrosis, patient iliac stents.       Lab Results   Component Value Date    PSADIAG 0.45 04/16/2024               Review of Systems  Review of Systems  All other systems reviewed and negative except pertinent positives noted in HPI.       Objective:     Physical Exam  Constitutional:       General: He is not in acute distress.     Appearance: He is well-developed.   HENT:      Head: Normocephalic and atraumatic.   Eyes:      General: No scleral icterus.  Neck:      Trachea: No tracheal deviation.   Pulmonary:      Effort: Pulmonary effort is normal. No respiratory distress.   Neurological:      Mental Status: He is alert and oriented to person, place, and time.   Psychiatric:         Behavior: Behavior normal.         Thought Content: Thought content normal.         Judgment: Judgment normal.         Lab Review  Lab Results   Component Value Date    COLORU Colorless (A)  11/04/2024    SPECGRAV 1.010 11/04/2024    PHUR 6.0 11/04/2024    WBCUR Neg 03/25/2024    NITRITE Negative 11/04/2024    GLUCOSEUR Neg 03/25/2024    KETONESU Negative 11/04/2024    UROBILINOGEN Negative 11/04/2024    RBCUR Neg 03/25/2024         Assessment:        No diagnosis found.                Plan:     There are no diagnoses linked to this encounter.    --imaging reviewed as per HPI.     -psa from 4/16/24 was 0.45.  he reports a recent psa (10/2024---0.4 by his report).     -no change in renal function after stent placement or removal.  Mag 3 reviewed---difficult to assess obstruction in setting of lack of uptake/poor renal function.     Assessment & Plan    N13.30 Unspecified hydronephrosis  Z85.46 Personal history of malignant neoplasm of prostate    IMPRESSION:  - Right kidney remains obstructed with poor function; previous stent placement did not yield meaningful improvement.  - Creatinine level stable at 1.7, consistent with baseline.  - Continued observation of right kidney deemed appropriate due to lack of symptoms or functional changes.  - History of prostate cancer; PSA levels stable (0.4 in October 2024).  - CT Abdomen results reviewed, showing stable retroperitoneal fibrosis, iliac stenting, and small right kidney.  - Determined [facility].  - Concluded that further follow-up with this office for kidney issues is unnecessary unless new problems develop.    PROSTATE CANCER:  - [facility].    GENERAL FOLLOW-UP:  - Follow up as needed if new problems develop.           This note was generated with the assistance of ambient listening technology. Verbal consent was obtained by the patient and accompanying visitor(s) for the recording of patient appointment to facilitate this note. I attest to having reviewed and edited the generated note for accuracy, though some syntax or spelling errors  may persist. Please contact the author of this note for any clarification.      - code applied: patient requires or will require a continuous, longitudinal, and active collaborative plan of care related to this patient's health condition, prostate cancer, hydronephrosis --the management of which requires the direction of a practitioner with specialized clinical knowledge, skill, and expertise.

## 2025-04-01 ENCOUNTER — CLINICAL SUPPORT (OUTPATIENT)
Dept: INTERVENTIONAL RADIOLOGY/VASCULAR | Facility: CLINIC | Age: 65
End: 2025-04-01
Payer: MEDICARE

## 2025-04-01 DIAGNOSIS — I82.429: Primary | ICD-10-CM

## 2025-04-01 PROCEDURE — 98006 SYNCH AUDIO-VIDEO EST MOD 30: CPT | Mod: 95,,, | Performed by: FAMILY MEDICINE

## 2025-04-01 NOTE — PROGRESS NOTES
"Subjective     Patient ID: Bradley Collins is a 64 y.o. male.    Chief Complaint: Follow-up    Virtual visit with patient for follow up of recanalization of iliac veins with placement of kissing Venovo stents in the external iliac to IVC performed on 3/8/2025. Prior to treatment patient was admitted for edema and erythema of the entire right lower extremity, right groin, and right lower abdomen. A CT scan obtained on 3/5/2024 noted "There is significant compression of the right external iliac vein extending to the common iliac.  Soft tissue encases the vessels and ureter in the right hemipelvis.  Questionable filling defect on the delayed images in the region of the right common iliac series 3, image 136."      On 3/26/2024 he reported significant improvement in his edema, but not complete resolution. Sutures were removed without difficulty and patient advised to continue with compression stockings, plavix, and eliquis. He underwent CT venogram on 9/9/2024. He was seen for follow up on 9/26/2024 and admitted to lower leg swelling intermittently.     Today he denies any lower leg swelling and endorses adherence with Eliquis. Follow up CT scan obtained on 3/12/2025.      Review of Systems   Constitutional:  Negative for activity change, appetite change, chills, fatigue and fever.   Respiratory:  Negative for cough, shortness of breath, wheezing and stridor.    Cardiovascular:  Negative for chest pain, palpitations and leg swelling.   Gastrointestinal:  Negative for abdominal distention, abdominal pain, constipation, diarrhea, nausea and vomiting.          Objective     Physical Exam  Constitutional:       General: He is not in acute distress.     Appearance: He is well-developed. He is not diaphoretic.   HENT:      Head: Normocephalic and atraumatic.   Pulmonary:      Effort: Pulmonary effort is normal. No respiratory distress.   Neurological:      Mental Status: He is alert and oriented to person, place, and time. "   Psychiatric:         Behavior: Behavior normal.         Thought Content: Thought content normal.         Judgment: Judgment normal.       CT scan 3/12/2025  FINDINGS:  There is mild atelectasis at the lung bases.  Stable small nodules at the lung bases measure up to 10 mm.     The liver demonstrates a hypodensity in the right lobe, stable compared to prior imaging but too small to characterize.     The spleen is not enlarged.     The stomach is within normal limits.     The pancreas demonstrates a stable 1.4 cm hypodensity within the body and smaller subcentimeter hypodensity also stable.     The gallbladder is within normal limits.  There is no biliary ductal dilatation.     There is no adrenal mass.     The right kidney demonstrates small size and cortical thinning.  There is moderate hydronephrosis and hydroureter to the level of the pelvis.  Mild adjacent fat stranding is also noted.  Findings are similar to previous.  There is somewhat delayed excretion from the right kidney compared to the left but at the 3 minute imaging there is excretion.     The left kidney demonstrates cysts.     The bladder is partially distended.  There is bladder wall thickening greater than expected for the degree of nondistention.     The abdominal aorta is not aneurysmal.  There is no significant periaortic lymphadenopathy.  There is narrowing of the right external iliac artery.  Soft tissue within the right pelvis about the vessels as on prior.     As on prior exam, there are bilateral venous stents which extend from the common to external iliac veins.  The configuration of the stents is unchanged compared to prior imaging.  The right external iliac vein appears narrowed just caudal to the level of the stent.  Right-sided stent appears patent.  Questionable filling defect seen within the left-sided stent on prior, not as well visualized on the current exam.     Osseous structures show degenerative changes.     Impression:      Bilateral common and external iliac vein stent placement.  Narrowing of the right external iliac vein appears similar to prior.     Soft tissue thickening at the right pelvis along the vessels similar to prior suspected retroperitoneal fibrosis.     area of suspected intimal hyperplasia in the left external iliac vein not as well demonstrated on the current study.     Stable right-sided hydro ureteral nephrosis with small size of the right kidney     Two hypoattenuating lesions in the pancreas similar to prior.     Assessment and Plan     1. Acquired occlusion of iliac vein  -     IR Venocavagram Inferior; Future; Expected date: 04/01/2025  -     Comprehensive Metabolic Panel; Future; Expected date: 04/01/2025  -     Protime-INR; Future; Expected date: 04/01/2025  -     CBC Auto Differential; Future; Expected date: 04/01/2025        The patient location is: [place]  The chief complaint leading to consultation is: DVT    Visit type: audiovisual    Face to Face time with patient: 20 minutes  30 minutes of total time spent on the encounter, which includes face to face time and non-face to face time preparing to see the patient (eg, review of tests), Obtaining and/or reviewing separately obtained history, Documenting clinical information in the electronic or other health record, Independently interpreting results (not separately reported) and communicating results to the patient/family/caregiver, or Care coordination (not separately reported).         Each patient to whom he or she provides medical services by telemedicine is:  (1) informed of the relationship between the physician and patient and the respective role of any other health care provider with respect to management of the patient; and (2) notified that he or she may decline to receive medical services by telemedicine and may withdraw from such care at any time.    Notes:   Reviewed CT scan with Dr. Gonsalez. Explained to patient some compression of the  right iliac vein is noted on CT scan. Recommendation is to perform venogram with venoplasty. Discussed how the procedure will be performed, risks (including, but not limited to, pain, bleeding, infection, damage to nearby structures, and the need for additional procedures), benefits, possible complications, pre-post procedure expectations, and alternatives. Patient asks what are the risks of not performing venogram and venoplasty. Explained the risk is that the vein will continue to be compressed with possible complete occlusion and a return to the symptoms he was having prior to intervention. The patient voices understanding and all questions have been answered.  The patient agrees to proceed as planned. We will call to schedule. Message sent to schedulers. Clinic phone number provided.

## 2025-04-10 ENCOUNTER — LAB VISIT (OUTPATIENT)
Dept: LAB | Facility: OTHER | Age: 65
End: 2025-04-10
Attending: FAMILY MEDICINE
Payer: MEDICARE

## 2025-04-10 DIAGNOSIS — I82.429: ICD-10-CM

## 2025-04-10 LAB
ABSOLUTE EOSINOPHIL (OHS): 1.13 K/UL
ABSOLUTE MONOCYTE (OHS): 0.73 K/UL (ref 0.3–1)
ABSOLUTE NEUTROPHIL COUNT (OHS): 4.32 K/UL (ref 1.8–7.7)
ALBUMIN SERPL BCP-MCNC: 4 G/DL (ref 3.5–5.2)
ALP SERPL-CCNC: 67 UNIT/L (ref 40–150)
ALT SERPL W/O P-5'-P-CCNC: 24 UNIT/L (ref 10–44)
ANION GAP (OHS): 8 MMOL/L (ref 8–16)
AST SERPL-CCNC: 22 UNIT/L (ref 11–45)
BASOPHILS # BLD AUTO: 0.13 K/UL
BASOPHILS NFR BLD AUTO: 1.6 %
BILIRUB SERPL-MCNC: <0.5 MG/DL (ref 0.1–1)
BUN SERPL-MCNC: 24 MG/DL (ref 8–23)
CALCIUM SERPL-MCNC: 9.4 MG/DL (ref 8.7–10.5)
CHLORIDE SERPL-SCNC: 106 MMOL/L (ref 95–110)
CO2 SERPL-SCNC: 24 MMOL/L (ref 23–29)
CREAT SERPL-MCNC: 1.7 MG/DL (ref 0.5–1.4)
ERYTHROCYTE [DISTWIDTH] IN BLOOD BY AUTOMATED COUNT: 12.7 % (ref 11.5–14.5)
GFR SERPLBLD CREATININE-BSD FMLA CKD-EPI: 44 ML/MIN/1.73/M2
GLUCOSE SERPL-MCNC: 97 MG/DL (ref 70–110)
HCT VFR BLD AUTO: 42.4 % (ref 40–54)
HGB BLD-MCNC: 15.1 GM/DL (ref 14–18)
IMM GRANULOCYTES # BLD AUTO: 0.03 K/UL (ref 0–0.04)
IMM GRANULOCYTES NFR BLD AUTO: 0.4 % (ref 0–0.5)
INR PPP: 1 (ref 0.8–1.2)
LYMPHOCYTES # BLD AUTO: 1.88 K/UL (ref 1–4.8)
MCH RBC QN AUTO: 33.4 PG (ref 27–31)
MCHC RBC AUTO-ENTMCNC: 35.6 G/DL (ref 32–36)
MCV RBC AUTO: 94 FL (ref 82–98)
NUCLEATED RBC (/100WBC) (OHS): 0 /100 WBC
PLATELET # BLD AUTO: 260 K/UL (ref 150–450)
PMV BLD AUTO: 9 FL (ref 9.2–12.9)
POTASSIUM SERPL-SCNC: 4.1 MMOL/L (ref 3.5–5.1)
PROT SERPL-MCNC: 7.4 GM/DL (ref 6–8.4)
PROTHROMBIN TIME: 11.6 SECONDS (ref 9–12.5)
RBC # BLD AUTO: 4.52 M/UL (ref 4.6–6.2)
RELATIVE EOSINOPHIL (OHS): 13.7 %
RELATIVE LYMPHOCYTE (OHS): 22.9 % (ref 18–48)
RELATIVE MONOCYTE (OHS): 8.9 % (ref 4–15)
RELATIVE NEUTROPHIL (OHS): 52.5 % (ref 38–73)
SODIUM SERPL-SCNC: 138 MMOL/L (ref 136–145)
WBC # BLD AUTO: 8.22 K/UL (ref 3.9–12.7)

## 2025-04-10 PROCEDURE — 85025 COMPLETE CBC W/AUTO DIFF WBC: CPT

## 2025-04-10 PROCEDURE — 80053 COMPREHEN METABOLIC PANEL: CPT

## 2025-04-10 PROCEDURE — 36415 COLL VENOUS BLD VENIPUNCTURE: CPT

## 2025-04-10 PROCEDURE — 85610 PROTHROMBIN TIME: CPT

## 2025-04-11 ENCOUNTER — TELEPHONE (OUTPATIENT)
Dept: INTERVENTIONAL RADIOLOGY/VASCULAR | Facility: HOSPITAL | Age: 65
End: 2025-04-11
Payer: MEDICARE

## 2025-04-14 ENCOUNTER — HOSPITAL ENCOUNTER (OUTPATIENT)
Dept: INTERVENTIONAL RADIOLOGY/VASCULAR | Facility: HOSPITAL | Age: 65
Discharge: HOME | End: 2025-04-14
Attending: FAMILY MEDICINE
Payer: MEDICARE

## 2025-04-14 VITALS
RESPIRATION RATE: 37 BRPM | WEIGHT: 160 LBS | TEMPERATURE: 98 F | HEART RATE: 74 BPM | BODY MASS INDEX: 25.11 KG/M2 | DIASTOLIC BLOOD PRESSURE: 77 MMHG | SYSTOLIC BLOOD PRESSURE: 129 MMHG | OXYGEN SATURATION: 88 % | HEIGHT: 67 IN

## 2025-04-14 DIAGNOSIS — I82.429: Primary | ICD-10-CM

## 2025-04-14 DIAGNOSIS — I82.429: ICD-10-CM

## 2025-04-14 LAB
POC ACTIVATED CLOTTING TIME K: 112 SEC (ref 74–137)
POC ACTIVATED CLOTTING TIME K: 222 SEC (ref 74–137)
SAMPLE: ABNORMAL
SAMPLE: NORMAL
SITE: ABNORMAL
SITE: NORMAL

## 2025-04-14 PROCEDURE — C1769 GUIDE WIRE: HCPCS

## 2025-04-14 PROCEDURE — 99152 MOD SED SAME PHYS/QHP 5/>YRS: CPT

## 2025-04-14 PROCEDURE — 99153 MOD SED SAME PHYS/QHP EA: CPT

## 2025-04-14 PROCEDURE — 25500020 PHARM REV CODE 255: Performed by: RADIOLOGY

## 2025-04-14 PROCEDURE — C1725 CATH, TRANSLUMIN NON-LASER: HCPCS

## 2025-04-14 PROCEDURE — 63600175 PHARM REV CODE 636 W HCPCS: Performed by: RADIOLOGY

## 2025-04-14 PROCEDURE — C1757 CATH, THROMBECTOMY/EMBOLECT: HCPCS

## 2025-04-14 PROCEDURE — C1887 CATHETER, GUIDING: HCPCS

## 2025-04-14 PROCEDURE — C1894 INTRO/SHEATH, NON-LASER: HCPCS

## 2025-04-14 PROCEDURE — 27201423 OPTIME MED/SURG SUP & DEVICES STERILE SUPPLY

## 2025-04-14 RX ORDER — MIDAZOLAM HYDROCHLORIDE 1 MG/ML
INJECTION, SOLUTION INTRAMUSCULAR; INTRAVENOUS
Status: COMPLETED | OUTPATIENT
Start: 2025-04-14 | End: 2025-04-14

## 2025-04-14 RX ORDER — FENTANYL CITRATE 50 UG/ML
INJECTION, SOLUTION INTRAMUSCULAR; INTRAVENOUS
Status: COMPLETED | OUTPATIENT
Start: 2025-04-14 | End: 2025-04-14

## 2025-04-14 RX ORDER — LIDOCAINE HYDROCHLORIDE 10 MG/ML
INJECTION, SOLUTION INFILTRATION; PERINEURAL
Status: COMPLETED | OUTPATIENT
Start: 2025-04-14 | End: 2025-04-14

## 2025-04-14 RX ORDER — HEPARIN SODIUM 1000 [USP'U]/ML
INJECTION, SOLUTION INTRAVENOUS; SUBCUTANEOUS
Status: COMPLETED | OUTPATIENT
Start: 2025-04-14 | End: 2025-04-14

## 2025-04-14 RX ADMIN — FENTANYL CITRATE 50 MCG: 50 INJECTION INTRAMUSCULAR; INTRAVENOUS at 09:04

## 2025-04-14 RX ADMIN — FENTANYL CITRATE 50 MCG: 50 INJECTION INTRAMUSCULAR; INTRAVENOUS at 11:04

## 2025-04-14 RX ADMIN — MIDAZOLAM 2 MG: 1 INJECTION INTRAMUSCULAR; INTRAVENOUS at 09:04

## 2025-04-14 RX ADMIN — LIDOCAINE HYDROCHLORIDE 10 ML: 10 INJECTION, SOLUTION INFILTRATION; PERINEURAL at 09:04

## 2025-04-14 RX ADMIN — HEPARIN SODIUM 2000 UNITS: 1000 INJECTION, SOLUTION INTRAVENOUS; SUBCUTANEOUS at 10:04

## 2025-04-14 RX ADMIN — IOHEXOL 150 ML: 300 INJECTION, SOLUTION INTRAVENOUS at 11:04

## 2025-04-14 RX ADMIN — HEPARIN SODIUM 5000 UNITS: 1000 INJECTION, SOLUTION INTRAVENOUS; SUBCUTANEOUS at 10:04

## 2025-04-14 NOTE — PROCEDURES
Radiology Post-Procedure Note    Pre Op Diagnosis: In-stent stenosis  Post Op Diagnosis: Same    Procedure: Pelvic venogram and iliac stent thrombectomy/angioplasty    Procedure performed by: Darryl Gonsalez MD    Written Informed Consent Obtained: Yes  Specimen Removed: YES Multiple blood samples for ACT/heparin dosing  Estimated Blood Loss: Minimal    Findings:   Pelvic venogram with patent kissing iliac vein stents. Scattered moderate bilateral in-stent stenosis treated with angioplasty and thrombectomy. Widely patent kissing iliac stents post intervention.     Patient tolerated procedure well.    Bedrest for 1 hour with HOB elevated, will remove woggle in 30 min.   Follow-up in IR clinic in 3 months with CT venogram abdomen and pelvis.     Darryl Gonsalez MD  Interventional Radiology  Department of Radiology

## 2025-04-14 NOTE — PLAN OF CARE
Pt VSS and in NAD. PIV and CRM equipment removed. Discharge instructions and AVS provided. Pt verbalized understanding, questions and concerns addressed. No further needs at this time. Pt discharged from recovery area at 1220.

## 2025-04-14 NOTE — SEDATION DOCUMENTATION
Pt arrived to ir suite for venocavagram. Pt oriented to unit and staff, Pt safely transferred from stretcher to procedural table. Fall risk reviewed and comfort measures utilized with interventions. Safety strap applied, position pillows to minimize pressure points. Blankets applied. Pt prepped and draped utilizing standard sterile technique. Patient placed on continuous monitoring, as required by sedation policy. Timeouts implemented utilizing standard universal time-out per department and facility policy. RN to remain at bedside with continuous monitoring. Pt resting comfortably. Denies pain/discomfort. Will continue to monitor. See flow sheets for monitoring, medication administration, and updates. patient verbalizes understanding.

## 2025-04-14 NOTE — H&P
Radiology History & Physical      SUBJECTIVE:     Chief Complaint: in-stent stenosis    History of Present Illness:  Bradley Collins is a 64 y.o. male with a h/o iliac vein occlusion s/p kissing iliac stent placement. One year follow-up CT demonstrates moderate left iliac stent stenosis.   Past Medical History:   Diagnosis Date    Abnormal weight loss 07/11/2022    Formatting of this note might be different from the original.   Added automatically from request for surgery [id number]      Allergy     Asthma     Blood clot in vein     iliac vein    Cellulitis     Cellulitis of right lower limb 02/16/2024    Dyspepsia 07/11/2022    Formatting of this note might be different from the original.   Added automatically from request for surgery [id number]      Dysphonia 02/27/2024    Elevated blood-pressure reading without diagnosis of hypertension 02/27/2024    Elevated PSA     Hyperlipidemia     Irritable bowel syndrome with diarrhea 02/27/2024    Prostate cancer     Secondary hypertension 03/09/2024    Spiritual or Zoroastrianism counseling 02/27/2024     Past Surgical History:   Procedure Laterality Date    CYSTOGRAM  5/2/2024    Procedure: CYSTOGRAM;  Surgeon: Darryl Greenfield MD;  Location: [facility] OR [address] [place] [address];  Service: Urology;;    CYSTOSCOPY N/A 5/2/2024    Procedure: CYSTOSCOPY;  Surgeon: Darryl Greenfield MD;  [facility];  Service: Urology;  Laterality: N/A;    ENDOSCOPIC ULTRASOUND OF UPPER GASTROINTESTINAL TRACT N/A 7/25/2023    Procedure: ULTRASOUND, UPPER GI TRACT, ENDOSCOPIC;  Surgeon: Yoseph Li MD;  Location: [facility] ENDO (2ND FLR);  Service: Endoscopy;  Laterality: N/A;  instr [place]-va referral    RETROGRADE PYELOGRAPHY Right 5/2/2024    Procedure: PYELOGRAM, RETROGRADE;  Surgeon: Darryl Greenfield MD;  Location: [facility] OR [address] [place] [address];  Service: Urology;  Laterality: Right;    SINUS SURGERY Bilateral 2001    URETERAL STENT PLACEMENT Right 5/2/2024    Procedure: INSERTION, STENT, URETER;  Surgeon: Jean Pierre  Darryl PETERSON MD;  [facility] [place] [address] [place] [address];  Service: Urology;  Laterality: Right;       Home Meds:   Prior to Admission medications    Medication Sig Start Date End Date Taking? Authorizing Provider   albuterol (ACCUNEB) 1.25 mg/3 mL Nebu Take 1.25 mg by nebulization every 6 (six) hours as needed. Rescue   Yes [facility]   amLODIPine (NORVASC) 10 MG tablet Take 1 tablet (10 mg total) by mouth once daily. 3/10/24  Yes Sagar Cox MD   apixaban (ELIQUIS) 5 mg Tab Take 1 tablet (5 mg total) by mouth 2 (two) times daily. 3/9/24  Yes Sagar Cox MD   atorvastatin (LIPITOR) 10 MG tablet Take 10 mg by mouth. 8/7/24  Yes Provider, Historical   azelastine (ASTELIN) 137 mcg (0.1 %) nasal spray INHALE 2 SPRAYS IN EACH NOSTRIL TWICE A DAY FOR ALLERGIES 3/20/24  Yes Provider, Historical   budesonide-formoterol 160-4.5 mcg (SYMBICORT) 160-4.5 mcg/actuation HFAA Inhale 2 puffs into the lungs every 12 (twelve) hours. Controller   Yes [facility]   calcium carbonate (OS-CAROLYN) 500 mg calcium (1,250 mg) tablet    Yes Provider, Historical   cetirizine (ZYRTEC) 10 MG tablet 10 mg. 3/20/24  Yes Provider, Historical   dupilumab 300 mg/2 mL PnIj INJECT 300MG SUBCUTANEOUSLY EVERY TWO WEEKS 1/18/24  Yes Provider, Historical   fluticasone propionate 93 mcg/actuation AerB 2 sprays by Nasal route 2 (two) times a day. 10/23/23  Yes Provider, Historical   mometasone-formoterol (DULERA) 200-5 mcg/actuation inhaler Inhale 2 puffs into the lungs 2 (two) times daily. 2/6/24  Yes Provider, Historical   pantoprazole (PROTONIX) 40 MG tablet Take 40 mg by mouth.   Yes Provider, Historical   tadalafiL (CIALIS) 20 MG Tab 20 mg. 7/11/23  Yes Provider, Historical   tiotropium (SPIRIVA) 18 mcg inhalation capsule Inhale 18 mcg into the lungs once daily. Controller   Yes Provider, Historical   clopidogreL (PLAVIX) 75 mg tablet Take 5 mg by mouth. 4/22/24   Provider,  Historical   montelukast (SINGULAIR) 5 MG chewable tablet Take 5 mg by mouth.    Provider, Historical   nasal exhalation resistanc.dev (NASAL EXHALATION RESISTANCE DV NASL) by Nasal route.    Provider, Historical   omeprazole 20 mg TbEC Take 20 mg by mouth once daily. 12/19/23   Provider, Historical   oxybutynin (DITROPAN) 5 MG Tab Take 1 tablet (5 mg total) by mouth 3 (three) times daily as needed (for bladder spasms and stent pain). 5/2/24 5/16/24  Pretty Blackmon MD   vitamin D (VITAMIN D3) 1000 units Tab Take 1,000 Units by mouth once daily.    [facility]     Anticoagulants/Antiplatelets:  Eliquis    Allergies:   Review of patient's allergies indicates:   Allergen Reactions    Aspirin Shortness Of Breath     Sedation History:  no adverse reactions          OBJECTIVE:     Vital Signs (Most Recent)  Temp: 98.1 °F (36.7 °C) (04/14/25 0800)  Pulse: 70 (04/14/25 0800)  Resp: 17 (04/14/25 0800)  BP: 134/75 (04/14/25 0803)  SpO2: 99 % (04/14/25 0800)    Physical Exam:  ASA: 2  Mallampati: 2    General: no acute distress  Mental Status: alert and oriented to person, place and time  HEENT: normocephalic, atraumatic  Chest: unlabored breathing  Heart: regular heart rate  Abdomen: nondistended  Extremity: moves all extremities    Laboratory  Lab Results   Component Value Date    INR 1.0 04/10/2025       Lab Results   Component Value Date    WBC 8.22 04/10/2025    HGB 15.1 04/10/2025    HCT 42.4 04/10/2025    MCV 94 04/10/2025     04/10/2025      Lab Results   Component Value Date    GLU 88 03/10/2025     04/10/2025    K 4.1 04/10/2025     04/10/2025    CO2 24 04/10/2025    BUN 24 (H) 04/10/2025    CREATININE 1.7 (H) 04/10/2025    CALCIUM 9.4 04/10/2025    ALT 24 04/10/2025    AST 22 04/10/2025    ALBUMIN 4.0 04/10/2025    BILITOT <0.5 04/10/2025       ASSESSMENT/PLAN:     Sedation Plan: moderate  Patient will undergo pelvic venogram and possible intervention.    Darryl Gonsalez,  MD  Interventional Radiology  Department of Radiology

## 2025-04-14 NOTE — DISCHARGE INSTRUCTIONS
Monitor the site for bleeding. Apply firm pressure to the site if you need to cough, during sneezes, and in the event you have to vomit. This reduces the risk of your site bleeding. If bleeding does occurs, apply firm, manual pressure and seek medical assistance immediately!  Do not shower/bathe tonight. Shower tomorrow, at least 24 hours post-procedure. After your shower, you may remove the groin dressing.   Carefully cleanse the site with gentle soap and water; do not pick at any scab formation.  Monitor the site for signs and symptoms of infection (See below).    When to call your doctor:  Fever of 100.4°F (38°C) or higher, or as directed by your health care provider.  Sudden shortness breath or any bleeding, bruising, or a large area of swelling at the groin site.  Signs of an allergic reaction to the contrast dye: rash, nausea, vomiting, or trouble breathing.  Signs of infection at the site: increased pain, redness, swelling, warmth, or foul-smelling drainage.     Interventional Radiology Clinic:   [phone], choose prompt 3, Monday - Friday, 8:00 am - 4:00 pm    [phone] After hours and on holidays. Ask to speak with the interventional radiologist on call.

## 2025-04-14 NOTE — SEDATION DOCUMENTATION
Procedure completed. Patient tolerated well; VSS. Site CDI. Patient to be transported to pre/post for 45 min recovery; report to be given at the bedside.

## 2025-04-15 ENCOUNTER — TELEPHONE (OUTPATIENT)
Dept: INTERVENTIONAL RADIOLOGY/VASCULAR | Facility: CLINIC | Age: 65
End: 2025-04-15
Payer: MEDICARE

## 2025-05-01 ENCOUNTER — TELEPHONE (OUTPATIENT)
Dept: INTERVENTIONAL RADIOLOGY/VASCULAR | Facility: CLINIC | Age: 65
End: 2025-05-01
Payer: MEDICARE

## 2025-07-10 ENCOUNTER — PATIENT MESSAGE (OUTPATIENT)
Dept: NEPHROLOGY | Facility: CLINIC | Age: 65
End: 2025-07-10
Payer: MEDICARE

## 2025-07-11 ENCOUNTER — TELEPHONE (OUTPATIENT)
Dept: INTERVENTIONAL RADIOLOGY/VASCULAR | Facility: CLINIC | Age: 65
End: 2025-07-11
Payer: MEDICARE

## 2025-07-11 DIAGNOSIS — I82.429: Primary | ICD-10-CM

## 2025-07-14 ENCOUNTER — LAB VISIT (OUTPATIENT)
Dept: LAB | Facility: OTHER | Age: 65
End: 2025-07-14
Attending: FAMILY MEDICINE
Payer: MEDICARE

## 2025-07-14 DIAGNOSIS — I82.429: ICD-10-CM

## 2025-07-14 LAB
CREAT SERPL-MCNC: 1.5 MG/DL (ref 0.5–1.4)
GFR SERPLBLD CREATININE-BSD FMLA CKD-EPI: 51 ML/MIN/1.73/M2

## 2025-07-14 PROCEDURE — 36415 COLL VENOUS BLD VENIPUNCTURE: CPT

## 2025-07-14 PROCEDURE — 82565 ASSAY OF CREATININE: CPT

## 2025-07-15 ENCOUNTER — HOSPITAL ENCOUNTER (OUTPATIENT)
Dept: RADIOLOGY | Facility: OTHER | Age: 65
Discharge: HOME | End: 2025-07-15
Attending: PHYSICIAN ASSISTANT
Payer: MEDICARE

## 2025-07-15 DIAGNOSIS — I82.429: ICD-10-CM

## 2025-07-15 PROCEDURE — 25500020 PHARM REV CODE 255: Performed by: PHYSICIAN ASSISTANT

## 2025-07-15 PROCEDURE — 74177 CT ABD & PELVIS W/CONTRAST: CPT | Mod: TC

## 2025-07-15 PROCEDURE — 74177 CT ABD & PELVIS W/CONTRAST: CPT | Mod: 26,,, | Performed by: RADIOLOGY

## 2025-07-15 RX ADMIN — IOHEXOL 100 ML: 350 INJECTION, SOLUTION INTRAVENOUS at 05:07

## 2025-07-17 ENCOUNTER — OFFICE VISIT (OUTPATIENT)
Dept: INTERVENTIONAL RADIOLOGY/VASCULAR | Facility: CLINIC | Age: 65
End: 2025-07-17
Payer: MEDICARE

## 2025-07-17 DIAGNOSIS — I82.429: Primary | ICD-10-CM

## 2025-07-17 PROCEDURE — 98005 SYNCH AUDIO-VIDEO EST LOW 20: CPT | Mod: 95,,, | Performed by: FAMILY MEDICINE

## 2025-07-17 PROCEDURE — 1159F MED LIST DOCD IN RCRD: CPT | Mod: CPTII,95,, | Performed by: FAMILY MEDICINE

## 2025-07-17 PROCEDURE — 1160F RVW MEDS BY RX/DR IN RCRD: CPT | Mod: CPTII,95,, | Performed by: FAMILY MEDICINE

## 2025-07-17 NOTE — PROGRESS NOTES
The patient location is: {State:[facility]}  The chief complaint leading to consultation is: ***    Visit type: audiovisual    Face to Face time with patient: ***  *** minutes of total time spent on the encounter, which includes face to face time and non-face to face time preparing to see the patient (eg, review of tests), Obtaining and/or reviewing separately obtained history, Documenting clinical information in the electronic or other health record, Independently interpreting results (not separately reported) and communicating results to the patient/family/caregiver, or Care coordination (not separately reported).         Each patient to whom he or she provides medical services by telemedicine is:  (1) informed of the relationship between the physician and patient and the respective role of any other health care provider with respect to management of the patient; and (2) notified that he or she may decline to receive medical services by telemedicine and may withdraw from such care at any time.    Notes:       {New Patient Virtual Visit requirements-  50830  SYNCHRONOUS AUDIO-VIDEO VISIT, NEW, SF MDM 15+ MIN  51858  SYNCHRONOUS AUDIO-VIDEO VISIT, NEW, LOW MDM 30+ MIN  84722  SYNCHRONOUS AUDIO-VIDEO VISIT, NEW, MOD MDM 45+ MIN  09936  SYNCHRONOUS AUDIO-VIDEO VISIT, NEW, HIGH MDM 60+ MIN     Est Patient Virtual Visit requirements-  79829  SYNCHRONOUS AUDIO-VIDEO VISIT, EST, SF MDM 10+ MIN  80722  SYNCHRONOUS AUDIO-VIDEO VISIT, EST, LOW MDM 20+ MIN  37337  SYNCHRONOUS AUDIO-VIDEO VISIT, EST, MOD MDM 30+ MIN  41594  SYNCHRONOUS AUDIO-VIDEO VISIT, EST, HIGH MDM 40+ MIN  (This text will automatically delete):96607}

## 2025-07-17 NOTE — PROGRESS NOTES
"Subjective     Patient ID: Bradley Collins is a 64 y.o. male.    Chief Complaint: No chief complaint on file.    Virtual visit with patient for follow up of recanalization of iliac veins with placement of kissing Venovo stents in the external iliac to IVC performed on 3/8/2025. Prior to treatment patient was admitted for edema and erythema of the entire right lower extremity, right groin, and right lower abdomen. A CT scan obtained on 3/5/2024 noted "There is significant compression of the right external iliac vein extending to the common iliac.  Soft tissue encases the vessels and ureter in the right hemipelvis.  Questionable filling defect on the delayed images in the region of the right common iliac series 3, image 136."      On 3/26/2024 he reported significant improvement in his edema, but not complete resolution. Sutures were removed without difficulty and patient advised to continue with compression stockings, plavix, and eliquis. He underwent CT venogram on 9/9/2024. He was seen for follow up on 9/26/2024 and admitted to lower leg swelling intermittently. Repeat CT scan obtained on 3/12/2025 showed some compression of the right iliac vein. Recommendation was to perform venogram with venoplasty. He underwent thrombectomy and balloon angioplasty on 4/14/2025. Follow up CT scan obtained on 7/15/2025.     Today he denies any lower leg swelling and endorses adherence with Eliquis. He reports he cannot take aspirin due to asthma.      Review of Systems   Constitutional:  Negative for activity change, appetite change, chills, fatigue and fever.   Respiratory:  Negative for cough, shortness of breath, wheezing and stridor.    Cardiovascular:  Negative for chest pain, palpitations and leg swelling.   Musculoskeletal:  Negative for leg pain.        No leg swelling          Objective     Physical Exam  Constitutional:       General: He is not in acute distress.     Appearance: He is well-developed. He is not diaphoretic. "   HENT:      Head: Normocephalic and atraumatic.   Pulmonary:      Effort: Pulmonary effort is normal. No respiratory distress.   Neurological:      Mental Status: He is alert and oriented to person, place, and time.   Psychiatric:         Behavior: Behavior normal.         Thought Content: Thought content normal.         Judgment: Judgment normal.     CT scan 7/15/2025  FINDINGS:  Limited evaluation lung bases show atelectatic change.     Liver is normal in size.  No solid mass or biliary ductal dilatation.  Gallbladder is unremarkable.     Spleen, adrenal glands, and pancreas show no focal abnormalities.     Bilateral kidneys show normal size and position.  Peripelvic cysts on the left.  Cortical thinning of the right kidney are unchanged.  Degree dilatation of the right-sided collecting system is decreased in comparison to prior.  Urinary bladder is somewhat decompressed, but shows mild diffuse wall thickening.  Prostate is slightly enlarged.     Gastrointestinal tract shows no wall thickening or obstruction.  No free fluid or free gas in the abdomen.  No concerning lymphadenopathy.     Vascular structures show no evidence of aneurysm.  Bilateral iliac vein stents are present, in unchanged position.  The stents appear patent.  There is some narrowing of the native external iliac vein just distal to the termination of the stents, more so on the right than the left.  Findings appears similar to prior CT.  There may be a small amount of eccentric thrombus at the distal aspect of the left iliac stent which also appears similar to prior.     Osseous structures are intact without acute fracture or destructive lesion.     Impression:     Bilateral iliac vein stents appear patent and similar to prior.  There is some narrowing of the native external iliac veins at the distal aspect of the stents, more so on the right than the left which appears overall similar to the prior CT.  Questionable small amount of eccentric  thrombus at the distal aspect of the left iliac stent also appears similar to prior.       Assessment and Plan     1. Acquired occlusion of iliac vein  -     CT Abdomen Pelvis With IV Contrast NO Oral Contrast; Future; Expected date: 01/15/2026  -     Creatinine, serum; Future; Expected date: 07/17/2025        The patient location is: [place]  The chief complaint leading to consultation is: iliac vein thrombus    Visit type: audiovisual    Face to Face time with patient: 20 minutes  30 minutes of total time spent on the encounter, which includes face to face time and non-face to face time preparing to see the patient (eg, review of tests), Obtaining and/or reviewing separately obtained history, Documenting clinical information in the electronic or other health record, Independently interpreting results (not separately reported) and communicating results to the patient/family/caregiver, or Care coordination (not separately reported).         Each patient to whom he or she provides medical services by telemedicine is:  (1) informed of the relationship between the physician and patient and the respective role of any other health care provider with respect to management of the patient; and (2) notified that he or she may decline to receive medical services by telemedicine and may withdraw from such care at any time.    Notes:   Reviewed CT scan with Dr. Gonsalez. Explained to patient CT scan shows good response to treatment. Recommendation is to continue with anticoagulation. Explained anticoagulation will be lifelong. Also would like repeat CT venogram in 6 months. Patient verbalized understanding and agreement. Message sent to IR schedulers.

## 2025-08-07 ENCOUNTER — PATIENT MESSAGE (OUTPATIENT)
Dept: NEPHROLOGY | Facility: CLINIC | Age: 65
End: 2025-08-07
Payer: MEDICARE

## 2025-08-20 ENCOUNTER — PATIENT MESSAGE (OUTPATIENT)
Dept: NEPHROLOGY | Facility: CLINIC | Age: 65
End: 2025-08-20
Payer: MEDICARE

## (undated) DEVICE — UNDERGLOVES BIOGEL PI SIZE 7.5

## (undated) DEVICE — ADAPTER HOSE 10FT 8MM

## (undated) DEVICE — PACK CYSTOSCOPY III SIRUS

## (undated) DEVICE — TRAY CYSTO BASIN OMC

## (undated) DEVICE — SOL NACL IRR 3000ML

## (undated) DEVICE — GOWN X-LG STERILE BACK

## (undated) DEVICE — GUIDE WIRE MOTION .035 X 150CM

## (undated) DEVICE — SYR ONLY LUER LOCK 20CC

## (undated) DEVICE — SET IRR URLGY 2LINE UNIV SPIKE

## (undated) DEVICE — CATH POLLACK OPEN-END FLEXI-TI